# Patient Record
Sex: FEMALE | Race: WHITE | NOT HISPANIC OR LATINO | Employment: OTHER | URBAN - NONMETROPOLITAN AREA
[De-identification: names, ages, dates, MRNs, and addresses within clinical notes are randomized per-mention and may not be internally consistent; named-entity substitution may affect disease eponyms.]

---

## 2017-01-10 ENCOUNTER — HOSPITAL ENCOUNTER (OUTPATIENT)
Dept: GENERAL RADIOLOGY | Facility: HOSPITAL | Age: 77
Discharge: HOME | End: 2017-01-10
Attending: INTERNAL MEDICINE | Admitting: INTERNAL MEDICINE

## 2017-01-10 ENCOUNTER — TRANSCRIBE ORDERS (OUTPATIENT)
Dept: ADMINISTRATIVE | Facility: HOSPITAL | Age: 77
End: 2017-01-10

## 2017-01-10 ENCOUNTER — HOSPITAL ENCOUNTER (OUTPATIENT)
Dept: GENERAL RADIOLOGY | Facility: HOSPITAL | Age: 77
Discharge: HOME | End: 2017-01-10
Attending: INTERNAL MEDICINE

## 2017-01-10 DIAGNOSIS — M51.06 INTERVERTEBRAL LUMBAR DISC DISORDER WITH MYELOPATHY, LUMBAR REGION: ICD-10-CM

## 2017-01-10 DIAGNOSIS — M51.06 INTERVERTEBRAL LUMBAR DISC DISORDER WITH MYELOPATHY, LUMBAR REGION: Primary | ICD-10-CM

## 2017-01-10 PROCEDURE — 72110 X-RAY EXAM L-2 SPINE 4/>VWS: CPT

## 2017-01-10 PROCEDURE — 72070 X-RAY EXAM THORAC SPINE 2VWS: CPT

## 2017-01-10 PROCEDURE — 72072 X-RAY EXAM THORAC SPINE 3VWS: CPT | Performed by: RADIOLOGY

## 2017-01-10 PROCEDURE — 72110 X-RAY EXAM L-2 SPINE 4/>VWS: CPT | Performed by: RADIOLOGY

## 2017-01-20 ENCOUNTER — TRANSCRIBE ORDERS (OUTPATIENT)
Dept: ADMINISTRATIVE | Facility: HOSPITAL | Age: 77
End: 2017-01-20

## 2017-01-20 DIAGNOSIS — M51.04 THORACIC DISC DISEASE WITH MYELOPATHY: Primary | ICD-10-CM

## 2017-01-20 DIAGNOSIS — M51.06 DISC DISEASE WITH MYELOPATHY, LUMBAR: ICD-10-CM

## 2017-01-21 ENCOUNTER — HOSPITAL ENCOUNTER (OUTPATIENT)
Dept: MRI IMAGING | Facility: HOSPITAL | Age: 77
Discharge: HOME | End: 2017-01-21
Attending: INTERNAL MEDICINE | Admitting: INTERNAL MEDICINE

## 2017-01-21 ENCOUNTER — HOSPITAL ENCOUNTER (OUTPATIENT)
Dept: MRI IMAGING | Facility: HOSPITAL | Age: 77
Discharge: HOME | End: 2017-01-21
Attending: INTERNAL MEDICINE

## 2017-01-21 DIAGNOSIS — M51.04 THORACIC DISC DISEASE WITH MYELOPATHY: ICD-10-CM

## 2017-01-21 DIAGNOSIS — M51.06 DISC DISEASE WITH MYELOPATHY, LUMBAR: ICD-10-CM

## 2017-01-21 PROCEDURE — 72148 MRI LUMBAR SPINE W/O DYE: CPT

## 2017-01-21 PROCEDURE — 72148 MRI LUMBAR SPINE W/O DYE: CPT | Performed by: RADIOLOGY

## 2017-01-21 PROCEDURE — 72146 MRI CHEST SPINE W/O DYE: CPT

## 2017-01-21 PROCEDURE — 72146 MRI CHEST SPINE W/O DYE: CPT | Performed by: RADIOLOGY

## 2017-05-06 ENCOUNTER — HOSPITAL ENCOUNTER (EMERGENCY)
Facility: HOSPITAL | Age: 77
Discharge: HOME | End: 2017-05-06
Attending: FAMILY MEDICINE | Admitting: FAMILY MEDICINE

## 2017-05-06 ENCOUNTER — APPOINTMENT (OUTPATIENT)
Dept: CT IMAGING | Facility: HOSPITAL | Age: 77
End: 2017-05-06

## 2017-05-06 ENCOUNTER — APPOINTMENT (OUTPATIENT)
Dept: GENERAL RADIOLOGY | Facility: HOSPITAL | Age: 77
End: 2017-05-06

## 2017-05-06 VITALS
SYSTOLIC BLOOD PRESSURE: 124 MMHG | RESPIRATION RATE: 18 BRPM | OXYGEN SATURATION: 96 % | TEMPERATURE: 97.9 F | HEART RATE: 108 BPM | WEIGHT: 85 LBS | DIASTOLIC BLOOD PRESSURE: 80 MMHG | HEIGHT: 64 IN | BODY MASS INDEX: 14.51 KG/M2

## 2017-05-06 DIAGNOSIS — R31.9 HEMATURIA: ICD-10-CM

## 2017-05-06 DIAGNOSIS — J98.11 ATELECTASIS OF LEFT LUNG: ICD-10-CM

## 2017-05-06 DIAGNOSIS — M25.552 LEFT HIP PAIN: Primary | ICD-10-CM

## 2017-05-06 LAB
BACTERIA UR QL AUTO: ABNORMAL /HPF
BILIRUB UR QL STRIP: NEGATIVE
CLARITY UR: ABNORMAL
COLOR UR: YELLOW
GLUCOSE UR STRIP-MCNC: NEGATIVE MG/DL
HGB UR QL STRIP.AUTO: ABNORMAL
HYALINE CASTS UR QL AUTO: ABNORMAL /LPF
KETONES UR QL STRIP: ABNORMAL
LEUKOCYTE ESTERASE UR QL STRIP.AUTO: NEGATIVE
NITRITE UR QL STRIP: NEGATIVE
PH UR STRIP.AUTO: <=5 [PH] (ref 5–8)
PROT UR QL STRIP: ABNORMAL
RBC # UR: ABNORMAL /HPF
REF LAB TEST METHOD: ABNORMAL
SP GR UR STRIP: 1.02 (ref 1–1.03)
SQUAMOUS #/AREA URNS HPF: ABNORMAL /HPF
UROBILINOGEN UR QL STRIP: ABNORMAL
WBC UR QL AUTO: ABNORMAL /HPF

## 2017-05-06 PROCEDURE — 74176 CT ABD & PELVIS W/O CONTRAST: CPT | Performed by: RADIOLOGY

## 2017-05-06 PROCEDURE — P9612 CATHETERIZE FOR URINE SPEC: HCPCS

## 2017-05-06 PROCEDURE — 99284 EMERGENCY DEPT VISIT MOD MDM: CPT

## 2017-05-06 PROCEDURE — 81001 URINALYSIS AUTO W/SCOPE: CPT | Performed by: NURSE PRACTITIONER

## 2017-05-06 PROCEDURE — 73502 X-RAY EXAM HIP UNI 2-3 VIEWS: CPT

## 2017-05-06 PROCEDURE — 74176 CT ABD & PELVIS W/O CONTRAST: CPT

## 2017-05-06 PROCEDURE — 73502 X-RAY EXAM HIP UNI 2-3 VIEWS: CPT | Performed by: RADIOLOGY

## 2017-05-06 RX ORDER — KETOROLAC TROMETHAMINE 10 MG/1
10 TABLET, FILM COATED ORAL ONCE
Status: COMPLETED | OUTPATIENT
Start: 2017-05-06 | End: 2017-05-06

## 2017-05-06 RX ORDER — HYDROCODONE BITARTRATE AND ACETAMINOPHEN 7.5; 325 MG/1; MG/1
1 TABLET ORAL 2 TIMES DAILY PRN
Status: ON HOLD | COMMUNITY
End: 2017-07-14

## 2017-05-06 RX ORDER — CEPHALEXIN 500 MG/1
500 CAPSULE ORAL 3 TIMES DAILY
Qty: 21 CAPSULE | Refills: 0 | Status: ON HOLD | OUTPATIENT
Start: 2017-05-06 | End: 2017-07-14

## 2017-05-06 RX ORDER — ONDANSETRON 4 MG/1
4 TABLET, ORALLY DISINTEGRATING ORAL ONCE
Status: COMPLETED | OUTPATIENT
Start: 2017-05-06 | End: 2017-05-06

## 2017-05-06 RX ORDER — GABAPENTIN 100 MG/1
100 CAPSULE ORAL EVERY MORNING
COMMUNITY
End: 2017-08-04 | Stop reason: HOSPADM

## 2017-05-06 RX ADMIN — ONDANSETRON 4 MG: 4 TABLET, ORALLY DISINTEGRATING ORAL at 21:47

## 2017-05-06 RX ADMIN — KETOROLAC TROMETHAMINE 10 MG: 10 TABLET, FILM COATED ORAL at 21:47

## 2017-07-14 ENCOUNTER — HOSPITAL ENCOUNTER (INPATIENT)
Facility: HOSPITAL | Age: 77
LOS: 7 days | Discharge: NURSING, REHAB OR LONG-TERM CARE | End: 2017-07-21
Attending: INTERNAL MEDICINE | Admitting: INTERNAL MEDICINE

## 2017-07-14 ENCOUNTER — APPOINTMENT (OUTPATIENT)
Dept: GENERAL RADIOLOGY | Facility: HOSPITAL | Age: 77
End: 2017-07-14

## 2017-07-14 ENCOUNTER — APPOINTMENT (OUTPATIENT)
Dept: GENERAL RADIOLOGY | Facility: HOSPITAL | Age: 77
End: 2017-07-14
Attending: INTERNAL MEDICINE

## 2017-07-14 DIAGNOSIS — E86.0 DEHYDRATION: Primary | ICD-10-CM

## 2017-07-14 LAB
ALBUMIN SERPL-MCNC: 4 G/DL (ref 3.4–4.8)
ALBUMIN/GLOB SERPL: 1 G/DL (ref 1.5–2.5)
ALP SERPL-CCNC: 85 U/L (ref 35–104)
ALT SERPL W P-5'-P-CCNC: 4 U/L (ref 10–36)
ANION GAP SERPL CALCULATED.3IONS-SCNC: 6.3 MMOL/L (ref 3.6–11.2)
AST SERPL-CCNC: 15 U/L (ref 10–30)
BASOPHILS # BLD AUTO: 0.01 10*3/MM3 (ref 0–0.3)
BASOPHILS NFR BLD AUTO: 0.1 % (ref 0–2)
BILIRUB SERPL-MCNC: 0.4 MG/DL (ref 0.2–1.8)
BUN BLD-MCNC: 8 MG/DL (ref 7–21)
BUN/CREAT SERPL: 14.3 (ref 7–25)
CALCIUM SPEC-SCNC: 9.8 MG/DL (ref 7.7–10)
CHLORIDE SERPL-SCNC: 105 MMOL/L (ref 99–112)
CO2 SERPL-SCNC: 32.7 MMOL/L (ref 24.3–31.9)
CREAT BLD-MCNC: 0.56 MG/DL (ref 0.43–1.29)
CRP SERPL-MCNC: 2.34 MG/DL (ref 0–0.99)
DEPRECATED RDW RBC AUTO: 50.5 FL (ref 37–54)
EOSINOPHIL # BLD AUTO: 0.14 10*3/MM3 (ref 0–0.7)
EOSINOPHIL NFR BLD AUTO: 1.5 % (ref 0–7)
ERYTHROCYTE [DISTWIDTH] IN BLOOD BY AUTOMATED COUNT: 14.8 % (ref 11.5–14.5)
GFR SERPL CREATININE-BSD FRML MDRD: 105 ML/MIN/1.73
GLOBULIN UR ELPH-MCNC: 4 GM/DL
GLUCOSE BLD-MCNC: 92 MG/DL (ref 70–110)
HCT VFR BLD AUTO: 36.2 % (ref 37–47)
HGB BLD-MCNC: 10.8 G/DL (ref 12–16)
IMM GRANULOCYTES # BLD: 0.03 10*3/MM3 (ref 0–0.03)
IMM GRANULOCYTES NFR BLD: 0.3 % (ref 0–0.5)
LYMPHOCYTES # BLD AUTO: 0.95 10*3/MM3 (ref 1–3)
LYMPHOCYTES NFR BLD AUTO: 10.2 % (ref 16–46)
MCH RBC QN AUTO: 27.5 PG (ref 27–33)
MCHC RBC AUTO-ENTMCNC: 29.8 G/DL (ref 33–37)
MCV RBC AUTO: 92.1 FL (ref 80–94)
MONOCYTES # BLD AUTO: 0.85 10*3/MM3 (ref 0.1–0.9)
MONOCYTES NFR BLD AUTO: 9.1 % (ref 0–12)
NEUTROPHILS # BLD AUTO: 7.34 10*3/MM3 (ref 1.4–6.5)
NEUTROPHILS NFR BLD AUTO: 78.8 % (ref 40–75)
OSMOLALITY SERPL CALC.SUM OF ELEC: 284.8 MOSM/KG (ref 273–305)
PLATELET # BLD AUTO: 285 10*3/MM3 (ref 130–400)
PMV BLD AUTO: 10.1 FL (ref 6–10)
POTASSIUM BLD-SCNC: 3.1 MMOL/L (ref 3.5–5.3)
PROT SERPL-MCNC: 8 G/DL (ref 6–8)
RBC # BLD AUTO: 3.93 10*6/MM3 (ref 4.2–5.4)
SODIUM BLD-SCNC: 144 MMOL/L (ref 135–153)
WBC NRBC COR # BLD: 9.32 10*3/MM3 (ref 4.5–12.5)

## 2017-07-14 PROCEDURE — 74230 X-RAY XM SWLNG FUNCJ C+: CPT | Performed by: RADIOLOGY

## 2017-07-14 PROCEDURE — 74230 X-RAY XM SWLNG FUNCJ C+: CPT

## 2017-07-14 PROCEDURE — 92610 EVALUATE SWALLOWING FUNCTION: CPT

## 2017-07-14 PROCEDURE — G8996 SWALLOW CURRENT STATUS: HCPCS

## 2017-07-14 PROCEDURE — 93005 ELECTROCARDIOGRAM TRACING: CPT | Performed by: INTERNAL MEDICINE

## 2017-07-14 PROCEDURE — 93010 ELECTROCARDIOGRAM REPORT: CPT | Performed by: INTERNAL MEDICINE

## 2017-07-14 PROCEDURE — 74022 RADEX COMPL AQT ABD SERIES: CPT | Performed by: RADIOLOGY

## 2017-07-14 PROCEDURE — 85025 COMPLETE CBC W/AUTO DIFF WBC: CPT | Performed by: INTERNAL MEDICINE

## 2017-07-14 PROCEDURE — 25010000002 ENOXAPARIN PER 10 MG: Performed by: INTERNAL MEDICINE

## 2017-07-14 PROCEDURE — 74022 RADEX COMPL AQT ABD SERIES: CPT

## 2017-07-14 PROCEDURE — C1751 CATH, INF, PER/CENT/MIDLINE: HCPCS

## 2017-07-14 PROCEDURE — 86140 C-REACTIVE PROTEIN: CPT | Performed by: INTERNAL MEDICINE

## 2017-07-14 PROCEDURE — G8997 SWALLOW GOAL STATUS: HCPCS

## 2017-07-14 PROCEDURE — G8998 SWALLOW D/C STATUS: HCPCS

## 2017-07-14 PROCEDURE — 80053 COMPREHEN METABOLIC PANEL: CPT | Performed by: INTERNAL MEDICINE

## 2017-07-14 RX ORDER — POTASSIUM CHLORIDE 1.5 G/1.77G
40 POWDER, FOR SOLUTION ORAL AS NEEDED
Status: DISCONTINUED | OUTPATIENT
Start: 2017-07-14 | End: 2017-07-21 | Stop reason: HOSPADM

## 2017-07-14 RX ORDER — CYPROHEPTADINE HYDROCHLORIDE 4 MG/1
4 TABLET ORAL 3 TIMES DAILY
Status: DISCONTINUED | OUTPATIENT
Start: 2017-07-14 | End: 2017-07-21 | Stop reason: HOSPADM

## 2017-07-14 RX ORDER — SODIUM CHLORIDE 0.9 % (FLUSH) 0.9 %
10 SYRINGE (ML) INJECTION AS NEEDED
Status: DISCONTINUED | OUTPATIENT
Start: 2017-07-14 | End: 2017-07-21 | Stop reason: HOSPADM

## 2017-07-14 RX ORDER — POTASSIUM CHLORIDE 750 MG/1
40 CAPSULE, EXTENDED RELEASE ORAL AS NEEDED
Status: DISCONTINUED | OUTPATIENT
Start: 2017-07-14 | End: 2017-07-21 | Stop reason: HOSPADM

## 2017-07-14 RX ORDER — MAGNESIUM SULFATE HEPTAHYDRATE 40 MG/ML
4 INJECTION, SOLUTION INTRAVENOUS AS NEEDED
Status: DISCONTINUED | OUTPATIENT
Start: 2017-07-14 | End: 2017-07-21 | Stop reason: HOSPADM

## 2017-07-14 RX ORDER — MAGNESIUM SULFATE HEPTAHYDRATE 40 MG/ML
2 INJECTION, SOLUTION INTRAVENOUS AS NEEDED
Status: DISCONTINUED | OUTPATIENT
Start: 2017-07-14 | End: 2017-07-21 | Stop reason: HOSPADM

## 2017-07-14 RX ORDER — GABAPENTIN 300 MG/1
300 CAPSULE ORAL NIGHTLY
Status: DISCONTINUED | OUTPATIENT
Start: 2017-07-14 | End: 2017-07-21 | Stop reason: HOSPADM

## 2017-07-14 RX ORDER — LIDOCAINE HYDROCHLORIDE 10 MG/ML
20 INJECTION, SOLUTION INFILTRATION; PERINEURAL ONCE
Status: DISCONTINUED | OUTPATIENT
Start: 2017-07-14 | End: 2017-07-21 | Stop reason: HOSPADM

## 2017-07-14 RX ORDER — GABAPENTIN 300 MG/1
300 CAPSULE ORAL NIGHTLY
COMMUNITY
End: 2017-08-04 | Stop reason: HOSPADM

## 2017-07-14 RX ORDER — ACETAMINOPHEN 325 MG/1
1000 TABLET ORAL EVERY 6 HOURS PRN
Status: DISCONTINUED | OUTPATIENT
Start: 2017-07-14 | End: 2017-07-21 | Stop reason: HOSPADM

## 2017-07-14 RX ORDER — ACETAMINOPHEN 500 MG
1000 TABLET ORAL EVERY 6 HOURS PRN
COMMUNITY
End: 2017-10-16 | Stop reason: ALTCHOICE

## 2017-07-14 RX ORDER — GABAPENTIN 100 MG/1
100 CAPSULE ORAL EVERY MORNING
Status: CANCELLED | OUTPATIENT
Start: 2017-07-14

## 2017-07-14 RX ORDER — GABAPENTIN 100 MG/1
100 CAPSULE ORAL EVERY MORNING
Status: DISCONTINUED | OUTPATIENT
Start: 2017-07-14 | End: 2017-07-21 | Stop reason: HOSPADM

## 2017-07-14 RX ORDER — CYPROHEPTADINE HYDROCHLORIDE 4 MG/1
4 TABLET ORAL 3 TIMES DAILY
COMMUNITY
End: 2017-10-16 | Stop reason: ALTCHOICE

## 2017-07-14 RX ORDER — SODIUM CHLORIDE 0.9 % (FLUSH) 0.9 %
10 SYRINGE (ML) INJECTION EVERY 12 HOURS SCHEDULED
Status: DISCONTINUED | OUTPATIENT
Start: 2017-07-14 | End: 2017-07-21 | Stop reason: HOSPADM

## 2017-07-14 RX ORDER — DEXTROSE AND SODIUM CHLORIDE 5; .45 G/100ML; G/100ML
75 INJECTION, SOLUTION INTRAVENOUS CONTINUOUS
Status: DISCONTINUED | OUTPATIENT
Start: 2017-07-14 | End: 2017-07-19

## 2017-07-14 RX ORDER — POTASSIUM CHLORIDE 20 MEQ/1
40 TABLET, EXTENDED RELEASE ORAL EVERY 4 HOURS
Status: COMPLETED | OUTPATIENT
Start: 2017-07-14 | End: 2017-07-15

## 2017-07-14 RX ADMIN — GABAPENTIN 300 MG: 300 CAPSULE ORAL at 20:33

## 2017-07-14 RX ADMIN — POTASSIUM CHLORIDE 40 MEQ: 1500 TABLET, EXTENDED RELEASE ORAL at 23:44

## 2017-07-14 RX ADMIN — DEXTROSE AND SODIUM CHLORIDE 125 ML/HR: 5; 450 INJECTION, SOLUTION INTRAVENOUS at 16:00

## 2017-07-14 RX ADMIN — GABAPENTIN 100 MG: 100 CAPSULE ORAL at 14:17

## 2017-07-14 RX ADMIN — PANTOPRAZOLE SODIUM 8 MG/HR: 40 INJECTION, POWDER, FOR SOLUTION INTRAVENOUS at 20:33

## 2017-07-14 RX ADMIN — ENOXAPARIN SODIUM 40 MG: 40 INJECTION SUBCUTANEOUS at 14:17

## 2017-07-14 RX ADMIN — PANTOPRAZOLE SODIUM 8 MG/HR: 40 INJECTION, POWDER, FOR SOLUTION INTRAVENOUS at 16:00

## 2017-07-14 RX ADMIN — CYPROHEPTADINE HYDROCHLORIDE 4 MG: 4 TABLET ORAL at 20:34

## 2017-07-14 RX ADMIN — POTASSIUM CHLORIDE 40 MEQ: 1500 TABLET, EXTENDED RELEASE ORAL at 20:34

## 2017-07-14 RX ADMIN — Medication 10 ML: at 20:35

## 2017-07-14 NOTE — PROGRESS NOTES
Discharge Planning Assessment  [facility]     Patient Name: Tamie Rodriguez  MRN: [id number]  Today's Date: 7/14/2017    Admit Date: 7/14/2017          Discharge Needs Assessment       07/14/17 1621    Living Environment    Lives With spouse;child(alla), adult;other relative(s) (specify)   Pt lives with spouse, Jerad, daughter and great-grandchild.     Living Environment    Quality Of Family Relationships supportive    Able to Return to Prior Living Arrangements --   Pt states she may need nursing home placement at [facility]. Pt to inform SS if nursing home placement is needed.     Discharge Needs Assessment    Equipment Currently Used at Home hospital bed;wheelchair;rollator   Pt has a hospital bed, wheelchair and rollator from [facility].     Discharge Facility/Level Of Care Needs --   Pt does not utilize home health services. Pt to inform SS if nursing home placement is needed at [facility].             Discharge Plan       07/14/17 1625    Case Management/Social Work Plan    Plan Pt lives with spouse, Jerad, daughter and great-grandchild. Pt states she may need nursing home placement at [facility]. Pt to inform SS if nursing home placement is needed. Pt does not utilize home health services. Pt has a hospital bed, wheelchair and rollator from [facility]. SS to follow and assist as needed with discharge planning.     Patient/Family In Agreement With Plan yes                  Demographic Summary       07/14/17 1621    Referral Information    Referral Source nursing    Reason For Consult --   SS received consult discharge planning. SS spoke to pt.     Primary Care Physician Information    Name Dr. Fulton and pt also see's Dr. Kay       Legal       07/14/17 1621    Legal    Legal Comments Pt does not have a POA or advance directive.      Saira Hou

## 2017-07-14 NOTE — PLAN OF CARE
Problem: Patient Care Overview (Adult)  Goal: Plan of Care Review  Outcome: Ongoing (interventions implemented as appropriate)    07/14/17 1505   Coping/Psychosocial Response Interventions   Plan Of Care Reviewed With patient;family       Goal: Adult Individualization and Mutuality  Outcome: Ongoing (interventions implemented as appropriate)  Goal: Discharge Needs Assessment  Outcome: Ongoing (interventions implemented as appropriate)    Problem: Fluid Volume Deficit (Adult)  Goal: Identify Related Risk Factors and Signs and Symptoms  Outcome: Ongoing (interventions implemented as appropriate)  Goal: Fluid/Electrolyte Balance  Outcome: Ongoing (interventions implemented as appropriate)    07/14/17 1505   Fluid Volume Deficit (Adult)   Fluid/Electrolyte Balance making progress toward outcome       Goal: Comfort/Well Being  Outcome: Ongoing (interventions implemented as appropriate)    07/14/17 1505   Fluid Volume Deficit (Adult)   Comfort/Well Being making progress toward outcome         Problem: Fall Risk (Adult)  Goal: Identify Related Risk Factors and Signs and Symptoms  Outcome: Ongoing (interventions implemented as appropriate)  Goal: Absence of Falls  Outcome: Ongoing (interventions implemented as appropriate)    07/14/17 1505   Fall Risk (Adult)   Absence of Falls making progress toward outcome         Problem: Nutrition, Imbalanced: Inadequate Oral Intake (Adult)  Goal: Identify Related Risk Factors and Signs and Symptoms  Outcome: Ongoing (interventions implemented as appropriate)  Goal: Improved Oral Intake  Outcome: Ongoing (interventions implemented as appropriate)    07/14/17 1505   Nutrition, Imbalanced: Inadequate Oral Intake (Adult)   Improved Oral Intake making progress toward outcome       Goal: Prevent Further Weight Loss  Outcome: Ongoing (interventions implemented as appropriate)    07/14/17 1505   Nutrition, Imbalanced: Inadequate Oral Intake (Adult)   Prevent Further Weight Loss making progress  toward outcome         Problem: Pressure Ulcer Risk (Victor Hugo Scale) (Adult,Obstetrics,Pediatric)  Goal: Identify Related Risk Factors and Signs and Symptoms  Outcome: Ongoing (interventions implemented as appropriate)  Goal: Skin Integrity  Outcome: Ongoing (interventions implemented as appropriate)    07/14/17 1505   Pressure Ulcer Risk (Victor Hugo Scale) (Adult,Obstetrics,Pediatric)   Skin Integrity making progress toward outcome

## 2017-07-14 NOTE — MBS/VFSS/FEES
Acute Care - Speech Language Pathology   Swallow Initial Evaluation  [facility]   MODIFIED BARIUM SWALLOW STUDY     Patient Name: Tamie Rodriguez  : 1940  MRN: [id number]  Today's Date: 2017             Admit Date: 2017    Tamie Rodriguez  presents to the radiology suite this am from 3340/1S to participate in an instrumental MBS to evaluate safety/efficacy of swallowing fnx, determine safest/least restrictive diet. She is admitted w/ dehydration. She reports poor appetite w/ recent weight loss of approximately 20 pounds. She reports odynophagia.      Social History     Social History   • Marital status:      Spouse name: N/A   • Number of children: N/A   • Years of education: N/A     Occupational History   • Not on file.     Social History Main Topics   • Smoking status: Never Smoker   • Smokeless tobacco: Not on file   • Alcohol use No   • Drug use: No   • Sexual activity: Defer     Other Topics Concern   • Not on file     Social History Narrative    Chest xray not available for review.     Diet Orders (active)     None        Observed on ra w/o complications.     Risks and benefits of the procedure are explained w/ verbalizing understanding/agreement to participate. Proceed per protocol.     Ms. Rodriguez is positioned upright and centered in a wheel chair to accept multiple po presentations of solid cracker, puree, honey thick, nectar thick, and thin liquids via spoon, cup and straw, along w/ whole placebo pill in puree. She is able to self feed.     All views are from the lateral plane.     Facial/oral structures are symmetrical upon observation w/o lingual deviation upon protrusion. Oral mucosa are moist, pink and clean. Secretions are clear, thin and well controlled. OROM/EVANS is generally weak w/ slight delayed imitation of oral postures. Gag is not assessed. Volitional cough is adequate in intensity, clear in quality, nonproductive. Vocal quality is adequate in intensity, clear in quality w/  intelligible speech. Pt is a/a and cooperative to particpate.  She is oriented to person, place, and time, follows simple directives, and participates in simple conversational exchanges.     Upon po presentations, adequate bolus anticipation w/ good labial seal for bolus clearance via spoon bowl, cup rim stability and suction via straw.  Bolus formation, manipulation,  and control are generally weak w/fatigue effects w/ solids w/ rotary mastication pattern. A-p transit is timely w/o oral residue. Tongue base retraction and linguavelar seal are adequate w/o premature spillage. No laryngeal penetration or aspiration is evidenced before the swallow.     Pharyngeal swallow is timely w/ adequate hyolaryngeal elevation and epiglottic inversion. Pharyngeal contraction is adequate.Mild diffuese pharyngeal residue w/all consistencies, controlled. No laryngeal penetration or aspiration evidenced during or after the swallow.     Full esophageal sweep reveals slightly delayed esophageal peristalsis w/all consistencies w/ intermittent retrograde flow to mid esophagus. No obvious no mucosal abnormalities. Please see full radiology note for details.              Visit Dx:   No diagnosis found.  Patient Active Problem List   Diagnosis   • Dehydration     Past Medical History:   Diagnosis Date   • Arthritis    • Meniere's disease      Past Surgical History:   Procedure Laterality Date   • APPENDECTOMY     • CHOLECYSTECTOMY     • HYSTERECTOMY     • TOTAL HIP ARTHROPLASTY Left    • TUBAL ABDOMINAL LIGATION     • VARICOSE VEIN SURGERY      times two   EDUCATION  The patient has been educated in the following areas:   Dysphagia (Swallowing Impairment) Modified Diet Instruction.    Impression: Ms. Rodriguez presents w/ a mild oral dysphagia secondary only to generalized weakness, wfl pharyngeal swallow w/o overt penetration or aspiration across this evaluation. She will benefit from modified po diet of mechanical soft, chopped meats, thin  liquids.  Esophageal sweep revealed poor peristalsis w/all consistencies w/ intermittent retrograde flow to mid esophagus. No LPR noted across this evaluation.    SLP Recommendation and Plan   1. Mechanical soft diet, chopped meats, thin liquids  2. Meds whole w/ puree/thins.   3. Upright/centered for all po intake.   4. SAURABH precautions.   5. Per c/o prolonged poor appetite, please consider candidacy for appetite stimulant vs. Nutritional supplements.   No further formal SLP f/u recommended.     D/w pt results and recs w/ verbal understanding.     D/w RNLorna, results and recs w/ verbal understanding.     Thank you-  Najma Bhatti M.S., [facility]/SLP                                                      Time Calculation:         Time Calculation- SLP       07/14/17 1343          Time Calculation- SLP    SLP Start Time 1300  -      SLP Stop Time 1400  -      SLP Time Calculation (min) 60 min  -      SLP Non-Billable Time (min) 15 min  -        User Key  (r) = Recorded By, (t) = Taken By, (c) = Cosigned By    Initials Name Provider Type     Najma Bhatti MS [facility]-SLP Speech Therapist          Therapy Charges for Today     Code Description Service Date Service Provider Modifiers Qty    10387539230 HC ST EVAL ORAL PHARYNG SWALLOW 2 7/14/2017 Najma Bhatti MS CCC-SLP GN 1    11706494367 HC ST SWALLOWING CURRENT STATUS 7/14/2017 Najma Bhatti MS CCC-SLP GN, CI 1    02438025895 HC ST SWALLOWING PROJECTED 7/14/2017 Najma Bhatti MS CCC-SLP GN, CI 1    82733693179 HC ST SWALLOWING DISCHARGE 7/14/2017 Najma Bhatti MS CCC-SLP GN, CI 1          SLP G-Codes  SLP NOMS Used?: Yes  Functional Limitations: Swallowing  Swallow Current Status (): At least 1 percent but less than 20 percent impaired, limited or restricted  Swallow Goal Status (): At least 1 percent but less than 20 percent impaired, limited or restricted  Swallow Discharge Status (): At least 1 percent but less than 20  percent impaired, limited or restricted    Najma Bhatti, MS CCC-SLP  7/14/2017

## 2017-07-14 NOTE — PROGRESS NOTES
Acute Care - Speech Language Pathology   Swallow Initial Evaluation [facility]     Patient Name: Tamie Rodriguez  : 1940  MRN: [id number]  Today's Date: 2017             Admit Date: 2017  Ms. Rodriguez is seen at bedside this pm on 3N to assess swallowing fnx.     She is positioned upright and centered in bed to accept presentations of thin water. She exhibits overt s/s aspiration w/ cough w/ all presentatiosn. She reports odynophagia w/ recent weight loss and poor appetite. Per this evaluation, she is felt to most benefit from further objective evaluation via MBS.     D/w RNLorna, who cleared pt to transfer from floor to radiology suite.     MBS this pm w/ further recs pending.   Visit Dx:   No diagnosis found.  Patient Active Problem List   Diagnosis   • Dehydration     Past Medical History:   Diagnosis Date   • Arthritis    • Meniere's disease      Past Surgical History:   Procedure Laterality Date   • APPENDECTOMY     • CHOLECYSTECTOMY     • HYSTERECTOMY     • TOTAL HIP ARTHROPLASTY Left    • TUBAL ABDOMINAL LIGATION     • VARICOSE VEIN SURGERY      times two     EDUCATION  The patient has been educated in the following areas:   Dysphagia (Swallowing Impairment).    SLP Recommendation and Plan   [facility] [place] w further recommendations pending                                                       Time Calculation:                Najma Bhatti, MS CCC-SLP  2017

## 2017-07-14 NOTE — PLAN OF CARE
Problem: Patient Care Overview (Adult)  Goal: Discharge Needs Assessment  Outcome: Ongoing (interventions implemented as appropriate)  Discharge Planning Assessment   [facility]     Patient Name: Tamie Rodriguez                    MRN: [id number]  Today's Date: 7/14/2017                     Admit Date: 7/14/2017             Discharge Needs Assessment        07/14/17 1621     Living Environment     Lives With spouse;child(alla), adult;other relative(s) (specify)   Pt lives with spouse, Jerad, daughter and great-grandchild.      Living Environment     Quality Of Family Relationships supportive     Able to Return to Prior Living Arrangements --   Pt states she may need nursing home placement at [facility] and [facility]. Pt to inform SS if nursing home placement is needed.      Discharge Needs Assessment     Equipment Currently Used at Home hospital bed;wheelchair;rollator   Pt has a hospital bed, wheelchair and rollator from [facility].      Discharge Facility/Level Of Care Needs --   Pt does not utilize home health services. Pt to inform SS if nursing home placement is needed at [facility] and [facility].        [facility]       Discharge Plan        07/14/17 1625     Case Management/Social Work Plan     Plan Pt lives with spouse, Jerad, daughter and great-grandchild. Pt states she may need nursing home placement at [facility]. Pt to inform SS if nursing home placement is needed. Pt does not utilize home health services. Pt has a hospital bed, wheelchair and rollator from [facility]. SS to follow and assist as needed with discharge planning.      Patient/Family In Agreement With Plan yes       [facility]       Demographic Summary        07/14/17 1621     Referral Information     Referral Source nursing     Reason For Consult --   SS received consult discharge planning. SS spoke to pt.      Primary Care Physician Information     Name Dr. Fulton and pt also see's Dr. Kay        Legal         07/14/17 5581     Legal     Legal Comments Pt does not have a POA or advance directive.        nyla Hou

## 2017-07-14 NOTE — H&P
Chief complaint severe weakness    Subjective     Patient is a 76 y.o. female presented to the office today complaining of severe weakness.  She describes not being able to eat or drink in the last 2-3 days.  Her family notes that she has lost another 10 pounds in the last month.  She has developed severe difficulty swallowing.  She also has difficulty performing any of her activities of daily living.  She recently been seen by the neurologist and diagnosed with severe neuropathy.  She denies any chest pain, palpitations nausea vomiting.  She notes that she's become so weak she is unable to perform any practice of daily living without assistance and her family is required to provide all of her care.  She notes this is certainly new for her    Review of Systems   Constitutional: Positive for activity change, appetite change, fatigue and unexpected weight change.   HENT: Positive for trouble swallowing and voice change. Negative for drooling, hearing loss, mouth sores, sinus pressure and sore throat.    Eyes: Negative for pain, discharge, itching and visual disturbance.   Respiratory: Negative for apnea, cough, chest tightness, shortness of breath, wheezing and stridor.    Cardiovascular: Negative for chest pain, palpitations and leg swelling.   Gastrointestinal: Positive for nausea. Negative for abdominal distention, abdominal pain, diarrhea and vomiting.        Difficulty swallowing and severe weight loss and anorexia.   Endocrine: Negative for cold intolerance, heat intolerance, polydipsia, polyphagia and polyuria.   Genitourinary: Negative for difficulty urinating, dyspareunia, dysuria, flank pain, frequency, hematuria, urgency and vaginal pain.   Musculoskeletal: Positive for arthralgias, back pain and gait problem. Negative for joint swelling, myalgias and neck pain.   Skin: Negative for color change, pallor and rash.   Allergic/Immunologic: Negative for environmental allergies, food allergies and  immunocompromised state.   Neurological: Positive for dizziness and weakness. Negative for tremors, seizures, syncope, facial asymmetry, speech difficulty, numbness and headaches.   Hematological: Negative for adenopathy. Does not bruise/bleed easily.   Psychiatric/Behavioral: Negative for agitation, behavioral problems, confusion, decreased concentration and self-injury. The patient is not nervous/anxious and is not hyperactive.         Past Medical History  Past Medical History:   Diagnosis Date   • Arthritis    • Meniere's disease      Surgical History  Past Surgical History:   Procedure Laterality Date   • APPENDECTOMY     • CHOLECYSTECTOMY     • HYSTERECTOMY     • TOTAL HIP ARTHROPLASTY Left    • TUBAL ABDOMINAL LIGATION     • VARICOSE VEIN SURGERY      times two     Family History  Family History   Problem Relation Age of Onset   • Cancer Mother    • Cancer Father      Social History  Social History   Substance Use Topics   • Smoking status: Never Smoker   • Smokeless tobacco: None   • Alcohol use No     Home Medications  Prescriptions Prior to Admission   Medication Sig Dispense Refill Last Dose   • acetaminophen (TYLENOL) 500 MG tablet Take 1,000 mg by mouth Every 6 (Six) Hours As Needed for Mild Pain (1-3).   7/13/2017 at Unknown time   • cyproheptadine (PERIACTIN) 4 MG tablet Take 4 mg by mouth 3 (Three) Times a Day.   7/14/2017 at 0830   • gabapentin (NEURONTIN) 100 MG capsule Take 100 mg by mouth Every Morning. 100MG IN AM AND 300MG AT BEDTIME   7/13/2017 at 0900   • gabapentin (NEURONTIN) 300 MG capsule Take 300 mg by mouth Every Night.   7/13/2017 at 2200         Allergies:  Darvon [propoxyphene] and Tramadol    Objective     Vital Signs  Temp:  [98.6 °F (37 °C)] 98.6 °F (37 °C)  Heart Rate:  [115] 115  Resp:  [18] 18  BP: (166)/(83) 166/83    Physical Exam:    Physical Exam   Constitutional: She is oriented to person, place, and time.   Thin elderly white female arousable and talkative diffuse  weakness no obvious pain distress   HENT:   Head: Normocephalic and atraumatic.   Mouth/Throat: No oropharyngeal exudate.   Bilateral temporal wasting.  Oral mucosa is dry   Eyes: Conjunctivae and EOM are normal. Pupils are equal, round, and reactive to light. Right eye exhibits no discharge. Left eye exhibits no discharge. No scleral icterus.   Neck: Normal range of motion. Neck supple. JVD present. No tracheal deviation present. No thyromegaly present.   Cardiovascular: Normal rate, regular rhythm, normal heart sounds and intact distal pulses.  Exam reveals no gallop and no friction rub.    No murmur heard.  Pulmonary/Chest: Effort normal and breath sounds normal. No stridor. No respiratory distress. She has no wheezes. She has no rales.   Abdominal: Soft. Bowel sounds are normal. She exhibits no distension and no mass. There is no tenderness.   Genitourinary:   Genitourinary Comments: No Bernard catheter   Musculoskeletal: She exhibits no edema or deformity.   Lymphadenopathy:     She has no cervical adenopathy.   Neurological: She is alert and oriented to person, place, and time. She has normal reflexes. She displays normal reflexes. No cranial nerve deficit. She exhibits normal muscle tone. Coordination normal.   Skin: Skin is warm. No rash noted. No erythema. No pallor.   Psychiatric:   Confused with detailed questions.   Nursing note and vitals reviewed.      Results Review:    I reviewed the patient's clinical results from admission:  Imaging Results (last 72 hours)     Procedure Component Value Units Date/Time    FL Video Swallow [[id number]] Collected:  07/14/17 1346     Updated:  07/14/17 1348    Narrative:       EXAMINATION: FL VIDEO SWALLOW-      CLINICAL INDICATION:     odynophagia     TECHNIQUE: Modified barium swallow was performed utilizing video  fluoroscopy in conjunction with the Speech Pathology team. The patient  ingested multiple barium media including thin barium, nectar, and honey  liquid as  well as barium pudding and a barium pudding coated cracker.   FLUOROSCOPY TIME: 104 s     COMPARISON: NONE      FINDINGS: Mild diffuse pharyngeal residue w/all consistencies,  controlled. No laryngeal penetration or aspiration. Slight delay in  esophageal motility w/ retrograde flow to mid esophagus intermittently,  eventually clearing. No obvious structural abnormalities.        Impression:       Mild diffuse pharyngeal residue w/all consistencies,  controlled. No laryngeal penetration or aspiration. Slight delay in  esophageal motility w/ retrograde flow to mid esophagus intermittently,  eventually clearing. No obvious structural abnormalities.      This report was finalized on 7/14/2017 1:46 PM by Dr. Rancho Carrasco MD.       XR Abdomen 2 View With Chest 1 View [[id number]] Collected:  07/14/17 1401     Updated:  07/14/17 1404    Narrative:       EXAMINATION: XR ABDOMEN 2 VW W CHEST 1 VW-      One frontal view of the chest  and two frontal views of the abdomen.     CLINICAL INDICATION:    76 years Female abdominal pain         COMPARISON: NONE     FINDINGS:  The cardiac silhouette is normal in size and configuration.   The lungs are aerated.   There is no pleural fluid. No free air beneath the diaphragm.  Supine and upright views of the abdomen show nonspecific bowel gas  pattern.  There is no evidence of bowel obstruction.  No intra-abdominal  soft tissue masses are demonstrated.   No pathological calcifications are identified.  Contrast material in the stomach. Abundant stool throughout the colon.    Impression:       Abundant stool throughout the colon.     This report was finalized on 7/14/2017 2:01 PM by Dr. Rancho Carrasco MD.             LABS:    Lab Results   Component Value Date    GLUCOSE 92 07/14/2017    GLUCOSE 90 11/11/2016    GLUCOSE 105 08/16/2016    BUN 8 07/14/2017    CREATININE 0.56 07/14/2017    EGFRIFNONA 105 07/14/2017    BCR 14.3 07/14/2017    CO2 32.7 (H) 07/14/2017    CALCIUM 9.8 07/14/2017     ALBUMIN 4.00 07/14/2017    LABIL2 1.0 (L) 07/14/2017    AST 15 07/14/2017    ALT 4 (L) 07/14/2017    WBC 9.32 07/14/2017    WBC 7.97 08/16/2016    WBC 4.9 05/11/2016    HGB 10.8 (L) 07/14/2017    HCT 36.2 (L) 07/14/2017    MCV 92.1 07/14/2017     07/14/2017     07/14/2017     11/11/2016     08/16/2016    K 3.1 (L) 07/14/2017    K 3.5 11/11/2016    K 3.8 08/16/2016     07/14/2017    ANIONGAP 6.3 07/14/2017       No results found for: INR, PROTIME              Assessment/Plan     1) Dehydration  2) hypokalemia  3) severe dysphagia  4) severe weight loss  5) debility  6) anemia-multifactorial  7) dementia      I discussed the patients findings and my recommendations with patient and nursing staff.     Link Fulton MD  07/14/17  6:01 PM    Time: 32 minutes of time with history of physical coordination of care

## 2017-07-15 LAB
ALBUMIN SERPL-MCNC: 3.3 G/DL (ref 3.4–4.8)
ALBUMIN/GLOB SERPL: 1 G/DL (ref 1.5–2.5)
ALP SERPL-CCNC: 69 U/L (ref 35–104)
ALT SERPL W P-5'-P-CCNC: 5 U/L (ref 10–36)
AMMONIA BLD-SCNC: 52 UMOL/L (ref 11–51)
AMYLASE SERPL-CCNC: 192 U/L (ref 28–100)
ANION GAP SERPL CALCULATED.3IONS-SCNC: 2.5 MMOL/L (ref 3.6–11.2)
AST SERPL-CCNC: 14 U/L (ref 10–30)
BACTERIA UR QL AUTO: ABNORMAL /HPF
BASOPHILS # BLD AUTO: 0.03 10*3/MM3 (ref 0–0.3)
BASOPHILS NFR BLD AUTO: 0.5 % (ref 0–2)
BILIRUB SERPL-MCNC: 0.4 MG/DL (ref 0.2–1.8)
BILIRUB UR QL STRIP: NEGATIVE
BUN BLD-MCNC: 8 MG/DL (ref 7–21)
BUN/CREAT SERPL: 14.5 (ref 7–25)
CALCIUM SPEC-SCNC: 9 MG/DL (ref 7.7–10)
CHLORIDE SERPL-SCNC: 108 MMOL/L (ref 99–112)
CLARITY UR: CLEAR
CO2 SERPL-SCNC: 30.5 MMOL/L (ref 24.3–31.9)
COLOR UR: YELLOW
CREAT BLD-MCNC: 0.55 MG/DL (ref 0.43–1.29)
CRP SERPL-MCNC: 2.84 MG/DL (ref 0–0.99)
DEPRECATED RDW RBC AUTO: 48.6 FL (ref 37–54)
EOSINOPHIL # BLD AUTO: 0.35 10*3/MM3 (ref 0–0.7)
EOSINOPHIL NFR BLD AUTO: 5.6 % (ref 0–7)
ERYTHROCYTE [DISTWIDTH] IN BLOOD BY AUTOMATED COUNT: 14.9 % (ref 11.5–14.5)
GFR SERPL CREATININE-BSD FRML MDRD: 107 ML/MIN/1.73
GLOBULIN UR ELPH-MCNC: 3.3 GM/DL
GLUCOSE BLD-MCNC: 107 MG/DL (ref 70–110)
GLUCOSE UR STRIP-MCNC: NEGATIVE MG/DL
HCT VFR BLD AUTO: 31 % (ref 37–47)
HGB BLD-MCNC: 9.1 G/DL (ref 12–16)
HGB UR QL STRIP.AUTO: ABNORMAL
HYALINE CASTS UR QL AUTO: ABNORMAL /LPF
IMM GRANULOCYTES # BLD: 0.02 10*3/MM3 (ref 0–0.03)
IMM GRANULOCYTES NFR BLD: 0.3 % (ref 0–0.5)
KETONES UR QL STRIP: NEGATIVE
LEUKOCYTE ESTERASE UR QL STRIP.AUTO: ABNORMAL
LIPASE SERPL-CCNC: 27 U/L (ref 13–60)
LYMPHOCYTES # BLD AUTO: 1.61 10*3/MM3 (ref 1–3)
LYMPHOCYTES NFR BLD AUTO: 25.7 % (ref 16–46)
MCH RBC QN AUTO: 27.9 PG (ref 27–33)
MCHC RBC AUTO-ENTMCNC: 29.4 G/DL (ref 33–37)
MCV RBC AUTO: 95.1 FL (ref 80–94)
MONOCYTES # BLD AUTO: 1.04 10*3/MM3 (ref 0.1–0.9)
MONOCYTES NFR BLD AUTO: 16.6 % (ref 0–12)
NEUTROPHILS # BLD AUTO: 3.21 10*3/MM3 (ref 1.4–6.5)
NEUTROPHILS NFR BLD AUTO: 51.3 % (ref 40–75)
NITRITE UR QL STRIP: NEGATIVE
OSMOLALITY SERPL CALC.SUM OF ELEC: 280.1 MOSM/KG (ref 273–305)
PH UR STRIP.AUTO: 7.5 [PH] (ref 5–8)
PLATELET # BLD AUTO: 271 10*3/MM3 (ref 130–400)
PMV BLD AUTO: 9.8 FL (ref 6–10)
POTASSIUM BLD-SCNC: 3.6 MMOL/L (ref 3.5–5.3)
PROT SERPL-MCNC: 6.6 G/DL (ref 6–8)
PROT UR QL STRIP: NEGATIVE
RBC # BLD AUTO: 3.26 10*6/MM3 (ref 4.2–5.4)
RBC # UR: ABNORMAL /HPF
REF LAB TEST METHOD: ABNORMAL
SODIUM BLD-SCNC: 141 MMOL/L (ref 135–153)
SP GR UR STRIP: 1.01 (ref 1–1.03)
SQUAMOUS #/AREA URNS HPF: ABNORMAL /HPF
UROBILINOGEN UR QL STRIP: ABNORMAL
WBC NRBC COR # BLD: 6.26 10*3/MM3 (ref 4.5–12.5)
WBC UR QL AUTO: ABNORMAL /HPF

## 2017-07-15 PROCEDURE — 86140 C-REACTIVE PROTEIN: CPT | Performed by: INTERNAL MEDICINE

## 2017-07-15 PROCEDURE — 80053 COMPREHEN METABOLIC PANEL: CPT | Performed by: INTERNAL MEDICINE

## 2017-07-15 PROCEDURE — 85025 COMPLETE CBC W/AUTO DIFF WBC: CPT | Performed by: INTERNAL MEDICINE

## 2017-07-15 PROCEDURE — 82140 ASSAY OF AMMONIA: CPT | Performed by: INTERNAL MEDICINE

## 2017-07-15 PROCEDURE — 25010000002 ENOXAPARIN PER 10 MG: Performed by: INTERNAL MEDICINE

## 2017-07-15 PROCEDURE — 81001 URINALYSIS AUTO W/SCOPE: CPT | Performed by: INTERNAL MEDICINE

## 2017-07-15 PROCEDURE — 83690 ASSAY OF LIPASE: CPT | Performed by: INTERNAL MEDICINE

## 2017-07-15 PROCEDURE — 82150 ASSAY OF AMYLASE: CPT | Performed by: INTERNAL MEDICINE

## 2017-07-15 RX ORDER — LACTULOSE 10 G/15ML
10 SOLUTION ORAL 3 TIMES DAILY
Status: DISCONTINUED | OUTPATIENT
Start: 2017-07-15 | End: 2017-07-21 | Stop reason: HOSPADM

## 2017-07-15 RX ORDER — PANTOPRAZOLE SODIUM 40 MG/1
40 TABLET, DELAYED RELEASE ORAL
Status: DISCONTINUED | OUTPATIENT
Start: 2017-07-15 | End: 2017-07-21 | Stop reason: HOSPADM

## 2017-07-15 RX ADMIN — DEXTROSE AND SODIUM CHLORIDE 75 ML/HR: 5; 450 INJECTION, SOLUTION INTRAVENOUS at 12:18

## 2017-07-15 RX ADMIN — LACTULOSE 10 G: 20 SOLUTION ORAL at 12:17

## 2017-07-15 RX ADMIN — LACTULOSE 10 G: 20 SOLUTION ORAL at 20:47

## 2017-07-15 RX ADMIN — PANTOPRAZOLE SODIUM 8 MG/HR: 40 INJECTION, POWDER, FOR SOLUTION INTRAVENOUS at 01:45

## 2017-07-15 RX ADMIN — DEXTROSE AND SODIUM CHLORIDE 125 ML/HR: 5; 450 INJECTION, SOLUTION INTRAVENOUS at 00:33

## 2017-07-15 RX ADMIN — CYPROHEPTADINE HYDROCHLORIDE 4 MG: 4 TABLET ORAL at 08:53

## 2017-07-15 RX ADMIN — ENOXAPARIN SODIUM 40 MG: 40 INJECTION SUBCUTANEOUS at 15:13

## 2017-07-15 RX ADMIN — POTASSIUM CHLORIDE 40 MEQ: 1500 TABLET, EXTENDED RELEASE ORAL at 04:44

## 2017-07-15 RX ADMIN — Medication 10 ML: at 20:46

## 2017-07-15 RX ADMIN — CYPROHEPTADINE HYDROCHLORIDE 4 MG: 4 TABLET ORAL at 16:35

## 2017-07-15 RX ADMIN — LACTULOSE 10 G: 20 SOLUTION ORAL at 16:35

## 2017-07-15 RX ADMIN — DEXTROSE AND SODIUM CHLORIDE 75 ML/HR: 5; 450 INJECTION, SOLUTION INTRAVENOUS at 19:13

## 2017-07-15 RX ADMIN — PANTOPRAZOLE SODIUM 40 MG: 40 TABLET, DELAYED RELEASE ORAL at 12:17

## 2017-07-15 RX ADMIN — GABAPENTIN 100 MG: 100 CAPSULE ORAL at 06:57

## 2017-07-15 RX ADMIN — PANTOPRAZOLE SODIUM 8 MG/HR: 40 INJECTION, POWDER, FOR SOLUTION INTRAVENOUS at 07:05

## 2017-07-15 RX ADMIN — GABAPENTIN 300 MG: 300 CAPSULE ORAL at 20:47

## 2017-07-15 NOTE — PLAN OF CARE
Problem: Patient Care Overview (Adult)  Goal: Plan of Care Review  Outcome: Ongoing (interventions implemented as appropriate)    Problem: Fluid Volume Deficit (Adult)  Goal: Identify Related Risk Factors and Signs and Symptoms  Outcome: Ongoing (interventions implemented as appropriate)  Goal: Fluid/Electrolyte Balance  Outcome: Ongoing (interventions implemented as appropriate)  Goal: Comfort/Well Being  Outcome: Ongoing (interventions implemented as appropriate)    Problem: Fall Risk (Adult)  Goal: Identify Related Risk Factors and Signs and Symptoms  Outcome: Ongoing (interventions implemented as appropriate)  Goal: Absence of Falls  Outcome: Ongoing (interventions implemented as appropriate)    Problem: Nutrition, Imbalanced: Inadequate Oral Intake (Adult)  Goal: Identify Related Risk Factors and Signs and Symptoms  Outcome: Ongoing (interventions implemented as appropriate)  Goal: Improved Oral Intake  Outcome: Ongoing (interventions implemented as appropriate)  Goal: Prevent Further Weight Loss  Outcome: Ongoing (interventions implemented as appropriate)

## 2017-07-15 NOTE — PLAN OF CARE
Problem: Patient Care Overview (Adult)  Goal: Plan of Care Review  Outcome: Ongoing (interventions implemented as appropriate)    07/15/17 1130   Coping/Psychosocial Response Interventions   Plan Of Care Reviewed With patient;family   Patient Care Overview   Progress no change         Problem: Fluid Volume Deficit (Adult)  Goal: Identify Related Risk Factors and Signs and Symptoms  Outcome: Ongoing (interventions implemented as appropriate)  Goal: Fluid/Electrolyte Balance  Outcome: Ongoing (interventions implemented as appropriate)  Goal: Comfort/Well Being  Outcome: Ongoing (interventions implemented as appropriate)    Problem: Fall Risk (Adult)  Goal: Identify Related Risk Factors and Signs and Symptoms  Outcome: Ongoing (interventions implemented as appropriate)  Goal: Absence of Falls  Outcome: Ongoing (interventions implemented as appropriate)    Problem: Nutrition, Imbalanced: Inadequate Oral Intake (Adult)  Goal: Identify Related Risk Factors and Signs and Symptoms  Outcome: Ongoing (interventions implemented as appropriate)  Goal: Improved Oral Intake  Outcome: Ongoing (interventions implemented as appropriate)  Goal: Prevent Further Weight Loss  Outcome: Ongoing (interventions implemented as appropriate)    Problem: Pressure Ulcer Risk (Victor Hugo Scale) (Adult,Obstetrics,Pediatric)  Goal: Identify Related Risk Factors and Signs and Symptoms  Outcome: Ongoing (interventions implemented as appropriate)  Goal: Skin Integrity  Outcome: Ongoing (interventions implemented as appropriate)

## 2017-07-15 NOTE — PROGRESS NOTES
LOS: 1 day       Chief Complaint :   Generalized weakness and dehydration  Subjective     Interval History:     Patient Complaints:  Tamie Rodriguez  has been admitted with severe weakness, dehydration, weight loss.  She is feeling somewhat better after IV fluids.  She denies any fevers, chills, nausea or vomiting.  She continues with severe weakness and debility.    Review of Systems-unchanged since admission history and physical  Objective     Vital Signs  Temp:  [97.8 °F (36.6 °C)-98.7 °F (37.1 °C)] 97.8 °F (36.6 °C)  Heart Rate:  [] 78  Resp:  [16-18] 18  BP: (122-166)/(57-87) 150/65    Physical Exam    Constitutional: She is oriented to person, place, and time.   Thin elderly white female arousable and talkative diffuse weakness no obvious pain distress   HENT:   Head: Normocephalic and atraumatic.   Mouth/Throat: No oropharyngeal exudate.   Bilateral temporal wasting. Oral mucosa is dry   Eyes: Conjunctivae and EOM are normal. Pupils are equal, round, and reactive to light. Right eye exhibits no discharge. Left eye exhibits no discharge. No scleral icterus.   Neck: Normal range of motion. Neck supple. JVD present. No tracheal deviation present. No thyromegaly present.   Cardiovascular: Normal rate, regular rhythm, normal heart sounds and intact distal pulses. Exam reveals no gallop and no friction rub.   No murmur heard.  Pulmonary/Chest: Effort normal and breath sounds normal. No stridor. No respiratory distress. She has no wheezes. She has no rales.   Abdominal: Soft. Bowel sounds are normal. She exhibits no distension and no mass. There is no tenderness.   Genitourinary:   Genitourinary Comments: No Bernard catheter   Musculoskeletal: She exhibits no edema or deformity.   Lymphadenopathy:   She has no cervical adenopathy.   Neurological: She is alert and oriented to person, place, and time. She has normal reflexes. She displays normal reflexes. No cranial nerve deficit. She exhibits normal muscle  tone. Coordination normal.   Skin: Skin is warm. No rash noted. No erythema. No pallor.   Psychiatric:   Confused with detailed questions.   Nursing note and vitals reviewed.      Results Review:     I reviewed the patient's new clinical results  : See Below  MEDICATIONS:    cyproheptadine 4 mg Oral TID   enoxaparin 40 mg Subcutaneous Q24H   gabapentin 100 mg Oral QAM   gabapentin 300 mg Oral Nightly   lidocaine 20 mL Subcutaneous Once   sodium chloride 10 mL Intracatheter Q12H       LABS:        Results from last 7 days  Lab Units 07/15/17  0048 07/14/17  1229   CRP mg/dL 2.84* 2.34*   WBC 10*3/mm3 6.26 9.32   HEMOGLOBIN g/dL 9.1* 10.8*   HEMATOCRIT % 31.0* 36.2*   MCV fL 95.1* 92.1   MCHC g/dL 29.4* 29.8*   PLATELETS 10*3/mm3 271 285           Results from last 7 days  Lab Units 07/15/17  0048 07/14/17  1229   SODIUM mmol/L 141 144   POTASSIUM mmol/L 3.6 3.1*   CHLORIDE mmol/L 108 105   CO2 mmol/L 30.5 32.7*   BUN mg/dL 8 8   CREATININE mg/dL 0.55 0.56   EGFR IF NONAFRICN AM mL/min/1.73 107 105   CALCIUM mg/dL 9.0 9.8   GLUCOSE mg/dL 107 92   ALBUMIN g/dL 3.30* 4.00   BILIRUBIN mg/dL 0.4 0.4   ALK PHOS U/L 69 85   AST (SGOT) U/L 14 15   ALT (SGPT) U/L 5* 4*   Estimated Creatinine Clearance: 34.9 mL/min (by C-G formula based on Cr of 0.55).  Ammonia   Date Value Ref Range Status   07/15/2017 52 (H) 11 - 51 umol/L Final                         Assessment/Plan    1) Dehydration  2) hypokalemia  3) severe dysphagia  4) severe weight loss  5) debility  6) anemia-multifactorial  7) dementia       See orders entered.     Link Fulton MD  07/15/17  10:16 AM

## 2017-07-15 NOTE — PROGRESS NOTES
Malnutrition Severity Assessment    Patient Name:  Tamie Rodriguez  YOB: 1940  MRN: [id number]  Admit Date:  7/14/2017    Patient meets criteria for : Severe malnutrition    Comments:  If MD agrees please cosign and document.      Malnutrition Type: Acute Illness/Injury Malnutrition     Malnutrition Type (last 8 hours)      Malnutrition Severity Assessment       07/15/17 1642    Physical Signs of Malnutrition (Chronic)    Muscle Wasting Severe    Fat Loss Severe    Fluid Accumulation None    Secondary Physical Signs None      07/15/17 1642    Malnutrition Severity Assessment    Malnutrition Type Acute Illness/Injury Malnutrition          Physical Signs of Malnutrition         Most Recent Value    Muscle Wasting  Severe    Fat Loss  Severe    Fluid Accumulation  None    Secondary Physical Signs  None      Weight Status         Most Recent Value    %UBW  -- [UBW - 125 lb.  66% UBW]    BMI  Severe (<16) [BMI-14]    %IBW  Severe (<70%) [68%]    %UBW  Severe (<75%) [UBW - 125 lb.  66% UBW]    Weight Loss  Severe (>20% / 1 yr) [43 lbs wt loss x 1 year per pt]      Energy Intake Status         Most Recent Value    Energy Intake  Severe (< or equal to 50% / > or equal to 1 mo)              Electronically signed by:  Thelma Gonzalez RD  07/15/17 4:51 PM

## 2017-07-16 LAB
027 TOXIN: (no result)
ALBUMIN SERPL-MCNC: 3.2 G/DL (ref 3.4–4.8)
ALBUMIN/GLOB SERPL: 1 G/DL (ref 1.5–2.5)
ALP SERPL-CCNC: 64 U/L (ref 35–104)
ALT SERPL W P-5'-P-CCNC: 5 U/L (ref 10–36)
ANION GAP SERPL CALCULATED.3IONS-SCNC: 4.2 MMOL/L (ref 3.6–11.2)
AST SERPL-CCNC: 18 U/L (ref 10–30)
BASOPHILS # BLD AUTO: 0.01 10*3/MM3 (ref 0–0.3)
BASOPHILS NFR BLD AUTO: 0.2 % (ref 0–2)
BILIRUB SERPL-MCNC: 0.3 MG/DL (ref 0.2–1.8)
BUN BLD-MCNC: 5 MG/DL (ref 7–21)
BUN/CREAT SERPL: 7.8 (ref 7–25)
C DIFF TOX GENS STL QL NAA+PROBE: NEGATIVE
CALCIUM SPEC-SCNC: 8.7 MG/DL (ref 7.7–10)
CHLORIDE SERPL-SCNC: 106 MMOL/L (ref 99–112)
CO2 SERPL-SCNC: 28.8 MMOL/L (ref 24.3–31.9)
CREAT BLD-MCNC: 0.64 MG/DL (ref 0.43–1.29)
CRP SERPL-MCNC: 2.85 MG/DL (ref 0–0.99)
DEPRECATED RDW RBC AUTO: 50.8 FL (ref 37–54)
EOSINOPHIL # BLD AUTO: 0.34 10*3/MM3 (ref 0–0.7)
EOSINOPHIL NFR BLD AUTO: 6.2 % (ref 0–7)
ERYTHROCYTE [DISTWIDTH] IN BLOOD BY AUTOMATED COUNT: 14.9 % (ref 11.5–14.5)
GFR SERPL CREATININE-BSD FRML MDRD: 90 ML/MIN/1.73
GLOBULIN UR ELPH-MCNC: 3.2 GM/DL
GLUCOSE BLD-MCNC: 110 MG/DL (ref 70–110)
HCT VFR BLD AUTO: 31.5 % (ref 37–47)
HGB BLD-MCNC: 9.2 G/DL (ref 12–16)
IMM GRANULOCYTES # BLD: 0.01 10*3/MM3 (ref 0–0.03)
IMM GRANULOCYTES NFR BLD: 0.2 % (ref 0–0.5)
LYMPHOCYTES # BLD AUTO: 1.07 10*3/MM3 (ref 1–3)
LYMPHOCYTES NFR BLD AUTO: 19.5 % (ref 16–46)
MAGNESIUM SERPL-MCNC: 1.7 MG/DL (ref 1.7–2.6)
MAGNESIUM SERPL-MCNC: 3 MG/DL (ref 1.7–2.6)
MCH RBC QN AUTO: 27.2 PG (ref 27–33)
MCHC RBC AUTO-ENTMCNC: 29.2 G/DL (ref 33–37)
MCV RBC AUTO: 93.2 FL (ref 80–94)
MONOCYTES # BLD AUTO: 1.05 10*3/MM3 (ref 0.1–0.9)
MONOCYTES NFR BLD AUTO: 19.1 % (ref 0–12)
NEUTROPHILS # BLD AUTO: 3.02 10*3/MM3 (ref 1.4–6.5)
NEUTROPHILS NFR BLD AUTO: 54.8 % (ref 40–75)
OSMOLALITY SERPL CALC.SUM OF ELEC: 275.4 MOSM/KG (ref 273–305)
PHOSPHATE SERPL-MCNC: 3.1 MG/DL (ref 2.7–4.5)
PLATELET # BLD AUTO: 257 10*3/MM3 (ref 130–400)
PMV BLD AUTO: 10.1 FL (ref 6–10)
POTASSIUM BLD-SCNC: 3.8 MMOL/L (ref 3.5–5.3)
PROT SERPL-MCNC: 6.4 G/DL (ref 6–8)
RBC # BLD AUTO: 3.38 10*6/MM3 (ref 4.2–5.4)
SODIUM BLD-SCNC: 139 MMOL/L (ref 135–153)
WBC NRBC COR # BLD: 5.5 10*3/MM3 (ref 4.5–12.5)

## 2017-07-16 PROCEDURE — 80053 COMPREHEN METABOLIC PANEL: CPT | Performed by: INTERNAL MEDICINE

## 2017-07-16 PROCEDURE — 84100 ASSAY OF PHOSPHORUS: CPT | Performed by: INTERNAL MEDICINE

## 2017-07-16 PROCEDURE — 87493 C DIFF AMPLIFIED PROBE: CPT | Performed by: INTERNAL MEDICINE

## 2017-07-16 PROCEDURE — 83735 ASSAY OF MAGNESIUM: CPT | Performed by: INTERNAL MEDICINE

## 2017-07-16 PROCEDURE — 85025 COMPLETE CBC W/AUTO DIFF WBC: CPT | Performed by: INTERNAL MEDICINE

## 2017-07-16 PROCEDURE — 25010000002 ENOXAPARIN PER 10 MG: Performed by: INTERNAL MEDICINE

## 2017-07-16 PROCEDURE — 86140 C-REACTIVE PROTEIN: CPT | Performed by: INTERNAL MEDICINE

## 2017-07-16 RX ORDER — MAGNESIUM SULFATE HEPTAHYDRATE 40 MG/ML
4 INJECTION, SOLUTION INTRAVENOUS ONCE
Status: COMPLETED | OUTPATIENT
Start: 2017-07-16 | End: 2017-07-16

## 2017-07-16 RX ADMIN — LACTULOSE 10 G: 20 SOLUTION ORAL at 21:04

## 2017-07-16 RX ADMIN — LACTULOSE 10 G: 20 SOLUTION ORAL at 16:58

## 2017-07-16 RX ADMIN — GABAPENTIN 300 MG: 300 CAPSULE ORAL at 21:04

## 2017-07-16 RX ADMIN — GABAPENTIN 100 MG: 100 CAPSULE ORAL at 06:12

## 2017-07-16 RX ADMIN — CYPROHEPTADINE HYDROCHLORIDE 4 MG: 4 TABLET ORAL at 08:35

## 2017-07-16 RX ADMIN — CYPROHEPTADINE HYDROCHLORIDE 4 MG: 4 TABLET ORAL at 16:58

## 2017-07-16 RX ADMIN — PANTOPRAZOLE SODIUM 40 MG: 40 TABLET, DELAYED RELEASE ORAL at 05:09

## 2017-07-16 RX ADMIN — DEXTROSE AND SODIUM CHLORIDE 75 ML/HR: 5; 450 INJECTION, SOLUTION INTRAVENOUS at 19:40

## 2017-07-16 RX ADMIN — ENOXAPARIN SODIUM 40 MG: 40 INJECTION SUBCUTANEOUS at 14:19

## 2017-07-16 RX ADMIN — Medication 10 ML: at 21:04

## 2017-07-16 RX ADMIN — MAGNESIUM SULFATE HEPTAHYDRATE 4 G: 40 INJECTION, SOLUTION INTRAVENOUS at 04:29

## 2017-07-16 RX ADMIN — DEXTROSE AND SODIUM CHLORIDE 75 ML/HR: 5; 450 INJECTION, SOLUTION INTRAVENOUS at 02:19

## 2017-07-16 RX ADMIN — Medication 10 ML: at 08:36

## 2017-07-16 RX ADMIN — CYPROHEPTADINE HYDROCHLORIDE 4 MG: 4 TABLET ORAL at 21:04

## 2017-07-16 RX ADMIN — LACTULOSE 10 G: 20 SOLUTION ORAL at 08:35

## 2017-07-16 NOTE — PLAN OF CARE
Problem: Patient Care Overview (Adult)  Goal: Plan of Care Review    07/15/17 2205   Coping/Psychosocial Response Interventions   Plan Of Care Reviewed With patient   Patient Care Overview   Progress no change         Problem: Fluid Volume Deficit (Adult)  Goal: Identify Related Risk Factors and Signs and Symptoms    07/14/17 2235   Fluid Volume Deficit   Fluid Volume Deficit: Related Risk Factors age extremes   Signs and Symptoms (Fluid Volume Deficit) nausea/vomiting, anorexia, diarrhea complaints;weight loss       Goal: Fluid/Electrolyte Balance    07/15/17 2205   Fluid Volume Deficit (Adult)   Fluid/Electrolyte Balance making progress toward outcome       Goal: Comfort/Well Being    07/15/17 2205   Fluid Volume Deficit (Adult)   Comfort/Well Being making progress toward outcome         Problem: Fall Risk (Adult)  Goal: Identify Related Risk Factors and Signs and Symptoms    07/14/17 2235   Fall Risk   Fall Risk: Related Risk Factors age-related changes;gait/mobility problems;sleep pattern alteration;environment unfamiliar   Fall Risk: Signs and Symptoms presence of risk factors       Goal: Absence of Falls    07/15/17 2205   Fall Risk (Adult)   Absence of Falls making progress toward outcome         Problem: Nutrition, Imbalanced: Inadequate Oral Intake (Adult)  Goal: Identify Related Risk Factors and Signs and Symptoms    07/14/17 2235   Nutrition, Imbalanced: Inadequate Oral Intake   Nutrition Imbalanced: Less than Body Requirements: Related Risk Factors appetite decreased;eating difficulty;eating aversion;satiety early   Signs and Symptoms (Nutrition Imbalance, Inadequate Oral Intake: Signs and Symptoms) satiety early;loss of subcutaneous fat;muscle mass/strength decreased;weakness/lethargy       Goal: Improved Oral Intake    07/15/17 2205   Nutrition, Imbalanced: Inadequate Oral Intake (Adult)   Improved Oral Intake making progress toward outcome       Goal: Prevent Further Weight Loss    07/15/17 2205    Nutrition, Imbalanced: Inadequate Oral Intake (Adult)   Prevent Further Weight Loss making progress toward outcome         Problem: Pressure Ulcer Risk (Victor Hugo Scale) (Adult,Obstetrics,Pediatric)  Goal: Identify Related Risk Factors and Signs and Symptoms    07/15/17 1130   Pressure Ulcer Risk (Victor Hugo Scale)   Related Risk Factors (Pressure Ulcer Risk (Victor Hugo Scale)) body weight extremes;fluid intake inadequate;nutritional deficiencies;mobility impaired       Goal: Skin Integrity    07/15/17 2205   Pressure Ulcer Risk (Victor Hugo Scale) (Adult,Obstetrics,Pediatric)   Skin Integrity making progress toward outcome

## 2017-07-16 NOTE — PLAN OF CARE
Problem: Patient Care Overview (Adult)  Goal: Plan of Care Review  Outcome: Ongoing (interventions implemented as appropriate)  Goal: Discharge Needs Assessment  Outcome: Ongoing (interventions implemented as appropriate)    Problem: Fluid Volume Deficit (Adult)  Goal: Identify Related Risk Factors and Signs and Symptoms  Outcome: Ongoing (interventions implemented as appropriate)  Goal: Fluid/Electrolyte Balance  Outcome: Ongoing (interventions implemented as appropriate)  Goal: Comfort/Well Being  Outcome: Ongoing (interventions implemented as appropriate)    Problem: Fall Risk (Adult)  Goal: Identify Related Risk Factors and Signs and Symptoms  Outcome: Ongoing (interventions implemented as appropriate)  Goal: Absence of Falls  Outcome: Ongoing (interventions implemented as appropriate)    Problem: Nutrition, Imbalanced: Inadequate Oral Intake (Adult)  Goal: Identify Related Risk Factors and Signs and Symptoms  Outcome: Ongoing (interventions implemented as appropriate)  Goal: Improved Oral Intake  Outcome: Ongoing (interventions implemented as appropriate)  Goal: Prevent Further Weight Loss  Outcome: Ongoing (interventions implemented as appropriate)    Problem: Pressure Ulcer Risk (Victor Hugo Scale) (Adult,Obstetrics,Pediatric)  Goal: Identify Related Risk Factors and Signs and Symptoms  Outcome: Ongoing (interventions implemented as appropriate)  Goal: Skin Integrity  Outcome: Ongoing (interventions implemented as appropriate)

## 2017-07-16 NOTE — PROGRESS NOTES
LOS: 2 days       Chief Complaint :   Generalized weakness and dehydration  Subjective     Interval History:     Patient Complaints:  Tamie Rodriguez continues with complaints of severe weakness, inability to eat, not drinking well, severe weakness to the point she is unable dyllan Tuesday living.  Her family notes that she is continued to worsen over the last 2-3 weeks.  She denies any chest pain, shortness of breath, nausea or vomiting.  She does complain of difficulty swallowing.  Her family notes that she is also recently had some psychiatric changes in the form of increasing confusion and intermittent agitation.      Review of Systems-unchanged since admission history and physical  Objective     Vital Signs  Temp:  [98.3 °F (36.8 °C)-98.8 °F (37.1 °C)] 98.8 °F (37.1 °C)  Heart Rate:  [] 90  Resp:  [18-19] 18  BP: (115-152)/(61-75) 132/75    Physical Exam    Constitutional: She is oriented to person, place, and time.   Thin elderly white female arousable and talkative diffuse weakness no obvious pain distress   HENT:   Head: Normocephalic and atraumatic.   Mouth/Throat: No oropharyngeal exudate.   Bilateral temporal wasting. Oral mucosa is dry   Eyes: Conjunctivae and EOM are normal. Pupils are equal, round, and reactive to light. Right eye exhibits no discharge. Left eye exhibits no discharge. No scleral icterus.   Neck: Normal range of motion. Neck supple. JVD present. No tracheal deviation present. No thyromegaly present.   Cardiovascular: Normal rate, regular rhythm, normal heart sounds and intact distal pulses. Exam reveals no gallop and no friction rub.   No murmur heard.  Pulmonary/Chest: Effort normal and breath sounds normal. No stridor. No respiratory distress. She has no wheezes. She has no rales.   Abdominal: Soft. Bowel sounds are normal. She exhibits no distension and no mass. There is no tenderness.   Genitourinary:   Genitourinary Comments: No Bernard catheter   Musculoskeletal: She exhibits  no edema or deformity.   Lymphadenopathy:   She has no cervical adenopathy.   Neurological: She is alert and oriented to person, place, and time. She has normal reflexes. She displays normal reflexes. No cranial nerve deficit. She exhibits normal muscle tone. Coordination normal.   Skin: Skin is warm. No rash noted. No erythema. No pallor.   Psychiatric:   Confused with detailed questions.   Nursing note and vitals reviewed.      Results Review:     I reviewed the patient's new clinical results  : See Below  MEDICATIONS:    cyproheptadine 4 mg Oral TID   enoxaparin 40 mg Subcutaneous Q24H   gabapentin 100 mg Oral QAM   gabapentin 300 mg Oral Nightly   lactulose 10 g Oral TID   lidocaine 20 mL Subcutaneous Once   pantoprazole 40 mg Oral Q AM   sodium chloride 10 mL Intracatheter Q12H       LABS:        Results from last 7 days  Lab Units 07/16/17  0112 07/16/17  0111 07/15/17  0048 07/14/17  1229   CRP mg/dL 2.85*  --  2.84* 2.34*   WBC 10*3/mm3  --  5.50 6.26 9.32   HEMOGLOBIN g/dL  --  9.2* 9.1* 10.8*   HEMATOCRIT %  --  31.5* 31.0* 36.2*   MCV fL  --  93.2 95.1* 92.1   MCHC g/dL  --  29.2* 29.4* 29.8*   PLATELETS 10*3/mm3  --  257 271 285           Results from last 7 days  Lab Units 07/16/17  0111 07/15/17  0048 07/14/17  1229   SODIUM mmol/L 139 141 144   POTASSIUM mmol/L 3.8 3.6 3.1*   MAGNESIUM mg/dL 1.7  --   --    CHLORIDE mmol/L 106 108 105   CO2 mmol/L 28.8 30.5 32.7*   BUN mg/dL 5* 8 8   CREATININE mg/dL 0.64 0.55 0.56   EGFR IF NONAFRICN AM mL/min/1.73 90 107 105   CALCIUM mg/dL 8.7 9.0 9.8   GLUCOSE mg/dL 110 107 92   ALBUMIN g/dL 3.20* 3.30* 4.00   BILIRUBIN mg/dL 0.3 0.4 0.4   ALK PHOS U/L 64 69 85   AST (SGOT) U/L 18 14 15   ALT (SGPT) U/L 5* 5* 4*   Estimated Creatinine Clearance: 34.9 mL/min (by C-G formula based on Cr of 0.64).  Ammonia   Date Value Ref Range Status   07/15/2017 52 (H) 11 - 51 umol/L Final                         Assessment/Plan    1) Dehydration  2) hypokalemia  3) severe  dysphagia  4) severe weight loss  5) debility  6) anemia-multifactorial  7) dementia       See orders entered.     Link Fulton MD  07/16/17  10:38 AM

## 2017-07-17 ENCOUNTER — EPISODE CHANGES (OUTPATIENT)
Dept: CASE MANAGEMENT | Facility: OTHER | Age: 77
End: 2017-07-17

## 2017-07-17 PROCEDURE — 25010000002 ENOXAPARIN PER 10 MG: Performed by: INTERNAL MEDICINE

## 2017-07-17 PROCEDURE — 99222 1ST HOSP IP/OBS MODERATE 55: CPT | Performed by: PSYCHIATRY & NEUROLOGY

## 2017-07-17 RX ADMIN — Medication 10 ML: at 09:00

## 2017-07-17 RX ADMIN — CYPROHEPTADINE HYDROCHLORIDE 4 MG: 4 TABLET ORAL at 08:31

## 2017-07-17 RX ADMIN — LACTULOSE 10 G: 20 SOLUTION ORAL at 08:31

## 2017-07-17 RX ADMIN — CYPROHEPTADINE HYDROCHLORIDE 4 MG: 4 TABLET ORAL at 16:45

## 2017-07-17 RX ADMIN — PANTOPRAZOLE SODIUM 40 MG: 40 TABLET, DELAYED RELEASE ORAL at 06:11

## 2017-07-17 RX ADMIN — DEXTROSE AND SODIUM CHLORIDE 75 ML/HR: 5; 450 INJECTION, SOLUTION INTRAVENOUS at 23:28

## 2017-07-17 RX ADMIN — GABAPENTIN 300 MG: 300 CAPSULE ORAL at 20:21

## 2017-07-17 RX ADMIN — CYPROHEPTADINE HYDROCHLORIDE 4 MG: 4 TABLET ORAL at 23:43

## 2017-07-17 RX ADMIN — LACTULOSE 10 G: 20 SOLUTION ORAL at 20:21

## 2017-07-17 RX ADMIN — GABAPENTIN 100 MG: 100 CAPSULE ORAL at 06:11

## 2017-07-17 RX ADMIN — DEXTROSE AND SODIUM CHLORIDE 75 ML/HR: 5; 450 INJECTION, SOLUTION INTRAVENOUS at 08:30

## 2017-07-17 RX ADMIN — Medication 10 ML: at 20:21

## 2017-07-17 RX ADMIN — ENOXAPARIN SODIUM 40 MG: 40 INJECTION SUBCUTANEOUS at 13:35

## 2017-07-17 RX ADMIN — LACTULOSE 10 G: 20 SOLUTION ORAL at 16:46

## 2017-07-17 NOTE — PROGRESS NOTES
Discharge Planning Assessment   [place]     Patient Name: Tamie Rodriguez  MRN: [id number]  Today's Date: 7/17/2017    Admit Date: 7/14/2017       Discharge Plan       07/17/17 1354    Case Management/Social Work Plan    Plan SS spoke to pt who states plans to return home at discharge with family. SS contacted spouse, Jerad who request acute rehab. SS attempted contact with [facility] acute rehab and left message. SS discussed nursing home placement for short-term rehab with spouse, however he declines this. SS to follow.     15:10 SS received message from [facility] acute rehab per Dayla and pt's will be reviewed. SS to follow.               Saira Hou

## 2017-07-17 NOTE — PLAN OF CARE
Problem: Patient Care Overview (Adult)  Goal: Plan of Care Review    07/16/17 2234   Coping/Psychosocial Response Interventions   Plan Of Care Reviewed With patient   Patient Care Overview   Progress no change         Problem: Fluid Volume Deficit (Adult)  Goal: Identify Related Risk Factors and Signs and Symptoms    07/14/17 2235   Fluid Volume Deficit   Fluid Volume Deficit: Related Risk Factors age extremes   Signs and Symptoms (Fluid Volume Deficit) nausea/vomiting, anorexia, diarrhea complaints;weight loss       Goal: Fluid/Electrolyte Balance    07/16/17 2234   Fluid Volume Deficit (Adult)   Fluid/Electrolyte Balance making progress toward outcome       Goal: Comfort/Well Being    07/16/17 2234   Fluid Volume Deficit (Adult)   Comfort/Well Being making progress toward outcome         Problem: Fall Risk (Adult)  Goal: Identify Related Risk Factors and Signs and Symptoms    07/14/17 2235   Fall Risk   Fall Risk: Related Risk Factors age-related changes;gait/mobility problems;sleep pattern alteration;environment unfamiliar   Fall Risk: Signs and Symptoms presence of risk factors       Goal: Absence of Falls    07/16/17 2234   Fall Risk (Adult)   Absence of Falls making progress toward outcome         Problem: Nutrition, Imbalanced: Inadequate Oral Intake (Adult)  Goal: Identify Related Risk Factors and Signs and Symptoms    07/14/17 2235   Nutrition, Imbalanced: Inadequate Oral Intake   Nutrition Imbalanced: Less than Body Requirements: Related Risk Factors appetite decreased;eating difficulty;eating aversion;satiety early   Signs and Symptoms (Nutrition Imbalance, Inadequate Oral Intake: Signs and Symptoms) satiety early;loss of subcutaneous fat;muscle mass/strength decreased;weakness/lethargy       Goal: Improved Oral Intake    07/16/17 2234   Nutrition, Imbalanced: Inadequate Oral Intake (Adult)   Improved Oral Intake making progress toward outcome       Goal: Prevent Further Weight Loss    07/16/17 2234    Nutrition, Imbalanced: Inadequate Oral Intake (Adult)   Prevent Further Weight Loss making progress toward outcome         Problem: Pressure Ulcer Risk (Victor Hugo Scale) (Adult,Obstetrics,Pediatric)  Goal: Identify Related Risk Factors and Signs and Symptoms    07/15/17 1130   Pressure Ulcer Risk (Victor Hugo Scale)   Related Risk Factors (Pressure Ulcer Risk (Victor Hugo Scale)) body weight extremes;fluid intake inadequate;nutritional deficiencies;mobility impaired       Goal: Skin Integrity    07/16/17 2234   Pressure Ulcer Risk (Victor Hugo Scale) (Adult,Obstetrics,Pediatric)   Skin Integrity making progress toward outcome

## 2017-07-17 NOTE — PLAN OF CARE
Problem: Pressure Ulcer Risk (Victor Hugo Scale) (Adult,Obstetrics,Pediatric)  Goal: Identify Related Risk Factors and Signs and Symptoms  Outcome: Ongoing (interventions implemented as appropriate)    07/15/17 1130   Pressure Ulcer Risk (Victor Hugo Scale)   Related Risk Factors (Pressure Ulcer Risk (Victor Hugo Scale)) body weight extremes;fluid intake inadequate;nutritional deficiencies;mobility impaired

## 2017-07-17 NOTE — CONSULTS
Referring Provider: Dr. Rakesh Fulton  Reason for Consultation: G-tube placement      Chief complaint poor appetite and coughing when eating    Subjective .     History of present illness:  The patient is 77-year-old lady who has had difficulty swallowing with frequent episodes of coughing and choking.  She reports a 45 pound weight loss and is now quite thin and weak.  GI consultation was requested for gastrostomy tube placement.    Review of Systems    Review of Systems - Negative except joint pain and stiffness, weakness, fatigue  History  Past Medical History:   Diagnosis Date   • Arthritis    • Meniere's disease    , Past Surgical History:   Procedure Laterality Date   • APPENDECTOMY     • CHOLECYSTECTOMY     • HYSTERECTOMY     • TOTAL HIP ARTHROPLASTY Left    • TUBAL ABDOMINAL LIGATION     • VARICOSE VEIN SURGERY      times two   , Family History   Problem Relation Age of Onset   • Cancer Mother    • Cancer Father    , Social History   Substance Use Topics   • Smoking status: Never Smoker   • Smokeless tobacco: None   • Alcohol use No   , Prescriptions Prior to Admission   Medication Sig Dispense Refill Last Dose   • acetaminophen (TYLENOL) 500 MG tablet Take 1,000 mg by mouth Every 6 (Six) Hours As Needed for Mild Pain (1-3).   7/13/2017 at Unknown time   • cyproheptadine (PERIACTIN) 4 MG tablet Take 4 mg by mouth 3 (Three) Times a Day.   7/14/2017 at 0830   • gabapentin (NEURONTIN) 100 MG capsule Take 100 mg by mouth Every Morning. 100MG IN AM AND 300MG AT BEDTIME   7/13/2017 at 0900   • gabapentin (NEURONTIN) 300 MG capsule Take 300 mg by mouth Every Night.   7/13/2017 at 2200   , Scheduled Meds:    cyproheptadine 4 mg Oral TID   enoxaparin 40 mg Subcutaneous Q24H   gabapentin 100 mg Oral QAM   gabapentin 300 mg Oral Nightly   lactulose 10 g Oral TID   lidocaine 20 mL Subcutaneous Once   pantoprazole 40 mg Oral Q AM   sodium chloride 10 mL Intracatheter Q12H   , Continuous Infusions:    dextrose 5 % and  sodium chloride 0.45 % 75 mL/hr Last Rate: 75 mL/hr (07/17/17 0830)   , PRN Meds:  •  acetaminophen  •  magnesium sulfate **OR** magnesium sulfate **OR** magnesium sulfate  •  potassium & sodium phosphates **OR** potassium & sodium phosphates  •  potassium chloride  •  potassium chloride  •  sodium chloride and Allergies:  Darvon [propoxyphene] and Tramadol    Objective     Vital Signs   Temp:  [97.5 °F (36.4 °C)-98.8 °F (37.1 °C)] 98.3 °F (36.8 °C)  Heart Rate:  [] 103  Resp:  [18-19] 18  BP: (112-152)/(57-81) 127/57    Physical Exam:     General Appearance:    Alert, cooperative, in no acute distress, Cachectic    Head:    Normocephalic, without obvious abnormality, atraumatic   Eyes:            Lids and lashes normal, conjunctivae and sclerae normal, no   icterus, no pallor, corneas clear, PERRLA       Throat:   No oral lesions, no thrush, oral mucosa moist   Neck:   No adenopathy, supple, trachea midline, no thyromegaly, no   carotid bruit, no JVD   Back:     No kyphosis present, no scoliosis present, no skin lesions,      erythema or scars, no tenderness to percussion or                   palpation,   range of motion normal   Lungs:     Clear to auscultation,respirations regular, even and                  unlabored Coarse breath sounds with forced expiration     Heart:    Regular rhythm and normal rate, normal S1 and S2, no            murmur, no gallop, no rub, no click   Chest Wall:    No abnormalities observed.  Very elongated chest.     Abdomen:     Normal bowel sounds,Scaphoid abdomen no masses, no organomegaly, soft        non-tender, non-distended, no guarding, no rebound                tenderness   Rectal:     Deferred   Extremities:   Moves all extremities well, no edema, no cyanosis, no             redness       Skin:   No bleeding, bruising or rash   Lymph nodes:   No palpable adenopathy   Results Review:   I reviewed the patient's new clinical results.      Assessment/Plan    Dysphagia with  anorexia, significant weight loss and weakness.  I have discussed the risk, benefits, and alternatives to G-tube placement.  The patient at this point she is uncertain and wishes to discuss it further with her family.  If she consents it is not certain that a G-tube can be placed.  Her elongated chest may preclude access to the stomach percutaneously.  However, whether or not it can be placed with adequate assurance of safety can only be determined at the time of endoscopy.    Active Problems:    Dehydration            Venu Ventura MD  07/17/17  4:57 PM

## 2017-07-17 NOTE — PLAN OF CARE
Problem: Nutrition, Imbalanced: Inadequate Oral Intake (Adult)  Goal: Identify Related Risk Factors and Signs and Symptoms  Outcome: Ongoing (interventions implemented as appropriate)    07/14/17 6088   Nutrition, Imbalanced: Inadequate Oral Intake   Nutrition Imbalanced: Less than Body Requirements: Related Risk Factors appetite decreased;eating difficulty;eating aversion;satiety early   Signs and Symptoms (Nutrition Imbalance, Inadequate Oral Intake: Signs and Symptoms) satiety early;loss of subcutaneous fat;muscle mass/strength decreased;weakness/lethargy

## 2017-07-17 NOTE — PROGRESS NOTES
LOS: 3 days       Chief Complaint :   Generalized weakness and dehydration  Subjective     Interval History:     Patient Complaints:  Tamie Rodriguez continues with healthy swallowing and diffuse weakness.  She has no other complaints this morning.  She denies any fevers or chills.  She continues with diffuse weakness.  She is agreeable to GI evaluation.      Review of Systems-unchanged since admission history and physical  Objective     Vital Signs  Temp:  [97.5 °F (36.4 °C)-98.8 °F (37.1 °C)] 97.5 °F (36.4 °C)  Heart Rate:  [84-94] 94  Resp:  [18-19] 19  BP: (112-146)/(61-81) 133/75    Physical Exam    Constitutional: She is oriented to person, place, and time.   Thin elderly white female arousable and talkative diffuse weakness no obvious pain distress   HENT:   Head: Normocephalic and atraumatic.   Mouth/Throat: No oropharyngeal exudate.   Bilateral temporal wasting. Oral mucosa is dry   Eyes: Conjunctivae and EOM are normal. Pupils are equal, round, and reactive to light. Right eye exhibits no discharge. Left eye exhibits no discharge. No scleral icterus.   Neck: Normal range of motion. Neck supple. JVD present. No tracheal deviation present. No thyromegaly present.   Cardiovascular: Normal rate, regular rhythm, normal heart sounds and intact distal pulses. Exam reveals no gallop and no friction rub.   No murmur heard.  Pulmonary/Chest: Effort normal and breath sounds normal. No stridor. No respiratory distress. She has no wheezes. She has no rales.   Abdominal: Soft. Bowel sounds are normal. She exhibits no distension and no mass. There is no tenderness.   Genitourinary:   Genitourinary Comments: No Bernard catheter   Musculoskeletal: She exhibits no edema or deformity.   Lymphadenopathy:   She has no cervical adenopathy.   Neurological: She is alert and oriented to person, place, and time. She has normal reflexes. She displays normal reflexes. No cranial nerve deficit. She exhibits normal muscle tone.  Coordination normal.   Skin: Skin is warm. No rash noted. No erythema. No pallor.   Psychiatric:   Confused with detailed questions.   Nursing note and vitals reviewed.      Results Review:     I reviewed the patient's new clinical results  : See Below  MEDICATIONS:    cyproheptadine 4 mg Oral TID   enoxaparin 40 mg Subcutaneous Q24H   gabapentin 100 mg Oral QAM   gabapentin 300 mg Oral Nightly   lactulose 10 g Oral TID   lidocaine 20 mL Subcutaneous Once   pantoprazole 40 mg Oral Q AM   sodium chloride 10 mL Intracatheter Q12H       LABS:        Results from last 7 days  Lab Units 07/16/17  0112 07/16/17  0111 07/15/17  0048 07/14/17  1229   CRP mg/dL 2.85*  --  2.84* 2.34*   WBC 10*3/mm3  --  5.50 6.26 9.32   HEMOGLOBIN g/dL  --  9.2* 9.1* 10.8*   HEMATOCRIT %  --  31.5* 31.0* 36.2*   MCV fL  --  93.2 95.1* 92.1   MCHC g/dL  --  29.2* 29.4* 29.8*   PLATELETS 10*3/mm3  --  257 271 285           Results from last 7 days  Lab Units 07/16/17  1718 07/16/17  0111 07/15/17  0048 07/14/17  1229   SODIUM mmol/L  --  139 141 144   POTASSIUM mmol/L  --  3.8 3.6 3.1*   MAGNESIUM mg/dL 3.0* 1.7  --   --    CHLORIDE mmol/L  --  106 108 105   CO2 mmol/L  --  28.8 30.5 32.7*   BUN mg/dL  --  5* 8 8   CREATININE mg/dL  --  0.64 0.55 0.56   EGFR IF NONAFRICN AM mL/min/1.73  --  90 107 105   CALCIUM mg/dL  --  8.7 9.0 9.8   GLUCOSE mg/dL  --  110 107 92   ALBUMIN g/dL  --  3.20* 3.30* 4.00   BILIRUBIN mg/dL  --  0.3 0.4 0.4   ALK PHOS U/L  --  64 69 85   AST (SGOT) U/L  --  18 14 15   ALT (SGPT) U/L  --  5* 5* 4*   Estimated Creatinine Clearance: 34.9 mL/min (by C-G formula based on Cr of 0.64).  Ammonia   Date Value Ref Range Status   07/15/2017 52 (H) 11 - 51 umol/L Final                         Assessment/Plan    1) Dehydration  2) hypokalemia  3) severe dysphagia  4) severe weight loss  5) debility  6) anemia-multifactorial  7) dementia       See orders entered.     Link Fulton MD  07/17/17  6:52 AM

## 2017-07-17 NOTE — PLAN OF CARE
Problem: Fluid Volume Deficit (Adult)  Goal: Fluid/Electrolyte Balance  Outcome: Ongoing (interventions implemented as appropriate)    07/16/17 9658   Fluid Volume Deficit (Adult)   Fluid/Electrolyte Balance making progress toward outcome

## 2017-07-17 NOTE — CONSULTS
"    Referring Provider: Dr. Fulton  Reason for Consultation: Depression      Chief complaint \"Not able to eat\"    Subjective .     History of present illness:  The patient is a 75 y/o CF, who has been admitted to the medical service because of weakness and poor po intake. The patient states that she has no appetite for the last four months, and when she tries to eat, she chokes on her food. She states that she has been feeling sick for the last four months. States that she had surgery for dizziness and she reports that at first it helped, but she continues to feel dizzy and has been bound to her bed and is dependent on others for her to get up and eat and go to the bathroom. She states that she feels depressed, hopeless and has had suicidal thoughts. She states that she has felt suicidal and once asked her  to bring her a bottle of pills so that she would take an overdose. She denies any active suicidal thoughts at this time and states that doesn't want to kill herself.   There is no report of tahira, psychosis. She has had some cognitive decline but doesn't appear to be confused, or delusional.    Review of Systems  Gen: Weakness, weight loss, dizziness  Resp: No soa  CVS: No CP  GI: Loss of appetite  Musculoskeletal: Back pain      History  Past Medical History:   Diagnosis Date   • Arthritis    • Meniere's disease    ,   Past Surgical History:   Procedure Laterality Date   • APPENDECTOMY     • CHOLECYSTECTOMY     • HYSTERECTOMY     • TOTAL HIP ARTHROPLASTY Left    • TUBAL ABDOMINAL LIGATION     • VARICOSE VEIN SURGERY      times two   ,   Family History   Problem Relation Age of Onset   • Cancer Mother    • Cancer Father    ,   Social History   Substance Use Topics   • Smoking status: Never Smoker   • Smokeless tobacco: None   • Alcohol use No   ,   Prescriptions Prior to Admission   Medication Sig Dispense Refill Last Dose   • acetaminophen (TYLENOL) 500 MG tablet Take 1,000 mg by mouth Every 6 (Six) Hours " As Needed for Mild Pain (1-3).   7/13/2017 at Unknown time   • cyproheptadine (PERIACTIN) 4 MG tablet Take 4 mg by mouth 3 (Three) Times a Day.   7/14/2017 at 0830   • gabapentin (NEURONTIN) 100 MG capsule Take 100 mg by mouth Every Morning. 100MG IN AM AND 300MG AT BEDTIME   7/13/2017 at 0900   • gabapentin (NEURONTIN) 300 MG capsule Take 300 mg by mouth Every Night.   7/13/2017 at 2200   , Scheduled Meds:    cyproheptadine 4 mg Oral TID   enoxaparin 40 mg Subcutaneous Q24H   gabapentin 100 mg Oral QAM   gabapentin 300 mg Oral Nightly   lactulose 10 g Oral TID   lidocaine 20 mL Subcutaneous Once   pantoprazole 40 mg Oral Q AM   sodium chloride 10 mL Intracatheter Q12H   , Continuous Infusions:    dextrose 5 % and sodium chloride 0.45 % 75 mL/hr Last Rate: 75 mL/hr (07/17/17 0830)   , PRN Meds:  •  acetaminophen  •  magnesium sulfate **OR** magnesium sulfate **OR** magnesium sulfate  •  potassium & sodium phosphates **OR** potassium & sodium phosphates  •  potassium chloride  •  potassium chloride  •  sodium chloride and Allergies:  Darvon [propoxyphene] and Tramadol    Objective     Vital Signs   Temp:  [97.5 °F (36.4 °C)-98.8 °F (37.1 °C)] 98.8 °F (37.1 °C)  Heart Rate:  [80-94] 84  Resp:  [18-19] 18  BP: (112-152)/(61-81) 152/72    Mental Status Exam:   Mental Status Exam:    Hygiene:   fair  Cooperation:  Cooperative  Eye Contact:  Good  Psychomotor Behavior:  Appropriate  Affect:  Appropriate  Hopelessness: 6  Speech:  Normal  Thought Progress:  Goal directed  Thought Content:  Normal  Suicidal:  None  Homicidal:  None  Hallucinations:  None  Delusion:  None  Memory:  Intact  Orientation:  Person, Place, Time and Situation  Reliability:  fair  Insight:  Fair  Judgement:  Fair  Impulse Control:  Fair    Results Review:   I reviewed the patient's new clinical results.  Lab Results (last 24 hours)     Procedure Component Value Units Date/Time    Magnesium [[id number]]  (Abnormal) Collected:  07/16/17 0340     Specimen:  Blood Updated:  07/16/17 1822     Magnesium 3.0 (H) mg/dL         Imaging Results (last 24 hours)     ** No results found for the last 24 hours. **            Assessment/Plan     Active Problems:    Dehydration     MDD, single episode, moderate    Rec: May start patient on Remeron sol tab 15 mg. It dissolves in the mouth and will be a better option for the patient with her swallowing problems. Also will help her rest better and improve her appetite, along with treating her depressive symptoms.  Thank you for allowing me to participate in the care of the patient.       I discussed the patients findings and my recommendations with patient and nursing staff    Jose Sharif MD  07/17/17  1:01 PM

## 2017-07-18 ENCOUNTER — ANESTHESIA (OUTPATIENT)
Dept: PERIOP | Facility: HOSPITAL | Age: 77
End: 2017-07-18

## 2017-07-18 ENCOUNTER — ANESTHESIA EVENT (OUTPATIENT)
Dept: PERIOP | Facility: HOSPITAL | Age: 77
End: 2017-07-18

## 2017-07-18 PROCEDURE — 25010000002 PROPOFOL 1000 MG/ML EMULSION: Performed by: NURSE ANESTHETIST, CERTIFIED REGISTERED

## 2017-07-18 PROCEDURE — 25010000002 HYDROMORPHONE PER 4 MG: Performed by: INTERNAL MEDICINE

## 2017-07-18 PROCEDURE — 94640 AIRWAY INHALATION TREATMENT: CPT

## 2017-07-18 PROCEDURE — 25010000002 PROPOFOL 10 MG/ML EMULSION: Performed by: NURSE ANESTHETIST, CERTIFIED REGISTERED

## 2017-07-18 PROCEDURE — 0DH63UZ INSERTION OF FEEDING DEVICE INTO STOMACH, PERCUTANEOUS APPROACH: ICD-10-PCS | Performed by: INTERNAL MEDICINE

## 2017-07-18 PROCEDURE — 94799 UNLISTED PULMONARY SVC/PX: CPT

## 2017-07-18 RX ORDER — MEPERIDINE HYDROCHLORIDE 25 MG/ML
12.5 INJECTION INTRAMUSCULAR; INTRAVENOUS; SUBCUTANEOUS
Status: DISCONTINUED | OUTPATIENT
Start: 2017-07-18 | End: 2017-07-18 | Stop reason: HOSPADM

## 2017-07-18 RX ORDER — SODIUM CHLORIDE, SODIUM LACTATE, POTASSIUM CHLORIDE, CALCIUM CHLORIDE 600; 310; 30; 20 MG/100ML; MG/100ML; MG/100ML; MG/100ML
125 INJECTION, SOLUTION INTRAVENOUS CONTINUOUS
Status: DISCONTINUED | OUTPATIENT
Start: 2017-07-18 | End: 2017-07-19

## 2017-07-18 RX ORDER — ONDANSETRON 2 MG/ML
4 INJECTION INTRAMUSCULAR; INTRAVENOUS ONCE AS NEEDED
Status: DISCONTINUED | OUTPATIENT
Start: 2017-07-18 | End: 2017-07-18 | Stop reason: HOSPADM

## 2017-07-18 RX ORDER — HYDROMORPHONE HYDROCHLORIDE 1 MG/ML
0.5 INJECTION, SOLUTION INTRAMUSCULAR; INTRAVENOUS; SUBCUTANEOUS ONCE
Status: COMPLETED | OUTPATIENT
Start: 2017-07-18 | End: 2017-07-18

## 2017-07-18 RX ORDER — IPRATROPIUM BROMIDE AND ALBUTEROL SULFATE 2.5; .5 MG/3ML; MG/3ML
3 SOLUTION RESPIRATORY (INHALATION) ONCE AS NEEDED
Status: DISCONTINUED | OUTPATIENT
Start: 2017-07-18 | End: 2017-07-18 | Stop reason: HOSPADM

## 2017-07-18 RX ORDER — SODIUM CHLORIDE 0.9 % (FLUSH) 0.9 %
1-10 SYRINGE (ML) INJECTION AS NEEDED
Status: DISCONTINUED | OUTPATIENT
Start: 2017-07-18 | End: 2017-07-18 | Stop reason: HOSPADM

## 2017-07-18 RX ORDER — IPRATROPIUM BROMIDE AND ALBUTEROL SULFATE 2.5; .5 MG/3ML; MG/3ML
3 SOLUTION RESPIRATORY (INHALATION) ONCE
Status: COMPLETED | OUTPATIENT
Start: 2017-07-18 | End: 2017-07-18

## 2017-07-18 RX ORDER — METOPROLOL TARTRATE 5 MG/5ML
INJECTION INTRAVENOUS AS NEEDED
Status: DISCONTINUED | OUTPATIENT
Start: 2017-07-18 | End: 2017-07-18 | Stop reason: SURG

## 2017-07-18 RX ORDER — ACETAMINOPHEN 10 MG/ML
INJECTION, SOLUTION INTRAVENOUS AS NEEDED
Status: DISCONTINUED | OUTPATIENT
Start: 2017-07-18 | End: 2017-07-18 | Stop reason: SURG

## 2017-07-18 RX ORDER — PROPOFOL 10 MG/ML
VIAL (ML) INTRAVENOUS AS NEEDED
Status: DISCONTINUED | OUTPATIENT
Start: 2017-07-18 | End: 2017-07-18 | Stop reason: SURG

## 2017-07-18 RX ORDER — FENTANYL CITRATE 50 UG/ML
50 INJECTION, SOLUTION INTRAMUSCULAR; INTRAVENOUS
Status: DISCONTINUED | OUTPATIENT
Start: 2017-07-18 | End: 2017-07-18 | Stop reason: HOSPADM

## 2017-07-18 RX ORDER — OXYCODONE HYDROCHLORIDE AND ACETAMINOPHEN 5; 325 MG/1; MG/1
1 TABLET ORAL ONCE AS NEEDED
Status: DISCONTINUED | OUTPATIENT
Start: 2017-07-18 | End: 2017-07-18 | Stop reason: HOSPADM

## 2017-07-18 RX ORDER — IPRATROPIUM BROMIDE AND ALBUTEROL SULFATE 2.5; .5 MG/3ML; MG/3ML
3 SOLUTION RESPIRATORY (INHALATION)
Status: DISCONTINUED | OUTPATIENT
Start: 2017-07-18 | End: 2017-07-20 | Stop reason: SDUPTHER

## 2017-07-18 RX ADMIN — PROPOFOL 20 MG: 10 INJECTION, EMULSION INTRAVENOUS at 14:59

## 2017-07-18 RX ADMIN — DEXTROSE AND SODIUM CHLORIDE 75 ML/HR: 5; 450 INJECTION, SOLUTION INTRAVENOUS at 12:57

## 2017-07-18 RX ADMIN — HYDROMORPHONE HYDROCHLORIDE 0.5 MG: 1 INJECTION, SOLUTION INTRAMUSCULAR; INTRAVENOUS; SUBCUTANEOUS at 23:06

## 2017-07-18 RX ADMIN — PROPOFOL 80 MCG/KG/MIN: 10 INJECTION, EMULSION INTRAVENOUS at 14:51

## 2017-07-18 RX ADMIN — ACETAMINOPHEN 1000 MG: 10 INJECTION, SOLUTION INTRAVENOUS at 15:39

## 2017-07-18 RX ADMIN — IPRATROPIUM BROMIDE AND ALBUTEROL SULFATE 3 ML: .5; 3 SOLUTION RESPIRATORY (INHALATION) at 13:43

## 2017-07-18 RX ADMIN — GABAPENTIN 300 MG: 300 CAPSULE ORAL at 22:00

## 2017-07-18 RX ADMIN — METOPROLOL TARTRATE 2 MG: 1 INJECTION, SOLUTION INTRAVENOUS at 14:56

## 2017-07-18 RX ADMIN — LACTULOSE 10 G: 20 SOLUTION ORAL at 22:00

## 2017-07-18 RX ADMIN — GABAPENTIN 300 MG: 300 CAPSULE ORAL at 05:24

## 2017-07-18 RX ADMIN — SODIUM CHLORIDE, POTASSIUM CHLORIDE, SODIUM LACTATE AND CALCIUM CHLORIDE: 600; 310; 30; 20 INJECTION, SOLUTION INTRAVENOUS at 14:44

## 2017-07-18 RX ADMIN — IPRATROPIUM BROMIDE AND ALBUTEROL SULFATE 3 ML: .5; 3 SOLUTION RESPIRATORY (INHALATION) at 18:49

## 2017-07-18 RX ADMIN — Medication 10 ML: at 08:27

## 2017-07-18 RX ADMIN — CEFAZOLIN 1 G: 1 INJECTION, POWDER, FOR SOLUTION INTRAMUSCULAR; INTRAVENOUS; PARENTERAL at 14:56

## 2017-07-18 RX ADMIN — Medication 10 ML: at 21:00

## 2017-07-18 RX ADMIN — CYPROHEPTADINE HYDROCHLORIDE 4 MG: 4 TABLET ORAL at 22:00

## 2017-07-18 RX ADMIN — PROPOFOL 20 MG: 10 INJECTION, EMULSION INTRAVENOUS at 14:51

## 2017-07-18 RX ADMIN — PROPOFOL 20 MG: 10 INJECTION, EMULSION INTRAVENOUS at 14:54

## 2017-07-18 RX ADMIN — PANTOPRAZOLE SODIUM 40 MG: 40 TABLET, DELAYED RELEASE ORAL at 05:00

## 2017-07-18 NOTE — PROGRESS NOTES
LOS: 4 days       Chief Complaint :   Generalized weakness and dehydration  Subjective     Interval History:     Patient Complaints:  Tamie Rodriguez continues with difficulty swallowing and diffuse weakness.  She has discussed feeding tube pacemaker with gastroenterologist.  She continues with general weakness, depression, difficulty performing activities of daily living.    She has no other complaints this morning.  She denies any fevers or chills.      Review of Systems-unchanged since admission history and physical  Objective     Vital Signs  Temp:  [98 °F (36.7 °C)-98.8 °F (37.1 °C)] 98.3 °F (36.8 °C)  Heart Rate:  [] 98  Resp:  [18-20] 18  BP: (121-152)/(57-76) 144/76    Physical Exam    Constitutional: She is oriented to person, place, and time.   Thin elderly white female arousable and talkative diffuse weakness no obvious pain distress   HENT:   Head: Normocephalic and atraumatic.   Mouth/Throat: No oropharyngeal exudate.   Bilateral temporal wasting. Oral mucosa is dry   Eyes: Conjunctivae and EOM are normal. Pupils are equal, round, and reactive to light. Right eye exhibits no discharge. Left eye exhibits no discharge. No scleral icterus.   Neck: Normal range of motion. Neck supple. No tracheal deviation present. No thyromegaly present.   Cardiovascular: Normal rate, regular rhythm, normal heart sounds and intact distal pulses. Exam reveals no gallop and no friction rub.   No murmur heard.  Pulmonary/Chest: Effort normal and breath sounds normal. No stridor. No respiratory distress. She has no wheezes. She has no rales.   Abdominal: Soft. Bowel sounds are normal. She exhibits no distension and no mass. There is no tenderness.   Genitourinary:   Genitourinary Comments: No Bernard catheter   Musculoskeletal: She exhibits no edema or deformity.   Lymphadenopathy:   She has no cervical adenopathy.   Neurological: She is alert and oriented to person, place, and time. She has normal reflexes. She displays  normal reflexes. No cranial nerve deficit. She exhibits normal muscle tone. Coordination normal.   Skin: Skin is warm. No rash noted. No erythema. No pallor.   Psychiatric: Alert and arousable and talking  Nursing note and vitals reviewed.      Results Review:     I reviewed the patient's new clinical results  : See Below  MEDICATIONS:    cyproheptadine 4 mg Oral TID   enoxaparin 40 mg Subcutaneous Q24H   gabapentin 100 mg Oral QAM   gabapentin 300 mg Oral Nightly   lactulose 10 g Oral TID   lidocaine 20 mL Subcutaneous Once   pantoprazole 40 mg Oral Q AM   sodium chloride 10 mL Intracatheter Q12H       LABS:        Results from last 7 days  Lab Units 07/16/17  0112 07/16/17  0111 07/15/17  0048 07/14/17  1229   CRP mg/dL 2.85*  --  2.84* 2.34*   WBC 10*3/mm3  --  5.50 6.26 9.32   HEMOGLOBIN g/dL  --  9.2* 9.1* 10.8*   HEMATOCRIT %  --  31.5* 31.0* 36.2*   MCV fL  --  93.2 95.1* 92.1   MCHC g/dL  --  29.2* 29.4* 29.8*   PLATELETS 10*3/mm3  --  257 271 285           Results from last 7 days  Lab Units 07/16/17  1718 07/16/17  0111 07/15/17  0048 07/14/17  1229   SODIUM mmol/L  --  139 141 144   POTASSIUM mmol/L  --  3.8 3.6 3.1*   MAGNESIUM mg/dL 3.0* 1.7  --   --    CHLORIDE mmol/L  --  106 108 105   CO2 mmol/L  --  28.8 30.5 32.7*   BUN mg/dL  --  5* 8 8   CREATININE mg/dL  --  0.64 0.55 0.56   EGFR IF NONAFRICN AM mL/min/1.73  --  90 107 105   CALCIUM mg/dL  --  8.7 9.0 9.8   GLUCOSE mg/dL  --  110 107 92   ALBUMIN g/dL  --  3.20* 3.30* 4.00   BILIRUBIN mg/dL  --  0.3 0.4 0.4   ALK PHOS U/L  --  64 69 85   AST (SGOT) U/L  --  18 14 15   ALT (SGPT) U/L  --  5* 5* 4*   Estimated Creatinine Clearance: 37.2 mL/min (by C-G formula based on Cr of 0.64).  No results found for: AMMONIA                      Assessment/Plan    1) Dehydration-Resolved  2) hypokalemia  3) severe dysphagia-GI evaluation ongoing.  4) severe weight loss  5) debility  6) anemia-multifactorial  7) dementia  8) depression-psychiatrist  recommendations appreciated       See orders entered.     Link Fulton MD  07/18/17  7:04 AM

## 2017-07-18 NOTE — PROGRESS NOTES
Discharge Planning Assessment  [facility]     Patient Name: Tamie Rodriguez  MRN: [id number]  Today's Date: 7/18/2017    Admit Date: 7/14/2017       Discharge Plan       07/18/17 1605    Case Management/Social Work Plan    Plan SS spoke to [facility] [place] acute rehab per Gris who is agreeable to review information. Pt is scheduled for G-Tube placement today. SS to follow.           Expected Discharge Date and Time     Expected Discharge Date Expected Discharge Time    Jul 20, 2017           Saira Hou

## 2017-07-18 NOTE — PLAN OF CARE
Problem: Patient Care Overview (Adult)  Goal: Plan of Care Review  Outcome: Ongoing (interventions implemented as appropriate)  Goal: Adult Individualization and Mutuality  Outcome: Ongoing (interventions implemented as appropriate)  Goal: Discharge Needs Assessment  Outcome: Ongoing (interventions implemented as appropriate)    Problem: Fluid Volume Deficit (Adult)  Goal: Identify Related Risk Factors and Signs and Symptoms  Outcome: Ongoing (interventions implemented as appropriate)  Goal: Fluid/Electrolyte Balance  Outcome: Ongoing (interventions implemented as appropriate)  Goal: Comfort/Well Being  Outcome: Ongoing (interventions implemented as appropriate)    Problem: Fall Risk (Adult)  Goal: Identify Related Risk Factors and Signs and Symptoms  Outcome: Ongoing (interventions implemented as appropriate)  Goal: Absence of Falls  Outcome: Ongoing (interventions implemented as appropriate)    Problem: Nutrition, Imbalanced: Inadequate Oral Intake (Adult)  Goal: Identify Related Risk Factors and Signs and Symptoms  Outcome: Ongoing (interventions implemented as appropriate)  Goal: Improved Oral Intake  Outcome: Ongoing (interventions implemented as appropriate)  Goal: Prevent Further Weight Loss  Outcome: Ongoing (interventions implemented as appropriate)    Problem: Pressure Ulcer Risk (Victor Hugo Scale) (Adult,Obstetrics,Pediatric)  Goal: Identify Related Risk Factors and Signs and Symptoms  Outcome: Ongoing (interventions implemented as appropriate)  Goal: Skin Integrity  Outcome: Ongoing (interventions implemented as appropriate)

## 2017-07-18 NOTE — OP NOTE
"Endoscopic Gastroduodenoscopy with PEG Procedure Note    Procedure: Endoscopic Gastroduodenoscopy with placement of a percutaneous endoscopic gastrostomy tube    Pre-operative Diagnosis: Dysphagia and weight loss    Post-operative Diagnosis: Same    Indications: Dysphagia and weight loss with malnutrition    Sedation: As per anesthesia    Pre-Procedure Physical:  The following portions of the patient's history were reviewed.    /79 (BP Location: Left arm, Patient Position: Lying)  Pulse 120  Temp 97.4 °F (36.3 °C) (Tympanic)   Resp 20  Ht 64\" (162.6 cm)  Wt 88 lb 4 oz (40 kg)  SpO2 100%  BMI 15.15 kg/m2    Heart:  normal S1 and S2  Lungs:  clear  Abdomen:  soft, nontender, normal bowel sounds  Mental Status:  Awake and alert.  She did respond to simple questions coherently.        Procedure Details     Informed consent was obtained for the procedure, including conscious sedation. Risks of pancreatitis, infection, perforation, hemorrhage, adverse drug reaction and aspiration were discussed. The patient was placed in the supine position.  Based on the pre-procedure assessment, including review of the patient's medical history, medications, allergies, and review of systems, she had been deemed to be an appropriate candidate for conscious sedation; she was therefore sedated with the medications listed below.  She was monitored continuously with ECG tracing, pulse oximetry, blood pressure monitoring, and direct observation.      The gastroscope was inserted into the mouth and advanced under direct vision to third portion of the duodenum.  A careful inspection was made as the gastroscope was withdrawn, including a retroflexed view of the proximal stomach.     An appropriate location for PEG placement was found by transillumination and palpation of the anterior abdominal wall.  The skin was prepped and draped in a sterile fashion, then the skin and underlying soft tissues were anesthetized with lidocaine.  A " needle was passed through the abdominal wall and grasped using a snare placed through the endoscope.  A wire was then passed through the needle, grasped with the snare, and brought out through the patient's mouth along with the endoscope.  Following this, a PEG tube was placed by the pull technique.  Further findings and interventions are described below.  The patient's anatomy was very unusual with an elongated stomach extending nearly to the pelvis.  A gastrostomy was placed in the left mid abdomen only 2 cm from the skin to the gastric mucosa.  The esophageal, gastric, and duodenal mucosa was normal throughout.  The patient tolerated the procedure well with no sign of any immediate complication.      Findings:  -Very large and elongated stomach extending nearly to the pelvis but otherwise normal esophagus, stomach, and duodenum    Specimens: None           Complications:  None; patient tolerated the procedure well.           Impression:    -Very large and elongated stomach extending nearly to the pelvis but otherwise normal esophagus, stomach, and duodenum  -Status post PEG placement in the anterior wall of the mid gastric body    Recommendations:  -Monitor overnight  -Began enteral nutrition in a.m. if no complications arise

## 2017-07-18 NOTE — ANESTHESIA PREPROCEDURE EVALUATION
Anesthesia Evaluation     Patient summary reviewed and Nursing notes reviewed   no history of anesthetic complications:  NPO Solid Status: > 8 hours  NPO Liquid Status: > 8 hours     Airway   Mallampati: II  TM distance: >3 FB  Neck ROM: full  possible difficult intubation and small opening  Dental - normal exam     Pulmonary    (+) pneumonia worsening , rhonchi, decreased breath sounds,   (-) asthma, not a smoker  Cardiovascular - negative cardio ROS and normal exam  Exercise tolerance: good (4-7 METS)    NYHA Classification: II    (-) hypertension, past MI, dysrhythmias, angina      Neuro/Psych  (+) CVA (5 yrs ago),    (-) seizures  GI/Hepatic/Renal/Endo - negative ROS   (-) GERD, diabetes, hypothyroidism    Musculoskeletal     Abdominal  - normal exam    Bowel sounds: normal.   Substance History - negative use     OB/GYN negative ob/gyn ROS         Other   (+) arthritis     ROS/Med Hx Other: Hard of hearing  Chronic bronchitis pt states coughing for 4-months      Phys Exam Other: Poor inspiratory effort                              Anesthesia Plan    ASA 3     general     intravenous induction   Anesthetic plan and risks discussed with patient.  Use of blood products discussed with patient  Consented to blood products.

## 2017-07-18 NOTE — PROGRESS NOTES
Nutrition Services    Patient Name:  Tamie Rodriguez  YOB: 1940  MRN: [id number]  Admit Date:  7/14/2017        When MD desires to use g-tube for feeding:  Recommend: Osmolite 1 wei at goal rate of 45 ml/hr to provide: 1145 kcal/d (100% kcal needs, 48 g protein/d (100% pro needs), and 909 cc/d fluid.  RD will follow.  Thank you      Electronically signed by:  Helen Waite RD  07/18/17 2:42 PM

## 2017-07-18 NOTE — PLAN OF CARE
Problem: Patient Care Overview (Adult)  Goal: Plan of Care Review  Outcome: Ongoing (interventions implemented as appropriate)    07/16/17 2234 07/17/17 0842   Coping/Psychosocial Response Interventions   Plan Of Care Reviewed With --  patient   Patient Care Overview   Progress no change --        Goal: Adult Individualization and Mutuality  Outcome: Ongoing (interventions implemented as appropriate)    Problem: Fluid Volume Deficit (Adult)  Goal: Identify Related Risk Factors and Signs and Symptoms  Outcome: Ongoing (interventions implemented as appropriate)    07/14/17 2235   Fluid Volume Deficit   Fluid Volume Deficit: Related Risk Factors age extremes   Signs and Symptoms (Fluid Volume Deficit) nausea/vomiting, anorexia, diarrhea complaints;weight loss       Goal: Fluid/Electrolyte Balance  Outcome: Ongoing (interventions implemented as appropriate)    07/16/17 2234   Fluid Volume Deficit (Adult)   Fluid/Electrolyte Balance making progress toward outcome       Goal: Comfort/Well Being  Outcome: Ongoing (interventions implemented as appropriate)    07/16/17 2234   Fluid Volume Deficit (Adult)   Comfort/Well Being making progress toward outcome         Problem: Fall Risk (Adult)  Goal: Identify Related Risk Factors and Signs and Symptoms  Outcome: Ongoing (interventions implemented as appropriate)    07/14/17 2235   Fall Risk   Fall Risk: Related Risk Factors age-related changes;gait/mobility problems;sleep pattern alteration;environment unfamiliar   Fall Risk: Signs and Symptoms presence of risk factors       Goal: Absence of Falls  Outcome: Ongoing (interventions implemented as appropriate)    07/17/17 2153   Fall Risk (Adult)   Absence of Falls making progress toward outcome         Problem: Nutrition, Imbalanced: Inadequate Oral Intake (Adult)  Goal: Identify Related Risk Factors and Signs and Symptoms  Outcome: Ongoing (interventions implemented as appropriate)    07/14/17 2235   Nutrition, Imbalanced:  Inadequate Oral Intake   Nutrition Imbalanced: Less than Body Requirements: Related Risk Factors appetite decreased;eating difficulty;eating aversion;satiety early   Signs and Symptoms (Nutrition Imbalance, Inadequate Oral Intake: Signs and Symptoms) satiety early;loss of subcutaneous fat;muscle mass/strength decreased;weakness/lethargy       Goal: Improved Oral Intake  Outcome: Ongoing (interventions implemented as appropriate)    07/16/17 2234   Nutrition, Imbalanced: Inadequate Oral Intake (Adult)   Improved Oral Intake making progress toward outcome       Goal: Prevent Further Weight Loss  Outcome: Ongoing (interventions implemented as appropriate)    07/17/17 2153   Nutrition, Imbalanced: Inadequate Oral Intake (Adult)   Prevent Further Weight Loss making progress toward outcome         Problem: Pressure Ulcer Risk (Victor Hugo Scale) (Adult,Obstetrics,Pediatric)  Goal: Identify Related Risk Factors and Signs and Symptoms  Outcome: Ongoing (interventions implemented as appropriate)    07/15/17 1130   Pressure Ulcer Risk (Victor Hugo Scale)   Related Risk Factors (Pressure Ulcer Risk (Victor Hugo Scale)) body weight extremes;fluid intake inadequate;nutritional deficiencies;mobility impaired       Goal: Skin Integrity  Outcome: Ongoing (interventions implemented as appropriate)    07/17/17 2153   Pressure Ulcer Risk (Victor Hugo Scale) (Adult,Obstetrics,Pediatric)   Skin Integrity making progress toward outcome

## 2017-07-18 NOTE — NURSING NOTE
Called and left voicemail for call back and also paged Dr. Hayes on call MD regarding pt have ing rhonchi in left lower lobe with c/o chest conjestion.

## 2017-07-18 NOTE — ANESTHESIA POSTPROCEDURE EVALUATION
Patient: Tamie Rodriguez    Procedure Summary     Date Anesthesia Start Anesthesia Stop Room / Location    07/18/17 1449 1516  [facility] 07 / [facility]       Procedure Diagnosis Surgeon Provider    ESOPHAGOGASTRODUODENOSCOPY WITH PERCUTANEOUS ENDOSCOPIC GASTROSTOMY TUBE INSERTION (N/A Esophagus) Dehydration  (Dehydration [E86.0]) MD Stanley Munoz MD          Anesthesia Type: general  Last vitals  BP   (!) 151/104 (07/18/17 1528)    Temp   97.4 °F (36.3 °C) (07/18/17 1518)    Pulse   99 (07/18/17 1528)   Resp   20 (07/18/17 1528)    SpO2   99 % (07/18/17 1528)      Post Anesthesia Care and Evaluation    Patient location during evaluation: PACU  Patient participation: complete - patient participated  Level of consciousness: awake and alert  Pain score: 1  Pain management: adequate  Airway patency: patent  Anesthetic complications: No anesthetic complications  PONV Status: controlled  Cardiovascular status: acceptable  Respiratory status: acceptable  Hydration status: acceptable

## 2017-07-19 ENCOUNTER — APPOINTMENT (OUTPATIENT)
Dept: GENERAL RADIOLOGY | Facility: HOSPITAL | Age: 77
End: 2017-07-19

## 2017-07-19 ENCOUNTER — APPOINTMENT (OUTPATIENT)
Dept: CT IMAGING | Facility: HOSPITAL | Age: 77
End: 2017-07-19

## 2017-07-19 LAB
ALBUMIN SERPL-MCNC: 3.4 G/DL (ref 3.4–4.8)
ALBUMIN/GLOB SERPL: 0.9 G/DL (ref 1.5–2.5)
ALP SERPL-CCNC: 76 U/L (ref 35–104)
ALT SERPL W P-5'-P-CCNC: 8 U/L (ref 10–36)
ANION GAP SERPL CALCULATED.3IONS-SCNC: 4.1 MMOL/L (ref 3.6–11.2)
AST SERPL-CCNC: 16 U/L (ref 10–30)
BASOPHILS # BLD AUTO: 0.01 10*3/MM3 (ref 0–0.3)
BASOPHILS NFR BLD AUTO: 0.1 % (ref 0–2)
BILIRUB SERPL-MCNC: 0.4 MG/DL (ref 0.2–1.8)
BUN BLD-MCNC: 8 MG/DL (ref 7–21)
BUN/CREAT SERPL: 13.6 (ref 7–25)
CALCIUM SPEC-SCNC: 8.9 MG/DL (ref 7.7–10)
CHLORIDE SERPL-SCNC: 104 MMOL/L (ref 99–112)
CO2 SERPL-SCNC: 30.9 MMOL/L (ref 24.3–31.9)
CREAT BLD-MCNC: 0.59 MG/DL (ref 0.43–1.29)
DEPRECATED RDW RBC AUTO: 50.5 FL (ref 37–54)
EOSINOPHIL # BLD AUTO: 0.24 10*3/MM3 (ref 0–0.7)
EOSINOPHIL NFR BLD AUTO: 2.8 % (ref 0–7)
ERYTHROCYTE [DISTWIDTH] IN BLOOD BY AUTOMATED COUNT: 14.7 % (ref 11.5–14.5)
GFR SERPL CREATININE-BSD FRML MDRD: 99 ML/MIN/1.73
GLOBULIN UR ELPH-MCNC: 3.6 GM/DL
GLUCOSE BLD-MCNC: 115 MG/DL (ref 70–110)
HCT VFR BLD AUTO: 32.2 % (ref 37–47)
HGB BLD-MCNC: 9.4 G/DL (ref 12–16)
IMM GRANULOCYTES # BLD: 0.01 10*3/MM3 (ref 0–0.03)
IMM GRANULOCYTES NFR BLD: 0.1 % (ref 0–0.5)
LYMPHOCYTES # BLD AUTO: 1.21 10*3/MM3 (ref 1–3)
LYMPHOCYTES NFR BLD AUTO: 14.1 % (ref 16–46)
MCH RBC QN AUTO: 27.1 PG (ref 27–33)
MCHC RBC AUTO-ENTMCNC: 29.2 G/DL (ref 33–37)
MCV RBC AUTO: 92.8 FL (ref 80–94)
MONOCYTES # BLD AUTO: 0.82 10*3/MM3 (ref 0.1–0.9)
MONOCYTES NFR BLD AUTO: 9.5 % (ref 0–12)
NEUTROPHILS # BLD AUTO: 6.32 10*3/MM3 (ref 1.4–6.5)
NEUTROPHILS NFR BLD AUTO: 73.4 % (ref 40–75)
NRBC BLD MANUAL-RTO: 0 /100 WBC (ref 0–0)
OSMOLALITY SERPL CALC.SUM OF ELEC: 276.8 MOSM/KG (ref 273–305)
PLATELET # BLD AUTO: 272 10*3/MM3 (ref 130–400)
PMV BLD AUTO: 10 FL (ref 6–10)
POTASSIUM BLD-SCNC: 3.8 MMOL/L (ref 3.5–5.3)
PROT SERPL-MCNC: 7 G/DL (ref 6–8)
RBC # BLD AUTO: 3.47 10*6/MM3 (ref 4.2–5.4)
SODIUM BLD-SCNC: 139 MMOL/L (ref 135–153)
WBC NRBC COR # BLD: 8.61 10*3/MM3 (ref 4.5–12.5)

## 2017-07-19 PROCEDURE — 0 IOPAMIDOL 61 % SOLUTION: Performed by: INTERNAL MEDICINE

## 2017-07-19 PROCEDURE — 85025 COMPLETE CBC W/AUTO DIFF WBC: CPT | Performed by: INTERNAL MEDICINE

## 2017-07-19 PROCEDURE — 71020 HC CHEST PA AND LATERAL: CPT

## 2017-07-19 PROCEDURE — 71020 XR CHEST PA AND LATERAL: CPT | Performed by: RADIOLOGY

## 2017-07-19 PROCEDURE — 94799 UNLISTED PULMONARY SVC/PX: CPT

## 2017-07-19 PROCEDURE — 71260 CT THORAX DX C+: CPT | Performed by: RADIOLOGY

## 2017-07-19 PROCEDURE — 25010000002 ENOXAPARIN PER 10 MG: Performed by: INTERNAL MEDICINE

## 2017-07-19 PROCEDURE — 71260 CT THORAX DX C+: CPT

## 2017-07-19 PROCEDURE — 80053 COMPREHEN METABOLIC PANEL: CPT | Performed by: INTERNAL MEDICINE

## 2017-07-19 RX ORDER — POVIDONE-IODINE 10 MG/G
OINTMENT TOPICAL DAILY
Status: DISCONTINUED | OUTPATIENT
Start: 2017-07-19 | End: 2017-07-21 | Stop reason: HOSPADM

## 2017-07-19 RX ADMIN — GABAPENTIN 100 MG: 100 CAPSULE ORAL at 06:23

## 2017-07-19 RX ADMIN — CYPROHEPTADINE HYDROCHLORIDE 4 MG: 4 TABLET ORAL at 20:55

## 2017-07-19 RX ADMIN — CYPROHEPTADINE HYDROCHLORIDE 4 MG: 4 TABLET ORAL at 10:37

## 2017-07-19 RX ADMIN — ENOXAPARIN SODIUM 40 MG: 40 INJECTION SUBCUTANEOUS at 15:11

## 2017-07-19 RX ADMIN — LACTULOSE 10 G: 20 SOLUTION ORAL at 09:59

## 2017-07-19 RX ADMIN — PANTOPRAZOLE SODIUM 40 MG: 40 TABLET, DELAYED RELEASE ORAL at 06:24

## 2017-07-19 RX ADMIN — Medication 10 ML: at 09:34

## 2017-07-19 RX ADMIN — IPRATROPIUM BROMIDE AND ALBUTEROL SULFATE 3 ML: .5; 3 SOLUTION RESPIRATORY (INHALATION) at 00:42

## 2017-07-19 RX ADMIN — Medication 10 ML: at 21:00

## 2017-07-19 RX ADMIN — ACETAMINOPHEN 975 MG: 325 TABLET ORAL at 10:00

## 2017-07-19 RX ADMIN — POVIDONE-IODINE: 100 OINTMENT TOPICAL at 02:00

## 2017-07-19 RX ADMIN — IPRATROPIUM BROMIDE AND ALBUTEROL SULFATE 3 ML: .5; 3 SOLUTION RESPIRATORY (INHALATION) at 07:09

## 2017-07-19 RX ADMIN — DEXTROSE AND SODIUM CHLORIDE 75 ML/HR: 5; 450 INJECTION, SOLUTION INTRAVENOUS at 04:40

## 2017-07-19 RX ADMIN — IOPAMIDOL 100 ML: 612 INJECTION, SOLUTION INTRAVENOUS at 20:30

## 2017-07-19 RX ADMIN — GABAPENTIN 300 MG: 300 CAPSULE ORAL at 20:55

## 2017-07-19 RX ADMIN — IPRATROPIUM BROMIDE AND ALBUTEROL SULFATE 3 ML: .5; 3 SOLUTION RESPIRATORY (INHALATION) at 12:56

## 2017-07-19 RX ADMIN — LACTULOSE 10 G: 20 SOLUTION ORAL at 20:55

## 2017-07-19 RX ADMIN — IPRATROPIUM BROMIDE AND ALBUTEROL SULFATE 3 ML: .5; 3 SOLUTION RESPIRATORY (INHALATION) at 19:03

## 2017-07-19 NOTE — PROGRESS NOTES
LOS: 5 days       Chief Complaint :   Generalized weakness and dehydration  Subjective     Interval History:     Patient Complaints:  Tamie Rodriguez is having some postoperative abdominal pain.  She has no complaints of fevers or chills.  She does continue with some poor energy, lethargy and difficulty performing activities of daily living.  She has no associated hematemesis, rectal bleeding, diarrhea.       Review of Systems-unchanged since admission history and physical  Objective     Vital Signs  Temp:  [97.4 °F (36.3 °C)-99 °F (37.2 °C)] 99 °F (37.2 °C)  Heart Rate:  [] 90  Resp:  [14-20] 18  BP: (112-157)/() 137/68    Physical Exam    Constitutional: She is oriented to person, place, and time.   Thin elderly white female arousable and talkative diffuse weakness no obvious pain distress   HENT:   Head: Normocephalic and atraumatic.   Mouth/Throat: No oropharyngeal exudate.   Bilateral temporal wasting. Oral mucosa is dry   Eyes: Conjunctivae and EOM are normal. Pupils are equal, round, and reactive to light. Right eye exhibits no discharge. Left eye exhibits no discharge. No scleral icterus.   Neck: Normal range of motion. Neck supple. No tracheal deviation present. No thyromegaly present.   Cardiovascular: Normal rate, regular rhythm, normal heart sounds and intact distal pulses. Exam reveals no gallop and no friction rub.   No murmur heard.  Pulmonary/Chest: Effort normal and breath sounds normal. No stridor. No respiratory distress. She has no wheezes. She has no rales.   Abdominal: Soft. Bowel sounds are normal. -Postoperative tenderness.  Abdominal binders in place.    Genitourinary:   Genitourinary Comments: No Bernard catheter   Musculoskeletal: She exhibits no edema or deformity.   Lymphadenopathy:   She has no cervical adenopathy.   Neurological: She is alert and oriented to person, place, and time. She has normal reflexes. She displays normal reflexes. No cranial nerve deficit. She  exhibits normal muscle tone. Coordination normal.   Skin: Skin is warm. No rash noted. No erythema. No pallor.   Psychiatric: Alert and arousable and talking  Nursing note and vitals reviewed.      Results Review:     I reviewed the patient's new clinical results  : See Below  MEDICATIONS:    cyproheptadine 4 mg Oral TID   enoxaparin 40 mg Subcutaneous Q24H   gabapentin 100 mg Oral QAM   gabapentin 300 mg Oral Nightly   ipratropium-albuterol 3 mL Nebulization 4x Daily - RT   lactulose 10 g Oral TID   lidocaine 20 mL Subcutaneous Once   pantoprazole 40 mg Oral Q AM   povidone-iodine  Topical Daily   sodium chloride 10 mL Intracatheter Q12H       LABS:        Results from last 7 days  Lab Units 07/19/17  0051 07/16/17  0112 07/16/17  0111 07/15/17  0048 07/14/17  1229   CRP mg/dL  --  2.85*  --  2.84* 2.34*   WBC 10*3/mm3 8.61  --  5.50 6.26 9.32   HEMOGLOBIN g/dL 9.4*  --  9.2* 9.1* 10.8*   HEMATOCRIT % 32.2*  --  31.5* 31.0* 36.2*   MCV fL 92.8  --  93.2 95.1* 92.1   MCHC g/dL 29.2*  --  29.2* 29.4* 29.8*   PLATELETS 10*3/mm3 272  --  257 271 285           Results from last 7 days  Lab Units 07/19/17  0051 07/16/17  1718 07/16/17  0111 07/15/17  0048   SODIUM mmol/L 139  --  139 141   POTASSIUM mmol/L 3.8  --  3.8 3.6   MAGNESIUM mg/dL  --  3.0* 1.7  --    CHLORIDE mmol/L 104  --  106 108   CO2 mmol/L 30.9  --  28.8 30.5   BUN mg/dL 8  --  5* 8   CREATININE mg/dL 0.59  --  0.64 0.55   EGFR IF NONAFRICN AM mL/min/1.73 99  --  90 107   CALCIUM mg/dL 8.9  --  8.7 9.0   GLUCOSE mg/dL 115*  --  110 107   ALBUMIN g/dL 3.40  --  3.20* 3.30*   BILIRUBIN mg/dL 0.4  --  0.3 0.4   ALK PHOS U/L 76  --  64 69   AST (SGOT) U/L 16  --  18 14   ALT (SGPT) U/L 8*  --  5* 5*   Estimated Creatinine Clearance: 37.2 mL/min (by C-G formula based on Cr of 0.59).  No results found for: AMMONIA                      Assessment/Plan    1) Dehydration-Resolved  2) hypokalemia  3) severe dysphagia-GI evaluation ongoing.  4) severe weight  loss  5) debility  6) anemia-multifactorial  7) dementia  8) depression-psychiatrist recommendations appreciated       See orders entered.     Link Fulton MD  07/19/17  9:28 AM

## 2017-07-19 NOTE — PLAN OF CARE
Problem: Patient Care Overview (Adult)  Goal: Plan of Care Review  Outcome: Ongoing (interventions implemented as appropriate)    07/19/17 0033   Coping/Psychosocial Response Interventions   Plan Of Care Reviewed With patient   Patient Care Overview   Progress progress toward functional goals as expected         Problem: Fluid Volume Deficit (Adult)  Goal: Identify Related Risk Factors and Signs and Symptoms  Outcome: Ongoing (interventions implemented as appropriate)    07/18/17 0905   Fluid Volume Deficit   Fluid Volume Deficit: Related Risk Factors age extremes   Signs and Symptoms (Fluid Volume Deficit) weight loss;nausea/vomiting, anorexia, diarrhea complaints       Goal: Fluid/Electrolyte Balance  Outcome: Ongoing (interventions implemented as appropriate)    07/19/17 0033   Fluid Volume Deficit (Adult)   Fluid/Electrolyte Balance making progress toward outcome       Goal: Comfort/Well Being  Outcome: Ongoing (interventions implemented as appropriate)    07/19/17 0033   Fluid Volume Deficit (Adult)   Comfort/Well Being making progress toward outcome         Problem: Fall Risk (Adult)  Goal: Identify Related Risk Factors and Signs and Symptoms  Outcome: Ongoing (interventions implemented as appropriate)    07/18/17 0905   Fall Risk   Fall Risk: Related Risk Factors age-related changes;gait/mobility problems;sleep pattern alteration   Fall Risk: Signs and Symptoms presence of risk factors       Goal: Absence of Falls  Outcome: Ongoing (interventions implemented as appropriate)    07/19/17 0033   Fall Risk (Adult)   Absence of Falls making progress toward outcome         Problem: Nutrition, Imbalanced: Inadequate Oral Intake (Adult)  Goal: Identify Related Risk Factors and Signs and Symptoms  Outcome: Ongoing (interventions implemented as appropriate)    07/18/17 0905   Nutrition, Imbalanced: Inadequate Oral Intake   Nutrition Imbalanced: Less than Body Requirements: Related Risk Factors appetite decreased;eating  aversion;knowledge deficit   Signs and Symptoms (Nutrition Imbalance, Inadequate Oral Intake: Signs and Symptoms) satiety early;weight decreased (percent weight loss, percent usual body weight, body mass index less than 18.5) (Adults);muscle mass/strength decreased;loss of subcutaneous fat       Goal: Improved Oral Intake  Outcome: Ongoing (interventions implemented as appropriate)    07/19/17 0033   Nutrition, Imbalanced: Inadequate Oral Intake (Adult)   Improved Oral Intake making progress toward outcome       Goal: Prevent Further Weight Loss  Outcome: Ongoing (interventions implemented as appropriate)    07/19/17 0033   Nutrition, Imbalanced: Inadequate Oral Intake (Adult)   Prevent Further Weight Loss making progress toward outcome         Problem: Pressure Ulcer Risk (Victor Hugo Scale) (Adult,Obstetrics,Pediatric)  Goal: Identify Related Risk Factors and Signs and Symptoms  Outcome: Ongoing (interventions implemented as appropriate)    07/18/17 0905   Pressure Ulcer Risk (Victor Hugo Scale)   Related Risk Factors (Pressure Ulcer Risk (Victor Hugo Scale)) age extremes;fluid intake inadequate;mobility impaired       Goal: Skin Integrity  Outcome: Ongoing (interventions implemented as appropriate)    07/19/17 0033   Pressure Ulcer Risk (Victor Hugo Scale) (Adult,Obstetrics,Pediatric)   Skin Integrity making progress toward outcome

## 2017-07-19 NOTE — PLAN OF CARE
Problem: Patient Care Overview (Adult)  Goal: Plan of Care Review  Outcome: Ongoing (interventions implemented as appropriate)  Goal: Adult Individualization and Mutuality  Outcome: Ongoing (interventions implemented as appropriate)  Goal: Discharge Needs Assessment  Outcome: Ongoing (interventions implemented as appropriate)    Problem: Fluid Volume Deficit (Adult)  Goal: Identify Related Risk Factors and Signs and Symptoms  Outcome: Ongoing (interventions implemented as appropriate)  Goal: Fluid/Electrolyte Balance  Outcome: Ongoing (interventions implemented as appropriate)  Goal: Comfort/Well Being  Outcome: Ongoing (interventions implemented as appropriate)    Problem: Fall Risk (Adult)  Goal: Identify Related Risk Factors and Signs and Symptoms  Outcome: Ongoing (interventions implemented as appropriate)  Goal: Absence of Falls  Outcome: Ongoing (interventions implemented as appropriate)    Problem: Nutrition, Imbalanced: Inadequate Oral Intake (Adult)  Goal: Identify Related Risk Factors and Signs and Symptoms  Outcome: Ongoing (interventions implemented as appropriate)  Goal: Improved Oral Intake  Outcome: Ongoing (interventions implemented as appropriate)  Goal: Prevent Further Weight Loss  Outcome: Ongoing (interventions implemented as appropriate)

## 2017-07-19 NOTE — PROGRESS NOTES
Discharge Planning Assessment  [facility]     Patient Name: Tamie Rodriguez  MRN: [id number]  Today's Date: 7/19/2017    Admit Date: 7/14/2017    ischarge Plan       07/19/17 1204    Case Management/Social Work Plan    Plan [facility] acute rehab is reviewing information. SS to follow.           Expected Discharge Date and Time     Expected Discharge Date Expected Discharge Time    Jul 20, 2017           Saira Hou

## 2017-07-19 NOTE — PROGRESS NOTES
Subjective     Interval History:     Patient Complaints: Sleepy.  She denies shortness of breath and denies abdominal pain.    History taken from: patient    Review of Systems:    The following systems were reviewed and negative;  respiratory, cardiovascular and genitourinary    Objective     Vital Signs  Temp:  [97.8 °F (36.6 °C)-99 °F (37.2 °C)] 99 °F (37.2 °C)  Heart Rate:  [] 89  Resp:  [15-18] 15  BP: (112-139)/(50-75) 137/68    Physical Exam:   Lungs respirations regular, respirations even and respirations unlabored  Heart normal S1, S2, no murmur, no jaswinder, no rub and no click  Abdomen no masses, no hepatomegaly and no splenomegaly mild tenderness on palpation round G-tube  Extremities moves extremities well and no redness     Results Review:     I reviewed the patient's new clinical results.      Assessment/Plan   Status post G-tube placement yesterday.  The G-tube looks good and the patient has no complaints today.  She did have an unusual amount of abdominal discomfort yesterday immediately following the placement and she also has a very unusual intra-abdominal anatomy.  For that reason I have requested that she have her abdomen scanned through the G-tube when she has her chest CT done later today.  Assuming there are no unexpected findings on CT I will see the patient on an as needed basis.    Active Problems:    Dehydration        Venu Ventura MD  07/19/17  4:56 PM

## 2017-07-19 NOTE — NURSING NOTE
Skin assessment performed at this time, slight blanchable redness noted to coccyx, heels, and buttocks. No new areas for concern at this time, heels elevated off of bed at this time with pillow. Pt has #20 in lower Rt arm, flushes well at this time.

## 2017-07-19 NOTE — PROGRESS NOTES
Nutrition Services    Patient Name:  Tamie Rodriguez  YOB: 1940  MRN: [id number]  Admit Date:  7/14/2017        Rec TF of Osmolite 1 wei at goal rate of 45ml/hr to meet needs - no additional fluids until IVF d/c Tf will provide 907 ml water at goal rate -  10 ml/hr or additional 240ml water recommended when IVF d/c      Electronically signed by:  Simran Benavides RD  07/19/17 9:23 AM

## 2017-07-20 PROCEDURE — G8979 MOBILITY GOAL STATUS: HCPCS

## 2017-07-20 PROCEDURE — 97167 OT EVAL HIGH COMPLEX 60 MIN: CPT

## 2017-07-20 PROCEDURE — 25010000002 CEFTRIAXONE: Performed by: NURSE PRACTITIONER

## 2017-07-20 PROCEDURE — 94799 UNLISTED PULMONARY SVC/PX: CPT

## 2017-07-20 PROCEDURE — 87040 BLOOD CULTURE FOR BACTERIA: CPT | Performed by: INTERNAL MEDICINE

## 2017-07-20 PROCEDURE — G8988 SELF CARE GOAL STATUS: HCPCS

## 2017-07-20 PROCEDURE — 97530 THERAPEUTIC ACTIVITIES: CPT

## 2017-07-20 PROCEDURE — G8987 SELF CARE CURRENT STATUS: HCPCS

## 2017-07-20 PROCEDURE — 97162 PT EVAL MOD COMPLEX 30 MIN: CPT

## 2017-07-20 PROCEDURE — 99222 1ST HOSP IP/OBS MODERATE 55: CPT | Performed by: INTERNAL MEDICINE

## 2017-07-20 PROCEDURE — 25010000002 ENOXAPARIN PER 10 MG: Performed by: INTERNAL MEDICINE

## 2017-07-20 PROCEDURE — G8978 MOBILITY CURRENT STATUS: HCPCS

## 2017-07-20 PROCEDURE — 97110 THERAPEUTIC EXERCISES: CPT

## 2017-07-20 RX ORDER — IPRATROPIUM BROMIDE AND ALBUTEROL SULFATE 2.5; .5 MG/3ML; MG/3ML
3 SOLUTION RESPIRATORY (INHALATION)
Status: DISCONTINUED | OUTPATIENT
Start: 2017-07-20 | End: 2017-07-21 | Stop reason: HOSPADM

## 2017-07-20 RX ORDER — PEDIATRIC MULTIPLE VITAMIN LIQ
5 LIQUID ORAL DAILY
Status: DISCONTINUED | OUTPATIENT
Start: 2017-07-20 | End: 2017-07-21 | Stop reason: HOSPADM

## 2017-07-20 RX ADMIN — IPRATROPIUM BROMIDE AND ALBUTEROL SULFATE 3 ML: .5; 3 SOLUTION RESPIRATORY (INHALATION) at 19:25

## 2017-07-20 RX ADMIN — CYPROHEPTADINE HYDROCHLORIDE 4 MG: 4 TABLET ORAL at 08:12

## 2017-07-20 RX ADMIN — CEFTRIAXONE 1 G: 1 INJECTION, POWDER, FOR SOLUTION INTRAMUSCULAR; INTRAVENOUS at 13:01

## 2017-07-20 RX ADMIN — PEDIATRIC MULTIPLE VITAMIN LIQ 5 ML: LIQUID at 16:32

## 2017-07-20 RX ADMIN — IPRATROPIUM BROMIDE AND ALBUTEROL SULFATE 3 ML: .5; 3 SOLUTION RESPIRATORY (INHALATION) at 13:08

## 2017-07-20 RX ADMIN — Medication 10 ML: at 08:12

## 2017-07-20 RX ADMIN — LACTULOSE 10 G: 20 SOLUTION ORAL at 16:32

## 2017-07-20 RX ADMIN — POVIDONE-IODINE: 100 OINTMENT TOPICAL at 08:12

## 2017-07-20 RX ADMIN — PANTOPRAZOLE SODIUM 40 MG: 40 TABLET, DELAYED RELEASE ORAL at 06:02

## 2017-07-20 RX ADMIN — LACTULOSE 10 G: 20 SOLUTION ORAL at 08:12

## 2017-07-20 RX ADMIN — GABAPENTIN 300 MG: 300 CAPSULE ORAL at 21:06

## 2017-07-20 RX ADMIN — GABAPENTIN 100 MG: 100 CAPSULE ORAL at 06:02

## 2017-07-20 RX ADMIN — IPRATROPIUM BROMIDE AND ALBUTEROL SULFATE 3 ML: .5; 3 SOLUTION RESPIRATORY (INHALATION) at 00:38

## 2017-07-20 RX ADMIN — LACTULOSE 10 G: 20 SOLUTION ORAL at 21:06

## 2017-07-20 RX ADMIN — ENOXAPARIN SODIUM 40 MG: 40 INJECTION SUBCUTANEOUS at 14:08

## 2017-07-20 RX ADMIN — DOXYCYCLINE 100 MG: 100 INJECTION, POWDER, LYOPHILIZED, FOR SOLUTION INTRAVENOUS at 13:59

## 2017-07-20 RX ADMIN — CYPROHEPTADINE HYDROCHLORIDE 4 MG: 4 TABLET ORAL at 16:32

## 2017-07-20 RX ADMIN — Medication 10 ML: at 21:06

## 2017-07-20 NOTE — PLAN OF CARE
Problem: Patient Care Overview (Adult)  Goal: Plan of Care Review  Outcome: Ongoing (interventions implemented as appropriate)    07/19/17 2313   Coping/Psychosocial Response Interventions   Plan Of Care Reviewed With patient   Patient Care Overview   Progress progress toward functional goals as expected         Problem: Fluid Volume Deficit (Adult)  Goal: Fluid/Electrolyte Balance  Outcome: Ongoing (interventions implemented as appropriate)    07/19/17 2313   Fluid Volume Deficit (Adult)   Fluid/Electrolyte Balance making progress toward outcome       Goal: Comfort/Well Being  Outcome: Ongoing (interventions implemented as appropriate)    07/19/17 2313   Fluid Volume Deficit (Adult)   Comfort/Well Being making progress toward outcome         Problem: Fall Risk (Adult)  Goal: Absence of Falls  Outcome: Ongoing (interventions implemented as appropriate)    07/19/17 2313   Fall Risk (Adult)   Absence of Falls making progress toward outcome         Problem: Nutrition, Imbalanced: Inadequate Oral Intake (Adult)  Goal: Improved Oral Intake  Outcome: Ongoing (interventions implemented as appropriate)    07/19/17 2313   Nutrition, Imbalanced: Inadequate Oral Intake (Adult)   Improved Oral Intake making progress toward outcome       Goal: Prevent Further Weight Loss  Outcome: Ongoing (interventions implemented as appropriate)    07/19/17 2313   Nutrition, Imbalanced: Inadequate Oral Intake (Adult)   Prevent Further Weight Loss making progress toward outcome         Problem: Pressure Ulcer Risk (Victor Hugo Scale) (Adult,Obstetrics,Pediatric)  Goal: Skin Integrity  Outcome: Ongoing (interventions implemented as appropriate)    07/19/17 2313   Pressure Ulcer Risk (Victor Hugo Scale) (Adult,Obstetrics,Pediatric)   Skin Integrity making progress toward outcome

## 2017-07-20 NOTE — PROGRESS NOTES
Discharge Planning Assessment   [place]     Patient Name: Tamie Rodriguez  MRN: [id number]  Today's Date: 7/20/2017    Admit Date: 7/14/2017       Discharge Plan       07/20/17 1301    Case Management/Social Work Plan    Plan Swing bed consult ordered today. SS contacted [facility] acute rehab per Gris. [facility] acute rehab to review pt further after PT/OT notes documented. SS made swing bed nurse aware that acute rehab is reviewing pt. SS to follow.     15:16 SS received call from [facility] acute rehab per Gris who states plans to review PT notes when available. SS to follow.           Expected Discharge Date and Time     Expected Discharge Date Expected Discharge Time    Jul 20, 2017                Saira Hou

## 2017-07-20 NOTE — PLAN OF CARE
Problem: Inpatient Occupational Therapy  Goal: Strength Goal LTG- OT    07/20/17 1435   Strength OT LTG   Strength Goal OT LTG, Date Established 07/20/17   Strength Goal OT LTG, Time to Achieve 5 - 7 days   Strength Goal OT LTG, Measure to Achieve BUE increase x 1 to enhance self care/mobility skills       Goal: ADL Goal LTG- OT    07/20/17 1435   ADL OT LTG   ADL OT LTG, Date Established 07/20/17   ADL OT LTG, Time to Achieve 5 - 7 days   ADL OT LTG, Activity Type ADL skills   ADL OT LTG, [place] Level mod assist

## 2017-07-20 NOTE — THERAPY EVALUATION
Acute Care - Physical Therapy Initial Evaluation   [place]     Patient Name: Tamie Rodriguez  : 1940  MRN: [id number]  Today's Date: 2017   Onset of Illness/Injury or Date of Surgery Date: 17  Date of Referral to PT: 17  Referring Physician: Dr. Fulton      Admit Date: 2017     Visit Dx:    ICD-10-CM ICD-9-CM   1. Dehydration E86.0 276.51     Patient Active Problem List   Diagnosis   • Dehydration     Past Medical History:   Diagnosis Date   • Arthritis    • Meniere's disease      Past Surgical History:   Procedure Laterality Date   • APPENDECTOMY     • CHOLECYSTECTOMY     • ENDOSCOPY W/ PEG TUBE PLACEMENT N/A 2017    Procedure: ESOPHAGOGASTRODUODENOSCOPY WITH PERCUTANEOUS ENDOSCOPIC GASTROSTOMY TUBE INSERTION;  Surgeon: Venu Ventura MD;  Location: [facility];  Service:    • HYSTERECTOMY     • TOTAL HIP ARTHROPLASTY Left    • TUBAL ABDOMINAL LIGATION     • VARICOSE VEIN SURGERY      times two          PT ASSESSMENT (last 72 hours)      PT Evaluation       17 1700 17 1431    Rehab Evaluation    Document Type evaluation  -BC evaluation  -KR    Subjective Information agree to therapy  -BC agree to therapy  -KR    Patient Effort, Rehab Treatment adequate  -BC adequate  -KR    General Information    Patient Profile Review yes  -BC     Onset of Illness/Injury or Date of Surgery Date 17  -BC     Referring Physician Dr. Fulton  -BC     General Observations Pt. supine in bed, awake and alert, daughter in room.  -BC     Equipment Currently Used at Home walker, rolling  -BC     Plans/Goals Discussed With patient and family;agreed upon  -BC     Risks Reviewed patient and family:;LOB;nausea/vomiting;dizziness;increased discomfort;change in vital signs  -BC     Benefits Reviewed patient and family:;improve function;increase independence;increase strength;increase balance;decrease pain;decrease risk of DVT  -BC     Living Environment    Lives With child(alla), adult   per  pt report  -BC child(alla), adult   per pt report  -KR    Living Arrangements house  -BC     Clinical Impression    Date of Referral to PT 07/20/17  -BC     Functional Level At Time Of Evaluation poor  -BC     Criteria for Skilled Therapeutic Interventions Met yes;treatment indicated  -BC     Rehab Potential fair, will monitor progress closely  -BC     Predicted Duration of Therapy Intervention (days/wks) 3-4 days  -BC     Pain Assessment    Pain Assessment No/denies pain  -BC     Cognitive Assessment/Intervention    Current Cognitive/Communication Assessment functional  -BC functional  -KR    Orientation Status oriented to;person;situation;required verbal cueing (specifiy in comments)  -BC oriented to;person;situation  -KR    Follows Commands/Answers Questions able to follow single-step instructions  -BC able to follow single-step instructions  -KR    Personal Safety moderate impairment;decreased awareness, need for assist;decreased awareness, need for safety;decreased insight to deficits  -BC     Personal Safety Interventions fall prevention program maintained;gait belt;nonskid shoes/slippers when out of bed;muscle strengthening facilitated  -BC     ROM (Range of Motion)    General ROM --   BLE ROM decreased 30%  -BC --   BUE AROM 4/5  -KR    MMT (Manual Muscle Testing)    General MMT Assessment lower extremity strength deficits identified   BLE MMT grossly 3+/5  -BC --   BUE 3-/5  -KR    Bed Mobility, Assessment/Treatment    Bed Mobility, Assistive Device bed rails  -BC     Bed Mob, Supine to Sit, [place] verbal cues required;nonverbal cues required (demo/gesture);2 person assist required;moderate assist (50% patient effort)  -BC     Bed Mob, Sit to Supine, [place] verbal cues required;nonverbal cues required (demo/gesture);moderate assist (50% patient effort);2 person assist required  -BC     Transfer Assessment/Treatment    Transfers, Sit-Stand [place] moderate assist (50% patient effort);2 person  assist required  -BC     Transfers, Stand-Sit [place] moderate assist (50% patient effort);2 person assist required  -BC     Transfers, Sit-Stand-Sit, Assist Device rolling walker  -BC     Gait Assessment/Treatment    Gait, [place] Level maximum assist (25% patient effort);2 person assist required  -BC     Gait, Assistive Device rolling walker  -BC     Gait, Distance (Feet) --   1 step  -BC     Positioning and Restraints    Pre-Treatment Position in bed  -BC     Post Treatment Position bed   bed in chair position. Notified nurse, sit up <30 min  -BC       07/20/17 0810 07/19/17 2000    Muscle Tone Assessment    Muscle Tone Assessment Bilateral Upper Extremities;Bilateral Lower Extremities  -SJ Bilateral Upper Extremities;Bilateral Lower Extremities  -RH    Bilateral Upper Extremities Muscle Tone Assessment moderately decreased tone  -SJ moderately decreased tone  -RH    Bilateral Lower Extremities Muscle Tone Assessment moderately decreased tone  -SJ moderately decreased tone  -RH      07/19/17 0812 07/18/17 2306    Muscle Tone Assessment    Muscle Tone Assessment Bilateral Upper Extremities;Bilateral Lower Extremities  -HB Bilateral Upper Extremities;Bilateral Lower Extremities  -RH    Bilateral Upper Extremities Muscle Tone Assessment moderately decreased tone  -HB moderately decreased tone  -RH    Bilateral Lower Extremities Muscle Tone Assessment moderately decreased tone  -HB moderately decreased tone  -RH      07/18/17 0823       Muscle Tone Assessment    Muscle Tone Assessment Bilateral Upper Extremities;Bilateral Lower Extremities  -HB     Bilateral Upper Extremities Muscle Tone Assessment moderately decreased tone  -HB     Bilateral Lower Extremities Muscle Tone Assessment moderately decreased tone  -HB       User Key  (r) = Recorded By, (t) = Taken By, (c) = Cosigned By    Initials Name Provider Type    KEYLA Daugherty, PT Physical Therapist    KR Aquiles Erickson OT Occupational Therapist    SJ  Lorna Blood, RN Registered Nurse    DILIP Lopes, RN Registered Nurse    JANESSA Berger, RN Registered Nurse          Physical Therapy Education     Title: PT OT SLP Therapies (Active)     Topic: Physical Therapy (Active)     Point: Mobility training (Active)    Learning Progress Summary    Learner Readiness Method Response Comment Documented by Status   Patient Acceptance E NR  BC 07/20/17 1729 Active               Point: Home exercise program (Active)    Learning Progress Summary    Learner Readiness Method Response Comment Documented by Status   Patient Acceptance E NR  BC 07/20/17 1729 Active               Point: Body mechanics (Active)    Learning Progress Summary    Learner Readiness Method Response Comment Documented by Status   Patient Acceptance E NR  BC 07/20/17 1729 Active               Point: Precautions (Active)    Learning Progress Summary    Learner Readiness Method Response Comment Documented by Status   Patient Acceptance E NR  BC 07/20/17 1729 Active                      User Key     Initials Effective Dates Name Provider Type Discipline    BC 03/14/16 -  Angie Daugherty, PT Physical Therapist PT                PT Recommendation and Plan  Anticipated Equipment Needs At Discharge: front wheeled walker  Planned Therapy Interventions: balance training, bed mobility training, gait training, home exercise program, patient/family education, strengthening, transfer training  PT Frequency: 3-5 times/wk, per priority policy             IP PT Goals       07/20/17 1730          Bed Mobility PT LTG    Bed Mobility PT LTG, Date Established 07/20/17  -BC      Bed Mobility PT LTG, Time to Achieve 3 days  -BC      Bed Mobility PT LTG, Activity Type all bed mobility  -BC      Bed Mobility PT LTG, [place] Level minimum assist (75% patient effort)  -BC      Bed Mobility PT Goal  LTG, Assist Device bed rails  -BC      Transfer Training PT LTG    Transfer Training PT LTG, Date Established 07/20/17   -BC      Transfer Training PT LTG, Time to Achieve 3 days  -BC      Transfer Training PT LTG, Activity Type all transfers  -BC      Transfer Training PT LTG, [place] Level minimum assist (75% patient effort)  -BC      Transfer Training PT LTG, Assist Device walker, rolling  -BC      Gait Training PT LTG    Gait Training Goal PT LTG, Date Established 07/20/17  -BC      Gait Training Goal PT LTG, Time to Achieve 3 days  -BC      Gait Training Goal PT LTG, [place] Level minimum assist (75% patient effort)  -BC      Gait Training Goal PT LTG, Assist Device walker, rolling  -BC      Gait Training Goal PT LTG, Distance to Achieve 20  -BC        User Key  (r) = Recorded By, (t) = Taken By, (c) = Cosigned By    Initials Name Provider Type    KEYLA Daugherty, PT Physical Therapist                Outcome Measures       07/20/17 1700 07/20/17 1436 07/20/17 1400    How much help from another person do you currently need...    Turning from your back to your side while in flat bed without using bedrails? 2  -BC      Moving from lying on back to sitting on the side of a flat bed without bedrails? 2  -BC      Moving to and from a bed to a chair (including a wheelchair)? 2  -BC      Standing up from a chair using your arms (e.g., wheelchair, bedside chair)? 2  -BC      Climbing 3-5 steps with a railing? 1  -BC      To walk in hospital room? 2  -BC      AM-PAC 6 Clicks Score 11  -BC      How much help from another is currently needed...    Putting on and taking off regular lower body clothing?  2  -KR     Bathing (including washing, rinsing, and drying)  2  -KR     Toileting (which includes using toilet bed pan or urinal)  2  -KR     Putting on and taking off regular upper body clothing  3  -KR     Taking care of personal grooming (such as brushing teeth)  3  -KR     Eating meals  3  -KR     Score  15  -KR     Functional Assessment    Outcome Measure Options AM-PAC 6 Clicks Basic Mobility (PT)  -BC  AM-PAC 6 Clicks  Daily Activity (OT)  -KR      User Key  (r) = Recorded By, (t) = Taken By, (c) = Cosigned By    Initials Name Provider Type    BC Angie Daugherty, PT Physical Therapist    KR Aquiles Erickson, OT Occupational Therapist           Time Calculation:         PT Charges       07/20/17 2012          Time Calculation    Start Time --   60  -BC      PT Received On 07/20/17  -BC      PT Goal Re-Cert Due Date 08/03/17  -BC        User Key  (r) = Recorded By, (t) = Taken By, (c) = Cosigned By    Initials Name Provider Type    BC Angie Daugherty, PT Physical Therapist          Therapy Charges for Today     Code Description Service Date Service Provider Modifiers Qty    34357199220 HC PT MOBILITY CURRENT 7/20/2017 Angie Daugherty, PT GP, CL 1    43846569352 HC PT MOBILITY PROJECTED 7/20/2017 Angie Daugherty, PT GP, CK 1    39258377602 HC PT THERAPEUTIC ACT EA 15 MIN 7/20/2017 Angie Daugherty, PT GP 1    27679479636 HC PT THER SUPP EA 15 MIN 7/20/2017 Angie Daugherty, PT GP 2    12876803482 HC PT THER PROC EA 15 MIN 7/20/2017 Angie Daugherty, PT GP 1    30355445233 HC PT EVAL MOD COMPLEXITY 2 7/20/2017 Angie Daugherty, PT GP 1          PT G-Codes  Outcome Measure Options: AM-PAC 6 Clicks Basic Mobility (PT)  Score: 11  Functional Limitation: Mobility: Walking and moving around  Mobility: Walking and Moving Around Current Status (): At least 60 percent but less than 80 percent impaired, limited or restricted  Mobility: Walking and Moving Around Goal Status (): At least 40 percent but less than 60 percent impaired, limited or restricted      Angie Daugherty PT  7/20/2017

## 2017-07-20 NOTE — PROGRESS NOTES
LOS: 6 days       Chief Complaint :   Generalized weakness and dehydration  Subjective     Interval History:     Patient Complaints:  Tamie Rodriguez remains alert and talkative.  She describes having improved energy, strength and capabilities with tube feeding.  She is more energetic.  She denies any productive cough, shortness breath, nausea or vomiting.  She does have some postoperative abdominal pain from her G-tube placement.  She did have a CT scan of her chest which was consistent with right middle lobe and left upper lobe consolidations.  Clinically her white blood cell count is normal she's had no fever.    She also has no productive cough.     Review of Systems-unchanged since admission history and physical  Objective     Vital Signs  Temp:  [97.4 °F (36.3 °C)-98.8 °F (37.1 °C)] 97.4 °F (36.3 °C)  Heart Rate:  [] 120  Resp:  [15-16] 16  BP: (109-137)/(63-78) 109/63    Physical Exam    Constitutional: She is oriented to person, place, and time.   Thin elderly white female arousable and talkative diffuse weakness no obvious pain distress   HENT:   Head: Normocephalic and atraumatic.   Mouth/Throat: No oropharyngeal exudate.   Bilateral temporal wasting. Oral mucosa is dry   Eyes: Conjunctivae and EOM are normal. Pupils are equal, round, and reactive to light. Right eye exhibits no discharge. Left eye exhibits no discharge. No scleral icterus.   Neck: Normal range of motion. Neck supple. No tracheal deviation present. No thyromegaly present.   Cardiovascular: Normal rate, regular rhythm, normal heart sounds and intact distal pulses. Exam reveals no gallop and no friction rub.   No murmur heard.  Pulmonary/Chest: Effort normal and breath sounds normal. No stridor. No respiratory distress. She has no wheezes. She has no rales.   Abdominal: Soft. Bowel sounds are normal. -Postoperative tenderness.  Abdominal binders in place.    Genitourinary:   Genitourinary Comments: No Bernard catheter    Musculoskeletal: She exhibits no edema or deformity.   Lymphadenopathy:   She has no cervical adenopathy.   Neurological: She is alert and oriented to person, place, and time. She has normal reflexes. She displays normal reflexes. No cranial nerve deficit. She exhibits normal muscle tone. Coordination normal.   Skin: Skin is warm. No rash noted. No erythema. No pallor.   Psychiatric: Alert and arousable and talking  Nursing note and vitals reviewed.      Results Review:     I reviewed the patient's new clinical results  : See Below  MEDICATIONS:    cyproheptadine 4 mg Oral TID   enoxaparin 40 mg Subcutaneous Q24H   gabapentin 100 mg Oral QAM   gabapentin 300 mg Oral Nightly   ipratropium-albuterol 3 mL Nebulization 4x Daily - RT   lactulose 10 g Oral TID   lidocaine 20 mL Subcutaneous Once   pantoprazole 40 mg Oral Q AM   povidone-iodine  Topical Daily   sodium chloride 10 mL Intracatheter Q12H       LABS:        Results from last 7 days  Lab Units 07/19/17  0051 07/16/17  0112 07/16/17  0111 07/15/17  0048 07/14/17  1229   CRP mg/dL  --  2.85*  --  2.84* 2.34*   WBC 10*3/mm3 8.61  --  5.50 6.26 9.32   HEMOGLOBIN g/dL 9.4*  --  9.2* 9.1* 10.8*   HEMATOCRIT % 32.2*  --  31.5* 31.0* 36.2*   MCV fL 92.8  --  93.2 95.1* 92.1   MCHC g/dL 29.2*  --  29.2* 29.4* 29.8*   PLATELETS 10*3/mm3 272  --  257 271 285           Results from last 7 days  Lab Units 07/19/17  0051 07/16/17  1718 07/16/17  0111 07/15/17  0048   SODIUM mmol/L 139  --  139 141   POTASSIUM mmol/L 3.8  --  3.8 3.6   MAGNESIUM mg/dL  --  3.0* 1.7  --    CHLORIDE mmol/L 104  --  106 108   CO2 mmol/L 30.9  --  28.8 30.5   BUN mg/dL 8  --  5* 8   CREATININE mg/dL 0.59  --  0.64 0.55   EGFR IF NONAFRICN AM mL/min/1.73 99  --  90 107   CALCIUM mg/dL 8.9  --  8.7 9.0   GLUCOSE mg/dL 115*  --  110 107   ALBUMIN g/dL 3.40  --  3.20* 3.30*   BILIRUBIN mg/dL 0.4  --  0.3 0.4   ALK PHOS U/L 76  --  64 69   AST (SGOT) U/L 16  --  18 14   ALT (SGPT) U/L 8*  --  5* 5*    Estimated Creatinine Clearance: 37.2 mL/min (by C-G formula based on Cr of 0.59).  No results found for: AMMONIA                      Assessment/Plan    1) Dehydration-Resolved  2) hypokalemia  3) severe dysphagia-GI evaluation ongoing.  4) severe weight loss  5) debility  6) anemia-multifactorial  7) dementia  8) depression-psychiatrist recommendations appreciated  9) consolidated right middle lobe and left upper lobe-clinically not consistent with pneumonia with normal white count and no fever.-Pulmonology consult       See orders entered.     Link Fulton MD  07/20/17  7:22 AM

## 2017-07-20 NOTE — DISCHARGE PLACEMENT REQUEST
Case Management/Social Work    Patient Name:  Tamie Rodriguez  YOB: 1940  MRN: [id number]  Admit Date:  7/14/2017    Consult received for Swing Bed services.  Patient meets criteria with Tube feeding, PT and RT however, acute Rehab is looking to accept patient.  I will continue to follow.     Electronically signed by:  Shari Schaffer RN  07/20/17 8:29 AM

## 2017-07-20 NOTE — THERAPY EVALUATION
Acute Care - Occupational Therapy Initial Evaluation   [place]     Patient Name: Tamie Rodriguez  : 1940  MRN: [id number]  Today's Date: 2017             Admit Date: 2017       ICD-10-CM ICD-9-CM   1. Dehydration E86.0 276.51     Patient Active Problem List   Diagnosis   • Dehydration     Past Medical History:   Diagnosis Date   • Arthritis    • Meniere's disease      Past Surgical History:   Procedure Laterality Date   • APPENDECTOMY     • CHOLECYSTECTOMY     • ENDOSCOPY W/ PEG TUBE PLACEMENT N/A 2017    Procedure: ESOPHAGOGASTRODUODENOSCOPY WITH PERCUTANEOUS ENDOSCOPIC GASTROSTOMY TUBE INSERTION;  Surgeon: Venu Ventura MD;  Location: [facility];  Service:    • HYSTERECTOMY     • TOTAL HIP ARTHROPLASTY Left    • TUBAL ABDOMINAL LIGATION     • VARICOSE VEIN SURGERY      times two          OT ASSESSMENT FLOWSHEET (last 72 hours)      OT Evaluation       17 1431 17 0810 17 2000 17 0812 17 2306    Rehab Evaluation    Document Type evaluation  -KR        Subjective Information agree to therapy  -KR        Patient Effort, Rehab Treatment adequate  -KR        Living Environment    Lives With child(alla), adult   per pt report  -KR        Cognitive Assessment/Intervention    Current Cognitive/Communication Assessment functional  -KR        Orientation Status oriented to;person;situation  -KR        Follows Commands/Answers Questions able to follow single-step instructions  -KR        ROM (Range of Motion)    General ROM --   BUE AROM 4/5  -KR        MMT (Manual Muscle Testing)    General MMT Assessment --   BUE 3-/5  -KR        Muscle Tone Assessment    Muscle Tone Assessment  Bilateral Upper Extremities;Bilateral Lower Extremities  -SJ Bilateral Upper Extremities;Bilateral Lower Extremities  -RH Bilateral Upper Extremities;Bilateral Lower Extremities  -HB Bilateral Upper Extremities;Bilateral Lower Extremities  -RH    Bilateral Upper Extremities Muscle Tone  Assessment  moderately decreased tone  -SJ moderately decreased tone  -RH moderately decreased tone  -HB moderately decreased tone  -RH    Bilateral Lower Extremities Muscle Tone Assessment  moderately decreased tone  -SJ moderately decreased tone  -RH moderately decreased tone  -HB moderately decreased tone  -RH    Upper Body Bathing Assessment/Training    UB Bathing Assess/Train, [place] Level maximum assist (25% patient effort)  -KR        Lower Body Bathing Assessment/Training    LB Bathing Assess/Train, [place] Level maximum assist (25% patient effort)  -KR        Upper Body Dressing Assessment/Training    UB Dressing Assess/Train, [place] maximum assist (25% patient effort)  -KR        Lower Body Dressing Assessment/Training    LB Dressing Assess/Train, [place] maximum assist (25% patient effort)  -KR        Toileting Assessment/Training    Toileting Assess/Train, [place] Level dependent (less than 25% patient effort)  -KR        Grooming Assessment/Training    Grooming Assess/Train, [place] Level moderate assist (50% patient effort)  -KR        General Therapy Interventions    Planned Therapy Interventions ROM (Range of Motion);strengthening;ADL retraining  -KR          07/18/17 0823                Muscle Tone Assessment    Muscle Tone Assessment Bilateral Upper Extremities;Bilateral Lower Extremities  -HB        Bilateral Upper Extremities Muscle Tone Assessment moderately decreased tone  -HB        Bilateral Lower Extremities Muscle Tone Assessment moderately decreased tone  -HB          User Key  (r) = Recorded By, (t) = Taken By, (c) = Cosigned By    Initials Name Effective Dates    KR Aquiles Erickson, ELIDIA 03/14/16 -     SJ Lorna Blood RN 06/16/16 -     RH Kortney Lopes RN 12/27/16 -     HB Rahel Berger RN 03/17/17 -                  OT Recommendation and Plan  Planned Therapy Interventions: ROM (Range of Motion), strengthening, ADL retraining             OT Goals        07/20/17 1435          Strength OT LTG    Strength Goal OT LTG, Date Established 07/20/17  -KR      Strength Goal OT LTG, Time to Achieve 5 - 7 days  -KR      Strength Goal OT LTG, Measure to Achieve BUE increase x 1 to enhance self care/mobility skills  -KR      ADL OT LTG    ADL OT LTG, Date Established 07/20/17  -KR      ADL OT LTG, Time to Achieve 5 - 7 days  -KR      ADL OT LTG, Activity Type ADL skills  -KR      ADL OT LTG, [place] Level mod assist  -KR        User Key  (r) = Recorded By, (t) = Taken By, (c) = Cosigned By    Initials Name Provider Type    TYSON Erickson OT Occupational Therapist                Outcome Measures       07/20/17 1436 07/20/17 1400       How much help from another is currently needed...    Putting on and taking off regular lower body clothing? 2  -KR      Bathing (including washing, rinsing, and drying) 2  -KR      Toileting (which includes using toilet bed pan or urinal) 2  -KR      Putting on and taking off regular upper body clothing 3  -KR      Taking care of personal grooming (such as brushing teeth) 3  -KR      Eating meals 3  -KR      Score 15  -KR      Functional Assessment    Outcome Measure Options  AM-PAC 6 Clicks Daily Activity (OT)  -KR       User Key  (r) = Recorded By, (t) = Taken By, (c) = Cosigned By    Initials Name Provider Type    TYSON Erickson OT Occupational Therapist          Time Calculation:        Therapy Charges for Today     Code Description Service Date Service Provider Modifiers Qty    57806917008  OT SELFCARE CURRENT 7/20/2017 ELIDIA Peters, CL 1    10551031079 HC OT SELFCARE PROJECTED 7/20/2017 ELIDIA Peters, CJ 1    [id number]  OT EVAL HIGH COMPLEXITY 2 7/20/2017 Aquiles Erickson OT GO 1          OT G-codes  Functional Limitation: Self care  Self Care Current Status (): At least 60 percent but less than 80 percent impaired, limited or restricted  Self Care Goal Status (): At least 20 percent but less than 40 percent  impaired, limited or restricted    Aquiles Erickson, OT  7/20/2017

## 2017-07-20 NOTE — PLAN OF CARE
Problem: Patient Care Overview (Adult)  Goal: Plan of Care Review  Outcome: Ongoing (interventions implemented as appropriate)  Goal: Adult Individualization and Mutuality  Outcome: Ongoing (interventions implemented as appropriate)  Goal: Discharge Needs Assessment  Outcome: Ongoing (interventions implemented as appropriate)    Problem: Fluid Volume Deficit (Adult)  Goal: Identify Related Risk Factors and Signs and Symptoms  Outcome: Ongoing (interventions implemented as appropriate)  Goal: Fluid/Electrolyte Balance  Outcome: Ongoing (interventions implemented as appropriate)    07/20/17 1437   Fluid Volume Deficit (Adult)   Fluid/Electrolyte Balance making progress toward outcome       Goal: Comfort/Well Being  Outcome: Ongoing (interventions implemented as appropriate)    07/20/17 1437   Fluid Volume Deficit (Adult)   Comfort/Well Being making progress toward outcome         Problem: Fall Risk (Adult)  Goal: Identify Related Risk Factors and Signs and Symptoms  Outcome: Ongoing (interventions implemented as appropriate)  Goal: Absence of Falls  Outcome: Ongoing (interventions implemented as appropriate)    07/20/17 1437   Fall Risk (Adult)   Absence of Falls making progress toward outcome         Problem: Nutrition, Imbalanced: Inadequate Oral Intake (Adult)  Goal: Identify Related Risk Factors and Signs and Symptoms  Outcome: Ongoing (interventions implemented as appropriate)  Goal: Improved Oral Intake  Outcome: Ongoing (interventions implemented as appropriate)    07/20/17 1437   Nutrition, Imbalanced: Inadequate Oral Intake (Adult)   Improved Oral Intake making progress toward outcome       Goal: Prevent Further Weight Loss  Outcome: Ongoing (interventions implemented as appropriate)    07/20/17 1437   Nutrition, Imbalanced: Inadequate Oral Intake (Adult)   Prevent Further Weight Loss making progress toward outcome         Problem: Pressure Ulcer Risk (Victor Hugo Scale) (Adult,Obstetrics,Pediatric)  Goal:  Identify Related Risk Factors and Signs and Symptoms  Outcome: Ongoing (interventions implemented as appropriate)    07/18/17 0905   Pressure Ulcer Risk (Victor Hugo Scale)   Related Risk Factors (Pressure Ulcer Risk (Victor Hugo Scale)) age extremes;fluid intake inadequate;mobility impaired       Goal: Skin Integrity  Outcome: Ongoing (interventions implemented as appropriate)    07/20/17 1437   Pressure Ulcer Risk (Victor Hugo Scale) (Adult,Obstetrics,Pediatric)   Skin Integrity making progress toward outcome

## 2017-07-20 NOTE — CONSULTS
Referring Provider: Dr. Fulton   Reason for Consultation: Pneumonia       Chief complaint: Shortness of breath       History of present illness:     Mrs. Rodriguez is a 76 year old patient who was admitted on 7/14/17 by Dr. Fulton for severe weakness. She reports she had not been able to eat or drink over the last 2-3 days prior to seeing him and that she has also lost 10 pounds over the last month. Her weakness has become so severe she has been unable to perform her normal ADLs. Upon admission she was found to be dehydrated, her  reports that she coughs when she eats, which had been going on quite some time. She has been evaluated by speech therapy during her stay, which has been reviewed. She has had a PEG tube placed on 7/17/17 to maintain nutrition due to her dysphagia. Her chest xray completed on 7/19/17 she was found to have a left pleural effusion and right and left basilar consolidation. She has not had any recent hospitalizations and has not been exposed to any illnesses to her knowledge.  Pulmonary/Critical care has been consulted to help with managing her pneumonia.      Her  is at bedside during rounds and is a good historian. Her medical history consist of neuropathy, meniere disease and arthritis. She denies any history of tobacco use. She is a full code.     Review Of Systems:  Review of Systems   Constitutional: Positive for appetite change and unexpected weight change (10 pounds in one month ). Negative for chills, diaphoresis and fever.   HENT: Positive for trouble swallowing. Negative for congestion, rhinorrhea and sore throat.    Respiratory: Positive for cough (nonproductive ). Negative for wheezing.    Cardiovascular: Negative for chest pain and leg swelling.   Gastrointestinal: Positive for abdominal pain (post-op g-tube placement 7/17/17). Negative for abdominal distention.   Genitourinary: Negative for difficulty urinating.   Musculoskeletal: Negative for arthralgias and  myalgias.   Skin: Positive for pallor. Negative for color change.   Neurological: Positive for weakness. Negative for dizziness and tremors.   Psychiatric/Behavioral: Negative for agitation, behavioral problems and confusion.          History  Past Medical History:   Diagnosis Date   • Arthritis    • Meniere's disease    , Past Surgical History:   Procedure Laterality Date   • APPENDECTOMY     • CHOLECYSTECTOMY     • ENDOSCOPY W/ PEG TUBE PLACEMENT N/A 7/18/2017    Procedure: ESOPHAGOGASTRODUODENOSCOPY WITH PERCUTANEOUS ENDOSCOPIC GASTROSTOMY TUBE INSERTION;  Surgeon: Venu Ventura MD;  Location: [facility];  Service:    • HYSTERECTOMY     • TOTAL HIP ARTHROPLASTY Left    • TUBAL ABDOMINAL LIGATION     • VARICOSE VEIN SURGERY      times two   , Family History   Problem Relation Age of Onset   • Cancer Mother    • Cancer Father    , Social History   Substance Use Topics   • Smoking status: Never Smoker   • Smokeless tobacco: None   • Alcohol use No   , Prescriptions Prior to Admission   Medication Sig Dispense Refill Last Dose   • acetaminophen (TYLENOL) 500 MG tablet Take 1,000 mg by mouth Every 6 (Six) Hours As Needed for Mild Pain (1-3).   7/13/2017 at Unknown time   • cyproheptadine (PERIACTIN) 4 MG tablet Take 4 mg by mouth 3 (Three) Times a Day.   7/14/2017 at 0830   • gabapentin (NEURONTIN) 100 MG capsule Take 100 mg by mouth Every Morning. 100MG IN AM AND 300MG AT BEDTIME   7/13/2017 at 0900   • gabapentin (NEURONTIN) 300 MG capsule Take 300 mg by mouth Every Night.   7/13/2017 at 2200   , Scheduled Meds:    ceftriaxone 1 g Intravenous Q24H   cyproheptadine 4 mg Oral TID   doxycycline 100 mg Intravenous Q12H   enoxaparin 40 mg Subcutaneous Q24H   gabapentin 100 mg Oral QAM   gabapentin 300 mg Oral Nightly   ipratropium-albuterol 3 mL Nebulization 4x Daily - RT   lactulose 10 g Oral TID   lidocaine 20 mL Subcutaneous Once   pantoprazole 40 mg Oral Q AM   povidone-iodine  Topical Daily   sodium chloride  10 mL Intracatheter Q12H   , Continuous Infusions:    and Allergies:  Darvon [propoxyphene] and Tramadol    Objective     Vital Signs   Vitals:    07/20/17 0943   BP:    Pulse: 119   Resp:    Temp: 98.6 °F (37 °C)   SpO2: 93%       Physical Exam:               GENERAL APPEARANCE:frail, thin, female, in no distress.     HEAD: normocephalic.    EYES: PERRL, EOMI. Fundi normal, vision is grossly intact.    THROAT: Oral cavity and pharynx normal. No inflammation, swelling, exudate, or lesions.     NECK: Neck supple.     CARDIAC: Normal S1 and S2. No S3, S4 or murmurs. Rhythm is regular. There is no peripheral edema, cyanosis or pallor. Extremities are warm and well perfused. Capillary refill is less than 2 seconds. No carotid bruits.    LUNGS: Clear to auscultation without rales, rhonchi, wheezing or diminished breath sounds.    ABDOMEN: Positive bowel sounds. Soft, nondistended, tender, g-tube     EXTREMITIES: No significant deformity or joint abnormality. No edema. Peripheral pulses intact. No varicosities.    NEUROLOGICAL: Strength and sensation symmetric and intact throughout, weak      PSYCHIATRIC: The mental examination revealed the patient was oriented to person, place, and time.                Results Review:    LABS:    Lab Results   Component Value Date    GLUCOSE 115 (H) 07/19/2017    BUN 8 07/19/2017    CREATININE 0.59 07/19/2017    EGFRIFNONA 99 07/19/2017    BCR 13.6 07/19/2017    CO2 30.9 07/19/2017    CALCIUM 8.9 07/19/2017    ALBUMIN 3.40 07/19/2017    LABIL2 0.9 (L) 07/19/2017    AST 16 07/19/2017    ALT 8 (L) 07/19/2017    WBC 8.61 07/19/2017    HGB 9.4 (L) 07/19/2017    HCT 32.2 (L) 07/19/2017    MCV 92.8 07/19/2017     07/19/2017     07/19/2017    K 3.8 07/19/2017     07/19/2017    ANIONGAP 4.1 07/19/2017       No results found for: INR, PROTIME         X-RAY CHEST PA AND LATERAL-       HISTORY: Possible pneumonia. Please send down for a standing study with  assistance.;  E86.0-Dehydration      COMPARISON: 07/14/2017.       TECHNIQUE: XR CHEST PA AND LATERAL-       FINDINGS:   Left effusion and right basilar consolidation.   Small nodular density in the right infrahilar region.   No pneumothorax.   Bony and soft tissue structures are unremarkable.          IMPRESSION:  Left effusion and right basilar consolidation, also left basilar  consolidation.      This report was finalized on 7/19/2017 1:03 PM by Dr. Norberto Sorensen MD.      CT CHEST WITH CONTRAST-       CLINICAL INDICATION: X-ray shows nodular density; E86.0-Dehydration.          DOSE:      COMPARISON: 02/14/2011      Radiation dose reduction techniques were utilized per ALARA protocol.  Automated exposure control was initiated through either or CareDoiGlue or  DoseRight software packages by  protocol.             PROCEDURE: Axial images were acquired from the thoracic inlet through  the upper abdomen during IV contrast. Reformatted images were created.      FINDINGS: There is dense consolidation in the left lung base and a small  left pleural effusion. This has the appearance of inflammatory change.      Lesser degree of inflammatory change in the right lung base and also  consolidation in the right middle lobe.      Small lymph node in the right hilum.      IMPRESSION:  1. Dense left basilar consolidation and small left effusion.  2. Consolidation in the right middle lobe and to a lesser degree in the  right lower lobe.       This report was finalized on 7/20/2017 7:21 AM by Dr. Norberto Sorensen MD.                I reviewed the patient's new clinical results.  I reviewed the patient's new imaging results and agree with the interpretation.      Assessment/Plan      LLL and RLL pneumonia: noted on chest x-ray and CT, likely related to aspiration. Discussed with patient possibility of aspirating even with g-tube in place. We will continue duo nebs. We will start rocephin and doxycyline today. She has been afebrile, WBC is  8.6, CRP ordered for the AM. 02 @ 2LNC in place, maintain Sp02 >94%.     Unintentional weight loss: likely related to her poor nutritional status and pneumonia. Would recommend making sure patient is up to date on age appropriate cancer screenings if she is agreeable.     Renal: BUN/Creatinine WNL, mag:3.0 phosph: 3.1, continue to monitor renal function and electrolytes closely.     Wash the patient for refeeding syndrome.  We will order magnesium and phosphorous level in the morning.  We will start on multivitamins.    The patient's overall prognosis with failure to thrive and likely aspiration is poor.    Continue DVT prophylaxis.     Active Problems:    Dehydration         I discussed the patients findings and my recommendations with patient, family, nursing staff and primary care team    JW Stevens  07/20/17  10:48 AM     Scribed for Dr. Linda by Cassandra Bob, JW.   I, Bennie Linda M.D. attest that the above note accurately reflects the work and decisions made  by me.  Patient was seen and evaluated by Dr. Linda, including history of present illness, physical exam, assessment, and treatment plan.  The above note was reviewed and edited by Dr. Linda.

## 2017-07-21 ENCOUNTER — HOSPITAL ENCOUNTER (INPATIENT)
Facility: HOSPITAL | Age: 77
LOS: 14 days | Discharge: HOME HEALTH | End: 2017-08-04
Attending: FAMILY MEDICINE | Admitting: FAMILY MEDICINE

## 2017-07-21 VITALS
TEMPERATURE: 98.8 F | RESPIRATION RATE: 18 BRPM | HEART RATE: 106 BPM | OXYGEN SATURATION: 98 % | SYSTOLIC BLOOD PRESSURE: 113 MMHG | WEIGHT: 88.25 LBS | BODY MASS INDEX: 15.07 KG/M2 | HEIGHT: 64 IN | DIASTOLIC BLOOD PRESSURE: 63 MMHG

## 2017-07-21 PROBLEM — R53.81 DEBILITY: Status: ACTIVE | Noted: 2017-07-21

## 2017-07-21 LAB
ALBUMIN SERPL-MCNC: 3.3 G/DL (ref 3.4–4.8)
ALBUMIN/GLOB SERPL: 1 G/DL (ref 1.5–2.5)
ALP SERPL-CCNC: 82 U/L (ref 35–104)
ALT SERPL W P-5'-P-CCNC: 11 U/L (ref 10–36)
ANION GAP SERPL CALCULATED.3IONS-SCNC: 1 MMOL/L (ref 3.6–11.2)
AST SERPL-CCNC: 26 U/L (ref 10–30)
BASOPHILS # BLD AUTO: 0.02 10*3/MM3 (ref 0–0.3)
BASOPHILS NFR BLD AUTO: 0.2 % (ref 0–2)
BILIRUB SERPL-MCNC: 0.3 MG/DL (ref 0.2–1.8)
BILIRUB UR QL STRIP: NEGATIVE
BUN BLD-MCNC: 12 MG/DL (ref 7–21)
BUN/CREAT SERPL: 22.2 (ref 7–25)
CALCIUM SPEC-SCNC: 8.8 MG/DL (ref 7.7–10)
CHLORIDE SERPL-SCNC: 103 MMOL/L (ref 99–112)
CLARITY UR: CLEAR
CO2 SERPL-SCNC: 31 MMOL/L (ref 24.3–31.9)
COLOR UR: YELLOW
CREAT BLD-MCNC: 0.54 MG/DL (ref 0.43–1.29)
CRP SERPL-MCNC: 9.32 MG/DL (ref 0–0.99)
DEPRECATED RDW RBC AUTO: 51.3 FL (ref 37–54)
EOSINOPHIL # BLD AUTO: 0.11 10*3/MM3 (ref 0–0.7)
EOSINOPHIL NFR BLD AUTO: 1 % (ref 0–7)
ERYTHROCYTE [DISTWIDTH] IN BLOOD BY AUTOMATED COUNT: 15.1 % (ref 11.5–14.5)
GFR SERPL CREATININE-BSD FRML MDRD: 109 ML/MIN/1.73
GLOBULIN UR ELPH-MCNC: 3.4 GM/DL
GLUCOSE BLD-MCNC: 146 MG/DL (ref 70–110)
GLUCOSE UR STRIP-MCNC: NEGATIVE MG/DL
HCT VFR BLD AUTO: 28.1 % (ref 37–47)
HGB BLD-MCNC: 8.3 G/DL (ref 12–16)
HGB UR QL STRIP.AUTO: NEGATIVE
IMM GRANULOCYTES # BLD: 0.01 10*3/MM3 (ref 0–0.03)
IMM GRANULOCYTES NFR BLD: 0.1 % (ref 0–0.5)
KETONES UR QL STRIP: NEGATIVE
LEUKOCYTE ESTERASE UR QL STRIP.AUTO: NEGATIVE
LYMPHOCYTES # BLD AUTO: 1.42 10*3/MM3 (ref 1–3)
LYMPHOCYTES NFR BLD AUTO: 12.6 % (ref 16–46)
MAGNESIUM SERPL-MCNC: 2 MG/DL (ref 1.7–2.6)
MCH RBC QN AUTO: 27.7 PG (ref 27–33)
MCHC RBC AUTO-ENTMCNC: 29.5 G/DL (ref 33–37)
MCV RBC AUTO: 93.7 FL (ref 80–94)
MONOCYTES # BLD AUTO: 1.54 10*3/MM3 (ref 0.1–0.9)
MONOCYTES NFR BLD AUTO: 13.7 % (ref 0–12)
NEUTROPHILS # BLD AUTO: 8.13 10*3/MM3 (ref 1.4–6.5)
NEUTROPHILS NFR BLD AUTO: 72.4 % (ref 40–75)
NITRITE UR QL STRIP: NEGATIVE
OSMOLALITY SERPL CALC.SUM OF ELEC: 272.5 MOSM/KG (ref 273–305)
PH UR STRIP.AUTO: 8 [PH] (ref 5–8)
PHOSPHATE SERPL-MCNC: 2.8 MG/DL (ref 2.7–4.5)
PLATELET # BLD AUTO: 254 10*3/MM3 (ref 130–400)
PMV BLD AUTO: 10.1 FL (ref 6–10)
POTASSIUM BLD-SCNC: 4.2 MMOL/L (ref 3.5–5.3)
PROT SERPL-MCNC: 6.7 G/DL (ref 6–8)
PROT UR QL STRIP: NEGATIVE
RBC # BLD AUTO: 3 10*6/MM3 (ref 4.2–5.4)
SODIUM BLD-SCNC: 135 MMOL/L (ref 135–153)
SP GR UR STRIP: 1.01 (ref 1–1.03)
UROBILINOGEN UR QL STRIP: NORMAL
WBC NRBC COR # BLD: 11.23 10*3/MM3 (ref 4.5–12.5)

## 2017-07-21 PROCEDURE — 25010000002 CEFTRIAXONE: Performed by: NURSE PRACTITIONER

## 2017-07-21 PROCEDURE — 94799 UNLISTED PULMONARY SVC/PX: CPT

## 2017-07-21 PROCEDURE — 83735 ASSAY OF MAGNESIUM: CPT | Performed by: NURSE PRACTITIONER

## 2017-07-21 PROCEDURE — 80053 COMPREHEN METABOLIC PANEL: CPT | Performed by: INTERNAL MEDICINE

## 2017-07-21 PROCEDURE — 86140 C-REACTIVE PROTEIN: CPT | Performed by: INTERNAL MEDICINE

## 2017-07-21 PROCEDURE — 99232 SBSQ HOSP IP/OBS MODERATE 35: CPT | Performed by: INTERNAL MEDICINE

## 2017-07-21 PROCEDURE — 84100 ASSAY OF PHOSPHORUS: CPT | Performed by: NURSE PRACTITIONER

## 2017-07-21 PROCEDURE — 97530 THERAPEUTIC ACTIVITIES: CPT

## 2017-07-21 PROCEDURE — 81003 URINALYSIS AUTO W/O SCOPE: CPT | Performed by: FAMILY MEDICINE

## 2017-07-21 PROCEDURE — 85025 COMPLETE CBC W/AUTO DIFF WBC: CPT | Performed by: INTERNAL MEDICINE

## 2017-07-21 RX ORDER — POTASSIUM CHLORIDE 750 MG/1
40 CAPSULE, EXTENDED RELEASE ORAL AS NEEDED
Status: DISCONTINUED | OUTPATIENT
Start: 2017-07-21 | End: 2017-08-04 | Stop reason: HOSPADM

## 2017-07-21 RX ORDER — SODIUM CHLORIDE 0.9 % (FLUSH) 0.9 %
10 SYRINGE (ML) INJECTION AS NEEDED
Status: DISCONTINUED | OUTPATIENT
Start: 2017-07-21 | End: 2017-08-04 | Stop reason: HOSPADM

## 2017-07-21 RX ORDER — ACETAMINOPHEN 325 MG/1
1000 TABLET ORAL EVERY 6 HOURS PRN
Status: DISCONTINUED | OUTPATIENT
Start: 2017-07-21 | End: 2017-08-04 | Stop reason: HOSPADM

## 2017-07-21 RX ORDER — POVIDONE-IODINE 10 MG/G
OINTMENT TOPICAL DAILY
Status: CANCELLED | OUTPATIENT
Start: 2017-07-22

## 2017-07-21 RX ORDER — SODIUM CHLORIDE 0.9 % (FLUSH) 0.9 %
10 SYRINGE (ML) INJECTION EVERY 12 HOURS SCHEDULED
Status: DISCONTINUED | OUTPATIENT
Start: 2017-07-21 | End: 2017-07-24

## 2017-07-21 RX ORDER — GABAPENTIN 300 MG/1
300 CAPSULE ORAL NIGHTLY
Status: CANCELLED | OUTPATIENT
Start: 2017-07-21

## 2017-07-21 RX ORDER — MAGNESIUM SULFATE HEPTAHYDRATE 40 MG/ML
4 INJECTION, SOLUTION INTRAVENOUS AS NEEDED
Status: CANCELLED | OUTPATIENT
Start: 2017-07-21

## 2017-07-21 RX ORDER — GABAPENTIN 100 MG/1
100 CAPSULE ORAL EVERY MORNING
Status: DISCONTINUED | OUTPATIENT
Start: 2017-07-22 | End: 2017-07-21

## 2017-07-21 RX ORDER — MAGNESIUM SULFATE HEPTAHYDRATE 40 MG/ML
4 INJECTION, SOLUTION INTRAVENOUS AS NEEDED
Status: DISCONTINUED | OUTPATIENT
Start: 2017-07-21 | End: 2017-08-04 | Stop reason: HOSPADM

## 2017-07-21 RX ORDER — CYPROHEPTADINE HYDROCHLORIDE 4 MG/1
4 TABLET ORAL 3 TIMES DAILY
Status: DISCONTINUED | OUTPATIENT
Start: 2017-07-21 | End: 2017-08-04 | Stop reason: HOSPADM

## 2017-07-21 RX ORDER — IPRATROPIUM BROMIDE AND ALBUTEROL SULFATE 2.5; .5 MG/3ML; MG/3ML
3 SOLUTION RESPIRATORY (INHALATION)
Status: DISCONTINUED | OUTPATIENT
Start: 2017-07-21 | End: 2017-08-04 | Stop reason: HOSPADM

## 2017-07-21 RX ORDER — CYPROHEPTADINE HYDROCHLORIDE 4 MG/1
4 TABLET ORAL 3 TIMES DAILY
Status: CANCELLED | OUTPATIENT
Start: 2017-07-21

## 2017-07-21 RX ORDER — LACTULOSE 10 G/15ML
10 SOLUTION ORAL 3 TIMES DAILY
Status: DISCONTINUED | OUTPATIENT
Start: 2017-07-21 | End: 2017-08-04 | Stop reason: HOSPADM

## 2017-07-21 RX ORDER — MAGNESIUM SULFATE HEPTAHYDRATE 40 MG/ML
2 INJECTION, SOLUTION INTRAVENOUS AS NEEDED
Status: DISCONTINUED | OUTPATIENT
Start: 2017-07-21 | End: 2017-08-04 | Stop reason: HOSPADM

## 2017-07-21 RX ORDER — POVIDONE-IODINE 10 MG/G
OINTMENT TOPICAL DAILY
Status: DISCONTINUED | OUTPATIENT
Start: 2017-07-22 | End: 2017-08-04 | Stop reason: HOSPADM

## 2017-07-21 RX ORDER — PEDIATRIC MULTIPLE VITAMIN LIQ
5 LIQUID ORAL DAILY
Status: CANCELLED | OUTPATIENT
Start: 2017-07-22

## 2017-07-21 RX ORDER — POTASSIUM CHLORIDE 750 MG/1
40 CAPSULE, EXTENDED RELEASE ORAL AS NEEDED
Status: CANCELLED | OUTPATIENT
Start: 2017-07-21

## 2017-07-21 RX ORDER — PANTOPRAZOLE SODIUM 40 MG/1
40 TABLET, DELAYED RELEASE ORAL
Status: DISCONTINUED | OUTPATIENT
Start: 2017-07-22 | End: 2017-08-04 | Stop reason: HOSPADM

## 2017-07-21 RX ORDER — ONDANSETRON 2 MG/ML
4 INJECTION INTRAMUSCULAR; INTRAVENOUS EVERY 6 HOURS PRN
Status: DISCONTINUED | OUTPATIENT
Start: 2017-07-21 | End: 2017-08-04 | Stop reason: HOSPADM

## 2017-07-21 RX ORDER — GABAPENTIN 300 MG/1
300 CAPSULE ORAL NIGHTLY
Status: DISCONTINUED | OUTPATIENT
Start: 2017-07-21 | End: 2017-07-21

## 2017-07-21 RX ORDER — MAGNESIUM SULFATE HEPTAHYDRATE 40 MG/ML
2 INJECTION, SOLUTION INTRAVENOUS AS NEEDED
Status: CANCELLED | OUTPATIENT
Start: 2017-07-21

## 2017-07-21 RX ORDER — GABAPENTIN 100 MG/1
100 CAPSULE ORAL EVERY MORNING
Status: CANCELLED | OUTPATIENT
Start: 2017-07-22

## 2017-07-21 RX ORDER — GABAPENTIN 100 MG/1
100 CAPSULE ORAL EVERY 12 HOURS SCHEDULED
Status: DISCONTINUED | OUTPATIENT
Start: 2017-07-21 | End: 2017-08-04 | Stop reason: HOSPADM

## 2017-07-21 RX ORDER — ONDANSETRON 4 MG/1
4 TABLET, FILM COATED ORAL EVERY 6 HOURS PRN
Status: DISCONTINUED | OUTPATIENT
Start: 2017-07-21 | End: 2017-08-04 | Stop reason: HOSPADM

## 2017-07-21 RX ORDER — SODIUM CHLORIDE 0.9 % (FLUSH) 0.9 %
10 SYRINGE (ML) INJECTION AS NEEDED
Status: CANCELLED | OUTPATIENT
Start: 2017-07-21

## 2017-07-21 RX ORDER — ACETAMINOPHEN 325 MG/1
650 TABLET ORAL EVERY 4 HOURS PRN
Status: DISCONTINUED | OUTPATIENT
Start: 2017-07-21 | End: 2017-07-21 | Stop reason: SDUPTHER

## 2017-07-21 RX ORDER — POTASSIUM CHLORIDE 1.5 G/1.77G
40 POWDER, FOR SOLUTION ORAL AS NEEDED
Status: DISCONTINUED | OUTPATIENT
Start: 2017-07-21 | End: 2017-08-04 | Stop reason: HOSPADM

## 2017-07-21 RX ORDER — BISACODYL 10 MG
10 SUPPOSITORY, RECTAL RECTAL DAILY PRN
Status: DISCONTINUED | OUTPATIENT
Start: 2017-07-21 | End: 2017-08-04 | Stop reason: HOSPADM

## 2017-07-21 RX ORDER — PEDIATRIC MULTIPLE VITAMIN LIQ
5 LIQUID ORAL DAILY
Status: DISCONTINUED | OUTPATIENT
Start: 2017-07-22 | End: 2017-07-30 | Stop reason: SDUPTHER

## 2017-07-21 RX ORDER — PANTOPRAZOLE SODIUM 40 MG/1
40 TABLET, DELAYED RELEASE ORAL
Status: CANCELLED | OUTPATIENT
Start: 2017-07-22

## 2017-07-21 RX ORDER — ONDANSETRON 4 MG/1
4 TABLET, ORALLY DISINTEGRATING ORAL EVERY 6 HOURS PRN
Status: DISCONTINUED | OUTPATIENT
Start: 2017-07-21 | End: 2017-08-04 | Stop reason: HOSPADM

## 2017-07-21 RX ORDER — SODIUM CHLORIDE 0.9 % (FLUSH) 0.9 %
10 SYRINGE (ML) INJECTION EVERY 12 HOURS SCHEDULED
Status: CANCELLED | OUTPATIENT
Start: 2017-07-21

## 2017-07-21 RX ORDER — ACETAMINOPHEN 325 MG/1
1000 TABLET ORAL EVERY 6 HOURS PRN
Status: CANCELLED | OUTPATIENT
Start: 2017-07-21

## 2017-07-21 RX ORDER — POTASSIUM CHLORIDE 1.5 G/1.77G
40 POWDER, FOR SOLUTION ORAL AS NEEDED
Status: CANCELLED | OUTPATIENT
Start: 2017-07-21

## 2017-07-21 RX ORDER — IPRATROPIUM BROMIDE AND ALBUTEROL SULFATE 2.5; .5 MG/3ML; MG/3ML
3 SOLUTION RESPIRATORY (INHALATION)
Status: CANCELLED | OUTPATIENT
Start: 2017-07-21

## 2017-07-21 RX ORDER — LACTULOSE 10 G/15ML
10 SOLUTION ORAL 3 TIMES DAILY
Status: CANCELLED | OUTPATIENT
Start: 2017-07-21

## 2017-07-21 RX ADMIN — Medication 10 ML: at 21:44

## 2017-07-21 RX ADMIN — CYPROHEPTADINE HYDROCHLORIDE 4 MG: 4 TABLET ORAL at 02:00

## 2017-07-21 RX ADMIN — IPRATROPIUM BROMIDE AND ALBUTEROL SULFATE 3 ML: .5; 3 SOLUTION RESPIRATORY (INHALATION) at 19:19

## 2017-07-21 RX ADMIN — CYPROHEPTADINE HYDROCHLORIDE 4 MG: 4 TABLET ORAL at 23:09

## 2017-07-21 RX ADMIN — CYPROHEPTADINE HYDROCHLORIDE 4 MG: 4 TABLET ORAL at 09:27

## 2017-07-21 RX ADMIN — LACTULOSE 10 G: 20 SOLUTION ORAL at 09:27

## 2017-07-21 RX ADMIN — DOXYCYCLINE 100 MG: 100 INJECTION, POWDER, LYOPHILIZED, FOR SOLUTION INTRAVENOUS at 01:35

## 2017-07-21 RX ADMIN — CEFTRIAXONE 1 G: 1 INJECTION, POWDER, FOR SOLUTION INTRAMUSCULAR; INTRAVENOUS at 12:36

## 2017-07-21 RX ADMIN — CYPROHEPTADINE HYDROCHLORIDE 4 MG: 4 TABLET ORAL at 16:25

## 2017-07-21 RX ADMIN — PEDIATRIC MULTIPLE VITAMIN LIQ 5 ML: LIQUID at 09:27

## 2017-07-21 RX ADMIN — GABAPENTIN 100 MG: 100 CAPSULE ORAL at 06:26

## 2017-07-21 RX ADMIN — POVIDONE-IODINE: 100 OINTMENT TOPICAL at 09:27

## 2017-07-21 RX ADMIN — LACTULOSE 10 G: 20 SOLUTION ORAL at 16:26

## 2017-07-21 RX ADMIN — IPRATROPIUM BROMIDE AND ALBUTEROL SULFATE 3 ML: .5; 3 SOLUTION RESPIRATORY (INHALATION) at 00:59

## 2017-07-21 RX ADMIN — IPRATROPIUM BROMIDE AND ALBUTEROL SULFATE 3 ML: .5; 3 SOLUTION RESPIRATORY (INHALATION) at 12:22

## 2017-07-21 RX ADMIN — GABAPENTIN 100 MG: 100 CAPSULE ORAL at 21:48

## 2017-07-21 RX ADMIN — DOXYCYCLINE 100 MG: 100 INJECTION, POWDER, LYOPHILIZED, FOR SOLUTION INTRAVENOUS at 12:36

## 2017-07-21 RX ADMIN — PANTOPRAZOLE SODIUM 40 MG: 40 TABLET, DELAYED RELEASE ORAL at 06:26

## 2017-07-21 RX ADMIN — LACTULOSE 10 G: 20 SOLUTION ORAL at 21:49

## 2017-07-21 RX ADMIN — Medication 10 ML: at 09:29

## 2017-07-21 NOTE — PLAN OF CARE
Problem: Patient Care Overview (Adult)  Goal: Plan of Care Review  Outcome: Ongoing (interventions implemented as appropriate)    Problem: Fluid Volume Deficit (Adult)  Goal: Identify Related Risk Factors and Signs and Symptoms  Outcome: Ongoing (interventions implemented as appropriate)  Goal: Fluid/Electrolyte Balance  Outcome: Ongoing (interventions implemented as appropriate)  Goal: Comfort/Well Being  Outcome: Ongoing (interventions implemented as appropriate)    Problem: Fall Risk (Adult)  Goal: Identify Related Risk Factors and Signs and Symptoms  Outcome: Ongoing (interventions implemented as appropriate)  Goal: Absence of Falls  Outcome: Ongoing (interventions implemented as appropriate)    Problem: Nutrition, Imbalanced: Inadequate Oral Intake (Adult)  Goal: Identify Related Risk Factors and Signs and Symptoms  Outcome: Ongoing (interventions implemented as appropriate)  Goal: Improved Oral Intake  Outcome: Ongoing (interventions implemented as appropriate)  Goal: Prevent Further Weight Loss  Outcome: Ongoing (interventions implemented as appropriate)    Problem: Pressure Ulcer Risk (Victor Hugo Scale) (Adult,Obstetrics,Pediatric)  Goal: Identify Related Risk Factors and Signs and Symptoms  Outcome: Ongoing (interventions implemented as appropriate)  Goal: Skin Integrity  Outcome: Ongoing (interventions implemented as appropriate)

## 2017-07-21 NOTE — PROGRESS NOTES
Discharge Planning Assessment   [place]     Patient Name: Tamie Rodriguez  MRN: [id number]  Today's Date: 7/21/2017    Admit Date: 7/14/2017       Discharge Plan       07/21/17 1449    Final Note    Final Note SS spoke to [facility] acute rehab per Gris this am who planned to discuss pt with Dr. Segovia. SS received call back from rehab per Gris to inform that pt has been accepted and needs to be discharged before 14:00. SS contacted Dr. Fulton's office at 11:45 and spoke to Toña to make aware. SS made nurse, Enid and lead nurse, Alberta aware. SS attempted contact with Toña at Dr. Fulton's office at 12:45 without success. SS contacted Dr. Fulton at 12:55 who was agreeable discharge pt to acute rehab today. SS contacted rehab per Christine to make aware. Nurse provided report to rehab nurse. Pt was discharged to rehab today. SS attempted contact with spouse, Jerad and left a message. No other needs identified.         Discharge Placement     Facility/Agency Request Status Selected? Address Phone Number Fax Number     [facility] Accept[facility]     [address] [id number] [phone]         Expected Discharge Date and Time     Expected Discharge Date Expected Discharge Time    Jul 21, 2017                Saira Hou

## 2017-07-21 NOTE — PROGRESS NOTES
LOS: 7 days       Chief Complaint :   Generalized weakness and dehydration  Subjective     Interval History:     Patient Complaints:  Tamie Rodriguez remains alert and talkative.  She has had an uneventful night.  Her cough is minimal.  She denies any fever.  She also denies any nausea vomiting.  Postoperative abdominal pain from her G-tube is improved.  She is no other complaints this morning except generalized weakness.  She continues to have difficulty performing any practice of daily living.        Review of Systems-unchanged since admission history and physical  Objective     Vital Signs  Temp:  [97.4 °F (36.3 °C)-99.8 °F (37.7 °C)] 99 °F (37.2 °C)  Heart Rate:  [] 115  Resp:  [16-18] 18  BP: ()/(42-69) 118/54    Physical Exam    Constitutional: She is oriented to person, place, and time.   Thin elderly white female arousable and talkative diffuse weakness no obvious pain distress   HENT:   Head: Normocephalic and atraumatic.   Mouth/Throat: No oropharyngeal exudate.   Bilateral temporal wasting. Oral mucosa is dry   Eyes: Conjunctivae and EOM are normal. Pupils are equal, round, and reactive to light. Right eye exhibits no discharge. Left eye exhibits no discharge. No scleral icterus.   Neck: Normal range of motion. Neck supple. No tracheal deviation present. No thyromegaly present.   Cardiovascular: Normal rate, regular rhythm, normal heart sounds and intact distal pulses. Exam reveals no gallop and no friction rub.   No murmur heard.  Pulmonary/Chest: Effort normal and breath sounds normal. No stridor. No respiratory distress. She has no wheezes. She has no rales.   Abdominal: Soft. Bowel sounds are normal. -Postoperative tenderness.  Abdominal binders in place.    Genitourinary:   Genitourinary Comments: No Bernard catheter   Musculoskeletal: She exhibits no edema or deformity.   Lymphadenopathy:   She has no cervical adenopathy.   Neurological: She is alert and oriented to person, place, and  time. She has normal reflexes. She displays normal reflexes. No cranial nerve deficit. She exhibits normal muscle tone. Coordination normal.   Skin: Skin is warm. No rash noted. No erythema. No pallor.   Psychiatric: Alert and arousable and talking  Nursing note and vitals reviewed.      Results Review:     I reviewed the patient's new clinical results  : See Below  MEDICATIONS:    ceftriaxone 1 g Intravenous Q24H   cyproheptadine 4 mg Oral TID   doxycycline 100 mg Intravenous Q12H   enoxaparin 40 mg Subcutaneous Q24H   gabapentin 100 mg Oral QAM   gabapentin 300 mg Oral Nightly   ipratropium-albuterol 3 mL Nebulization 4x Daily - RT   lactulose 10 g Oral TID   lidocaine 20 mL Subcutaneous Once   multivitamin 5 mL Oral Daily   pantoprazole 40 mg Oral Q AM   povidone-iodine  Topical Daily   sodium chloride 10 mL Intracatheter Q12H       LABS:        Results from last 7 days  Lab Units 07/21/17  0117 07/19/17  0051 07/16/17  0112 07/16/17  0111 07/15/17  0048   CRP mg/dL 9.32*  --  2.85*  --  2.84*   WBC 10*3/mm3 11.23 8.61  --  5.50 6.26   HEMOGLOBIN g/dL 8.3* 9.4*  --  9.2* 9.1*   HEMATOCRIT % 28.1* 32.2*  --  31.5* 31.0*   MCV fL 93.7 92.8  --  93.2 95.1*   MCHC g/dL 29.5* 29.2*  --  29.2* 29.4*   PLATELETS 10*3/mm3 254 272  --  257 271           Results from last 7 days  Lab Units 07/21/17  0117 07/19/17  0051 07/16/17  1718 07/16/17  0111   SODIUM mmol/L 135 139  --  139   POTASSIUM mmol/L 4.2 3.8  --  3.8   MAGNESIUM mg/dL 2.0  --  3.0* 1.7   CHLORIDE mmol/L 103 104  --  106   CO2 mmol/L 31.0 30.9  --  28.8   BUN mg/dL 12 8  --  5*   CREATININE mg/dL 0.54 0.59  --  0.64   EGFR IF NONAFRICN AM mL/min/1.73 109 99  --  90   CALCIUM mg/dL 8.8 8.9  --  8.7   GLUCOSE mg/dL 146* 115*  --  110   ALBUMIN g/dL 3.30* 3.40  --  3.20*   BILIRUBIN mg/dL 0.3 0.4  --  0.3   ALK PHOS U/L 82 76  --  64   AST (SGOT) U/L 26 16  --  18   ALT (SGPT) U/L 11 8*  --  5*   Estimated Creatinine Clearance: 37.2 mL/min (by C-G formula based  on Cr of 0.54).  No results found for: AMMONIA                      Assessment/Plan    1) Dehydration-Resolved  2) hypokalemia  3) severe dysphagia-status post G-tube.  4) severe weight loss  5) debility-physical rehabilitation consulted.  Transfer if accepted.  6) anemia-multifactorial  7) dementia  8) depression-psychiatrist recommendations appreciated  9) consolidated right middle lobe and left upper lobe-aspiration pneumonia-clinically  normal white count and no fever.-Pulmonology recommendations appreciated.       See orders entered.     Link Fulotn MD  07/21/17  7:13 AM

## 2017-07-21 NOTE — PLAN OF CARE
Problem: Patient Care Overview (Adult)  Goal: Plan of Care Review  Outcome: Ongoing (interventions implemented as appropriate)    07/20/17 1437 07/21/17 0900   Coping/Psychosocial Response Interventions   Plan Of Care Reviewed With --  patient;family   Patient Care Overview   Progress progress toward functional goals as expected --        Goal: Adult Individualization and Mutuality  Outcome: Ongoing (interventions implemented as appropriate)  Goal: Discharge Needs Assessment  Outcome: Ongoing (interventions implemented as appropriate)    Problem: Fluid Volume Deficit (Adult)  Goal: Identify Related Risk Factors and Signs and Symptoms  Outcome: Ongoing (interventions implemented as appropriate)  Goal: Fluid/Electrolyte Balance  Outcome: Ongoing (interventions implemented as appropriate)  Goal: Comfort/Well Being  Outcome: Ongoing (interventions implemented as appropriate)    Problem: Fall Risk (Adult)  Goal: Identify Related Risk Factors and Signs and Symptoms  Outcome: Ongoing (interventions implemented as appropriate)  Goal: Absence of Falls  Outcome: Ongoing (interventions implemented as appropriate)    Problem: Nutrition, Imbalanced: Inadequate Oral Intake (Adult)  Goal: Identify Related Risk Factors and Signs and Symptoms  Outcome: Ongoing (interventions implemented as appropriate)  Goal: Improved Oral Intake  Outcome: Ongoing (interventions implemented as appropriate)  Goal: Prevent Further Weight Loss  Outcome: Ongoing (interventions implemented as appropriate)

## 2017-07-21 NOTE — THERAPY DISCHARGE NOTE
Acute Care - Physical Therapy Treatment Note/Discharge   [place]     Patient Name: Tamie Rodriguez  : 1940  MRN: [id number]  Today's Date: 2017  Onset of Illness/Injury or Date of Surgery Date: 17  Date of Referral to PT: 17  Referring Physician: Dr. Fulton    Admit Date: 2017    Visit Dx:    ICD-10-CM ICD-9-CM   1. Dehydration E86.0 276.51     Patient Active Problem List   Diagnosis   • Dehydration   • Debility       Physical Therapy Education     Title: PT OT SLP Therapies (Resolved)     Topic: Physical Therapy (Resolved)     Point: Mobility training (Resolved)    Learning Progress Summary    Learner Readiness Method Response Comment Documented by Status   Patient Acceptance E [facility] 17 173 Done    Acceptance E NR  BC 17 Active               Point: Home exercise program (Resolved)    Learning Progress Summary    Learner Readiness Method Response Comment Documented by Status   Patient Acceptance E [facility] 17 1739 Done    Acceptance E NR  BC 17 172 Active               Point: Body mechanics (Resolved)    Learning Progress Summary    Learner Readiness Method Response Comment Documented by Status   Patient Acceptance E [facility] 17 1739 Done    Acceptance E NR  BC 17 172 Active               Point: Precautions (Resolved)    Learning Progress Summary    Learner Readiness Method Response Comment Documented by Status   Patient Acceptance E [facility] 17 1739 Done    Acceptance E NR  BC 17 172 Active                      User Key     Initials Effective Dates Name Provider Type Discipline    BC 16 -  Angie Daugherty, PT Physical Therapist PT                    IP PT Goals       17 1730          Bed Mobility PT LTG    Bed Mobility PT LTG, Date Established 17  -BC      Bed Mobility PT LTG, Time to Achieve 3 days  -BC      Bed Mobility PT LTG, Activity Type all bed mobility  -BC      Bed Mobility PT LTG, [place] Level minimum  assist (75% patient effort)  -BC      Bed Mobility PT Goal  LTG, Assist Device bed rails  -BC      Transfer Training PT LTG    Transfer Training PT LTG, Date Established 07/20/17  -BC      Transfer Training PT LTG, Time to Achieve 3 days  -BC      Transfer Training PT LTG, Activity Type all transfers  -BC      Transfer Training PT LTG, [place] Level minimum assist (75% patient effort)  -BC      Transfer Training PT LTG, Assist Device walker, rolling  -BC      Gait Training PT LTG    Gait Training Goal PT LTG, Date Established 07/20/17  -BC      Gait Training Goal PT LTG, Time to Achieve 3 days  -BC      Gait Training Goal PT LTG, [place] Level minimum assist (75% patient effort)  -BC      Gait Training Goal PT LTG, Assist Device walker, rolling  -BC      Gait Training Goal PT LTG, Distance to Achieve 20  -BC        User Key  (r) = Recorded By, (t) = Taken By, (c) = Cosigned By    Initials Name Provider Type    BC Angie Daugherty, PT Physical Therapist              Adult Rehabilitation Note       07/21/17 5574          Rehab Assessment/Intervention    Discipline physical therapist  -BC      Document Type therapy note (daily note)  -BC      Subjective Information agree to therapy  -BC      Patient Effort, Rehab Treatment adequate  -BC      Recorded by [BC] Angie Daugherty, PT      Pain Assessment    Pain Assessment No/denies pain  -BC      Recorded by [BC] Angie Daugherty, PT      Cognitive Assessment/Intervention    Current Cognitive/Communication Assessment functional  -BC      Orientation Status oriented to;person;situation;required verbal cueing (specifiy in comments)  -BC      Follows Commands/Answers Questions able to follow single-step instructions  -BC      Personal Safety moderate impairment;decreased awareness, need for assist;decreased awareness, need for safety;decreased insight to deficits  -BC      Personal Safety Interventions fall prevention program maintained;gait belt;muscle  strengthening facilitated;nonskid shoes/slippers when out of bed  -BC      Recorded by [BC] Angie Daugherty, PT      Bed Mobility, Assessment/Treatment    Bed Mobility, Assistive Device bed rails  -BC      Bed Mob, Supine to Sit, [place] verbal cues required;nonverbal cues required (demo/gesture);2 person assist required;moderate assist (50% patient effort)  -BC      Bed Mob, Sit to Supine, [place] verbal cues required;nonverbal cues required (demo/gesture);moderate assist (50% patient effort);2 person assist required  -BC      Recorded by [BC] Angie Daugherty PT      Transfer Assessment/Treatment    Transfers, Sit-Stand [place] moderate assist (50% patient effort);2 person assist required  -BC      Transfers, Stand-Sit [place] moderate assist (50% patient effort);2 person assist required  -BC      Transfers, Sit-Stand-Sit, Assist Device rolling walker  -BC      Recorded by [BC] Angie Daugherty PT      Gait Assessment/Treatment    Gait, [place] Level maximum assist (25% patient effort);2 person assist required  -BC      Gait, Assistive Device rolling walker  -BC      Recorded by [BC] Angie Daugherty PT      Positioning and Restraints    Pre-Treatment Position in bed  -BC      Post Treatment Position bed  -BC      In Bed notified nsg;supine;call light within reach;encouraged to call for assist;side rails up x3  -BC      Recorded by [BC] Angie Daugherty PT        User Key  (r) = Recorded By, (t) = Taken By, (c) = Cosigned By    Initials Name Effective Dates    BC Angie Daugherty PT 03/14/16 -           PT Recommendation and Plan  Anticipated Equipment Needs At Discharge: front wheeled walker  Planned Therapy Interventions: balance training, bed mobility training, gait training, home exercise program, patient/family education, strengthening, transfer training  PT Frequency: 3-5 times/wk, per priority policy             Outcome Measures       07/20/17 1700 07/20/17 1436 07/20/17 1400     How much help from another person do you currently need...    Turning from your back to your side while in flat bed without using bedrails? 2  -BC      Moving from lying on back to sitting on the side of a flat bed without bedrails? 2  -BC      Moving to and from a bed to a chair (including a wheelchair)? 2  -BC      Standing up from a chair using your arms (e.g., wheelchair, bedside chair)? 2  -BC      Climbing 3-5 steps with a railing? 1  -BC      To walk in hospital room? 2  -BC      AM-PAC 6 Clicks Score 11  -BC      How much help from another is currently needed...    Putting on and taking off regular lower body clothing?  2  -KR     Bathing (including washing, rinsing, and drying)  2  -KR     Toileting (which includes using toilet bed pan or urinal)  2  -KR     Putting on and taking off regular upper body clothing  3  -KR     Taking care of personal grooming (such as brushing teeth)  3  -KR     Eating meals  3  -KR     Score  15  -KR     Functional Assessment    Outcome Measure Options AM-PAC 6 Clicks Basic Mobility (PT)  -BC  AM-PAC 6 Clicks Daily Activity (OT)  -KR      User Key  (r) = Recorded By, (t) = Taken By, (c) = Cosigned By    Initials Name Provider Type    KEYLA Daugherty PT Physical Therapist    KR Aquiles Erickson, OT Occupational Therapist           Time Calculation:         PT Charges       07/21/17 2141          Time Calculation    Start Time --   30  -BC      PT Received On 07/21/17  -BC        User Key  (r) = Recorded By, (t) = Taken By, (c) = Cosigned By    Initials Name Provider Type    BC Angie Daugherty PT Physical Therapist          Therapy Charges for Today     Code Description Service Date Service Provider Modifiers Qty    17822501433 HC PT MOBILITY CURRENT 7/20/2017 Angie Daugherty, PT GP, CL 1    72640425248 HC PT MOBILITY PROJECTED 7/20/2017 Angie Daugherty, PT GP, CK 1    56180982499 HC PT THERAPEUTIC ACT EA 15 MIN 7/20/2017 Angie Daugherty PT GP 1    72505716102 HC PT  THER SUPP EA 15 MIN 7/20/2017 Angie Daugherty, PT GP 2    09092269333 HC PT THER PROC EA 15 MIN 7/20/2017 Angie Daugherty, PT GP 1    04278319658 HC PT EVAL MOD COMPLEXITY 2 7/20/2017 Angie Daugherty, PT GP 1    25650264473 HC PT THERAPEUTIC ACT EA 15 MIN 7/21/2017 Angie Daugherty, PT GP 2    08464417942 HC PT THER SUPP EA 15 MIN 7/21/2017 Angie Daugherty, PT GP 2          PT G-Codes  Outcome Measure Options: AM-PAC 6 Clicks Basic Mobility (PT)  Score: 11  Functional Limitation: Mobility: Walking and moving around  Mobility: Walking and Moving Around Current Status (): At least 60 percent but less than 80 percent impaired, limited or restricted  Mobility: Walking and Moving Around Goal Status (): At least 40 percent but less than 60 percent impaired, limited or restricted         Angie Daugherty, PT  7/21/2017

## 2017-07-21 NOTE — PROGRESS NOTES
LOS: 7 days     Chief Complaint:  Pulmonology is following for pneumonia     Subjective     Interval History:     Mrs. Rodriguez is resting comfortably in her bed this morning, no family is at her bedside. She did not have any acute events overnight.     History taken from: patient chart RN    Review of Systems:   Review of Systems   Constitutional: Positive for fatigue and unexpected weight change. Negative for chills, diaphoresis and fever.   HENT: Negative for congestion, rhinorrhea and sore throat.    Respiratory: Negative for shortness of breath and wheezing.    Cardiovascular: Negative for chest pain and leg swelling.   Gastrointestinal: Negative for abdominal distention and abdominal pain.   Genitourinary: Negative for difficulty urinating.   Musculoskeletal: Negative for arthralgias and myalgias.   Skin: Negative for color change and pallor.   Neurological: Positive for weakness. Negative for dizziness and tremors.   Psychiatric/Behavioral: Negative for agitation, behavioral problems and confusion.                     Objective     Vital Signs  Temp:  [97.4 °F (36.3 °C)-99.8 °F (37.7 °C)] 98.8 °F (37.1 °C)  Heart Rate:  [] 113  Resp:  [16-20] 20  BP: ()/(42-69) 106/49  Body mass index is 15.15 kg/(m^2).    Intake/Output Summary (Last 24 hours) at 07/21/17 0904  Last data filed at 07/21/17 0732   Gross per 24 hour   Intake              950 ml   Output              375 ml   Net              575 ml     I/O this shift:  In: 240 [P.O.:240]  Out: -     Physical Exam:  GENERAL APPEARANCE: frail, thin female, in no distress.     HEAD: normocephalic.    EYES: PERRL    NECK: Neck supple.     CARDIAC: Normal S1 and S2. No S3, S4 or murmurs. Rhythm is regular. There is no peripheral edema, cyanosis or pallor. Extremities are warm and well perfused. Capillary refill is less than 2 seconds. No carotid bruits.    LUNGS: Bilateral lower lobes fine crackles    ABDOMEN: Positive bowel sounds. Soft, nondistended,  nontender.     EXTREMITIES: No significant deformity or joint abnormality. No edema. Peripheral pulses intact.     NEUROLOGICAL: Strength and sensation symmetric and intact throughout, weak.      PSYCHIATRIC: The mental examination revealed the patient was oriented to person and place, she is confused on time frame.                  Results Review:                I reviewed the patient's new clinical results.  I reviewed the patient's new imaging results and agree with the interpretation.    Results from last 7 days  Lab Units 07/21/17 0117 07/19/17 0051 07/16/17 0111   WBC 10*3/mm3 11.23 8.61 5.50   HEMOGLOBIN g/dL 8.3* 9.4* 9.2*   PLATELETS 10*3/mm3 254 272 257       Results from last 7 days  Lab Units 07/21/17 0117 07/19/17 0051 07/16/17  1718 07/16/17 0111   SODIUM mmol/L 135 139  --  139   POTASSIUM mmol/L 4.2 3.8  --  3.8   CHLORIDE mmol/L 103 104  --  106   CO2 mmol/L 31.0 30.9  --  28.8   BUN mg/dL 12 8  --  5*   CREATININE mg/dL 0.54 0.59  --  0.64   CALCIUM mg/dL 8.8 8.9  --  8.7   GLUCOSE mg/dL 146* 115*  --  110   MAGNESIUM mg/dL 2.0  --  3.0* 1.7     No results found for: INR, PROTIME    Results from last 7 days  Lab Units 07/21/17  0117 07/19/17 0051 07/16/17 0111   ALK PHOS U/L 82 76 64   BILIRUBIN mg/dL 0.3 0.4 0.3   ALT (SGPT) U/L 11 8* 5*   AST (SGOT) U/L 26 16 18         Imaging Results (last 24 hours)     ** No results found for the last 24 hours. **             Medication Review:   Scheduled Medications:    ceftriaxone 1 g Intravenous Q24H   cyproheptadine 4 mg Oral TID   doxycycline 100 mg Intravenous Q12H   enoxaparin 40 mg Subcutaneous Q24H   gabapentin 100 mg Oral QAM   gabapentin 300 mg Oral Nightly   ipratropium-albuterol 3 mL Nebulization 4x Daily - RT   lactulose 10 g Oral TID   lidocaine 20 mL Subcutaneous Once   multivitamin 5 mL Oral Daily   pantoprazole 40 mg Oral Q AM   povidone-iodine  Topical Daily   sodium chloride 10 mL Intracatheter Q12H     Continuous infusions:        Assessment/Plan      LLL and RLL pneumonia: stable. WBC 11, CRP 9.32 and afebrile. Continue rocephin and doxycycline. Continue schedudled inhalants and breathing treatments. Turn cough and deep breath, incentive spirometer 10 times an hour during the day.   We will continue to trend CBC and CRP, monitor respiratory status closely. Supplemental oxygen as needed, maintain Sp02 >94%.     Aspiration precautions.     Renal: BUN/Creatinine WNL. Electrolytes WNL. Magnesium 2, phosphorous 2.8, potassium 4.2.     Please continue to monitor for refeeding syndrome over the weekend, with monitoring electrolytes closely.     Continue DVT prophylaxis.     Patient Active Problem List   Diagnosis Code   • Dehydration E86.0       No family at bedside to discuss case with.       JW Stevens  07/21/17  9:04 AM      Scribed for Dr. Linda by JW Wheeler.   IBennie M.D. attest that the above note accurately reflects the work and decisions made  by me.  Patient was seen and evaluated by Dr. Linda, including history of present illness, physical exam, assessment, and treatment plan.  The above note was reviewed and edited by Dr. Linda.

## 2017-07-22 LAB
ALBUMIN SERPL-MCNC: 3.4 G/DL (ref 3.4–4.8)
ALBUMIN/GLOB SERPL: 0.9 G/DL (ref 1.5–2.5)
ALP SERPL-CCNC: 91 U/L (ref 35–104)
ALT SERPL W P-5'-P-CCNC: 14 U/L (ref 10–36)
ANION GAP SERPL CALCULATED.3IONS-SCNC: 1.9 MMOL/L (ref 3.6–11.2)
AST SERPL-CCNC: 28 U/L (ref 10–30)
BASOPHILS # BLD AUTO: 0.02 10*3/MM3 (ref 0–0.3)
BASOPHILS NFR BLD AUTO: 0.2 % (ref 0–2)
BILIRUB SERPL-MCNC: 0.2 MG/DL (ref 0.2–1.8)
BUN BLD-MCNC: 18 MG/DL (ref 7–21)
BUN/CREAT SERPL: 32.7 (ref 7–25)
CALCIUM SPEC-SCNC: 9.4 MG/DL (ref 7.7–10)
CHLORIDE SERPL-SCNC: 104 MMOL/L (ref 99–112)
CO2 SERPL-SCNC: 31.1 MMOL/L (ref 24.3–31.9)
CREAT BLD-MCNC: 0.55 MG/DL (ref 0.43–1.29)
CRP SERPL-MCNC: 11.71 MG/DL (ref 0–0.99)
DEPRECATED RDW RBC AUTO: 48.9 FL (ref 37–54)
EOSINOPHIL # BLD AUTO: 0.13 10*3/MM3 (ref 0–0.7)
EOSINOPHIL NFR BLD AUTO: 1.6 % (ref 0–7)
ERYTHROCYTE [DISTWIDTH] IN BLOOD BY AUTOMATED COUNT: 14.9 % (ref 11.5–14.5)
GFR SERPL CREATININE-BSD FRML MDRD: 107 ML/MIN/1.73
GLOBULIN UR ELPH-MCNC: 3.6 GM/DL
GLUCOSE BLD-MCNC: 134 MG/DL (ref 70–110)
HCT VFR BLD AUTO: 29.2 % (ref 37–47)
HGB BLD-MCNC: 8.2 G/DL (ref 12–16)
HYPOCHROMIA BLD QL: NORMAL
IMM GRANULOCYTES # BLD: 0.01 10*3/MM3 (ref 0–0.03)
IMM GRANULOCYTES NFR BLD: 0.1 % (ref 0–0.5)
LYMPHOCYTES # BLD AUTO: 0.99 10*3/MM3 (ref 1–3)
LYMPHOCYTES NFR BLD AUTO: 12 % (ref 16–46)
MACROCYTES BLD QL SMEAR: NORMAL
MAGNESIUM SERPL-MCNC: 2 MG/DL (ref 1.7–2.6)
MCH RBC QN AUTO: 26.9 PG (ref 27–33)
MCHC RBC AUTO-ENTMCNC: 28.1 G/DL (ref 33–37)
MCV RBC AUTO: 95.7 FL (ref 80–94)
MONOCYTES # BLD AUTO: 1.16 10*3/MM3 (ref 0.1–0.9)
MONOCYTES NFR BLD AUTO: 14 % (ref 0–12)
NEUTROPHILS # BLD AUTO: 5.96 10*3/MM3 (ref 1.4–6.5)
NEUTROPHILS NFR BLD AUTO: 72.1 % (ref 40–75)
OSMOLALITY SERPL CALC.SUM OF ELEC: 277.7 MOSM/KG (ref 273–305)
PHOSPHATE SERPL-MCNC: 3.8 MG/DL (ref 2.7–4.5)
PLAT MORPH BLD: NORMAL
PLATELET # BLD AUTO: 275 10*3/MM3 (ref 130–400)
PMV BLD AUTO: 10.3 FL (ref 6–10)
POTASSIUM BLD-SCNC: 4.3 MMOL/L (ref 3.5–5.3)
PROT SERPL-MCNC: 7 G/DL (ref 6–8)
RBC # BLD AUTO: 3.05 10*6/MM3 (ref 4.2–5.4)
SODIUM BLD-SCNC: 137 MMOL/L (ref 135–153)
WBC NRBC COR # BLD: 8.27 10*3/MM3 (ref 4.5–12.5)

## 2017-07-22 PROCEDURE — 97530 THERAPEUTIC ACTIVITIES: CPT

## 2017-07-22 PROCEDURE — 84100 ASSAY OF PHOSPHORUS: CPT | Performed by: FAMILY MEDICINE

## 2017-07-22 PROCEDURE — 85025 COMPLETE CBC W/AUTO DIFF WBC: CPT | Performed by: FAMILY MEDICINE

## 2017-07-22 PROCEDURE — 80053 COMPREHEN METABOLIC PANEL: CPT | Performed by: FAMILY MEDICINE

## 2017-07-22 PROCEDURE — 85007 BL SMEAR W/DIFF WBC COUNT: CPT | Performed by: FAMILY MEDICINE

## 2017-07-22 PROCEDURE — 94799 UNLISTED PULMONARY SVC/PX: CPT

## 2017-07-22 PROCEDURE — 86140 C-REACTIVE PROTEIN: CPT | Performed by: FAMILY MEDICINE

## 2017-07-22 PROCEDURE — 97110 THERAPEUTIC EXERCISES: CPT

## 2017-07-22 PROCEDURE — 97163 PT EVAL HIGH COMPLEX 45 MIN: CPT

## 2017-07-22 PROCEDURE — 83735 ASSAY OF MAGNESIUM: CPT | Performed by: FAMILY MEDICINE

## 2017-07-22 PROCEDURE — 25010000002 CEFTRIAXONE: Performed by: FAMILY MEDICINE

## 2017-07-22 PROCEDURE — 25010000002 ENOXAPARIN PER 10 MG: Performed by: FAMILY MEDICINE

## 2017-07-22 PROCEDURE — 97167 OT EVAL HIGH COMPLEX 60 MIN: CPT

## 2017-07-22 RX ADMIN — LACTULOSE 10 G: 20 SOLUTION ORAL at 21:29

## 2017-07-22 RX ADMIN — IPRATROPIUM BROMIDE AND ALBUTEROL SULFATE 3 ML: .5; 3 SOLUTION RESPIRATORY (INHALATION) at 06:45

## 2017-07-22 RX ADMIN — IPRATROPIUM BROMIDE AND ALBUTEROL SULFATE 3 ML: .5; 3 SOLUTION RESPIRATORY (INHALATION) at 12:16

## 2017-07-22 RX ADMIN — ENOXAPARIN SODIUM 40 MG: 40 INJECTION SUBCUTANEOUS at 08:18

## 2017-07-22 RX ADMIN — DOXYCYCLINE 100 MG: 100 INJECTION, POWDER, LYOPHILIZED, FOR SOLUTION INTRAVENOUS at 13:37

## 2017-07-22 RX ADMIN — LACTULOSE 10 G: 20 SOLUTION ORAL at 08:17

## 2017-07-22 RX ADMIN — IPRATROPIUM BROMIDE AND ALBUTEROL SULFATE 3 ML: .5; 3 SOLUTION RESPIRATORY (INHALATION) at 19:01

## 2017-07-22 RX ADMIN — POVIDONE-IODINE: 100 OINTMENT TOPICAL at 08:18

## 2017-07-22 RX ADMIN — GABAPENTIN 100 MG: 100 CAPSULE ORAL at 21:29

## 2017-07-22 RX ADMIN — Medication 10 ML: at 21:29

## 2017-07-22 RX ADMIN — CYPROHEPTADINE HYDROCHLORIDE 4 MG: 4 TABLET ORAL at 16:47

## 2017-07-22 RX ADMIN — LACTULOSE 10 G: 20 SOLUTION ORAL at 16:47

## 2017-07-22 RX ADMIN — DOXYCYCLINE 100 MG: 100 INJECTION, POWDER, LYOPHILIZED, FOR SOLUTION INTRAVENOUS at 01:03

## 2017-07-22 RX ADMIN — IPRATROPIUM BROMIDE AND ALBUTEROL SULFATE 3 ML: .5; 3 SOLUTION RESPIRATORY (INHALATION) at 00:08

## 2017-07-22 RX ADMIN — ACETAMINOPHEN 975 MG: 325 TABLET ORAL at 10:31

## 2017-07-22 RX ADMIN — Medication 10 ML: at 08:18

## 2017-07-22 RX ADMIN — PANTOPRAZOLE SODIUM 40 MG: 40 TABLET, DELAYED RELEASE ORAL at 05:20

## 2017-07-22 RX ADMIN — CYPROHEPTADINE HYDROCHLORIDE 4 MG: 4 TABLET ORAL at 21:28

## 2017-07-22 RX ADMIN — CYPROHEPTADINE HYDROCHLORIDE 4 MG: 4 TABLET ORAL at 08:18

## 2017-07-22 RX ADMIN — MULTIPLE VITAMIN LIQUID 5 ML: LIQUID at 08:17

## 2017-07-22 RX ADMIN — CEFTRIAXONE 1 G: 1 INJECTION, POWDER, FOR SOLUTION INTRAMUSCULAR; INTRAVENOUS at 11:07

## 2017-07-22 RX ADMIN — GABAPENTIN 100 MG: 100 CAPSULE ORAL at 08:17

## 2017-07-23 LAB
ALBUMIN SERPL-MCNC: 3.4 G/DL (ref 3.4–4.8)
ALBUMIN/GLOB SERPL: 1 G/DL (ref 1.5–2.5)
ALP SERPL-CCNC: 89 U/L (ref 35–104)
ALT SERPL W P-5'-P-CCNC: 25 U/L (ref 10–36)
ANION GAP SERPL CALCULATED.3IONS-SCNC: 1.3 MMOL/L (ref 3.6–11.2)
AST SERPL-CCNC: 43 U/L (ref 10–30)
BASOPHILS # BLD AUTO: 0.03 10*3/MM3 (ref 0–0.3)
BASOPHILS NFR BLD AUTO: 0.4 % (ref 0–2)
BILIRUB SERPL-MCNC: 0.2 MG/DL (ref 0.2–1.8)
BUN BLD-MCNC: 20 MG/DL (ref 7–21)
BUN/CREAT SERPL: 37.7 (ref 7–25)
CALCIUM SPEC-SCNC: 9.3 MG/DL (ref 7.7–10)
CHLORIDE SERPL-SCNC: 105 MMOL/L (ref 99–112)
CO2 SERPL-SCNC: 31.7 MMOL/L (ref 24.3–31.9)
CREAT BLD-MCNC: 0.53 MG/DL (ref 0.43–1.29)
DEPRECATED RDW RBC AUTO: 51.7 FL (ref 37–54)
EOSINOPHIL # BLD AUTO: 0.23 10*3/MM3 (ref 0–0.7)
EOSINOPHIL NFR BLD AUTO: 3.1 % (ref 0–7)
ERYTHROCYTE [DISTWIDTH] IN BLOOD BY AUTOMATED COUNT: 15.1 % (ref 11.5–14.5)
GFR SERPL CREATININE-BSD FRML MDRD: 112 ML/MIN/1.73
GLOBULIN UR ELPH-MCNC: 3.5 GM/DL
GLUCOSE BLD-MCNC: 112 MG/DL (ref 70–110)
HCT VFR BLD AUTO: 26.6 % (ref 37–47)
HGB BLD-MCNC: 7.9 G/DL (ref 12–16)
IMM GRANULOCYTES # BLD: 0.01 10*3/MM3 (ref 0–0.03)
IMM GRANULOCYTES NFR BLD: 0.1 % (ref 0–0.5)
LYMPHOCYTES # BLD AUTO: 0.7 10*3/MM3 (ref 1–3)
LYMPHOCYTES NFR BLD AUTO: 9.4 % (ref 16–46)
MAGNESIUM SERPL-MCNC: 2.1 MG/DL (ref 1.7–2.6)
MCH RBC QN AUTO: 27.5 PG (ref 27–33)
MCHC RBC AUTO-ENTMCNC: 29.7 G/DL (ref 33–37)
MCV RBC AUTO: 92.7 FL (ref 80–94)
MONOCYTES # BLD AUTO: 0.97 10*3/MM3 (ref 0.1–0.9)
MONOCYTES NFR BLD AUTO: 13 % (ref 0–12)
NEUTROPHILS # BLD AUTO: 5.53 10*3/MM3 (ref 1.4–6.5)
NEUTROPHILS NFR BLD AUTO: 74 % (ref 40–75)
OSMOLALITY SERPL CALC.SUM OF ELEC: 279 MOSM/KG (ref 273–305)
PLATELET # BLD AUTO: 276 10*3/MM3 (ref 130–400)
PMV BLD AUTO: 10.3 FL (ref 6–10)
POTASSIUM BLD-SCNC: 4.5 MMOL/L (ref 3.5–5.3)
PROT SERPL-MCNC: 6.9 G/DL (ref 6–8)
RBC # BLD AUTO: 2.87 10*6/MM3 (ref 4.2–5.4)
SODIUM BLD-SCNC: 138 MMOL/L (ref 135–153)
WBC NRBC COR # BLD: 7.47 10*3/MM3 (ref 4.5–12.5)

## 2017-07-23 PROCEDURE — 94799 UNLISTED PULMONARY SVC/PX: CPT

## 2017-07-23 PROCEDURE — 83735 ASSAY OF MAGNESIUM: CPT | Performed by: FAMILY MEDICINE

## 2017-07-23 PROCEDURE — 25010000002 ENOXAPARIN PER 10 MG: Performed by: FAMILY MEDICINE

## 2017-07-23 PROCEDURE — 80053 COMPREHEN METABOLIC PANEL: CPT | Performed by: FAMILY MEDICINE

## 2017-07-23 PROCEDURE — 85025 COMPLETE CBC W/AUTO DIFF WBC: CPT | Performed by: FAMILY MEDICINE

## 2017-07-23 RX ORDER — DOXYCYCLINE 100 MG/1
100 CAPSULE ORAL EVERY 12 HOURS SCHEDULED
Status: DISCONTINUED | OUTPATIENT
Start: 2017-07-23 | End: 2017-07-27

## 2017-07-23 RX ORDER — CEFDINIR 300 MG/1
300 CAPSULE ORAL EVERY 12 HOURS SCHEDULED
Status: DISCONTINUED | OUTPATIENT
Start: 2017-07-23 | End: 2017-07-26

## 2017-07-23 RX ADMIN — CYPROHEPTADINE HYDROCHLORIDE 4 MG: 4 TABLET ORAL at 10:57

## 2017-07-23 RX ADMIN — DOXYCYCLINE 100 MG: 100 INJECTION, POWDER, LYOPHILIZED, FOR SOLUTION INTRAVENOUS at 00:36

## 2017-07-23 RX ADMIN — LACTULOSE 10 G: 20 SOLUTION ORAL at 17:23

## 2017-07-23 RX ADMIN — CEFDINIR 300 MG: 300 CAPSULE ORAL at 15:20

## 2017-07-23 RX ADMIN — GABAPENTIN 100 MG: 100 CAPSULE ORAL at 20:58

## 2017-07-23 RX ADMIN — CYPROHEPTADINE HYDROCHLORIDE 4 MG: 4 TABLET ORAL at 17:23

## 2017-07-23 RX ADMIN — MULTIPLE VITAMIN LIQUID 5 ML: LIQUID at 10:59

## 2017-07-23 RX ADMIN — DOXYCYCLINE 100 MG: 100 CAPSULE ORAL at 20:58

## 2017-07-23 RX ADMIN — IPRATROPIUM BROMIDE AND ALBUTEROL SULFATE 3 ML: .5; 3 SOLUTION RESPIRATORY (INHALATION) at 00:07

## 2017-07-23 RX ADMIN — LACTULOSE 10 G: 20 SOLUTION ORAL at 10:58

## 2017-07-23 RX ADMIN — ENOXAPARIN SODIUM 40 MG: 40 INJECTION SUBCUTANEOUS at 10:59

## 2017-07-23 RX ADMIN — CEFDINIR 300 MG: 300 CAPSULE ORAL at 20:58

## 2017-07-23 RX ADMIN — GABAPENTIN 100 MG: 100 CAPSULE ORAL at 10:57

## 2017-07-23 RX ADMIN — IPRATROPIUM BROMIDE AND ALBUTEROL SULFATE 3 ML: .5; 3 SOLUTION RESPIRATORY (INHALATION) at 13:25

## 2017-07-23 RX ADMIN — Medication 10 ML: at 11:00

## 2017-07-23 RX ADMIN — POVIDONE-IODINE: 100 OINTMENT TOPICAL at 10:59

## 2017-07-23 RX ADMIN — CYPROHEPTADINE HYDROCHLORIDE 4 MG: 4 TABLET ORAL at 20:57

## 2017-07-23 RX ADMIN — PANTOPRAZOLE SODIUM 40 MG: 40 TABLET, DELAYED RELEASE ORAL at 05:17

## 2017-07-23 RX ADMIN — LACTULOSE 10 G: 20 SOLUTION ORAL at 20:59

## 2017-07-23 RX ADMIN — IPRATROPIUM BROMIDE AND ALBUTEROL SULFATE 3 ML: .5; 3 SOLUTION RESPIRATORY (INHALATION) at 19:45

## 2017-07-23 RX ADMIN — ACETAMINOPHEN 975 MG: 325 TABLET ORAL at 11:22

## 2017-07-23 RX ADMIN — IPRATROPIUM BROMIDE AND ALBUTEROL SULFATE 3 ML: .5; 3 SOLUTION RESPIRATORY (INHALATION) at 06:55

## 2017-07-23 RX ADMIN — DOXYCYCLINE 100 MG: 100 CAPSULE ORAL at 15:20

## 2017-07-24 LAB
ALBUMIN SERPL-MCNC: 3.5 G/DL (ref 3.4–4.8)
ALBUMIN/GLOB SERPL: 0.9 G/DL (ref 1.5–2.5)
ALP SERPL-CCNC: 96 U/L (ref 35–104)
ALT SERPL W P-5'-P-CCNC: 29 U/L (ref 10–36)
ANION GAP SERPL CALCULATED.3IONS-SCNC: 3.9 MMOL/L (ref 3.6–11.2)
AST SERPL-CCNC: 40 U/L (ref 10–30)
BASOPHILS # BLD AUTO: 0.02 10*3/MM3 (ref 0–0.3)
BASOPHILS NFR BLD AUTO: 0.3 % (ref 0–2)
BILIRUB SERPL-MCNC: 0.2 MG/DL (ref 0.2–1.8)
BUN BLD-MCNC: 20 MG/DL (ref 7–21)
BUN/CREAT SERPL: 36.4 (ref 7–25)
CALCIUM SPEC-SCNC: 9.7 MG/DL (ref 7.7–10)
CHLORIDE SERPL-SCNC: 102 MMOL/L (ref 99–112)
CO2 SERPL-SCNC: 32.1 MMOL/L (ref 24.3–31.9)
CREAT BLD-MCNC: 0.55 MG/DL (ref 0.43–1.29)
DEPRECATED RDW RBC AUTO: 49.2 FL (ref 37–54)
EOSINOPHIL # BLD AUTO: 0.26 10*3/MM3 (ref 0–0.7)
EOSINOPHIL NFR BLD AUTO: 3.7 % (ref 0–7)
ERYTHROCYTE [DISTWIDTH] IN BLOOD BY AUTOMATED COUNT: 14.7 % (ref 11.5–14.5)
GFR SERPL CREATININE-BSD FRML MDRD: 107 ML/MIN/1.73
GLOBULIN UR ELPH-MCNC: 3.8 GM/DL
GLUCOSE BLD-MCNC: 120 MG/DL (ref 70–110)
HCT VFR BLD AUTO: 27.4 % (ref 37–47)
HGB BLD-MCNC: 8.1 G/DL (ref 12–16)
IMM GRANULOCYTES # BLD: 0.01 10*3/MM3 (ref 0–0.03)
IMM GRANULOCYTES NFR BLD: 0.1 % (ref 0–0.5)
LYMPHOCYTES # BLD AUTO: 1.05 10*3/MM3 (ref 1–3)
LYMPHOCYTES NFR BLD AUTO: 14.9 % (ref 16–46)
MCH RBC QN AUTO: 26.9 PG (ref 27–33)
MCHC RBC AUTO-ENTMCNC: 29.6 G/DL (ref 33–37)
MCV RBC AUTO: 91 FL (ref 80–94)
MONOCYTES # BLD AUTO: 0.97 10*3/MM3 (ref 0.1–0.9)
MONOCYTES NFR BLD AUTO: 13.8 % (ref 0–12)
NEUTROPHILS # BLD AUTO: 4.74 10*3/MM3 (ref 1.4–6.5)
NEUTROPHILS NFR BLD AUTO: 67.2 % (ref 40–75)
NRBC BLD MANUAL-RTO: 0 /100 WBC (ref 0–0)
OSMOLALITY SERPL CALC.SUM OF ELEC: 279.5 MOSM/KG (ref 273–305)
PLATELET # BLD AUTO: 323 10*3/MM3 (ref 130–400)
PMV BLD AUTO: 10.5 FL (ref 6–10)
POTASSIUM BLD-SCNC: 4.3 MMOL/L (ref 3.5–5.3)
PROT SERPL-MCNC: 7.3 G/DL (ref 6–8)
RBC # BLD AUTO: 3.01 10*6/MM3 (ref 4.2–5.4)
SODIUM BLD-SCNC: 138 MMOL/L (ref 135–153)
WBC NRBC COR # BLD: 7.05 10*3/MM3 (ref 4.5–12.5)

## 2017-07-24 PROCEDURE — 85025 COMPLETE CBC W/AUTO DIFF WBC: CPT | Performed by: FAMILY MEDICINE

## 2017-07-24 PROCEDURE — 80053 COMPREHEN METABOLIC PANEL: CPT | Performed by: FAMILY MEDICINE

## 2017-07-24 PROCEDURE — 97530 THERAPEUTIC ACTIVITIES: CPT

## 2017-07-24 PROCEDURE — 94799 UNLISTED PULMONARY SVC/PX: CPT

## 2017-07-24 PROCEDURE — 97535 SELF CARE MNGMENT TRAINING: CPT

## 2017-07-24 PROCEDURE — 94640 AIRWAY INHALATION TREATMENT: CPT

## 2017-07-24 PROCEDURE — 25010000002 ENOXAPARIN PER 10 MG: Performed by: FAMILY MEDICINE

## 2017-07-24 PROCEDURE — 97116 GAIT TRAINING THERAPY: CPT

## 2017-07-24 PROCEDURE — 97110 THERAPEUTIC EXERCISES: CPT

## 2017-07-24 RX ADMIN — GABAPENTIN 100 MG: 100 CAPSULE ORAL at 20:41

## 2017-07-24 RX ADMIN — CEFDINIR 300 MG: 300 CAPSULE ORAL at 08:53

## 2017-07-24 RX ADMIN — IPRATROPIUM BROMIDE AND ALBUTEROL SULFATE 3 ML: .5; 3 SOLUTION RESPIRATORY (INHALATION) at 07:44

## 2017-07-24 RX ADMIN — MULTIPLE VITAMIN LIQUID 5 ML: LIQUID at 08:53

## 2017-07-24 RX ADMIN — CYPROHEPTADINE HYDROCHLORIDE 4 MG: 4 TABLET ORAL at 17:32

## 2017-07-24 RX ADMIN — POVIDONE-IODINE: 100 OINTMENT TOPICAL at 08:54

## 2017-07-24 RX ADMIN — CYPROHEPTADINE HYDROCHLORIDE 4 MG: 4 TABLET ORAL at 20:52

## 2017-07-24 RX ADMIN — GABAPENTIN 100 MG: 100 CAPSULE ORAL at 08:56

## 2017-07-24 RX ADMIN — CEFDINIR 300 MG: 300 CAPSULE ORAL at 20:41

## 2017-07-24 RX ADMIN — ACETAMINOPHEN 975 MG: 325 TABLET ORAL at 13:57

## 2017-07-24 RX ADMIN — DOXYCYCLINE 100 MG: 100 CAPSULE ORAL at 20:41

## 2017-07-24 RX ADMIN — PANTOPRAZOLE SODIUM 40 MG: 40 TABLET, DELAYED RELEASE ORAL at 05:07

## 2017-07-24 RX ADMIN — ENOXAPARIN SODIUM 40 MG: 40 INJECTION SUBCUTANEOUS at 08:54

## 2017-07-24 RX ADMIN — IPRATROPIUM BROMIDE AND ALBUTEROL SULFATE 3 ML: .5; 3 SOLUTION RESPIRATORY (INHALATION) at 19:58

## 2017-07-24 RX ADMIN — LACTULOSE 10 G: 20 SOLUTION ORAL at 08:53

## 2017-07-24 RX ADMIN — CYPROHEPTADINE HYDROCHLORIDE 4 MG: 4 TABLET ORAL at 08:52

## 2017-07-24 RX ADMIN — LACTULOSE 10 G: 20 SOLUTION ORAL at 20:42

## 2017-07-24 RX ADMIN — IPRATROPIUM BROMIDE AND ALBUTEROL SULFATE 3 ML: .5; 3 SOLUTION RESPIRATORY (INHALATION) at 12:29

## 2017-07-24 RX ADMIN — DOXYCYCLINE 100 MG: 100 CAPSULE ORAL at 08:53

## 2017-07-24 RX ADMIN — LACTULOSE 10 G: 20 SOLUTION ORAL at 17:31

## 2017-07-24 RX ADMIN — IPRATROPIUM BROMIDE AND ALBUTEROL SULFATE 3 ML: .5; 3 SOLUTION RESPIRATORY (INHALATION) at 00:12

## 2017-07-25 LAB
BACTERIA SPEC AEROBE CULT: NORMAL
BACTERIA SPEC AEROBE CULT: NORMAL

## 2017-07-25 PROCEDURE — 97116 GAIT TRAINING THERAPY: CPT

## 2017-07-25 PROCEDURE — 97535 SELF CARE MNGMENT TRAINING: CPT

## 2017-07-25 PROCEDURE — 97150 GROUP THERAPEUTIC PROCEDURES: CPT

## 2017-07-25 PROCEDURE — 25010000002 ENOXAPARIN PER 10 MG: Performed by: FAMILY MEDICINE

## 2017-07-25 PROCEDURE — 97110 THERAPEUTIC EXERCISES: CPT

## 2017-07-25 PROCEDURE — 97530 THERAPEUTIC ACTIVITIES: CPT

## 2017-07-25 PROCEDURE — 94799 UNLISTED PULMONARY SVC/PX: CPT

## 2017-07-25 RX ADMIN — GABAPENTIN 100 MG: 100 CAPSULE ORAL at 09:18

## 2017-07-25 RX ADMIN — CEFDINIR 300 MG: 300 CAPSULE ORAL at 09:18

## 2017-07-25 RX ADMIN — CYPROHEPTADINE HYDROCHLORIDE 4 MG: 4 TABLET ORAL at 17:14

## 2017-07-25 RX ADMIN — MULTIPLE VITAMIN LIQUID 5 ML: LIQUID at 09:18

## 2017-07-25 RX ADMIN — DOXYCYCLINE 100 MG: 100 CAPSULE ORAL at 21:08

## 2017-07-25 RX ADMIN — CYPROHEPTADINE HYDROCHLORIDE 4 MG: 4 TABLET ORAL at 09:18

## 2017-07-25 RX ADMIN — IPRATROPIUM BROMIDE AND ALBUTEROL SULFATE 3 ML: .5; 3 SOLUTION RESPIRATORY (INHALATION) at 13:28

## 2017-07-25 RX ADMIN — CYPROHEPTADINE HYDROCHLORIDE 4 MG: 4 TABLET ORAL at 21:08

## 2017-07-25 RX ADMIN — IPRATROPIUM BROMIDE AND ALBUTEROL SULFATE 3 ML: .5; 3 SOLUTION RESPIRATORY (INHALATION) at 19:37

## 2017-07-25 RX ADMIN — ACETAMINOPHEN 975 MG: 325 TABLET ORAL at 09:18

## 2017-07-25 RX ADMIN — LACTULOSE 10 G: 20 SOLUTION ORAL at 21:08

## 2017-07-25 RX ADMIN — ENOXAPARIN SODIUM 40 MG: 40 INJECTION SUBCUTANEOUS at 09:18

## 2017-07-25 RX ADMIN — CEFDINIR 300 MG: 300 CAPSULE ORAL at 21:08

## 2017-07-25 RX ADMIN — PANTOPRAZOLE SODIUM 40 MG: 40 TABLET, DELAYED RELEASE ORAL at 05:37

## 2017-07-25 RX ADMIN — POVIDONE-IODINE: 100 OINTMENT TOPICAL at 09:17

## 2017-07-25 RX ADMIN — LACTULOSE 10 G: 20 SOLUTION ORAL at 17:14

## 2017-07-25 RX ADMIN — DOXYCYCLINE 100 MG: 100 CAPSULE ORAL at 09:18

## 2017-07-25 RX ADMIN — GABAPENTIN 100 MG: 100 CAPSULE ORAL at 21:08

## 2017-07-25 RX ADMIN — IPRATROPIUM BROMIDE AND ALBUTEROL SULFATE 3 ML: .5; 3 SOLUTION RESPIRATORY (INHALATION) at 07:20

## 2017-07-25 RX ADMIN — LACTULOSE 10 G: 20 SOLUTION ORAL at 09:19

## 2017-07-26 ENCOUNTER — APPOINTMENT (OUTPATIENT)
Dept: GENERAL RADIOLOGY | Facility: HOSPITAL | Age: 77
End: 2017-07-26

## 2017-07-26 LAB
ANION GAP SERPL CALCULATED.3IONS-SCNC: 7.2 MMOL/L (ref 3.6–11.2)
BACTERIA UR QL AUTO: ABNORMAL /HPF
BASOPHILS # BLD AUTO: 0.02 10*3/MM3 (ref 0–0.3)
BASOPHILS NFR BLD AUTO: 0.1 % (ref 0–2)
BILIRUB UR QL STRIP: NEGATIVE
BUN BLD-MCNC: 26 MG/DL (ref 7–21)
BUN/CREAT SERPL: 48.1 (ref 7–25)
CALCIUM SPEC-SCNC: 9.4 MG/DL (ref 7.7–10)
CHLORIDE SERPL-SCNC: 101 MMOL/L (ref 99–112)
CK MB SERPL-CCNC: <0.18 NG/ML (ref 0–5)
CK MB SERPL-RTO: NORMAL % (ref 0–3)
CK SERPL-CCNC: 22 U/L (ref 24–173)
CLARITY UR: CLEAR
CO2 SERPL-SCNC: 30.8 MMOL/L (ref 24.3–31.9)
COLOR UR: YELLOW
CREAT BLD-MCNC: 0.54 MG/DL (ref 0.43–1.29)
DEPRECATED RDW RBC AUTO: 47.2 FL (ref 37–54)
EOSINOPHIL # BLD AUTO: 0.1 10*3/MM3 (ref 0–0.7)
EOSINOPHIL NFR BLD AUTO: 0.7 % (ref 0–7)
ERYTHROCYTE [DISTWIDTH] IN BLOOD BY AUTOMATED COUNT: 14.5 % (ref 11.5–14.5)
GFR SERPL CREATININE-BSD FRML MDRD: 109 ML/MIN/1.73
GLUCOSE BLD-MCNC: 98 MG/DL (ref 70–110)
GLUCOSE UR STRIP-MCNC: NEGATIVE MG/DL
HCT VFR BLD AUTO: 29.3 % (ref 37–47)
HGB BLD-MCNC: 8.7 G/DL (ref 12–16)
HGB UR QL STRIP.AUTO: ABNORMAL
HYALINE CASTS UR QL AUTO: ABNORMAL /LPF
IMM GRANULOCYTES # BLD: 0.02 10*3/MM3 (ref 0–0.03)
IMM GRANULOCYTES NFR BLD: 0.1 % (ref 0–0.5)
KETONES UR QL STRIP: NEGATIVE
LEUKOCYTE ESTERASE UR QL STRIP.AUTO: NEGATIVE
LYMPHOCYTES # BLD AUTO: 0.9 10*3/MM3 (ref 1–3)
LYMPHOCYTES NFR BLD AUTO: 6.4 % (ref 16–46)
MCH RBC QN AUTO: 27.4 PG (ref 27–33)
MCHC RBC AUTO-ENTMCNC: 29.7 G/DL (ref 33–37)
MCV RBC AUTO: 92.1 FL (ref 80–94)
MONOCYTES # BLD AUTO: 0.89 10*3/MM3 (ref 0.1–0.9)
MONOCYTES NFR BLD AUTO: 6.3 % (ref 0–12)
NEUTROPHILS # BLD AUTO: 12.11 10*3/MM3 (ref 1.4–6.5)
NEUTROPHILS NFR BLD AUTO: 86.4 % (ref 40–75)
NITRITE UR QL STRIP: NEGATIVE
OSMOLALITY SERPL CALC.SUM OF ELEC: 282.3 MOSM/KG (ref 273–305)
PH UR STRIP.AUTO: 7.5 [PH] (ref 5–8)
PLATELET # BLD AUTO: 371 10*3/MM3 (ref 130–400)
PMV BLD AUTO: 9.9 FL (ref 6–10)
POTASSIUM BLD-SCNC: 4.4 MMOL/L (ref 3.5–5.3)
PROT UR QL STRIP: ABNORMAL
RBC # BLD AUTO: 3.18 10*6/MM3 (ref 4.2–5.4)
RBC # UR: ABNORMAL /HPF
REF LAB TEST METHOD: ABNORMAL
SODIUM BLD-SCNC: 139 MMOL/L (ref 135–153)
SP GR UR STRIP: 1.02 (ref 1–1.03)
SQUAMOUS #/AREA URNS HPF: ABNORMAL /HPF
TROPONIN I SERPL-MCNC: <0.006 NG/ML
UROBILINOGEN UR QL STRIP: ABNORMAL
WBC NRBC COR # BLD: 14.04 10*3/MM3 (ref 4.5–12.5)
WBC UR QL AUTO: ABNORMAL /HPF

## 2017-07-26 PROCEDURE — 81001 URINALYSIS AUTO W/SCOPE: CPT | Performed by: FAMILY MEDICINE

## 2017-07-26 PROCEDURE — 25010000002 ENOXAPARIN PER 10 MG: Performed by: FAMILY MEDICINE

## 2017-07-26 PROCEDURE — 93005 ELECTROCARDIOGRAM TRACING: CPT | Performed by: FAMILY MEDICINE

## 2017-07-26 PROCEDURE — 25010000002 VANCOMYCIN PER 500 MG

## 2017-07-26 PROCEDURE — 84484 ASSAY OF TROPONIN QUANT: CPT | Performed by: FAMILY MEDICINE

## 2017-07-26 PROCEDURE — 97110 THERAPEUTIC EXERCISES: CPT

## 2017-07-26 PROCEDURE — 71010 XR CHEST 1 VW: CPT | Performed by: RADIOLOGY

## 2017-07-26 PROCEDURE — 85025 COMPLETE CBC W/AUTO DIFF WBC: CPT | Performed by: FAMILY MEDICINE

## 2017-07-26 PROCEDURE — 82553 CREATINE MB FRACTION: CPT | Performed by: FAMILY MEDICINE

## 2017-07-26 PROCEDURE — 94799 UNLISTED PULMONARY SVC/PX: CPT

## 2017-07-26 PROCEDURE — 25010000002 PIPERACILLIN-TAZOBACTAM: Performed by: FAMILY MEDICINE

## 2017-07-26 PROCEDURE — 82550 ASSAY OF CK (CPK): CPT | Performed by: FAMILY MEDICINE

## 2017-07-26 PROCEDURE — 97116 GAIT TRAINING THERAPY: CPT

## 2017-07-26 PROCEDURE — 71010 HC CHEST PA OR AP: CPT

## 2017-07-26 PROCEDURE — 97535 SELF CARE MNGMENT TRAINING: CPT

## 2017-07-26 PROCEDURE — 87040 BLOOD CULTURE FOR BACTERIA: CPT | Performed by: FAMILY MEDICINE

## 2017-07-26 PROCEDURE — 80048 BASIC METABOLIC PNL TOTAL CA: CPT | Performed by: FAMILY MEDICINE

## 2017-07-26 PROCEDURE — 97530 THERAPEUTIC ACTIVITIES: CPT

## 2017-07-26 PROCEDURE — 93010 ELECTROCARDIOGRAM REPORT: CPT | Performed by: INTERNAL MEDICINE

## 2017-07-26 RX ADMIN — LACTULOSE 10 G: 20 SOLUTION ORAL at 21:54

## 2017-07-26 RX ADMIN — POVIDONE-IODINE: 100 OINTMENT TOPICAL at 09:39

## 2017-07-26 RX ADMIN — GABAPENTIN 100 MG: 100 CAPSULE ORAL at 09:39

## 2017-07-26 RX ADMIN — CYPROHEPTADINE HYDROCHLORIDE 4 MG: 4 TABLET ORAL at 17:48

## 2017-07-26 RX ADMIN — DOXYCYCLINE 100 MG: 100 CAPSULE ORAL at 21:54

## 2017-07-26 RX ADMIN — CYPROHEPTADINE HYDROCHLORIDE 4 MG: 4 TABLET ORAL at 21:55

## 2017-07-26 RX ADMIN — IPRATROPIUM BROMIDE AND ALBUTEROL SULFATE 3 ML: .5; 3 SOLUTION RESPIRATORY (INHALATION) at 07:37

## 2017-07-26 RX ADMIN — GABAPENTIN 100 MG: 100 CAPSULE ORAL at 21:54

## 2017-07-26 RX ADMIN — MULTIPLE VITAMIN LIQUID 5 ML: LIQUID at 05:00

## 2017-07-26 RX ADMIN — CYPROHEPTADINE HYDROCHLORIDE 4 MG: 4 TABLET ORAL at 09:39

## 2017-07-26 RX ADMIN — VANCOMYCIN HYDROCHLORIDE 750 MG: 5 INJECTION, POWDER, LYOPHILIZED, FOR SOLUTION INTRAVENOUS at 19:26

## 2017-07-26 RX ADMIN — PANTOPRAZOLE SODIUM 40 MG: 40 TABLET, DELAYED RELEASE ORAL at 05:59

## 2017-07-26 RX ADMIN — CEFDINIR 300 MG: 300 CAPSULE ORAL at 09:39

## 2017-07-26 RX ADMIN — PIPERACILLIN SODIUM,TAZOBACTAM SODIUM 3.38 G: 3; .375 INJECTION, POWDER, FOR SOLUTION INTRAVENOUS at 18:31

## 2017-07-26 RX ADMIN — LACTULOSE 10 G: 20 SOLUTION ORAL at 09:40

## 2017-07-26 RX ADMIN — ACETAMINOPHEN 975 MG: 325 TABLET ORAL at 18:31

## 2017-07-26 RX ADMIN — DOXYCYCLINE 100 MG: 100 CAPSULE ORAL at 09:40

## 2017-07-26 RX ADMIN — ENOXAPARIN SODIUM 40 MG: 40 INJECTION SUBCUTANEOUS at 09:39

## 2017-07-26 NOTE — DISCHARGE SUMMARY
Date of Admission:   7/14/2017 10:26 AM    Date of Discharge:    7/21/2017    Discharge Diagnosis:   Active Problems:   1) Dehydration  2) hypokalemia  3) severe dysphagia  4) severe weight loss  5) debility  6) anemia-multifactorial  7) dementia       Presenting Problem/History of Present Illness  See History and Physical for Presenting Problems / Illnesses.       Hospital Course  Patient is a 76 y.o. female presented with severe dysphagia, weight loss and dehydration.  She has had gradual deterioration and associated dementia for several months.  Gastrologist were consulted.  Feeding tube was placed.  After feeding tube was placed to feeding is been started.  I discussed disposition with her family at length.  The are adamant that she go home.  Unfortunately she was not ready for discharge home due to her severe debility.  Physical rehabilitation was consulted and she was transferred to physical rehabilitation on July 21.      Procedures Performed  Procedure(s):  ESOPHAGOGASTRODUODENOSCOPY WITH PERCUTANEOUS ENDOSCOPIC GASTROSTOMY TUBE INSERTION       Consults:   Consults     Date and Time Order Name Status Description    7/20/2017 0725 Inpatient Consult to Pulmonology Completed     7/16/2017 1041 Inpatient Consult to Psychiatrist Completed           Condition on Discharge:   Fair    Vital Signs  Temp:  [97.7 °F (36.5 °C)-98.7 °F (37.1 °C)] 98.7 °F (37.1 °C)  Heart Rate:  [104-118] 107  Resp:  [16-20] 18  BP: (110-130)/(62-66) 130/66    Physical Exam:   Physical exam performed in progress note on this discharge date.    Discharge Disposition  Short Term Hospital ([facility])    Discharge Medications   Tamie Rodriguez   Home Medication Instructions GARRETT:[id number]    Printed on:07/26/17 0902   Medication Information                      acetaminophen (TYLENOL) 500 MG tablet  Take 1,000 mg by mouth Every 6 (Six) Hours As Needed for Mild Pain (1-3).             cyproheptadine (PERIACTIN) 4 MG tablet  Take 4  mg by mouth 3 (Three) Times a Day.             gabapentin (NEURONTIN) 100 MG capsule  Take 100 mg by mouth Every Morning. 100MG IN AM AND 300MG AT BEDTIME             gabapentin (NEURONTIN) 300 MG capsule  Take 300 mg by mouth Every Night.                 Discharge Diet:     Activity at Discharge:     Follow-up Appointments  Follow-up Information     Follow up with Link Fulton MD .    Specialty:  Internal Medicine    Contact information:    [address] [facility] [address] [phone]          Follow up with Cat Barros MD .    Specialty:  Physical Medicine and Rehabilitation    Contact information:    [address] [facility] [address]  [phone]             Link Fulton MD  07/26/17  9:02 AM    Time: Discharge 30 min

## 2017-07-27 LAB
TROPONIN I SERPL-MCNC: <0.006 NG/ML
TROPONIN I SERPL-MCNC: <0.006 NG/ML

## 2017-07-27 PROCEDURE — 84484 ASSAY OF TROPONIN QUANT: CPT | Performed by: FAMILY MEDICINE

## 2017-07-27 PROCEDURE — 97530 THERAPEUTIC ACTIVITIES: CPT

## 2017-07-27 PROCEDURE — 94799 UNLISTED PULMONARY SVC/PX: CPT

## 2017-07-27 PROCEDURE — 97150 GROUP THERAPEUTIC PROCEDURES: CPT

## 2017-07-27 PROCEDURE — 25010000002 PIPERACILLIN-TAZOBACTAM: Performed by: FAMILY MEDICINE

## 2017-07-27 PROCEDURE — 25010000002 ENOXAPARIN PER 10 MG: Performed by: FAMILY MEDICINE

## 2017-07-27 PROCEDURE — 25010000002 VANCOMYCIN PER 500 MG

## 2017-07-27 PROCEDURE — 97116 GAIT TRAINING THERAPY: CPT

## 2017-07-27 PROCEDURE — 97535 SELF CARE MNGMENT TRAINING: CPT

## 2017-07-27 PROCEDURE — 97110 THERAPEUTIC EXERCISES: CPT

## 2017-07-27 RX ADMIN — GABAPENTIN 100 MG: 100 CAPSULE ORAL at 08:46

## 2017-07-27 RX ADMIN — PIPERACILLIN SODIUM,TAZOBACTAM SODIUM 3.38 G: 3; .375 INJECTION, POWDER, FOR SOLUTION INTRAVENOUS at 00:01

## 2017-07-27 RX ADMIN — PANTOPRAZOLE SODIUM 40 MG: 40 TABLET, DELAYED RELEASE ORAL at 05:01

## 2017-07-27 RX ADMIN — GABAPENTIN 100 MG: 100 CAPSULE ORAL at 21:09

## 2017-07-27 RX ADMIN — DOXYCYCLINE 100 MG: 100 CAPSULE ORAL at 08:46

## 2017-07-27 RX ADMIN — LACTULOSE 10 G: 20 SOLUTION ORAL at 08:46

## 2017-07-27 RX ADMIN — ACETAMINOPHEN 975 MG: 325 TABLET ORAL at 08:46

## 2017-07-27 RX ADMIN — LACTULOSE 10 G: 20 SOLUTION ORAL at 21:09

## 2017-07-27 RX ADMIN — LACTULOSE 10 G: 20 SOLUTION ORAL at 17:28

## 2017-07-27 RX ADMIN — CYPROHEPTADINE HYDROCHLORIDE 4 MG: 4 TABLET ORAL at 08:46

## 2017-07-27 RX ADMIN — PIPERACILLIN SODIUM,TAZOBACTAM SODIUM 3.38 G: 3; .375 INJECTION, POWDER, FOR SOLUTION INTRAVENOUS at 17:28

## 2017-07-27 RX ADMIN — IPRATROPIUM BROMIDE AND ALBUTEROL SULFATE 3 ML: .5; 3 SOLUTION RESPIRATORY (INHALATION) at 13:46

## 2017-07-27 RX ADMIN — CYPROHEPTADINE HYDROCHLORIDE 4 MG: 4 TABLET ORAL at 17:28

## 2017-07-27 RX ADMIN — PIPERACILLIN SODIUM,TAZOBACTAM SODIUM 3.38 G: 3; .375 INJECTION, POWDER, FOR SOLUTION INTRAVENOUS at 05:00

## 2017-07-27 RX ADMIN — IPRATROPIUM BROMIDE AND ALBUTEROL SULFATE 3 ML: .5; 3 SOLUTION RESPIRATORY (INHALATION) at 19:13

## 2017-07-27 RX ADMIN — VANCOMYCIN HYDROCHLORIDE 500 MG: 5 INJECTION, POWDER, LYOPHILIZED, FOR SOLUTION INTRAVENOUS at 18:13

## 2017-07-27 RX ADMIN — ENOXAPARIN SODIUM 40 MG: 40 INJECTION SUBCUTANEOUS at 08:45

## 2017-07-27 RX ADMIN — IPRATROPIUM BROMIDE AND ALBUTEROL SULFATE 3 ML: .5; 3 SOLUTION RESPIRATORY (INHALATION) at 07:18

## 2017-07-27 RX ADMIN — MULTIPLE VITAMIN LIQUID 5 ML: LIQUID at 08:46

## 2017-07-27 RX ADMIN — POVIDONE-IODINE: 100 OINTMENT TOPICAL at 08:47

## 2017-07-27 RX ADMIN — PIPERACILLIN SODIUM,TAZOBACTAM SODIUM 3.38 G: 3; .375 INJECTION, POWDER, FOR SOLUTION INTRAVENOUS at 12:04

## 2017-07-27 RX ADMIN — CYPROHEPTADINE HYDROCHLORIDE 4 MG: 4 TABLET ORAL at 21:09

## 2017-07-28 LAB
ANION GAP SERPL CALCULATED.3IONS-SCNC: 4.1 MMOL/L (ref 3.6–11.2)
BASOPHILS # BLD AUTO: 0.03 10*3/MM3 (ref 0–0.3)
BASOPHILS NFR BLD AUTO: 0.5 % (ref 0–2)
BUN BLD-MCNC: 21 MG/DL (ref 7–21)
BUN/CREAT SERPL: 36.8 (ref 7–25)
CALCIUM SPEC-SCNC: 8.7 MG/DL (ref 7.7–10)
CHLORIDE SERPL-SCNC: 106 MMOL/L (ref 99–112)
CO2 SERPL-SCNC: 28.9 MMOL/L (ref 24.3–31.9)
CREAT BLD-MCNC: 0.57 MG/DL (ref 0.43–1.29)
CRP SERPL-MCNC: 3.88 MG/DL (ref 0–0.99)
DEPRECATED RDW RBC AUTO: 48.6 FL (ref 37–54)
EOSINOPHIL # BLD AUTO: 0.31 10*3/MM3 (ref 0–0.7)
EOSINOPHIL NFR BLD AUTO: 5.1 % (ref 0–7)
ERYTHROCYTE [DISTWIDTH] IN BLOOD BY AUTOMATED COUNT: 14.8 % (ref 11.5–14.5)
GFR SERPL CREATININE-BSD FRML MDRD: 103 ML/MIN/1.73
GLUCOSE BLD-MCNC: 103 MG/DL (ref 70–110)
HCT VFR BLD AUTO: 24.1 % (ref 37–47)
HGB BLD-MCNC: 7.2 G/DL (ref 12–16)
IMM GRANULOCYTES # BLD: 0.02 10*3/MM3 (ref 0–0.03)
IMM GRANULOCYTES NFR BLD: 0.3 % (ref 0–0.5)
LYMPHOCYTES # BLD AUTO: 0.93 10*3/MM3 (ref 1–3)
LYMPHOCYTES NFR BLD AUTO: 15.3 % (ref 16–46)
MCH RBC QN AUTO: 26.9 PG (ref 27–33)
MCHC RBC AUTO-ENTMCNC: 29.9 G/DL (ref 33–37)
MCV RBC AUTO: 89.9 FL (ref 80–94)
MONOCYTES # BLD AUTO: 0.9 10*3/MM3 (ref 0.1–0.9)
MONOCYTES NFR BLD AUTO: 14.8 % (ref 0–12)
NEUTROPHILS # BLD AUTO: 3.88 10*3/MM3 (ref 1.4–6.5)
NEUTROPHILS NFR BLD AUTO: 64 % (ref 40–75)
NRBC BLD MANUAL-RTO: 0 /100 WBC (ref 0–0)
OSMOLALITY SERPL CALC.SUM OF ELEC: 280.8 MOSM/KG (ref 273–305)
PLATELET # BLD AUTO: 370 10*3/MM3 (ref 130–400)
PMV BLD AUTO: 10 FL (ref 6–10)
POTASSIUM BLD-SCNC: 4.5 MMOL/L (ref 3.5–5.3)
RBC # BLD AUTO: 2.68 10*6/MM3 (ref 4.2–5.4)
SODIUM BLD-SCNC: 139 MMOL/L (ref 135–153)
WBC NRBC COR # BLD: 6.07 10*3/MM3 (ref 4.5–12.5)

## 2017-07-28 PROCEDURE — C1751 CATH, INF, PER/CENT/MIDLINE: HCPCS

## 2017-07-28 PROCEDURE — 97110 THERAPEUTIC EXERCISES: CPT

## 2017-07-28 PROCEDURE — 97116 GAIT TRAINING THERAPY: CPT

## 2017-07-28 PROCEDURE — 94799 UNLISTED PULMONARY SVC/PX: CPT

## 2017-07-28 PROCEDURE — 86140 C-REACTIVE PROTEIN: CPT | Performed by: FAMILY MEDICINE

## 2017-07-28 PROCEDURE — 97530 THERAPEUTIC ACTIVITIES: CPT

## 2017-07-28 PROCEDURE — 25010000002 PIPERACILLIN-TAZOBACTAM: Performed by: FAMILY MEDICINE

## 2017-07-28 PROCEDURE — 85025 COMPLETE CBC W/AUTO DIFF WBC: CPT | Performed by: FAMILY MEDICINE

## 2017-07-28 PROCEDURE — 80048 BASIC METABOLIC PNL TOTAL CA: CPT | Performed by: FAMILY MEDICINE

## 2017-07-28 PROCEDURE — 25010000002 ENOXAPARIN PER 10 MG: Performed by: FAMILY MEDICINE

## 2017-07-28 PROCEDURE — 25010000002 VANCOMYCIN PER 500 MG

## 2017-07-28 PROCEDURE — 97535 SELF CARE MNGMENT TRAINING: CPT

## 2017-07-28 RX ORDER — SODIUM CHLORIDE 0.9 % (FLUSH) 0.9 %
10 SYRINGE (ML) INJECTION AS NEEDED
Status: DISCONTINUED | OUTPATIENT
Start: 2017-07-28 | End: 2017-08-04 | Stop reason: HOSPADM

## 2017-07-28 RX ORDER — SODIUM CHLORIDE 0.9 % (FLUSH) 0.9 %
10 SYRINGE (ML) INJECTION EVERY 12 HOURS SCHEDULED
Status: DISCONTINUED | OUTPATIENT
Start: 2017-07-28 | End: 2017-08-04 | Stop reason: HOSPADM

## 2017-07-28 RX ADMIN — CEFEPIME HYDROCHLORIDE 1 G: 1 INJECTION, POWDER, FOR SOLUTION INTRAMUSCULAR; INTRAVENOUS at 11:49

## 2017-07-28 RX ADMIN — Medication 10 ML: at 21:09

## 2017-07-28 RX ADMIN — METRONIDAZOLE 500 MG: 500 INJECTION, SOLUTION INTRAVENOUS at 21:10

## 2017-07-28 RX ADMIN — LACTULOSE 10 G: 20 SOLUTION ORAL at 16:45

## 2017-07-28 RX ADMIN — ENOXAPARIN SODIUM 40 MG: 40 INJECTION SUBCUTANEOUS at 08:50

## 2017-07-28 RX ADMIN — POVIDONE-IODINE: 100 OINTMENT TOPICAL at 08:50

## 2017-07-28 RX ADMIN — CYPROHEPTADINE HYDROCHLORIDE 4 MG: 4 TABLET ORAL at 08:51

## 2017-07-28 RX ADMIN — PIPERACILLIN SODIUM,TAZOBACTAM SODIUM 3.38 G: 3; .375 INJECTION, POWDER, FOR SOLUTION INTRAVENOUS at 00:28

## 2017-07-28 RX ADMIN — METRONIDAZOLE 500 MG: 500 INJECTION, SOLUTION INTRAVENOUS at 11:50

## 2017-07-28 RX ADMIN — VANCOMYCIN HYDROCHLORIDE 500 MG: 5 INJECTION, POWDER, LYOPHILIZED, FOR SOLUTION INTRAVENOUS at 21:07

## 2017-07-28 RX ADMIN — GABAPENTIN 100 MG: 100 CAPSULE ORAL at 21:08

## 2017-07-28 RX ADMIN — IPRATROPIUM BROMIDE AND ALBUTEROL SULFATE 3 ML: .5; 3 SOLUTION RESPIRATORY (INHALATION) at 19:42

## 2017-07-28 RX ADMIN — PANTOPRAZOLE SODIUM 40 MG: 40 TABLET, DELAYED RELEASE ORAL at 05:41

## 2017-07-28 RX ADMIN — LACTULOSE 10 G: 20 SOLUTION ORAL at 08:50

## 2017-07-28 RX ADMIN — GABAPENTIN 100 MG: 100 CAPSULE ORAL at 08:51

## 2017-07-28 RX ADMIN — MULTIPLE VITAMIN LIQUID 5 ML: LIQUID at 08:50

## 2017-07-28 RX ADMIN — CEFEPIME HYDROCHLORIDE 1 G: 1 INJECTION, POWDER, FOR SOLUTION INTRAMUSCULAR; INTRAVENOUS at 22:51

## 2017-07-28 RX ADMIN — CYPROHEPTADINE HYDROCHLORIDE 4 MG: 4 TABLET ORAL at 16:45

## 2017-07-28 RX ADMIN — PIPERACILLIN SODIUM,TAZOBACTAM SODIUM 3.38 G: 3; .375 INJECTION, POWDER, FOR SOLUTION INTRAVENOUS at 05:41

## 2017-07-28 RX ADMIN — LACTULOSE 10 G: 20 SOLUTION ORAL at 21:08

## 2017-07-28 RX ADMIN — IPRATROPIUM BROMIDE AND ALBUTEROL SULFATE 3 ML: .5; 3 SOLUTION RESPIRATORY (INHALATION) at 07:32

## 2017-07-28 RX ADMIN — CYPROHEPTADINE HYDROCHLORIDE 4 MG: 4 TABLET ORAL at 21:09

## 2017-07-28 RX ADMIN — IPRATROPIUM BROMIDE AND ALBUTEROL SULFATE 3 ML: .5; 3 SOLUTION RESPIRATORY (INHALATION) at 13:41

## 2017-07-29 LAB
MAGNESIUM SERPL-MCNC: 2.1 MG/DL (ref 1.7–2.6)
POTASSIUM BLD-SCNC: 4.1 MMOL/L (ref 3.5–5.3)
VANCOMYCIN TROUGH SERPL-MCNC: 8.4 MCG/ML (ref 5–15)

## 2017-07-29 PROCEDURE — 94799 UNLISTED PULMONARY SVC/PX: CPT

## 2017-07-29 PROCEDURE — 84132 ASSAY OF SERUM POTASSIUM: CPT | Performed by: FAMILY MEDICINE

## 2017-07-29 PROCEDURE — 25010000002 ENOXAPARIN PER 10 MG: Performed by: FAMILY MEDICINE

## 2017-07-29 PROCEDURE — 25010000002 VANCOMYCIN PER 500 MG

## 2017-07-29 PROCEDURE — 80202 ASSAY OF VANCOMYCIN: CPT | Performed by: FAMILY MEDICINE

## 2017-07-29 PROCEDURE — 83735 ASSAY OF MAGNESIUM: CPT | Performed by: FAMILY MEDICINE

## 2017-07-29 RX ADMIN — MULTIPLE VITAMIN LIQUID 5 ML: LIQUID at 09:03

## 2017-07-29 RX ADMIN — Medication 10 ML: at 09:04

## 2017-07-29 RX ADMIN — ENOXAPARIN SODIUM 40 MG: 40 INJECTION SUBCUTANEOUS at 09:03

## 2017-07-29 RX ADMIN — METRONIDAZOLE 500 MG: 500 INJECTION, SOLUTION INTRAVENOUS at 03:31

## 2017-07-29 RX ADMIN — CYPROHEPTADINE HYDROCHLORIDE 4 MG: 4 TABLET ORAL at 16:30

## 2017-07-29 RX ADMIN — GABAPENTIN 100 MG: 100 CAPSULE ORAL at 20:55

## 2017-07-29 RX ADMIN — IPRATROPIUM BROMIDE AND ALBUTEROL SULFATE 3 ML: .5; 3 SOLUTION RESPIRATORY (INHALATION) at 18:57

## 2017-07-29 RX ADMIN — METRONIDAZOLE 500 MG: 500 INJECTION, SOLUTION INTRAVENOUS at 20:54

## 2017-07-29 RX ADMIN — LACTULOSE 10 G: 20 SOLUTION ORAL at 16:30

## 2017-07-29 RX ADMIN — Medication 10 ML: at 20:55

## 2017-07-29 RX ADMIN — CYPROHEPTADINE HYDROCHLORIDE 4 MG: 4 TABLET ORAL at 20:55

## 2017-07-29 RX ADMIN — GABAPENTIN 100 MG: 100 CAPSULE ORAL at 09:03

## 2017-07-29 RX ADMIN — POVIDONE-IODINE: 100 OINTMENT TOPICAL at 09:03

## 2017-07-29 RX ADMIN — LACTULOSE 10 G: 20 SOLUTION ORAL at 09:03

## 2017-07-29 RX ADMIN — CEFEPIME HYDROCHLORIDE 1 G: 1 INJECTION, POWDER, FOR SOLUTION INTRAMUSCULAR; INTRAVENOUS at 22:56

## 2017-07-29 RX ADMIN — CYPROHEPTADINE HYDROCHLORIDE 4 MG: 4 TABLET ORAL at 09:03

## 2017-07-29 RX ADMIN — CEFEPIME HYDROCHLORIDE 1 G: 1 INJECTION, POWDER, FOR SOLUTION INTRAMUSCULAR; INTRAVENOUS at 11:51

## 2017-07-29 RX ADMIN — VANCOMYCIN HYDROCHLORIDE 500 MG: 5 INJECTION, POWDER, LYOPHILIZED, FOR SOLUTION INTRAVENOUS at 19:57

## 2017-07-29 RX ADMIN — PANTOPRAZOLE SODIUM 40 MG: 40 TABLET, DELAYED RELEASE ORAL at 05:59

## 2017-07-29 RX ADMIN — METRONIDAZOLE 500 MG: 500 INJECTION, SOLUTION INTRAVENOUS at 11:51

## 2017-07-29 RX ADMIN — IPRATROPIUM BROMIDE AND ALBUTEROL SULFATE 3 ML: .5; 3 SOLUTION RESPIRATORY (INHALATION) at 13:40

## 2017-07-29 RX ADMIN — LACTULOSE 10 G: 20 SOLUTION ORAL at 20:55

## 2017-07-29 RX ADMIN — IPRATROPIUM BROMIDE AND ALBUTEROL SULFATE 3 ML: .5; 3 SOLUTION RESPIRATORY (INHALATION) at 01:05

## 2017-07-29 RX ADMIN — IPRATROPIUM BROMIDE AND ALBUTEROL SULFATE 3 ML: .5; 3 SOLUTION RESPIRATORY (INHALATION) at 07:23

## 2017-07-30 LAB
MAGNESIUM SERPL-MCNC: 2.2 MG/DL (ref 1.7–2.6)
POTASSIUM BLD-SCNC: 4.4 MMOL/L (ref 3.5–5.3)

## 2017-07-30 PROCEDURE — 84132 ASSAY OF SERUM POTASSIUM: CPT | Performed by: FAMILY MEDICINE

## 2017-07-30 PROCEDURE — 25010000002 VANCOMYCIN PER 500 MG

## 2017-07-30 PROCEDURE — 25010000002 ENOXAPARIN PER 10 MG: Performed by: FAMILY MEDICINE

## 2017-07-30 PROCEDURE — 94799 UNLISTED PULMONARY SVC/PX: CPT

## 2017-07-30 PROCEDURE — 83735 ASSAY OF MAGNESIUM: CPT | Performed by: FAMILY MEDICINE

## 2017-07-30 RX ORDER — MULTIVITAMIN
1 TABLET ORAL DAILY
Status: DISCONTINUED | OUTPATIENT
Start: 2017-07-30 | End: 2017-08-04 | Stop reason: HOSPADM

## 2017-07-30 RX ADMIN — METRONIDAZOLE 500 MG: 500 INJECTION, SOLUTION INTRAVENOUS at 04:10

## 2017-07-30 RX ADMIN — Medication 10 ML: at 20:28

## 2017-07-30 RX ADMIN — CEFEPIME HYDROCHLORIDE 1 G: 1 INJECTION, POWDER, FOR SOLUTION INTRAMUSCULAR; INTRAVENOUS at 10:37

## 2017-07-30 RX ADMIN — VANCOMYCIN HYDROCHLORIDE 500 MG: 5 INJECTION, POWDER, LYOPHILIZED, FOR SOLUTION INTRAVENOUS at 20:27

## 2017-07-30 RX ADMIN — LACTULOSE 10 G: 20 SOLUTION ORAL at 15:40

## 2017-07-30 RX ADMIN — MULTIPLE VITAMIN LIQUID 5 ML: LIQUID at 08:42

## 2017-07-30 RX ADMIN — CYPROHEPTADINE HYDROCHLORIDE 4 MG: 4 TABLET ORAL at 20:27

## 2017-07-30 RX ADMIN — POVIDONE-IODINE: 100 OINTMENT TOPICAL at 08:45

## 2017-07-30 RX ADMIN — IPRATROPIUM BROMIDE AND ALBUTEROL SULFATE 3 ML: .5; 3 SOLUTION RESPIRATORY (INHALATION) at 13:00

## 2017-07-30 RX ADMIN — CYPROHEPTADINE HYDROCHLORIDE 4 MG: 4 TABLET ORAL at 08:42

## 2017-07-30 RX ADMIN — IPRATROPIUM BROMIDE AND ALBUTEROL SULFATE 3 ML: .5; 3 SOLUTION RESPIRATORY (INHALATION) at 19:03

## 2017-07-30 RX ADMIN — METRONIDAZOLE 500 MG: 500 INJECTION, SOLUTION INTRAVENOUS at 11:58

## 2017-07-30 RX ADMIN — PANTOPRAZOLE SODIUM 40 MG: 40 TABLET, DELAYED RELEASE ORAL at 05:21

## 2017-07-30 RX ADMIN — IPRATROPIUM BROMIDE AND ALBUTEROL SULFATE 3 ML: .5; 3 SOLUTION RESPIRATORY (INHALATION) at 07:30

## 2017-07-30 RX ADMIN — IPRATROPIUM BROMIDE AND ALBUTEROL SULFATE 3 ML: .5; 3 SOLUTION RESPIRATORY (INHALATION) at 00:00

## 2017-07-30 RX ADMIN — Medication 10 ML: at 08:46

## 2017-07-30 RX ADMIN — GABAPENTIN 100 MG: 100 CAPSULE ORAL at 20:28

## 2017-07-30 RX ADMIN — CEFEPIME HYDROCHLORIDE 1 G: 1 INJECTION, POWDER, FOR SOLUTION INTRAMUSCULAR; INTRAVENOUS at 22:51

## 2017-07-30 RX ADMIN — LACTULOSE 10 G: 20 SOLUTION ORAL at 20:27

## 2017-07-30 RX ADMIN — CYPROHEPTADINE HYDROCHLORIDE 4 MG: 4 TABLET ORAL at 15:40

## 2017-07-30 RX ADMIN — ENOXAPARIN SODIUM 40 MG: 40 INJECTION SUBCUTANEOUS at 08:42

## 2017-07-30 RX ADMIN — Medication 10 ML: at 09:34

## 2017-07-30 RX ADMIN — VANCOMYCIN HYDROCHLORIDE 500 MG: 5 INJECTION, POWDER, LYOPHILIZED, FOR SOLUTION INTRAVENOUS at 09:34

## 2017-07-30 RX ADMIN — GABAPENTIN 100 MG: 100 CAPSULE ORAL at 08:42

## 2017-07-30 RX ADMIN — METRONIDAZOLE 500 MG: 500 INJECTION, SOLUTION INTRAVENOUS at 20:27

## 2017-07-31 LAB
ANION GAP SERPL CALCULATED.3IONS-SCNC: 1.6 MMOL/L (ref 3.6–11.2)
BACTERIA SPEC AEROBE CULT: NORMAL
BACTERIA SPEC AEROBE CULT: NORMAL
BASOPHILS # BLD AUTO: 0.02 10*3/MM3 (ref 0–0.3)
BASOPHILS NFR BLD AUTO: 0.3 % (ref 0–2)
BUN BLD-MCNC: 16 MG/DL (ref 7–21)
BUN/CREAT SERPL: 30.2 (ref 7–25)
CALCIUM SPEC-SCNC: 9.1 MG/DL (ref 7.7–10)
CHLORIDE SERPL-SCNC: 109 MMOL/L (ref 99–112)
CO2 SERPL-SCNC: 28.4 MMOL/L (ref 24.3–31.9)
CREAT BLD-MCNC: 0.53 MG/DL (ref 0.43–1.29)
DEPRECATED RDW RBC AUTO: 48.2 FL (ref 37–54)
EOSINOPHIL # BLD AUTO: 0.4 10*3/MM3 (ref 0–0.7)
EOSINOPHIL NFR BLD AUTO: 6.7 % (ref 0–7)
ERYTHROCYTE [DISTWIDTH] IN BLOOD BY AUTOMATED COUNT: 14.8 % (ref 11.5–14.5)
GFR SERPL CREATININE-BSD FRML MDRD: 112 ML/MIN/1.73
GLUCOSE BLD-MCNC: 113 MG/DL (ref 70–110)
HCT VFR BLD AUTO: 24.8 % (ref 37–47)
HGB BLD-MCNC: 7.2 G/DL (ref 12–16)
IMM GRANULOCYTES # BLD: 0.01 10*3/MM3 (ref 0–0.03)
IMM GRANULOCYTES NFR BLD: 0.2 % (ref 0–0.5)
LYMPHOCYTES # BLD AUTO: 1.81 10*3/MM3 (ref 1–3)
LYMPHOCYTES NFR BLD AUTO: 30.3 % (ref 16–46)
MAGNESIUM SERPL-MCNC: 2.1 MG/DL (ref 1.7–2.6)
MCH RBC QN AUTO: 27.2 PG (ref 27–33)
MCHC RBC AUTO-ENTMCNC: 29 G/DL (ref 33–37)
MCV RBC AUTO: 93.6 FL (ref 80–94)
MONOCYTES # BLD AUTO: 0.68 10*3/MM3 (ref 0.1–0.9)
MONOCYTES NFR BLD AUTO: 11.4 % (ref 0–12)
NEUTROPHILS # BLD AUTO: 3.06 10*3/MM3 (ref 1.4–6.5)
NEUTROPHILS NFR BLD AUTO: 51.1 % (ref 40–75)
OSMOLALITY SERPL CALC.SUM OF ELEC: 279.5 MOSM/KG (ref 273–305)
PLATELET # BLD AUTO: 325 10*3/MM3 (ref 130–400)
PMV BLD AUTO: 10.7 FL (ref 6–10)
POTASSIUM BLD-SCNC: 4.5 MMOL/L (ref 3.5–5.3)
RBC # BLD AUTO: 2.65 10*6/MM3 (ref 4.2–5.4)
SODIUM BLD-SCNC: 139 MMOL/L (ref 135–153)
WBC NRBC COR # BLD: 5.98 10*3/MM3 (ref 4.5–12.5)

## 2017-07-31 PROCEDURE — 94799 UNLISTED PULMONARY SVC/PX: CPT

## 2017-07-31 PROCEDURE — 97116 GAIT TRAINING THERAPY: CPT

## 2017-07-31 PROCEDURE — 25010000002 VANCOMYCIN PER 500 MG

## 2017-07-31 PROCEDURE — 97110 THERAPEUTIC EXERCISES: CPT

## 2017-07-31 PROCEDURE — 83735 ASSAY OF MAGNESIUM: CPT | Performed by: FAMILY MEDICINE

## 2017-07-31 PROCEDURE — 97530 THERAPEUTIC ACTIVITIES: CPT

## 2017-07-31 PROCEDURE — 97535 SELF CARE MNGMENT TRAINING: CPT

## 2017-07-31 PROCEDURE — 25010000002 ENOXAPARIN PER 10 MG: Performed by: FAMILY MEDICINE

## 2017-07-31 PROCEDURE — 80048 BASIC METABOLIC PNL TOTAL CA: CPT | Performed by: FAMILY MEDICINE

## 2017-07-31 PROCEDURE — 85025 COMPLETE CBC W/AUTO DIFF WBC: CPT | Performed by: FAMILY MEDICINE

## 2017-07-31 RX ORDER — FERROUS SULFATE 325(65) MG
325 TABLET ORAL 2 TIMES DAILY WITH MEALS
Status: DISCONTINUED | OUTPATIENT
Start: 2017-07-31 | End: 2017-08-04 | Stop reason: HOSPADM

## 2017-07-31 RX ADMIN — GABAPENTIN 100 MG: 100 CAPSULE ORAL at 08:54

## 2017-07-31 RX ADMIN — VANCOMYCIN HYDROCHLORIDE 500 MG: 5 INJECTION, POWDER, LYOPHILIZED, FOR SOLUTION INTRAVENOUS at 08:54

## 2017-07-31 RX ADMIN — LACTULOSE 10 G: 20 SOLUTION ORAL at 15:11

## 2017-07-31 RX ADMIN — CYPROHEPTADINE HYDROCHLORIDE 4 MG: 4 TABLET ORAL at 15:11

## 2017-07-31 RX ADMIN — METRONIDAZOLE 500 MG: 500 INJECTION, SOLUTION INTRAVENOUS at 11:10

## 2017-07-31 RX ADMIN — POVIDONE-IODINE: 100 OINTMENT TOPICAL at 09:00

## 2017-07-31 RX ADMIN — LACTULOSE 10 G: 20 SOLUTION ORAL at 20:31

## 2017-07-31 RX ADMIN — IPRATROPIUM BROMIDE AND ALBUTEROL SULFATE 3 ML: .5; 3 SOLUTION RESPIRATORY (INHALATION) at 18:43

## 2017-07-31 RX ADMIN — VANCOMYCIN HYDROCHLORIDE 500 MG: 5 INJECTION, POWDER, LYOPHILIZED, FOR SOLUTION INTRAVENOUS at 21:37

## 2017-07-31 RX ADMIN — Medication 10 ML: at 20:36

## 2017-07-31 RX ADMIN — Medication 10 ML: at 09:18

## 2017-07-31 RX ADMIN — CEFEPIME HYDROCHLORIDE 1 G: 1 INJECTION, POWDER, FOR SOLUTION INTRAMUSCULAR; INTRAVENOUS at 22:44

## 2017-07-31 RX ADMIN — IPRATROPIUM BROMIDE AND ALBUTEROL SULFATE 3 ML: .5; 3 SOLUTION RESPIRATORY (INHALATION) at 07:12

## 2017-07-31 RX ADMIN — METRONIDAZOLE 500 MG: 500 INJECTION, SOLUTION INTRAVENOUS at 03:22

## 2017-07-31 RX ADMIN — ENOXAPARIN SODIUM 40 MG: 40 INJECTION SUBCUTANEOUS at 08:53

## 2017-07-31 RX ADMIN — CYPROHEPTADINE HYDROCHLORIDE 4 MG: 4 TABLET ORAL at 08:54

## 2017-07-31 RX ADMIN — LACTULOSE 10 G: 20 SOLUTION ORAL at 08:53

## 2017-07-31 RX ADMIN — METRONIDAZOLE 500 MG: 500 INJECTION, SOLUTION INTRAVENOUS at 20:31

## 2017-07-31 RX ADMIN — FERROUS SULFATE TAB 325 MG (65 MG ELEMENTAL FE) 325 MG: 325 (65 FE) TAB at 17:52

## 2017-07-31 RX ADMIN — FERROUS SULFATE TAB 325 MG (65 MG ELEMENTAL FE) 325 MG: 325 (65 FE) TAB at 13:12

## 2017-07-31 RX ADMIN — PANTOPRAZOLE SODIUM 40 MG: 40 TABLET, DELAYED RELEASE ORAL at 05:32

## 2017-07-31 RX ADMIN — CYPROHEPTADINE HYDROCHLORIDE 4 MG: 4 TABLET ORAL at 20:31

## 2017-07-31 RX ADMIN — CEFEPIME HYDROCHLORIDE 1 G: 1 INJECTION, POWDER, FOR SOLUTION INTRAMUSCULAR; INTRAVENOUS at 10:13

## 2017-07-31 RX ADMIN — IPRATROPIUM BROMIDE AND ALBUTEROL SULFATE 3 ML: .5; 3 SOLUTION RESPIRATORY (INHALATION) at 00:07

## 2017-07-31 RX ADMIN — GABAPENTIN 100 MG: 100 CAPSULE ORAL at 20:31

## 2017-07-31 RX ADMIN — Medication 1 TABLET: at 08:53

## 2017-07-31 RX ADMIN — IPRATROPIUM BROMIDE AND ALBUTEROL SULFATE 3 ML: .5; 3 SOLUTION RESPIRATORY (INHALATION) at 13:33

## 2017-08-01 LAB
MAGNESIUM SERPL-MCNC: 2.1 MG/DL (ref 1.7–2.6)
POTASSIUM BLD-SCNC: 4 MMOL/L (ref 3.5–5.3)
VANCOMYCIN TROUGH SERPL-MCNC: 15.4 MCG/ML (ref 5–15)

## 2017-08-01 PROCEDURE — 84132 ASSAY OF SERUM POTASSIUM: CPT | Performed by: FAMILY MEDICINE

## 2017-08-01 PROCEDURE — 94799 UNLISTED PULMONARY SVC/PX: CPT

## 2017-08-01 PROCEDURE — 25010000002 ENOXAPARIN PER 10 MG: Performed by: FAMILY MEDICINE

## 2017-08-01 PROCEDURE — 83735 ASSAY OF MAGNESIUM: CPT | Performed by: FAMILY MEDICINE

## 2017-08-01 PROCEDURE — 97116 GAIT TRAINING THERAPY: CPT

## 2017-08-01 PROCEDURE — 97110 THERAPEUTIC EXERCISES: CPT

## 2017-08-01 PROCEDURE — 80202 ASSAY OF VANCOMYCIN: CPT | Performed by: FAMILY MEDICINE

## 2017-08-01 PROCEDURE — 97535 SELF CARE MNGMENT TRAINING: CPT | Performed by: OCCUPATIONAL THERAPIST

## 2017-08-01 PROCEDURE — 97530 THERAPEUTIC ACTIVITIES: CPT | Performed by: OCCUPATIONAL THERAPIST

## 2017-08-01 PROCEDURE — 25010000002 VANCOMYCIN PER 500 MG

## 2017-08-01 RX ADMIN — FERROUS SULFATE TAB 325 MG (65 MG ELEMENTAL FE) 325 MG: 325 (65 FE) TAB at 08:22

## 2017-08-01 RX ADMIN — CYPROHEPTADINE HYDROCHLORIDE 4 MG: 4 TABLET ORAL at 08:22

## 2017-08-01 RX ADMIN — CYPROHEPTADINE HYDROCHLORIDE 4 MG: 4 TABLET ORAL at 20:54

## 2017-08-01 RX ADMIN — PANTOPRAZOLE SODIUM 40 MG: 40 TABLET, DELAYED RELEASE ORAL at 06:15

## 2017-08-01 RX ADMIN — VANCOMYCIN HYDROCHLORIDE 500 MG: 5 INJECTION, POWDER, LYOPHILIZED, FOR SOLUTION INTRAVENOUS at 22:04

## 2017-08-01 RX ADMIN — FERROUS SULFATE TAB 325 MG (65 MG ELEMENTAL FE) 325 MG: 325 (65 FE) TAB at 17:50

## 2017-08-01 RX ADMIN — LACTULOSE 10 G: 20 SOLUTION ORAL at 20:54

## 2017-08-01 RX ADMIN — METRONIDAZOLE 500 MG: 500 INJECTION, SOLUTION INTRAVENOUS at 11:17

## 2017-08-01 RX ADMIN — ENOXAPARIN SODIUM 40 MG: 40 INJECTION SUBCUTANEOUS at 08:24

## 2017-08-01 RX ADMIN — Medication 10 ML: at 09:06

## 2017-08-01 RX ADMIN — GABAPENTIN 100 MG: 100 CAPSULE ORAL at 20:54

## 2017-08-01 RX ADMIN — GABAPENTIN 100 MG: 100 CAPSULE ORAL at 08:24

## 2017-08-01 RX ADMIN — VANCOMYCIN HYDROCHLORIDE 500 MG: 5 INJECTION, POWDER, LYOPHILIZED, FOR SOLUTION INTRAVENOUS at 09:06

## 2017-08-01 RX ADMIN — METRONIDAZOLE 500 MG: 500 INJECTION, SOLUTION INTRAVENOUS at 03:57

## 2017-08-01 RX ADMIN — IPRATROPIUM BROMIDE AND ALBUTEROL SULFATE 3 ML: .5; 3 SOLUTION RESPIRATORY (INHALATION) at 19:14

## 2017-08-01 RX ADMIN — IPRATROPIUM BROMIDE AND ALBUTEROL SULFATE 3 ML: .5; 3 SOLUTION RESPIRATORY (INHALATION) at 01:10

## 2017-08-01 RX ADMIN — Medication 10 ML: at 20:52

## 2017-08-01 RX ADMIN — IPRATROPIUM BROMIDE AND ALBUTEROL SULFATE 3 ML: .5; 3 SOLUTION RESPIRATORY (INHALATION) at 06:52

## 2017-08-01 RX ADMIN — METRONIDAZOLE 500 MG: 500 INJECTION, SOLUTION INTRAVENOUS at 20:53

## 2017-08-01 RX ADMIN — CYPROHEPTADINE HYDROCHLORIDE 4 MG: 4 TABLET ORAL at 16:07

## 2017-08-01 RX ADMIN — Medication 1 TABLET: at 08:22

## 2017-08-01 RX ADMIN — CEFEPIME HYDROCHLORIDE 1 G: 1 INJECTION, POWDER, FOR SOLUTION INTRAMUSCULAR; INTRAVENOUS at 10:03

## 2017-08-01 RX ADMIN — IPRATROPIUM BROMIDE AND ALBUTEROL SULFATE 3 ML: .5; 3 SOLUTION RESPIRATORY (INHALATION) at 12:55

## 2017-08-01 RX ADMIN — POVIDONE-IODINE: 100 OINTMENT TOPICAL at 08:22

## 2017-08-01 RX ADMIN — CEFEPIME HYDROCHLORIDE 1 G: 1 INJECTION, POWDER, FOR SOLUTION INTRAMUSCULAR; INTRAVENOUS at 23:09

## 2017-08-01 RX ADMIN — LACTULOSE 10 G: 20 SOLUTION ORAL at 16:07

## 2017-08-01 RX ADMIN — LACTULOSE 10 G: 20 SOLUTION ORAL at 08:22

## 2017-08-01 NOTE — PLAN OF CARE
Problem: Patient Care Overview (Adult)  Goal: Adult Individualization and Mutuality  Outcome: Ongoing (interventions implemented as appropriate)  Goal: Discharge Needs Assessment  Outcome: Ongoing (interventions implemented as appropriate)    Problem: Fall Risk (Adult)  Goal: Absence of Falls  Outcome: Ongoing (interventions implemented as appropriate)    Problem: Skin Integrity Impairment, Risk/Actual (Adult)  Goal: Skin Integrity/Wound Healing  Outcome: Ongoing (interventions implemented as appropriate)    Problem: Pressure Ulcer Risk (Victor Hugo Scale) (Adult,Obstetrics,Pediatric)  Goal: Skin Integrity  Outcome: Ongoing (interventions implemented as appropriate)

## 2017-08-01 NOTE — PLAN OF CARE
Problem: Activity Intolerance (Adult)  Goal: Activity Tolerance  Outcome: Ongoing (interventions implemented as appropriate)    Problem: Fall Risk (Adult)  Goal: Absence of Falls  Outcome: Ongoing (interventions implemented as appropriate)    Problem: Skin Integrity Impairment, Risk/Actual (Adult)  Goal: Skin Integrity/Wound Healing  Outcome: Ongoing (interventions implemented as appropriate)    Problem: Pressure Ulcer Risk (Victor Hugo Scale) (Adult,Obstetrics,Pediatric)  Goal: Skin Integrity  Outcome: Ongoing (interventions implemented as appropriate)

## 2017-08-01 NOTE — THERAPY TREATMENT NOTE
Inpatient Rehabilitation - Physical Therapy Treatment Note   [facility]     Patient Name: Tamie Rodriguez  : 1940  MRN: [id number]  Today's Date: 2017  Onset of Illness/Injury or Date of Surgery Date: 17  Date of Referral to PT: 17  Referring Physician: Dr. Segovia    Admit Date: 2017    Visit Dx:  No diagnosis found.  Patient Active Problem List   Diagnosis   • Dehydration   • Debility               Adult Rehabilitation Note       17 1449 17 1417 17 1616    Rehab Assessment/Intervention    Discipline physical therapy assistant  -LL occupational therapist  -BF occupational therapist  -BC    Document Type therapy note (daily note)  -LL therapy note (daily note)  -BF therapy note (daily note)  -BC    Subjective Information agree to therapy;no complaints  -LL agree to therapy;no complaints  -BF agree to therapy;complains of;fatigue  -BC    Patient Effort, Rehab Treatment good  -LL good  -BF good  -BC    Precautions/Limitations fall precautions;other (see comments)   PEG, abdominal binder, soft diet with thin liquids, [place]  -LL fall precautions;other (see comments)   PEG, abd binder; soft diet w/ thin liquids; [place]  -BF     Patient Response to Treatment Patient did well with PT with adequate rest breaks  -LL  Pt. tolerated both AM/PM session well with rest breaks provided as needed  -BC    Recorded by [LL] Tiara Alcala PTA [BF] Fadia Rubio, ELIDIA [BC] Anyi Sands, OT    Pain Assessment    Pain Assessment Huffman-Butt FACES  -LL      Huffman-Baker FACES Pain Rating 4  -LL      Pain Type Chronic pain  -LL      Pain Location Back  -LL      Pain Intervention(s) Repositioned;Rest  -LL      Recorded by [LL] Tiara Alcala PTA      Cognitive Assessment/Intervention    Current Cognitive/Communication Assessment functional  -LL functional  -BF functional  -BC    Orientation Status oriented to;person;place  -LL oriented x 4  -BF oriented to;person;place;situation  -BC    Follows  Commands/Answers Questions 100% of the time;able to follow single-step instructions  -% of the time;able to follow single-step instructions  -% of the time  -BC    Personal Safety decreased awareness, need for assist;decreased awareness, need for safety  -      Personal Safety Interventions fall prevention program maintained;gait belt;nonskid shoes/slippers when out of bed  -  fall prevention program maintained  -BC    Recorded by [LL] Tiara Alcala PTA [BF] Fadia Rubio OT [BC] Anyi Sands OT    Communication Assessment/Intervention    Communication Assessment [place]  -      Recorded by [LL] Tiara Alcala PTA      Bed Mobility, Assessment/Treatment    Bed Mobility, Assistive Device bed rails  -      Bed Mobility, Scoot/Bridge, [place] supervision required;verbal cues required;nonverbal cues required (demo/gesture)  -      Bed Mob, Supine to Sit, [place] minimum assist (75% patient effort);contact guard assist  -      Bed Mob, Sit to Supine, [place] minimum assist (75% patient effort);contact guard assist  -      Bed Mobility, Safety Issues decreased use of arms for pushing/pulling;decreased use of legs for bridging/pushing  -      Bed Mobility, Impairments strength decreased;impaired balance;pain  -      Recorded by [LL] Tiara Alcala PTA      Transfer Assessment/Treatment    Transfers, Bed-Chair [place] verbal cues required;nonverbal cues required (demo/gesture);minimum assist (75% patient effort);contact guard assist   CGA in AM; Min A in PM  - minimum assist (75% patient effort);contact guard assist;verbal cues required  -BF     Transfers, Chair-Bed [place] verbal cues required;nonverbal cues required (demo/gesture);minimum assist (75% patient effort);contact guard assist  - minimum assist (75% patient effort);contact guard assist;verbal cues required  -BF     Transfers, Bed-Chair-Bed, Assist Device rolling walker  -LL other (see  comments)   w/c arm rests  -BF     Transfers, Sit-Stand [place] verbal cues required;nonverbal cues required (demo/gesture);minimum assist (75% patient effort);contact guard assist  -LL      Transfers, Stand-Sit [place] verbal cues required;nonverbal cues required (demo/gesture);minimum assist (75% patient effort);contact guard assist  -LL      Transfers, Sit-Stand-Sit, Assist Device rolling walker  -LL      Transfer, Safety Issues balance decreased during turns;step length decreased;knees buckling  -LL      Transfer, Impairments strength decreased;impaired balance;coordination impaired  -LL      Recorded by [LL] Tiara Alcaal PTA [BF] Fadia Rubio OT     Gait Assessment/Treatment    Gait, [place] Level verbal cues required;nonverbal cues required (demo/gesture);minimum assist (75% patient effort);2 person assist required  -LL      Gait, Assistive Device rolling walker  -LL      Gait, Distance (Feet) 80   x 3 then 60  -LL      Gait, Gait Pattern Analysis swing-to gait  -LL      Gait, Gait Deviations andrae decreased;forward flexed posture;step length decreased  -LL      Gait, Safety Issues balance decreased during turns;step length decreased  -LL      Gait, Impairments strength decreased;impaired balance;coordination impaired  -LL      Recorded by [LL] Tiara Alcala PTA      Upper Body Dressing Assessment/Training    UB Dressing Assess/Train, Clothing Type   donning:   dressing gown  -BC    UB Dressing Assess/Train, Position   sitting;supported sitting  -BC    UB Dressing Assess/Train, [place]   set up required  -BC    UB Dressing Assess/Train, Impairments   strength decreased;coordination impaired  -BC    UB Dressing Assess/Train, Comment   [place]  -BC    Recorded by   [BC] Anyi Sands, ELIDIA    Grooming Assessment/Training    Grooming Assess/Train, Position   sitting  -BC    Grooming Assess/Train, [place] Level   set up required;minimum assist (75% patient effort)  -BC    Grooming  Assess/Train, Impairments   strength decreased;coordination impaired  -BC    Recorded by   [BC] Anyi Sands OT    Self-Feeding Assessment/Training    Self-Feeding Assess/Train, Comment   set up with meals, tube feeding graded to night time only.  -BC    Recorded by   [BC] Anyi Sands OT    Balance Skills Training    Sitting-Level of Assistance Close supervision  -LL      Sitting-Balance Support Feet supported  -LL      Sitting-Balance Activities Reaching for objects;Reaching across midline   Sitting balloon tap  -LL      Standing-Level of Assistance Minimum assistance;Contact guard  -LL      Static Standing Balance Support assistive device  -LL      Gait Balance-Level of Assistance Minimum assistance  -LL      Gait Balance Support assistive device  -LL      Recorded by [LL] Tiara Alcala PTA      Therapy Exercises    Bilateral Lower Extremities AROM:;sitting;standing;supine  -LL      BLE Resistance theraband  -LL      Bilateral Upper Extremity  AROM:;sitting   [place] [facility] [place] [place]; reaching; light strengthening  -BF AROM:;10 reps;20 reps;sitting   BUE ther ex/aact, [facility] [place] [place]  -BC    BUE Resistance  theraputty   Wrist rolls X1  -BF     Recorded by [LL] Tiara Alcala PTA [BF] Fadia Rubio OT [BC] Anyi Sands OT    Positioning and Restraints    Pre-Treatment Position --   WC with OT  -LL  sitting in chair/recliner  -BC    Post Treatment Position bed  -LL  bed  -BC    In Bed supine;call light within reach;encouraged to call for assist;side rails up x2;heels elevated  -LL supine;call light within reach;encouraged to call for assist   In PM  -BF notified nsg;call light within reach;encouraged to call for assist;supine  -BC    In Wheelchair  sitting;with PT   In AM  -BF     Recorded by [LL] Tiara Alcala PTA [BF] Fadia Rubio OT [BC] Anyi Sands OT      07/31/17 1510          Rehab Assessment/Intervention    Discipline physical therapy assistant  -      Document Type therapy note  (daily note)  -      Subjective Information agree to therapy;no complaints  -LL      Patient Effort, Rehab Treatment good  -LL      Precautions/Limitations fall precautions;other (see comments)   PEG, abdominal binder, soft diet with think liquids, [place]  -LL      Patient Response to Treatment Patient did well with therapy with adequate rest breaks  -LL      Recorded by [LL] Tiara Alcala PTA      Pain Assessment    Pain Assessment Huffman-Butt FACES  -LL      Huffman-Baker FACES Pain Rating 2  -LL      Pain Type Chronic pain  -LL      Pain Location Back  -LL      Pain Intervention(s) Repositioned;Rest  -LL      Recorded by [LL] Tiara Alcala PTA      Cognitive Assessment/Intervention    Current Cognitive/Communication Assessment functional  -LL      Orientation Status oriented to;person;place  -LL      Follows Commands/Answers Questions 100% of the time;able to follow single-step instructions;needs cueing  -      Personal Safety decreased awareness, need for assist;decreased awareness, need for safety;decreased insight to deficits  -      Personal Safety Interventions fall prevention program maintained;gait belt;nonskid shoes/slippers when out of bed  -LL      Recorded by [LL] Tiara Alcala PTA      Communication Assessment/Intervention    Communication Assessment [place]  -      Recorded by [LL] Tiara Alcala PTA      Bed Mobility, Assessment/Treatment    Bed Mobility, Assistive Device bed rails  -LL      Bed Mobility, [place]/Bridge, [place] supervision required;verbal cues required;nonverbal cues required (demo/gesture)  -LL      Bed Mob, Supine to Sit, [place] minimum assist (75% patient effort);contact guard assist  -LL      Bed Mob, Sit to Supine, [place] minimum assist (75% patient effort);contact guard assist  -LL      Bed Mobility, Safety Issues decreased use of arms for pushing/pulling;decreased use of legs for bridging/pushing  -      Bed Mobility, Impairments strength  decreased;impaired balance;pain  -LL      Recorded by [LL] Tiara Alcala PTA      Transfer Assessment/Treatment    Transfers, Bed-Chair [place] verbal cues required;nonverbal cues required (demo/gesture);minimum assist (75% patient effort);contact guard assist   CGA in AM; Min A in PM  -LL      Transfers, Chair-Bed [place] verbal cues required;nonverbal cues required (demo/gesture);minimum assist (75% patient effort);contact guard assist  -LL      Transfers, Bed-Chair-Bed, Assist Device rolling walker  -LL      Transfers, Sit-Stand [place] verbal cues required;nonverbal cues required (demo/gesture);minimum assist (75% patient effort);contact guard assist  -LL      Transfers, Stand-Sit [place] verbal cues required;nonverbal cues required (demo/gesture);minimum assist (75% patient effort);contact guard assist  -LL      Transfers, Sit-Stand-Sit, Assist Device rolling walker  -LL      Transfer, Safety Issues balance decreased during turns;step length decreased;knees buckling  -LL      Transfer, Impairments strength decreased;impaired balance;coordination impaired  -LL      Recorded by [LL] Tiara Alcala PTA      Gait Assessment/Treatment    Gait, [place] Level verbal cues required;nonverbal cues required (demo/gesture);minimum assist (75% patient effort);2 person assist required  -LL      Gait, Assistive Device rolling walker  -LL      Gait, Distance (Feet) 80   x 2; 60 x 2  -LL      Gait, Gait Pattern Analysis swing-to gait  -LL      Gait, Gait Deviations andrae decreased;forward flexed posture;step length decreased  -LL      Gait, Safety Issues balance decreased during turns;step length decreased  -LL      Gait, Impairments strength decreased;impaired balance;coordination impaired  -LL      Recorded by [LL] Tiara Alcala PTA      Balance Skills Training    Sitting-Level of Assistance Close supervision  -LL      Sitting-Balance Support Feet supported  -LL      Sitting-Balance Activities  Reaching for objects;Reaching across midline   Sitting balloon tap  -LL      Standing-Level of Assistance Minimum assistance;Contact guard  -LL      Static Standing Balance Support assistive device  -LL      Gait Balance-Level of Assistance Minimum assistance  -LL      Gait Balance Support assistive device  -LL      Recorded by [LL] Tiara Alcala PTA      Therapy Exercises    Bilateral Lower Extremities AROM:;sitting;standing  -LL      BLE Resistance theraband  -LL      Recorded by [LL] Tiara Alcala PTA      Positioning and Restraints    Pre-Treatment Position --   WC with OT  -LL      Post Treatment Position bed  -LL      In Bed supine;call light within reach;encouraged to call for assist;heels elevated  -LL      Recorded by [LL] Tiara Alcala PTA        User Key  (r) = Recorded By, (t) = Taken By, (c) = Cosigned By    Initials Name Effective Dates    BF Fadia Rubio, OT 03/14/16 -     LL Tiara Alcala, PTA 05/02/16 -     BC Anyi Wicho, OT 01/21/17 -                 IP PT Goals       07/28/17 1543 07/22/17 1413 07/20/17 1730    Bed Mobility PT STG    Bed Mobility PT STG, Date Established  07/22/17  -RT     Bed Mobility PT STG, Time to Achieve  1 wk  -RT     Bed Mobility PT STG, Activity Type  all bed mobility  -RT     Bed Mobility PT STG, [place] Level  minimum assist (75% patient effort);moderate assist (50% patient effort)  -RT     Bed Mobility PT STG, Date Goal Reviewed 07/28/17  -LL      Bed Mobility PT STG, Outcome goal met  -LL      Bed Mobility PT LTG    Bed Mobility PT LTG, Date Established  07/22/17  -RT 07/20/17  -BC    Bed Mobility PT LTG, Time to Achieve  2 wks  -RT 3 days  -BC    Bed Mobility PT LTG, Activity Type  all bed mobility  -RT all bed mobility  -BC    Bed Mobility PT LTG, [place] Level  contact guard assist  -RT minimum assist (75% patient effort)  -BC    Bed Mobility PT Goal  LTG, Assist Device   bed rails  -BC    Bed Mobility PT LTG, Date Goal Reviewed  07/28/17  -LL      Bed Mobility PT LTG, Outcome goal ongoing  -LL      Transfer Training PT STG    Transfer Training PT STG, Date Established  07/22/17  -RT     Transfer Training PT STG, Time to Achieve  1 wk  -RT     Transfer Training PT STG, Activity Type  all transfers  -RT     Transfer Training PT STG, [place] Level  minimum assist (75% patient effort);moderate assist (50% patient effort)  -RT     Transfer Training PT STG, Assist Device  walker, rolling  -RT     Transfer Training PT STG, Date Goal Reviewed 07/28/17  -LL      Transfer Training PT STG, Outcome goal met  -LL      Transfer Training PT LTG    Transfer Training PT LTG, Date Established  07/22/17  -RT 07/20/17  -BC    Transfer Training PT LTG, Time to Achieve  2 wks  -RT 3 days  -BC    Transfer Training PT LTG, Activity Type  all transfers  -RT all transfers  -BC    Transfer Training PT LTG, [place] Level  contact guard assist  -RT minimum assist (75% patient effort)  -BC    Transfer Training PT LTG, Assist Device  walker, rolling  -RT walker, rolling  -BC    Transfer Training PT  LTG, Date Goal Reviewed 07/28/17  -LL      Transfer Training PT LTG, Outcome goal ongoing  -LL      Gait Training PT STG    Gait Training Goal PT STG, Date Established  07/22/17  -RT     Gait Training Goal PT STG, Time to Achieve  1 wk  -RT     Gait Training Goal PT STG, [place] Level  moderate assist (50% patient effort);2 person assist required;minimum assist (75% patient effort)  -RT     Gait Training Goal PT STG, Assist Device  walker, rolling  -RT     Gait Training Goal PT STG, Date Goal Reviewed 07/28/17  -LL      Gait Training Goal PT STG, Outcome goal met  -LL      Gait Training PT LTG    Gait Training Goal PT LTG, Date Established  07/22/17  -RT 07/20/17  -BC    Gait Training Goal PT LTG, Time to Achieve  2 wks  -RT 3 days  -BC    Gait Training Goal PT LTG, [place] Level  contact guard assist  -RT minimum assist (75% patient effort)  -BC    Gait  Training Goal PT LTG, Assist Device  walker, rolling  -RT walker, rolling  -BC    Gait Training Goal PT LTG, Distance to Achieve  50  -RT 20  -BC    Gait Training Goal PT LTG, Date Goal Reviewed 07/28/17  -LL      Gait Training Goal PT LTG, Outcome goal ongoing  -LL        User Key  (r) = Recorded By, (t) = Taken By, (c) = Cosigned By    Initials Name Provider Type    BC Angie Daugherty, PT Physical Therapist    LL Tiara Alcala, PTA Physical Therapy Assistant    RT Shant Jim, PT Physical Therapist          Physical Therapy Education     Title: PT OT SLP Therapies (Active)     Topic: Physical Therapy (Done)     Point: Mobility training (Done)    Learning Progress Summary    Learner Readiness Method Response Comment Documented by Status   Patient Acceptance E,D VU,NR  LL 08/01/17 1453 Done    Acceptance E,D VU,NR   07/31/17 1523 Done    Acceptance E,D VU,NR  LL 07/28/17 1542 Done    Acceptance E,D VU,NR   07/27/17 1705 Done    Acceptance E,D NR,VU   07/26/17 1749 Done    Acceptance E NR   07/25/17 2055 Active    Acceptance E VU,NR  LB 07/25/17 1745 Done    Acceptance D,E NR  AG 07/24/17 1706 Active               Point: Home exercise program (Done)    Learning Progress Summary    Learner Readiness Method Response Comment Documented by Status   Patient Acceptance E,D VU,NR  LL 08/01/17 1453 Done    Acceptance E,D VU,NR  LL 07/31/17 1523 Done    Acceptance E,D VU,NR   07/28/17 1542 Done    Acceptance E,D VU,NR   07/27/17 1705 Done    Acceptance E,D NR,VU   07/26/17 1749 Done    Acceptance E NR   07/25/17 2055 Active    Acceptance E VU,NR  LB 07/25/17 1745 Done    Acceptance D,E NR  AG 07/24/17 1706 Active               Point: Body mechanics (Done)    Learning Progress Summary    Learner Readiness Method Response Comment Documented by Status   Patient Acceptance E,D VU,NR  LL 08/01/17 1453 Done    Acceptance E,D VU,NR  LL 07/31/17 1523 Done    Acceptance E,D VU,NR  LL 07/28/17 1542 Done     Acceptance E,D VU,NR   07/27/17 1705 Done    Acceptance E,D NR,VU   07/26/17 1749 Done    Acceptance E NR   07/25/17 2055 Active    Acceptance E VU,NR   07/25/17 1745 Done    Acceptance D,E NR   07/24/17 1706 Active               Point: Precautions (Done)    Learning Progress Summary    Learner Readiness Method Response Comment Documented by Status   Patient Acceptance E,D VU,NR   08/01/17 1453 Done    Acceptance E,D VU,NR   07/31/17 1523 Done    Acceptance E,D VU,NR   07/28/17 1542 Done    Acceptance E,D VU,NR   07/27/17 1705 Done    Acceptance E,D NR,VU   07/26/17 1749 Done    Acceptance E NR   07/25/17 2055 Active    Acceptance E VU,NR   07/25/17 1745 Done    Acceptance D,E NR   07/24/17 1706 Active                      User Key     Initials Effective Dates Name Provider Type Discipline     06/16/16 -  Shahrzad Varela, RN Registered Nurse Nurse     03/14/16 -  Chrystal Galvan, PT Physical Therapist PT     03/14/16 -  Herminia Estrada, PT Physical Therapist PT     05/02/16 -  Tiara Alcala PTA Physical Therapy Assistant PT                    PT Recommendation and Plan  Planned Therapy Interventions: balance training, bed mobility training, gait training, home exercise program, lumbar stabilization, manual therapy techniques, neuromuscular re-education, patient/family education, postural re-education, ROM (Range of Motion), stair training, wheelchair management/propulsion training  PT Frequency: 5 times/wk, per priority policy            Time Calculation:         PT Charges       08/01/17 1457          Time Calculation    Start Time 1000  -LL      Stop Time 1140  -LL      Time Calculation (min) 100 min  -LL      PT Received On 08/01/17  -LL      PT Goal Re-Cert Due Date 08/04/17  -LL        User Key  (r) = Recorded By, (t) = Taken By, (c) = Cosigned By    Initials Name Provider Type     Tiara Alcala PTA Physical Therapy Assistant          Therapy Charges for Today      Code Description Service Date Service Provider Modifiers Qty    05024315088 HC GAIT TRAINING EA 15 MIN 7/31/2017 Tiara Alcala, PTA GP 1    03023572737 HC PT THERAPEUTIC ACT EA 15 MIN 7/31/2017 Tiara Alcala, PTA GP 1    74285984201 HC PT THER SUPP EA 15 MIN 7/31/2017 Tiara Alcala, PTA GP 2    68092260871 HC PT THER PROC EA 15 MIN 7/31/2017 Tiara Alcala PTA GP 4    65826126105 HC GAIT TRAINING EA 15 MIN 8/1/2017 Tiara Alcala PTA GP 1    03419680033 HC PT THER PROC EA 15 MIN 8/1/2017 Tiara Alcala PTA GP 6    26800006001 HC PT THER SUPP EA 15 MIN 8/1/2017 Tiara Alcala, PTA GP 2    31667795570 HC PT CARE PLAN EACH 15 MIN 8/1/2017 Tiara Alcala PTA GP 1               Em Alcala PTA  8/1/2017

## 2017-08-01 NOTE — PROGRESS NOTES
Kinetics :  Vancomycin  Day 7,  Cefepime - flagyl  Day 5    The pre-dose vancomycin level was within the goal range and plan to continue the same for now.

## 2017-08-01 NOTE — NURSING NOTE
Patients  Jerad here and started instructing in tube feeding and checking tube placement. Gave patients  a stethoscope and showed him how to unclamp tube feeding, then showed him how to place syringe in opening of tube feeding and push small amount of air into tube while holding stethoscope on abdomen and listening for  air. Patient heard the air and I had my stethoscope down on abdomin to make sure. Then I showed him how to check for placement by aspirating little bit of  Stomach contents through tube. I then showed him how to hook feeding into tube feeding machine and prime tubing then place feeding into opening of tube and unclamp hit start on tube feeding machine.  He stated understanding and did well with talk through but will need further instruction

## 2017-08-01 NOTE — PROGRESS NOTES
Case Management  Inpatient Rehabilitation Team Conference    Conference Date/Time: 8/1/2017 5:31:44 AM    Team Conference Attendees:  MD Najma Nolen, NANDA,   NANDA Morse, PT  Fadia Rubio, OT  DONALDO Guzman    Demographics            Age: 76Y            Gender: Female    Admission Date: 7/21/2017 1:44:00 PM  Rehabilitation Diagnosis:  debility  Comorbidities:      Plan of Care  Anticipated Discharge Date/Estimated Length of Stay: 8-4-17  Anticipated Discharge Destination: Community discharge with assistance  Discharge Plan : Pt plans to return home with spouse and daughter providing  assistance.  Medical Necessity Expected Level Rationale: Guarded-fair  Intensity and Duration: an average of 3 hours/5 days per week  Medical Supervision and 24 Hour Rehab Nursing: x  Physical Therapy: x  PT Intensity/Duration: PT 1.5 hours per day/5 days per week  Occupational Therapy: x  OT Intensity/Duration: OT 1.5 hours per day/5 days per week  Social Work: x  Therapeutic Recreation: x  Registered Dietician: x  Updated (if changes indicated)    Anticipated Discharge Date/Estimated Length of Stay:   8-4-17      Discharge Plan of Care:Home Health Services Physical Therapy strengthening,  endurance, gait training, transfer training, balance, therapeutic exercise, bed  mobility, home safety 3x per week for 4 weeks Aide bathing/personal care 3x per  week for 4 weeks Nursing   tube feeding Osmolite 1.5 55 ml/hr 12 hours per day 2x/week for 4 weeks    Based on the patient's medical and functional status, their prognosis and  expected level of functional improvement is: Guarded-fair      Interdisciplinary Problem/Goals/Status  Body Systems    [RN] Integumentary(Active)  Current Status(07/21/2017): risk for skin breakdown  Weekly Goal(08/02/2017):  no breakdown thiss shift  Discharge Goal: no breakdown this stay        Mobility    [RN] Walk(Active)  Current Status(07/21/2017): risk  for falls  Weekly Goal(08/02/2017): no falls this shift  Discharge Goal: no falls this stay    [PT] Bed/Chair/Wheelchair(Active)  Current Status(07/31/2017): min A  Weekly Goal(08/07/2017): CGA  Discharge Goal: CGA    [PT] Bed Mobility(Active)  Current Status(07/31/2017): min A  Weekly Goal(08/07/2017): CGA  Discharge Goal: CGA    [PT] Walk(Active)  Current Status(07/31/2017): 80 ft, Min A x 2, RW  Weekly Goal(08/07/2017): 50' RW CGA  Discharge Goal: 50' RW CGA        Safety    [RN] Potential for Elopement(Active)  Current Status(07/24/2017): no falls  Weekly Goal(08/04/2017): no falls this admission  Discharge Goal: no falls this admission        Self Care    [OT] Dressing (Lower)(Active)  Current Status(07/31/2017): TotalA  Weekly Goal(08/08/2017): MaxA  Discharge Goal: ModA    [OT] Dressing (Upper)(Active)  Current Status(07/31/2017): ModA/Anuj  Weekly Goal(08/08/2017): Anuj  Discharge Goal: Anuj    Comments: Pt plans to return home with spouse and daughter assisting with care.    Signed by: DONALDO Guzman    Physician CoSigned By: Shatn Rubio 08/01/2017 08:37:12

## 2017-08-01 NOTE — PROGRESS NOTES
"     LOS: 11 days     Chief Complaint:  Debility    Subjective     Interval History:     Resting well today and otherwise without complaints, pateint with BP controlled, tolerating diet and more optimistic this morning.       Objective     Vital Signs  /71 (BP Location: Right arm, Patient Position: Lying)  Pulse 104  Temp 98.4 °F (36.9 °C) (Oral)   Resp 20  Ht 64\" (162.6 cm)  Wt 82 lb (37.2 kg)  SpO2 97%  BMI 14.08 kg/m2  Intake & Output (last day)       07/31 0701 - 08/01 0700 08/01 0701 - 08/02 0700    P.O. 720     Other 275     NG/     IV Piggyback      Total Intake(mL/kg) 1915 (51.5)     Net +1915            Unmeasured Urine Occurrence 8 x             Physical Exam:     General Appearance:    Alert, cooperative, in no acute distress.  Thin and frail, cachectic    Head:    Normocephalic, without obvious abnormality, atraumatic   Eyes:            Lids and lashes normal, conjunctivae and sclerae normal, no   icterus, no pallor, corneas clear, PERRLA   Ears:    Ears appear intact with no abnormalities noted       Neck:   No adenopathy, supple, trachea midline, no thyromegaly, no   carotid bruit, no JVD   Lungs:     Bibasilar rales,respirations regular, even and                  unlabored    Heart:    Regular rhythm and normal rate, normal S1 and S2, no            murmur, no gallop, no rub, no click   Chest Wall:    No abnormalities observed   Abdomen:     Normal bowel sounds, no masses, no organomegaly, soft        non-tender, non-distended, no guarding, no rebound                tenderness   Extremities:   Moves all extremities well, no edema, no cyanosis, no             redness.  Decreased muscle mass    Pulses:   Pulses palpable and equal bilaterally   Skin:   No bleeding, bruising or rash       Neurologic:   Cranial nerves 2 - 12 grossly intact, sensation intact, DTR       present and equal bilaterally        Results Review:    Lab Results   Component Value Date    WBC 5.98 07/31/2017    HGB " 7.2 (L) 07/31/2017    HCT 24.8 (L) 07/31/2017    MCV 93.6 07/31/2017     07/31/2017       Lab Results   Component Value Date    GLUCOSE 113 (H) 07/31/2017    BUN 16 07/31/2017    CREATININE 0.53 07/31/2017    EGFRIFNONA 112 07/31/2017    BCR 30.2 (H) 07/31/2017     07/31/2017    K 4.0 08/01/2017     07/31/2017    CO2 28.4 07/31/2017    CALCIUM 9.1 07/31/2017    ALBUMIN 3.50 07/24/2017    LABIL2 0.9 (L) 07/24/2017    AST 40 (H) 07/24/2017    ALT 29 07/24/2017     No results found for: INR    No results found for: POCGLU       Medication Review:     Current Facility-Administered Medications:   •  acetaminophen (TYLENOL) tablet 975 mg, 975 mg, Oral, Q6H PRN, Samuel Duane Kreis, MD, 975 mg at 07/27/17 0846  •  bisacodyl (DULCOLAX) suppository 10 mg, 10 mg, Rectal, Daily PRN, Samuel Duane Kreis, MD  •  cefepime (MAXIPIME) 1 g/100 mL 0.9% NS IVPB (mbp), 1 g, Intravenous, Q12H, Samuel Duane Kreis, MD, 1 g at 07/31/17 2244  •  cyproheptadine (PERIACTIN) 4 MG tablet 4 mg, 4 mg, Oral, TID, Samuel Duane Kreis, MD, 4 mg at 07/31/17 2031  •  enoxaparin (LOVENOX) syringe 40 mg, 40 mg, Subcutaneous, Q24H, Samuel Duane Kreis, MD, 40 mg at 07/31/17 0853  •  ferrous sulfate tablet 325 mg, 325 mg, Oral, BID With Meals, Shant Rubio MD, 325 mg at 07/31/17 1752  •  gabapentin (NEURONTIN) capsule 100 mg, 100 mg, Oral, Q12H, Samuel Duane Kreis, MD, 100 mg at 07/31/17 2031  •  ipratropium-albuterol (DUO-NEB) nebulizer solution 3 mL, 3 mL, Nebulization, 4x Daily - RT, Samuel Duane Kreis, MD, 3 mL at 08/01/17 0652  •  lactulose (CHRONULAC) 10 GM/15ML solution 10 g, 10 g, Oral, TID, Samuel Duane Kreis, MD, 10 g at 07/31/17 2031  •  magnesium hydroxide (MILK OF MAGNESIA) suspension 2400 mg/10mL 10 mL, 10 mL, Oral, Daily PRN, Samuel Duane Kreis, MD  •  Magnesium Sulfate 2 gram Bolus, followed by 8 gram infusion (total Mg dose 10 grams)- Mg less than or equal to 1mg/dL, 2 g, Intravenous, PRN **OR** Magnesium Sulfate 6 gram  Infusion (2 gm x 3) -Mg 1.1 -1.5 mg/dL, 2 g, Intravenous, PRN **OR** magnesium sulfate 4 gram infusion- Mg 1.6-1.9 mg/dL, 4 g, Intravenous, PRN, Samuel Duane Kreis, MD  •  metroNIDAZOLE (FLAGYL) IVPB 500 mg, 500 mg, Intravenous, Q8H, Samuel Duane Kreis, MD, Last Rate: 100 mL/hr at 08/01/17 0357, 500 mg at 08/01/17 0357  •  multivitamin (DAILY BERNIE) tablet 1 tablet, 1 tablet, Oral, Daily, Samuel Duane Kreis, MD, 1 tablet at 07/31/17 0853  •  ondansetron (ZOFRAN) tablet 4 mg, 4 mg, Oral, Q6H PRN **OR** ondansetron ODT (ZOFRAN-ODT) disintegrating tablet 4 mg, 4 mg, Oral, Q6H PRN **OR** ondansetron (ZOFRAN) injection 4 mg, 4 mg, Intravenous, Q6H PRN, Samuel Duane Kreis, MD  •  pantoprazole (PROTONIX) EC tablet 40 mg, 40 mg, Oral, Q AM, Samuel Duane Kreis, MD, 40 mg at 08/01/17 0615  •  potassium & sodium phosphates (PHOS-NAK) 280-160-250 MG packet - for Phosphorus less than 1.25 mg/dL, 1 packet, Oral, Q6H PRN **OR** potassium & sodium phosphates (PHOS-NAK) 280-160-250 MG packet - for Phosphorus 1.25 - 2.1 mg/dL, 1 packet, Oral, Once PRN, Samuel Duane Kreis, MD  •  potassium chloride (KLOR-CON) packet 40 mEq, 40 mEq, Oral, PRN, Samuel Duane Kreis, MD  •  potassium chloride (MICRO-K) CR capsule 40 mEq, 40 mEq, Oral, PRN, Samuel Duane Kreis, MD  •  povidone-iodine (BETADINE) ointment, , Topical, Daily, Samuel Duane Kreis, MD  •  sodium chloride 0.9 % flush 10 mL, 10 mL, Intracatheter, PRN, Samuel Duane Kreis, MD  •  sodium chloride 0.9 % flush 10 mL, 10 mL, Intracatheter, Q12H, Samuel Duane Kreis, MD, 10 mL at 07/31/17 2036  •  sodium chloride 0.9 % flush 10 mL, 10 mL, Intracatheter, PRN, Samuel Duane Kreis, MD, 10 mL at 07/30/17 0846  •  vancomycin (VANCOCIN) 500 mg in sodium chloride 0.9 % 100 mL IVPB, 500 mg, Intravenous, Q12H, Madisyn Gayle, [facility], 500 mg at 07/31/17 2137      Assessment/Plan     Debility  Aspiration pneumonia with recurrent sepsis- vanc, cefepime and flagyl Blood cx, negative, doing well.   Cachexia  with malnutrition- tube feeds currently, tolerating PO but very poor intake, mood better today  Anemia- stable, start iron        PT and OT ongoing      Continue tube feeds via PEG      Shant Rubio MD  08/01/17  8:19 AM

## 2017-08-01 NOTE — PROGRESS NOTES
Inpatient Rehabilitation Functional Measures Assessment    Functional Measures  CATHY Eating:  CATHY Grooming:  CATHY Bathing:  CATHY Upper Body Dressing:  CATHY Lower Body Dressing:  CATHY Toileting:    CATHY Bladder Management  Level of Assistance:  Frequency/Number of Accidents this Shift:    CATHY Bowel Management  Level of Assistance:  Frequency/Number of Accidents this Shift:    CATHY Bed/Chair/Wheelchair Transfer:  Bed/chair/wheelchair Transfer Score = 4.  Patient performs 75% or more of effort and minimal assistance (little/incidental  help/lifting of one limb/steadying) for transferring to and from the  bed/chair/wheelchair, requiring: Steadying. Patient requires the following  assistive device(s): Arm rest.  CATHY Toilet Transfer:  CATHY Tub/Shower Transfer:    Previously Documented Mode of Locomotion at Discharge:  [facility] Expected Mode of Locomotion at Discharge:  [facility] Walk/Wheelchair:  [facility] Stairs:    [facility] Comprehension:  [facility] Expression:  [facility] Social Interaction:  [facility] Problem Solving:  CATHY Memory:    Therapy Mode Minutes  Occupational Therapy: Individual: 90 minutes.  Physical Therapy:  Speech Language Pathology:    Discharge Functional Goals:    Signed by: Fadia Rubio OT

## 2017-08-01 NOTE — THERAPY TREATMENT NOTE
Inpatient Rehabilitation - Occupational Therapy Treatment Note   [place]     Patient Name: Tamie Rodriguez  : 1940  MRN: [id number]  Today's Date: 2017  Onset of Illness/Injury or Date of Surgery Date: 17  Date of Referral to OT: 17  Referring Physician: Dr. Segovia      Admit Date: 2017    Visit Dx:   No diagnosis found.  Patient Active Problem List   Diagnosis   • Dehydration   • Debility             Adult Rehabilitation Note       17 1417 17 1616 17 1510    Rehab Assessment/Intervention    Discipline occupational therapist  -BF occupational therapist  -BC physical therapy assistant  -LL    Document Type therapy note (daily note)  -BF therapy note (daily note)  -BC therapy note (daily note)  -LL    Subjective Information agree to therapy;no complaints  -BF agree to therapy;complains of;fatigue  -BC agree to therapy;no complaints  -LL    Patient Effort, Rehab Treatment good  -BF good  -BC good  -LL    Precautions/Limitations fall precautions;other (see comments)   PEG, abd binder; soft diet w/ thin liquids; [place]  -BF  fall precautions;other (see comments)   PEG, abdominal binder, soft diet with think liquids, [place]  -LL    Patient Response to Treatment  Pt. tolerated both AM/PM session well with rest breaks provided as needed  -BC Patient did well with therapy with adequate rest breaks  -LL    Recorded by [BF] Fadia Rubio, OT [BC] Anyi Sands, OT [LL] Tiara Alcala PTA    Pain Assessment    Pain Assessment   Huffman-Baker FACES  -LL    Huffman-Baker FACES Pain Rating   2  -LL    Pain Type   Chronic pain  -LL    Pain Location   Back  -LL    Pain Intervention(s)   Repositioned;Rest  -LL    Recorded by   [LL] Tiara Alcala PTA    Cognitive Assessment/Intervention    Current Cognitive/Communication Assessment functional  -BF functional  -BC functional  -LL    Orientation Status oriented x 4  -BF oriented to;person;place;situation  -BC oriented to;person;place  -LL     Follows Commands/Answers Questions 100% of the time;able to follow single-step instructions  -% of the time  -% of the time;able to follow single-step instructions;needs cueing  -LL    Personal Safety   decreased awareness, need for assist;decreased awareness, need for safety;decreased insight to deficits  -LL    Personal Safety Interventions  fall prevention program maintained  -BC fall prevention program maintained;gait belt;nonskid shoes/slippers when out of bed  -LL    Recorded by [BF] Fadia Rubio, OT [BC] Anyi Sands, OT [LL] Tiara Alcala PTA    Communication Assessment/Intervention    Communication Assessment   [place]  -LL    Recorded by   [LL] Tiara Alcala PTA    Bed Mobility, Assessment/Treatment    Bed Mobility, Assistive Device   bed rails  -LL    Bed Mobility, Scoot/Bridge, [place]   supervision required;verbal cues required;nonverbal cues required (demo/gesture)  -LL    Bed Mob, Supine to Sit, [place]   minimum assist (75% patient effort);contact guard assist  -LL    Bed Mob, Sit to Supine, [place]   minimum assist (75% patient effort);contact guard assist  -LL    Bed Mobility, Safety Issues   decreased use of arms for pushing/pulling;decreased use of legs for bridging/pushing  -LL    Bed Mobility, Impairments   strength decreased;impaired balance;pain  -LL    Recorded by   [LL] Tiara Alcala PTA    Transfer Assessment/Treatment    Transfers, Bed-Chair [place] minimum assist (75% patient effort);contact guard assist;verbal cues required  -BF  verbal cues required;nonverbal cues required (demo/gesture);minimum assist (75% patient effort);contact guard assist   CGA in AM; Min A in PM  -LL    Transfers, Chair-Bed [place] minimum assist (75% patient effort);contact guard assist;verbal cues required  -BF  verbal cues required;nonverbal cues required (demo/gesture);minimum assist (75% patient effort);contact guard assist  -LL    Transfers, Bed-Chair-Bed,  Assist Device other (see comments)   w/c arm rests  -BF  rolling walker  -LL    Transfers, Sit-Stand [place]   verbal cues required;nonverbal cues required (demo/gesture);minimum assist (75% patient effort);contact guard assist  -LL    Transfers, Stand-Sit [place]   verbal cues required;nonverbal cues required (demo/gesture);minimum assist (75% patient effort);contact guard assist  -LL    Transfers, Sit-Stand-Sit, Assist Device   rolling walker  -LL    Transfer, Safety Issues   balance decreased during turns;step length decreased;knees buckling  -LL    Transfer, Impairments   strength decreased;impaired balance;coordination impaired  -LL    Recorded by [BF] Fadia Rubio OT  [LL] Tiara Alcala PTA    Gait Assessment/Treatment    Gait, [place] Level   verbal cues required;nonverbal cues required (demo/gesture);minimum assist (75% patient effort);2 person assist required  -LL    Gait, Assistive Device   rolling walker  -LL    Gait, Distance (Feet)   80   x 2; 60 x 2  -LL    Gait, Gait Pattern Analysis   swing-to gait  -LL    Gait, Gait Deviations   andrae decreased;forward flexed posture;step length decreased  -LL    Gait, Safety Issues   balance decreased during turns;step length decreased  -LL    Gait, Impairments   strength decreased;impaired balance;coordination impaired  -LL    Recorded by   [LL] Tiara Alcala PTA    Upper Body Dressing Assessment/Training    UB Dressing Assess/Train, Clothing Type  donning:   dressing gown  -BC     UB Dressing Assess/Train, Position  sitting;supported sitting  -BC     UB Dressing Assess/Train, [place]  set up required  -BC     UB Dressing Assess/Train, Impairments  strength decreased;coordination impaired  -BC     UB Dressing Assess/Train, Comment  Anuj  -BC     Recorded by  [BC] Anyi Sands, OT     Grooming Assessment/Training    Grooming Assess/Train, Position  sitting  -BC     Grooming Assess/Train, [place]epen Level  set up required;minimum  assist (75% patient effort)  -BC     Grooming Assess/Train, Impairments  strength decreased;coordination impaired  -BC     Recorded by  [BC] Anyi Sands OT     Self-Feeding Assessment/Training    Self-Feeding Assess/Train, Comment  set up with meals, tube feeding graded to night time only.  -BC     Recorded by  [BC] Anyi Sands OT     Balance Skills Training    Sitting-Level of Assistance   Close supervision  -LL    Sitting-Balance Support   Feet supported  -LL    Sitting-Balance Activities   Reaching for objects;Reaching across midline   Sitting balloon tap  -LL    Standing-Level of Assistance   Minimum assistance;Contact guard  -LL    Static Standing Balance Support   assistive device  -LL    Gait Balance-Level of Assistance   Minimum assistance  -LL    Gait Balance Support   assistive device  -LL    Recorded by   [LL] Tiara Alcala PTA    Therapy Exercises    Bilateral Lower Extremities   AROM:;sitting;standing  -LL    BLE Resistance   theraband  -LL    Bilateral Upper Extremity AROM:;sitting   [place] [facility] [place] act; reaching; light strengthening  - AROM:;10 reps;20 reps;sitting   BUE ther ex/aact, [facility] [place]  -BC     BUE Resistance theraputty   Wrist rolls X1  -BF      Recorded by [BF] Fadia Rubio, ELIDIA [BC] Anyi Sands OT [LL] Tiara Alcala PTA    Positioning and Restraints    Pre-Treatment Position  sitting in chair/recliner  -BC --   WC with OT  -    Post Treatment Position  bed  -BC bed  -LL    In Bed supine;call light within reach;encouraged to call for assist   In PM  -BF notified nsg;call light within reach;encouraged to call for assist;supine  -BC supine;call light within reach;encouraged to call for assist;heels elevated  -    In Wheelchair sitting;with PT   In AM  -BF      Recorded by [BF] Fadia Rubio, ELIDIA [BC] Anyi Sands, OT [LL] Tiara Alcala PTA      User Key  (r) = Recorded By, (t) = Taken By, (c) = Cosigned By    Initials Name Effective Dates     Fadia  Bella Rubio, OT 03/14/16 -      Tiara Alcala, PTA 05/02/16 -     [facility] 01/21/17 -                 OT Goals       07/28/17 1424 07/28/17 1422 07/22/17 1224    Transfer Training OT STG    Transfer Training OT STG, Date Established   07/22/17  -    Transfer Training OT STG, Time to Achieve   1 wk  -    Transfer Training OT STG, [place] Level   moderate assist (50% patient effort)  -    Transfer Training OT STG, Outcome  goal met  -     Transfer Training OT LTG    Transfer Training OT LTG, Date Established   07/22/17  -    Transfer Training OT LTG, Time to Achieve   by discharge  -    Transfer Training OT LTG, [place] Level   minimum assist (75% patient effort)  -    Patient Education OT LTG    Patient Education OT LTG, Date Established   07/22/17  -    Patient Education OT LTG, Time to Achieve   by discharge  -    Patient Education OT LTG, Education Type   written program;home safety  -    Bathing OT LTG    Bathing Goal OT LTG, Date Established   07/22/17  -    Bathing Goal OT LTG, Time to Achieve   by discharge  -    Bathing Goal OT LTG, [place] Level   moderate assist (50% patient effort)  -    Toileting OT STG    Toileting Goal OT STG, Date Established 07/28/17  -CJ 07/22/17  -    Toileting Goal OT STG, Time to Achieve 1 wk  -CJ  1 wk  -    Toileting Goal OT STG, [place] Level minimum assist (75% patient effort)  -  maximum assist (25% patient effort)  -    Toileting Goal OT STG, Outcome  goal met  -     Toileting OT LTG    Toileting Goal OT LTG, Date Established   07/22/17  -    Toileting Goal OT LTG, Time to Achieve   by discharge  -    Toileting Goal OT LTG, [place] Level   moderate assist (50% patient effort)  -    LB Dressing OT STG    LB Dressing Goal OT STG, Date Established 07/28/17  -CJ 07/22/17  -    LB Dressing Goal OT STG, Time to Achieve 1 wk  -CJ  1 wk  -    LB Dressing Goal OT STG, [place] Level contact  guard assist;minimum assist (75% patient effort)  -  maximum assist (25% patient effort)  -    LB Dressing Goal OT STG, Outcome  goal met  -     LB Dressing OT LTG    LB Dressing Goal OT LTG, Date Established   07/22/17  -    LB Dressing Goal OT LTG, Time to Achieve   by discharge  -    LB Dressing Goal OT LTG, [place] Level   moderate assist (50% patient effort)  -      07/20/17 1435          Strength OT LTG    Strength Goal OT LTG, Date Established 07/20/17  -KR      Strength Goal OT LTG, Time to Achieve 5 - 7 days  -KR      Strength Goal OT LTG, Measure to Achieve BUE increase x 1 to enhance self care/mobility skills  -KR      ADL OT LTG    ADL OT LTG, Date Established 07/20/17  -KR      ADL OT LTG, Time to Achieve 5 - 7 days  -KR      ADL OT LTG, Activity Type ADL skills  -KR      ADL OT LTG, [place] Level mod assist  -KR        User Key  (r) = Recorded By, (t) = Taken By, (c) = Cosigned By    Initials Name Provider Type     Hyacinth Hoyt, OT Occupational Therapist    TYSON Erickson, OT Occupational Therapist          Occupational Therapy Education     Title: PT OT SLP Therapies (Active)     Topic: Occupational Therapy (Active)     Point: ADL training (Active)    Description: Instruct learner(s) on proper safety adaptation and remediation techniques during self care or transfers.   Instruct in proper use of assistive devices.    Learning Progress Summary    Learner Readiness Method Response Comment Documented by Status   Patient Acceptance E,D NR  BF 08/01/17 1420 Active    Acceptance E NR  BC 07/31/17 1622 Active    Acceptance E,D VU,NR   07/28/17 1421 Done    Acceptance E,D NR,VU   07/27/17 1442 Done    Acceptance E,D NR,VU   07/26/17 1233 Done    Acceptance E,D NR   07/25/17 1511 Active    Acceptance E,D NR,VU  TM 07/25/17 1224 Done    Acceptance E,D NR  AB 07/24/17 1541 Active    Acceptance E,D NR   07/24/17 1501 Active    Acceptance E NR  RT 07/22/17 1408 Active     Acceptance E,D NR  CJ 07/22/17 1223 Active               Point: Home exercise program (Active)    Description: Instruct learner(s) on appropriate technique for monitoring, assisting and/or progressing therapeutic exercises/activities.    Learning Progress Summary    Learner Readiness Method Response Comment Documented by Status   Patient Acceptance E NR  RT 07/22/17 1408 Active               Point: Precautions (Done)    Description: Instruct learner(s) on prescribed precautions during self-care and functional transfers.    Learning Progress Summary    Learner Readiness Method Response Comment Documented by Status   Patient Acceptance E,D VU,NR  CJ 07/28/17 1421 Done    Acceptance E,D NR,VU  CJ 07/27/17 1442 Done    Acceptance E,D NR,VU  CJ 07/26/17 1233 Done    Acceptance E,D NR  CJ 07/25/17 1511 Active    Acceptance E,D NR,VU  TM 07/25/17 1224 Done    Acceptance E,D NR  AB 07/24/17 1541 Active    Acceptance E,D NR  CJ 07/24/17 1501 Active    Acceptance E NR  RT 07/22/17 1408 Active    Acceptance E,D NR   07/22/17 1223 Active                      User Key     Initials Effective Dates Name Provider Type Discipline    AB 02/04/16 -  Zarina Dean, OT Occupational Therapist OT    BF 03/14/16 -  Fadia Rubio, OT Occupational Therapist OT    CJ 03/14/16 -  Hyacinth Hoyt, OT Occupational Therapist OT    TM 03/14/16 -  Jennifer Lazcano, OT Occupational Therapist OT    RT 03/14/16 -  Shant Jim, PT Physical Therapist PT    BC 01/21/17 -  Anyi Sands, OT Occupational Therapist OT                  OT Recommendation and Plan  Anticipated Equipment Needs At Discharge:  (TBD)  Anticipated Discharge Disposition: home with 24/7 care  Planned Therapy Interventions: activity intolerance, adaptive equipment training, ADL retraining, IADL retraining, fine motor coordination training, home exercise program, motor coordination training, ROM (Range of Motion), strengthening, transfer training, other (see  comments) (therapeutic group activities as deemed beneficial)  Therapy Frequency: 3-5 times/wk          Time Calculation:         Time Calculation- OT       08/01/17 1420 08/01/17 0915       Time Calculation- OT    OT Start Time 1330  -BF 0915  -BF     OT Stop Time 1415  -BF 1000  -BF     OT Time Calculation (min) 45 min  -BF 45 min  -BF     Total Timed Code Minutes- OT 45 minute(s)  -BF 45 minute(s)  -BF     OT Non-Billable Time (min)  20 min  -BF       User Key  (r) = Recorded By, (t) = Taken By, (c) = Cosigned By    Initials Name Provider Type    BF Fadia Rubio OT Occupational Therapist           Therapy Charges for Today     Code Description Service Date Service Provider Modifiers Qty    95842608429  OT THER SUPP EA 15 MIN 8/1/2017 Fadia Rubio OT GO 1    [id number]  OT THERAPEUTIC ACT EA 15 MIN 8/1/2017 Fadia Rubio OT GO 5    [id number]  OT SELF CARE/MGMT/TRAIN EA 15 MIN 8/1/2017 Fadia Rubio OT GO 1               Fadia Rubio OT  8/1/2017

## 2017-08-01 NOTE — PROGRESS NOTES
Rehabilitation Nursing  Inpatient Rehabilitation Plan of Care Note    Plan of Care  Copy from POCMobility    Walk (Active)  Current Status (7/21/2017 2:00:00 PM): risk for falls  Weekly Goal: no falls this shift  Discharge Goal: no falls this stay    Safety    Potential for Elopement (Active)  Current Status (7/24/2017 9:30:00 AM): no falls  Weekly Goal: no falls this admission  Discharge Goal: no falls this admission    Body Systems    Integumentary (Active)  Current Status (7/21/2017 2:00:00 PM): risk for skin breakdown  Weekly Goal:  no breakdown thiss shift  Discharge Goal: no breakdown this stay    Signed by: Shahrzad Varela, Nurse

## 2017-08-01 NOTE — PROGRESS NOTES
Inpatient Rehabilitation Functional Measures Assessment    Functional Measures  CATHY Eating:  Eating Score = 5. Patient is supervision/set-up for eating,  requiring: Opening containers. Buttering bread. Cutting meat. No assistive  devices were required.  CATHY Grooming: Grooming Score = 5. Patient is supervision/set-up for grooming,  requiring: No assistive devices were required.  CATHY Bathing:  Patient bathed in bed. Patient requires no physical assistance for  washing, rinsing, and drying the right arm. Patient requires no physical  assistance for washing, rinsing, and drying the left arm. Patient requires no  physical assistance for washing, rinsing, and drying the chest. Patient requires  no physical assistance for washing, rinsing, and drying the abdomen. Patient  requires maximal assistance for washing, rinsing, or drying the perineal area.  Patient requires maximal assistance for washing, rinsing, or drying the  buttocks. Patient requires moderate assistance for washing, rinsing, or drying  the right upper leg. Patient requires moderate assistance for washing, rinsing,  or drying the left upper leg. Patient requires maximal assistance for washing,  rinsing, or drying the right lower leg, including the foot. Patient requires  maximal assistance for washing, rinsing, or drying the left lower leg, including  the foot. Patient performs 40 % of bathing tasks. Bathing Score = 2, Maximal  Assistance. No assistive devices were required.  CATHY Upper Body Dressing:  Upper Body Dressing Score = 4. Patient requires  minimal assistance for upper body dressing, requiring incidental help only (such  as task initiation or assist with buttons/zips/snaps). No assistive devices were  required.  CATHY Lower Body Dressing:  Patient requires total assistance for gathering  clothes. Patient requires moderate/considerable physical assistance for  threading the right leg through the undergarment. Patient requires  moderate/considerable  physical assistance for threading the left leg through the  undergarment. Patient requires moderate/considerable physical assistance for  pulling undergarment over hips and adjusting fasteners. Patient requires  moderate/considerable physical assistance for threading the right leg through  the pants/skirt. Patient requires moderate/considerable physical assistance for  threading the left leg through the pants/skirt. Patient requires  moderate/considerable physical assistance for pulling pants/skirt over hips and  adjusting fasteners. Patient requires maximal/significant physical assistance  for holding clothing and/or donning and/or doffing right sock. Patient requires  maximal/significant physical assistance for holding clothing and/or donning  and/or doffing left sock. Donning and/or doffing right shoe is not applicable  for this patient. Patient requires no physical assistance for donning and/or  doffing left shoe. Patient performs 45 % of lower body dressing tasks. Lower  Body Dressing Score = 2, Maximal Assistance. No assistive devices were required.    CATHY Toileting:  Patient requires moderate assistance for adjusting clothing  before using a toilet, commode, bedpan, or urinal. Patient requires moderate  assistance for hygiene. Patient requires moderate assistance for adjusting  clothing after using a toilet, commode, bedpan, or urinal. Patient performs 0 -  24% of toileting tasks.  Toileting Score = 1, Total Assistance. Patient requires  the following assistive device(s): Grab bar.    CATHY Bladder Management  Level of Assistance:  Bladder Score = 5.  Patient is supervision/set-up for  bladder management, requiring: No assistive devices were required.  Frequency/Number of Accidents this Shift:  Bladder accidents this shift:  0 .  Patient has not had an accident, but used a device/medication this shift  requiring: bedpan .    CATHY Bowel Management  Level of Assistance: Bowel Score = 7.  Patient is completely  independent for  bowel management.  Patient did not have bowel movement.  No  medication/intervention was provided.  Frequency/Number of Accidents this Shift: Bowel accidents this shift: 0 .  Patient has not had an accident, but used a device/medication this shift  requiring: bedpan at times .    CATHY Bed/Chair/Wheelchair Transfer:  Bed/chair/wheelchair Transfer Score = 3.  Patient performs 50-74% of effort and requires moderate assistance (some  lifting) for transferring to and from the bed/chair/wheelchair. No assistive  devices were required.  CATHY Toilet Transfer:  Toilet Transfer Score = 3.  Patient performs 50-74% of  effort and requires moderate assistance (some lifting) for transferring to and  from the toilet/commode. Patient requires the following assistive device(s):  Grab bars.  CATHY Tub/Shower Transfer:  Activity was not observed.    Previously Documented Mode of Locomotion at Discharge:  [facility] Expected Mode of Locomotion at Discharge:  CATHY Walk/Wheelchair:  CATHY Stairs:    CATHY Comprehension:  CATHY Expression:  CATHY Social Interaction:  CATHY Problem Solving:  CATHY Memory:    Therapy Mode Minutes  Occupational Therapy:  Physical Therapy:  Speech Language Pathology:    Discharge Functional Goals:    Signed by: BABAK Shah

## 2017-08-01 NOTE — PROGRESS NOTES
Rehabilitation Nursing  Inpatient Rehabilitation Admission Assessment of Functional Measures    Functional Measures  CATHY Eating:  CATHY Grooming:  CATHY Bathing:  CATHY Upper Body Dressing:  CATHY Lower Body Dressing:  CATHY Toileting:    CATHY Bladder Management  Level of Assistance:  Frequency/Number of Accidents (4 days prior to admission):  Frequency/Number of Accidents this Shift:    CATHY Bowel Management  Level of Assistance:  Frequency/Number of Accidents (4 days prior to admission):  Frequency/Number of Accidents this Shift:    CATHY Bed/Chair/Wheelchair Transfer:  CATHY Toilet Transfer:  CATHY Tub/Shower Transfer:    Previously Documented Mode of Locomotion at Discharge:  [facility] Expected Mode of Locomotion at Discharge:  CATHY Walk/Wheelchair:  CATHY Stairs:    CATHY Comprehension:  Both ( auditory and visual) modes of comprehension are used  equally. Comprehension Score = 7, Independent.  Patient comprehends  complex/abstract information in their primary language.  Patient is completely  independent for auditory and visual comprehension.  There are no activity  limitations.  CATHY Expression:  Both ( vocal and non-vocal) modes of expression are used  equally. Expression Score = 7, Independent. Patient expresses complex/abstract  information in their primary language.  Patient is completely independent for  vocal and non-vocal expression.  There are no activity limitations.  CATHY Social Interaction:  Social Interaction Score = 7, Independent. Patient is  completely independent for social interaction.  There are no activity  limitations.  CATHY Problem Solving:  Problem Solving Score = 7, Independent.  Patient makes  appropriate decisions in order to solve complex problems.  Patient is completely  independent for problem solving.  There are no activity limitations.  CATHY Memory:  Memory Score = 7, Independent.  Patient is completely independent  for memory.  There are no activity limitations.    Discharge Functional Goals:    Signed by: Tiara  Romeo, Nurse

## 2017-08-01 NOTE — PROGRESS NOTES
Inpatient Rehabilitation Functional Measures Assessment    Functional Measures  CATHY Eating:  Eating Score = 5. Patient is supervision/set-up for eating,  requiring: Opening containers. Cutting meat. Pouring liquids. No assistive  devices were required.  CATHY Grooming:  CATHY Bathing:  CATHY Upper Body Dressing:  CATHY Lower Body Dressing:  CATHY Toileting:  Patient requires moderate assistance for adjusting clothing  before using a toilet, commode, bedpan, or urinal. Patient requires moderate  assistance for hygiene. Patient requires moderate assistance for adjusting  clothing after using a toilet, commode, bedpan, or urinal. Patient performs 0 -  24% of toileting tasks.  Toileting Score = 1, Total Assistance. Patient requires  the following assistive device(s): Grab bar. Arm rest of a specialized seat.    CATHY Bladder Management  Level of Assistance:  Bladder Score = 5.  Patient is supervision/set-up for  bladder management, requiring: Emptying equipment. Setting out equipment. Stand  by assistance. Patient requires the following assistive device(s):  Bedpan.  Frequency/Number of Accidents this Shift:  Bladder accidents this shift:  0 .  Patient has not had an accident this shift.    CATHY Bowel Management  Level of Assistance: Bowel Score = 7.  Patient is completely independent for  bowel management.  Patient did not have bowel movement.  No  medication/intervention was provided.  Frequency/Number of Accidents this Shift: Bowel accidents this shift: 0 .  Patient has not had an accident this shift.    CATHY Bed/Chair/Wheelchair Transfer:  Activity was not observed.  CATHY Toilet Transfer:  Toilet Transfer was not observed this shift because  patient used bedpan/urinal only.  CATHY Tub/Shower Transfer:    Previously Documented Mode of Locomotion at Discharge:  [facility] Expected Mode of Locomotion at Discharge:  [facility] Walk/Wheelchair:  CATHY Stairs:    [facility] Comprehension:  CTAHY Expression:  [facility] Social Interaction:  [facility] Problem Solving:  CATHY  Memory:    Therapy Mode Minutes  Occupational Therapy:  Physical Therapy:  Speech Language Pathology:    Discharge Functional Goals:    Signed by: BABAK Schneider

## 2017-08-01 NOTE — PROGRESS NOTES
Inpatient Rehabilitation Functional Measures Assessment    Functional Measures  CATHY Eating:  CATHY Grooming:  CATHY Bathing:  CATHY Upper Body Dressing:  CATHY Lower Body Dressing:  CATHY Toileting:    CATHY Bladder Management  Level of Assistance:  Frequency/Number of Accidents this Shift:    CATHY Bowel Management  Level of Assistance:  Frequency/Number of Accidents this Shift:    CATHY Bed/Chair/Wheelchair Transfer:  Bed/chair/wheelchair Transfer Score = 4.  Patient performs 75% or more of effort and minimal assistance (little/incidental  help/lifting of one limb/steadying) for transferring to and from the  bed/chair/wheelchair, requiring: Contact guard. Steadying. Patient requires the  following assistive device(s): Arm rest. Bed rails.  CATHY Toilet Transfer:  CATHY Tub/Shower Transfer:    Previously Documented Mode of Locomotion at Discharge:  CATHY Expected Mode of Locomotion at Discharge:  CATHY Walk/Wheelchair:  WHEELCHAIR OBSERVATION   Activity was not observed.    WALK OBSERVATION   Walk Distance Scale = 2.  Distance walked is 50 -149 feet. Walk Score = 1.  Patient performs 75% or more of effort and requires minimal assistance of two or  more people. weakness & debility . Patient walked a distance of 80 feet. Patient  requires the following assistive device(s): Rolling walker.  CATHY Stairs:  Activity was not observed.    CATHY Comprehension:  CATHY Expression:  CATHY Social Interaction:  CATHY Problem Solving:  CATHY Memory:    Therapy Mode Minutes  Occupational Therapy:  Physical Therapy: Individual: 100 minutes.  Speech Language Pathology:    Discharge Functional Goals:    Signed by: Tiara Alcala PTA

## 2017-08-02 LAB
MAGNESIUM SERPL-MCNC: 2.1 MG/DL (ref 1.7–2.6)
POTASSIUM BLD-SCNC: 4.3 MMOL/L (ref 3.5–5.3)

## 2017-08-02 PROCEDURE — 25010000002 ENOXAPARIN PER 10 MG: Performed by: FAMILY MEDICINE

## 2017-08-02 PROCEDURE — 94799 UNLISTED PULMONARY SVC/PX: CPT

## 2017-08-02 PROCEDURE — 83735 ASSAY OF MAGNESIUM: CPT | Performed by: FAMILY MEDICINE

## 2017-08-02 PROCEDURE — 97530 THERAPEUTIC ACTIVITIES: CPT | Performed by: OCCUPATIONAL THERAPIST

## 2017-08-02 PROCEDURE — 97110 THERAPEUTIC EXERCISES: CPT

## 2017-08-02 PROCEDURE — 97116 GAIT TRAINING THERAPY: CPT

## 2017-08-02 PROCEDURE — 97535 SELF CARE MNGMENT TRAINING: CPT | Performed by: OCCUPATIONAL THERAPIST

## 2017-08-02 PROCEDURE — 84132 ASSAY OF SERUM POTASSIUM: CPT | Performed by: FAMILY MEDICINE

## 2017-08-02 PROCEDURE — 25010000002 VANCOMYCIN PER 500 MG

## 2017-08-02 RX ADMIN — CYPROHEPTADINE HYDROCHLORIDE 4 MG: 4 TABLET ORAL at 16:11

## 2017-08-02 RX ADMIN — Medication 10 ML: at 23:17

## 2017-08-02 RX ADMIN — IPRATROPIUM BROMIDE AND ALBUTEROL SULFATE 3 ML: .5; 3 SOLUTION RESPIRATORY (INHALATION) at 19:27

## 2017-08-02 RX ADMIN — LACTULOSE 10 G: 20 SOLUTION ORAL at 22:54

## 2017-08-02 RX ADMIN — FERROUS SULFATE TAB 325 MG (65 MG ELEMENTAL FE) 325 MG: 325 (65 FE) TAB at 08:30

## 2017-08-02 RX ADMIN — GABAPENTIN 100 MG: 100 CAPSULE ORAL at 09:21

## 2017-08-02 RX ADMIN — METRONIDAZOLE 500 MG: 500 INJECTION, SOLUTION INTRAVENOUS at 12:45

## 2017-08-02 RX ADMIN — CYPROHEPTADINE HYDROCHLORIDE 4 MG: 4 TABLET ORAL at 09:23

## 2017-08-02 RX ADMIN — PANTOPRAZOLE SODIUM 40 MG: 40 TABLET, DELAYED RELEASE ORAL at 05:15

## 2017-08-02 RX ADMIN — GABAPENTIN 100 MG: 100 CAPSULE ORAL at 22:46

## 2017-08-02 RX ADMIN — FERROUS SULFATE TAB 325 MG (65 MG ELEMENTAL FE) 325 MG: 325 (65 FE) TAB at 17:39

## 2017-08-02 RX ADMIN — POVIDONE-IODINE: 100 OINTMENT TOPICAL at 09:21

## 2017-08-02 RX ADMIN — VANCOMYCIN HYDROCHLORIDE 500 MG: 5 INJECTION, POWDER, LYOPHILIZED, FOR SOLUTION INTRAVENOUS at 23:54

## 2017-08-02 RX ADMIN — ENOXAPARIN SODIUM 40 MG: 40 INJECTION SUBCUTANEOUS at 09:21

## 2017-08-02 RX ADMIN — METRONIDAZOLE 500 MG: 500 INJECTION, SOLUTION INTRAVENOUS at 22:47

## 2017-08-02 RX ADMIN — Medication 10 ML: at 10:04

## 2017-08-02 RX ADMIN — CYPROHEPTADINE HYDROCHLORIDE 4 MG: 4 TABLET ORAL at 22:45

## 2017-08-02 RX ADMIN — IPRATROPIUM BROMIDE AND ALBUTEROL SULFATE 3 ML: .5; 3 SOLUTION RESPIRATORY (INHALATION) at 13:34

## 2017-08-02 RX ADMIN — METRONIDAZOLE 500 MG: 500 INJECTION, SOLUTION INTRAVENOUS at 03:55

## 2017-08-02 RX ADMIN — Medication 1 TABLET: at 09:21

## 2017-08-02 RX ADMIN — LACTULOSE 10 G: 20 SOLUTION ORAL at 09:21

## 2017-08-02 RX ADMIN — LACTULOSE 10 G: 20 SOLUTION ORAL at 16:11

## 2017-08-02 RX ADMIN — CEFEPIME HYDROCHLORIDE 1 G: 1 INJECTION, POWDER, FOR SOLUTION INTRAMUSCULAR; INTRAVENOUS at 10:04

## 2017-08-02 RX ADMIN — IPRATROPIUM BROMIDE AND ALBUTEROL SULFATE 3 ML: .5; 3 SOLUTION RESPIRATORY (INHALATION) at 06:44

## 2017-08-02 RX ADMIN — VANCOMYCIN HYDROCHLORIDE 500 MG: 5 INJECTION, POWDER, LYOPHILIZED, FOR SOLUTION INTRAVENOUS at 10:03

## 2017-08-02 NOTE — SIGNIFICANT NOTE
08/02/17 0944   Case Management/Social Work Plan   Plan Team conference meeting held on 8-1-17.  Spoke to pt about plans for discharge on 8-4-17, tube feeding and home health services.  Pt wanted to know if family could do therapy with her at home.  Encouraged pt to allow home health to come to her home and provide services.  Pt agreeable to home health referral.  Pt does not have HH agency preference.  Pt prefers [facility] for tube feeding.  Pt plans to go home with spouse and daughter providing assistance at discharge.

## 2017-08-02 NOTE — PROGRESS NOTES
Inpatient Rehabilitation Functional Measures Assessment    Functional Measures  CATHY Eating:  CATHY Grooming:  CATHY Bathing:  CATHY Upper Body Dressing:  CATHY Lower Body Dressing:  CATHY Toileting:    CATHY Bladder Management  Level of Assistance:  Frequency/Number of Accidents this Shift:    CATHY Bowel Management  Level of Assistance:  Frequency/Number of Accidents this Shift:    CATHY Bed/Chair/Wheelchair Transfer:  Bed/chair/wheelchair Transfer Score = 4.  Patient performs 75% or more of effort and minimal assistance (little/incidental  help/lifting of one limb/steadying) for transferring to and from the  bed/chair/wheelchair, requiring: Contact guard. Steadying. Patient requires the  following assistive device(s): Arm rest. Bed rails.  CATHY Toilet Transfer:  CATHY Tub/Shower Transfer:    Previously Documented Mode of Locomotion at Discharge:  CATHY Expected Mode of Locomotion at Discharge:  CATHY Walk/Wheelchair:  WHEELCHAIR OBSERVATION   Activity was not observed.    WALK OBSERVATION   Walk Distance Scale = 2.  Distance walked is 50 -149 feet. Walk Score = 2.  Patient performs 75% or more of effort and requires minimal assistance.  Incidental assistance, contact guard or steadying was provided. Patient walked a  distance of 90 feet. Patient requires the following assistive device(s): Rolling  walker.  CATHY Stairs:  Activity was not observed.    CATHY Comprehension:  CATHY Expression:  CATHY Social Interaction:  CATHY Problem Solving:  CATHY Memory:    Therapy Mode Minutes  Occupational Therapy:  Physical Therapy: Individual: 90 minutes.  Speech Language Pathology:    Discharge Functional Goals:    Signed by: Tiara Alcala PTA

## 2017-08-02 NOTE — PROGRESS NOTES
Inpatient Rehabilitation Functional Measures Assessment    Functional Measures  CATHY Eating:  Eating Score = 5. Patient is supervision/set-up for eating,  requiring: Opening containers. [place]tering bread. Cutting meat. No assistive  devices were required.  CATHY Grooming:  CATHY Bathing:  CATHY Upper Body Dressing:  CATHY Lower Body Dressing:  CATHY Toileting:  Toileting Score = 4.  Patient requires minimal assistance for  toileting, such as steadying for balance while cleansing or adjusting clothes.  Patient requires the following assistive device(s): Grab bar.    CATHY Bladder Management  Level of Assistance:  Bladder Score = 5.  Patient is supervision/set-up for  bladder management, requiring: Patient requires the following assistive  device(s):  Adult brief.  Frequency/Number of Accidents this Shift:  Bladder accidents this shift:  0 .  Patient has not had an accident, but used a device/medication this shift  requiring: pads .    CATHY Bowel Management  Level of Assistance: Bowel Score = 7.  Patient is completely independent for  bowel management.  Patient did not have bowel movement.  No  medication/intervention was provided.  Frequency/Number of Accidents this Shift: Bowel accidents this shift: 0 .  Patient has not had an accident, but used a device/medication this shift  requiring: pads .    CATHY Bed/Chair/Wheelchair Transfer:  Bed/chair/wheelchair Transfer Score = 3.  Patient performs 50-74% of effort and requires moderate assistance (some  lifting) for transferring to and from the bed/chair/wheelchair. No assistive  devices were required.  CATHY Toilet Transfer:  Toilet Transfer Score = 3.  Patient performs 50-74% of  effort and requires moderate assistance (some lifting) for transferring to and  from the toilet/commode. Patient requires the following assistive device(s):  Grab bars.  CATHY Tub/Shower Transfer:  Activity was not observed.    Previously Documented Mode of Locomotion at Discharge:  [facility] Expected Mode of Locomotion  at Discharge:  CATHY Walk/Wheelchair:  CATHY Stairs:    CATHY Comprehension:  CATHY Expression:  CATHY Social Interaction:  CATHY Problem Solving:  CATHY Memory:    Therapy Mode Minutes  Occupational Therapy:  Physical Therapy:  Speech Language Pathology:    Discharge Functional Goals:    Signed by: BABAK Shah

## 2017-08-02 NOTE — SIGNIFICANT NOTE
08/02/17 1200   Case Management/Social Work Plan   Plan SS received call from daughter Kami Cid who was inquiring about home health services at discharge.   Informed her pt is recommended to have home health for PT, aide, and nursing.  Daughter is coming to rehab at 1700 today to receive teaching from RN about tube feeding.  Daughter will be assisting with pt's care at home, however, states she has to work at least two days a week.  Informed daughter how pt is doing in therapy and she says pt will want to be dependent when she gets home.  Daughter inquired about how to get POA.  Explained pt has to have capacity to make her own decisions and appoint someone to be POA  and if she lacks capacity of make her own decisions a physician has to say pt needs an emergency guardian to make medical and financial decisions. Daughter says she will talk to Dr. Fulton, PCP, if needed.  Daughter will be present for discharge and transportation home.

## 2017-08-02 NOTE — THERAPY TREATMENT NOTE
Inpatient Rehabilitation - Occupational Therapy Treatment Note   [place]     Patient Name: Tamie Rodriguez  : 1940  MRN: [id number]  Today's Date: 2017  Onset of Illness/Injury or Date of Surgery Date: 17  Date of Referral to OT: 17  Referring Physician: Dr. Segovia      Admit Date: 2017    Visit Dx:   No diagnosis found.  Patient Active Problem List   Diagnosis   • Dehydration   • Debility             Adult Rehabilitation Note       17 1516 17 1449 17 1417    Rehab Assessment/Intervention    Discipline occupational therapist  -BF physical therapy assistant  -LL occupational therapist  -BF    Document Type therapy note (daily note)  -BF therapy note (daily note)  -LL therapy note (daily note)  -BF    Subjective Information agree to therapy;no complaints  -BF agree to therapy;no complaints  -LL agree to therapy;no complaints  -BF    Patient Effort, Rehab Treatment good  -BF good  -LL good  -BF    Precautions/Limitations fall precautions;other (see comments)   PEG, abd binder; soft diet w/ thin liquids; [place]  -BF fall precautions;other (see comments)   PEG, abdominal binder, soft diet with thin liquids, [place]  -LL fall precautions;other (see comments)   PEG, abd binder; soft diet w/ thin liquids; [place]  -BF    Patient Response to Treatment  Patient did well with PT with adequate rest breaks  -LL     Recorded by [BF] Fadia Rubio, OT [LL] Tiara Alcala PTA [BF] Fadia Rubio, OT    Pain Assessment    Pain Assessment  [place]uff[place]-Baker FACES  -LL     Huffman-Baker FACES Pain Rating  4  -LL     Pain Type  Chronic pain  -LL     Pain Location  Back  -LL     Pain Intervention(s)  Repositioned;Rest  -LL     Recorded by  [LL] Tiara Alcala PTA     Cognitive Assessment/Intervention    Current Cognitive/Communication Assessment functional  -BF functional  -LL functional  -BF    Orientation Status oriented to;person;place;situation  -BF oriented to;person;place  -LL oriented x 4   -BF    Follows Commands/Answers Questions 100% of the time;able to follow single-step instructions  -% of the time;able to follow single-step instructions  -% of the time;able to follow single-step instructions  -BF    Personal Safety  decreased awareness, need for assist;decreased awareness, need for safety  -LL     Personal Safety Interventions  fall prevention program maintained;gait belt;nonskid shoes/slippers when out of bed  -LL     Recorded by [BF] Fadia Rubio OT [LL] Tiara Alcala PTA [BF] Fadia Rubio OT    Communication Assessment/Intervention    Communication Assessment  [place]  -LL     Recorded by  [LL] Tiara Alcaal PTA     Bed Mobility, Assessment/Treatment    Bed Mobility, Assistive Device  bed rails  -LL     Bed Mobility, Scoot/Bridge, [employer]  supervision required;verbal cues required;nonverbal cues required (demo/gesture)  -LL     Bed Mob, Supine to Sit, [employer]  minimum assist (75% patient effort);contact guard assist  -LL     Bed Mob, Sit to Supine, [employer]  minimum assist (75% patient effort);contact guard assist  -LL     Bed Mobility, Safety Issues  decreased use of arms for pushing/pulling;decreased use of legs for bridging/pushing  -LL     Bed Mobility, Impairments  strength decreased;impaired balance;pain  -LL     Recorded by  [LL] Tiara Alcala PTA     Transfer Assessment/Treatment    Transfers, Bed-Chair [employer] [place] contact guard assist;verbal cues required  -BF verbal cues required;nonverbal cues required (demo/gesture);minimum assist (75% patient effort);contact guard assist   CGA in AM; Min A in PM  -LL minimum assist (75% patient effort);contact guard assist;verbal cues required  -BF    Transfers, Chair-Bed [employer] [place] contact guard assist;verbal cues required  -BF verbal cues required;nonverbal cues required (demo/gesture);minimum assist (75% patient effort);contact guard assist  -LL minimum assist (75% patient effort);contact guard  assist;verbal cues required  -BF    Transfers, Bed-Chair-Bed, Assist Device bed rails;other (see comments)   w/c arm rests  -BF rolling walker  -LL other (see comments)   w/c arm rests  -BF    Transfers, Sit-Stand [place]  verbal cues required;nonverbal cues required (demo/gesture);minimum assist (75% patient effort);contact guard assist  -LL     Transfers, Stand-Sit [place]  verbal cues required;nonverbal cues required (demo/gesture);minimum assist (75% patient effort);contact guard assist  -LL     Transfers, Sit-Stand-Sit, Assist Device  rolling walker  -LL     Transfer, Safety Issues  balance decreased during turns;step length decreased;knees buckling  -LL     Transfer, Impairments  strength decreased;impaired balance;coordination impaired  -LL     Recorded by [BF] Fadia Rubio OT [LL] Tiara Alcala PTA [BF] Fadia Rubio, ELIDIA    Gait Assessment/Treatment    Gait, [place] Level  verbal cues required;nonverbal cues required (demo/gesture);minimum assist (75% patient effort);2 person assist required  -LL     Gait, Assistive Device  rolling walker  -LL     Gait, Distance (Feet)  80   x 3 then 60  -LL     Gait, Gait Pattern Analysis  swing-to gait  -LL     Gait, Gait Deviations  andrae decreased;forward flexed posture;step length decreased  -LL     Gait, Safety Issues  balance decreased during turns;step length decreased  -LL     Gait, Impairments  strength decreased;impaired balance;coordination impaired  -LL     Recorded by  [LL] Tiara Alcala PTA     Balance Skills Training    Sitting-Level of Assistance  Close supervision  -LL     Sitting-Balance Support  Feet supported  -LL     Sitting-Balance Activities  Reaching for objects;Reaching across midline   Sitting balloon tap  -LL     Standing-Level of Assistance  Minimum assistance;Contact guard  -LL     Static Standing Balance Support  assistive device  -LL     Gait Balance-Level of Assistance  Minimum assistance  -LL     Gait  Balance Support  assistive device  -LL     Recorded by  [LL] Tiara Alcala PTA     Therapy Exercises    Bilateral Lower Extremities  AROM:;sitting;standing;supine  -LL     BLE Resistance  theraband  -LL     Bilateral Upper Extremity AROM:;sitting   BUE GMC/FMC ther act; reaching; light strengthening  -BF  AROM:;sitting   BUE GMC/FMC ther act; reaching; light strengthening  -BF    BUE Resistance theraputty  -BF  theraputty   Wrist rolls X1  -BF    Recorded by [BF] Fadia Rubio OT [LL] Tiara Alcala PTA [BF] Fadia Rubio OT    Positioning and Restraints    Pre-Treatment Position  --   WC with OT  -LL     Post Treatment Position bed  -BF bed  -LL     In Bed supine;call light within reach;encouraged to call for assist   After both sessions  -BF supine;call light within reach;encouraged to call for assist;side rails up x2;heels elevated  -LL supine;call light within reach;encouraged to call for assist   In PM  -BF    In Wheelchair   sitting;with PT   In AM  -BF    Recorded by [BF] Fadia Rubio OT [LL] Tiara Alcala PTA [BF] Fadia Rubio OT      07/31/17 1616 07/31/17 1510       Rehab Assessment/Intervention    Discipline occupational therapist  -BC physical therapy assistant  -     Document Type therapy note (daily note)  -BC therapy note (daily note)  -     Subjective Information agree to therapy;complains of;fatigue  -BC agree to therapy;no complaints  -     Patient Effort, Rehab Treatment good  -BC good  -     Precautions/Limitations  fall precautions;other (see comments)   PEG, abdominal binder, soft diet with think liquids, [place]  -     Patient Response to Treatment Pt. tolerated both AM/PM session well with rest breaks provided as needed  -BC Patient did well with therapy with adequate rest breaks  -LL     Recorded by [BC] Anyi Sands, OT [LL] Tiara Alcala PTA     Pain Assessment    Pain Assessment  Huffman-Baker FACES  -LL     Huffman-Baker FACES Pain Rating  2   -LL     Pain Type  Chronic pain  -LL     Pain Location  Back  -LL     Pain Intervention(s)  Repositioned;Rest  -LL     Recorded by  [LL] Tiara Alcala PTA     Cognitive Assessment/Intervention    Current Cognitive/Communication Assessment functional  -BC functional  -LL     Orientation Status oriented to;person;place;situation  -BC oriented to;person;place  -LL     Follows Commands/Answers Questions 100% of the time  -% of the time;able to follow single-step instructions;needs cueing  -LL     Personal Safety  decreased awareness, need for assist;decreased awareness, need for safety;decreased insight to deficits  -LL     Personal Safety Interventions fall prevention program maintained  -BC fall prevention program maintained;gait belt;nonskid shoes/slippers when out of bed  -LL     Recorded by [BC] Anyi Sands, OT [LL] Tiara Alcala PTA     Communication Assessment/Intervention    Communication Assessment  [place]  -LL     Recorded by  [LL] Tiara Alcala PTA     Bed Mobility, Assessment/Treatment    Bed Mobility, Assistive Device  bed rails  -LL     Bed Mobility, Scoot/Bridge, [place]  supervision required;verbal cues required;nonverbal cues required (demo/gesture)  -LL     Bed Mob, Supine to Sit, [place]  minimum assist (75% patient effort);contact guard assist  -LL     Bed Mob, Sit to Supine, [place]  minimum assist (75% patient effort);contact guard assist  -LL     Bed Mobility, Safety Issues  decreased use of arms for pushing/pulling;decreased use of legs for bridging/pushing  -LL     Bed Mobility, Impairments  strength decreased;impaired balance;pain  -LL     Recorded by  [LL] Tiara Alcala PTA     Transfer Assessment/Treatment    Transfers, Bed-Chair [place]  verbal cues required;nonverbal cues required (demo/gesture);minimum assist (75% patient effort);contact guard assist   CGA in AM; Min A in PM  -LL     Transfers, Chair-Bed [place]  verbal cues required;nonverbal cues  required (demo/gesture);minimum assist (75% patient effort);contact guard assist  -LL     Transfers, Bed-Chair-Bed, Assist Device  rolling walker  -LL     Transfers, Sit-Stand [place]  verbal cues required;nonverbal cues required (demo/gesture);minimum assist (75% patient effort);contact guard assist  -LL     Transfers, Stand-Sit [place]  verbal cues required;nonverbal cues required (demo/gesture);minimum assist (75% patient effort);contact guard assist  -LL     Transfers, Sit-Stand-Sit, Assist Device  rolling walker  -LL     Transfer, Safety Issues  balance decreased during turns;step length decreased;knees buckling  -LL     Transfer, Impairments  strength decreased;impaired balance;coordination impaired  -LL     Recorded by  [] Tiara Alcala PTA     Gait Assessment/Treatment    Gait, [place] Level  verbal cues required;nonverbal cues required (demo/gesture);minimum assist (75% patient effort);2 person assist required  -LL     Gait, Assistive Device  rolling walker  -LL     Gait, Distance (Feet)  80   x 2; 60 x 2  -LL     Gait, Gait Pattern Analysis  swing-to gait  -LL     Gait, Gait Deviations  andrae decreased;forward flexed posture;step length decreased  -LL     Gait, Safety Issues  balance decreased during turns;step length decreased  -LL     Gait, Impairments  strength decreased;impaired balance;coordination impaired  -LL     Recorded by  [] Tiara Alcala PTA     Upper Body Dressing Assessment/Training    UB Dressing Assess/Train, Clothing Type donning:   dressing gown  -BC      UB Dressing Assess/Train, Position sitting;supported sitting  -BC      UB Dressing Assess/Train, [place] set up required  -BC      UB Dressing Assess/Train, Impairments strength decreased;coordination impaired  -BC      UB Dressing Assess/Train, Comment [place]  -BC      Recorded by [BC] Anyi Sands, OT      Grooming Assessment/Training    Grooming Assess/Train, Position sitting  -BC      Grooming  Assess/Train, Indep[place] Level set up required;minimum assist (75% patient effort)  -BC      Grooming Assess/Train, Impairments strength decreased;coordination impaired  -BC      Recorded by [BC] Anyi Sands OT      Self-Feeding Assessment/Training    Self-Feeding Assess/Train, Comment set up with meals, tube feeding graded to night time only.  -BC      Recorded by [BC] Anyi Sands OT      Balance Skills Training    Sitting-Level of Assistance  Close supervision  -     Sitting-Balance Support  Feet supported  -LL     Sitting-Balance Activities  Reaching for objects;Reaching across midline   Sitting balloon tap  -     Standing-Level of Assistance  Minimum assistance;Contact guard  -LL     Static Standing Balance Support  assistive device  -LL     Gait Balance-Level of Assistance  Minimum assistance  -LL     Gait Balance Support  assistive device  -     Recorded by  [] Tiara Alcala PTA     Therapy Exercises    Bilateral Lower Extremities  AROM:;sitting;standing  -LL     BLE Resistance  theraband  -     Bilateral Upper Extremity AROM:;10 reps;20 reps;sitting   BUE ther ex/aact, GMC/FMC  -BC      Recorded by [BC] Anyi Sands, OT [LL] Tiara Alcala PTA     Positioning and Restraints    Pre-Treatment Position sitting in chair/recliner  -BC --   WC with OT  -     Post Treatment Position bed  -BC bed  -LL     In Bed notified nsg;call light within reach;encouraged to call for assist;supine  -BC supine;call light within reach;encouraged to call for assist;heels elevated  -     Recorded by [BC] Anyi Sands, OT [LL] Tiara Alcala PTA       User Key  (r) = Recorded By, (t) = Taken By, (c) = Cosigned By    Initials Name Effective Dates     Fadia Rubio, OT 03/14/16 -      Tiara Alcala PTA 05/02/16 -     [facility]                 OT Goals       07/28/17 1424 07/28/17 1422 07/22/17 1224    Transfer Training OT STG    Transfer Training OT STG, Date Established    07/22/17  -    Transfer Training OT STG, Time to Achieve   1 wk  -    Transfer Training OT STG, [place] Level   moderate assist (50% patient effort)  -    Transfer Training OT STG, Outcome  goal met  -     Transfer Training OT LTG    Transfer Training OT LTG, Date Established   07/22/17  -    Transfer Training OT LTG, Time to Achieve   by discharge  -    Transfer Training OT LTG, [place] Level   minimum assist (75% patient effort)  -    Patient Education OT LTG    Patient Education OT LTG, Date Established   07/22/17  -    Patient Education OT LTG, Time to Achieve   by discharge  -    Patient Education OT LTG, Education Type   written program;home safety  -    Bathing OT LTG    Bathing Goal OT LTG, Date Established   07/22/17  -CJ    Bathing Goal OT LTG, Time to Achieve   by discharge  -    Bathing Goal OT LTG, [place] Level   moderate assist (50% patient effort)  -    Toileting OT STG    Toileting Goal OT STG, Date Established 07/28/17  -CJ 07/22/17  -    Toileting Goal OT STG, Time to Achieve 1 wk  -CJ  1 wk  -    Toileting Goal OT STG, [place] Level minimum assist (75% patient effort)  -  maximum assist (25% patient effort)  -    Toileting Goal OT STG, Outcome  goal met  -     Toileting OT LTG    Toileting Goal OT LTG, Date Established   07/22/17  -    Toileting Goal OT LTG, Time to Achieve   by discharge  -    Toileting Goal OT LTG, [place] Level   moderate assist (50% patient effort)  -    LB Dressing OT STG    LB Dressing Goal OT STG, Date Established 07/28/17  -CJ 07/22/17  -CJ    LB Dressing Goal OT STG, Time to Achieve 1 wk  -CJ  1 wk  -    LB Dressing Goal OT STG, [place] Level contact guard assist;minimum assist (75% patient effort)  -  maximum assist (25% patient effort)  -    LB Dressing Goal OT STG, Outcome  goal met  -     LB Dressing OT LTG    LB Dressing Goal OT LTG, Date Established   07/22/17  -CJ    LB Dressing Goal  OT LTG, Time to Achieve   by discharge  -    LB Dressing Goal OT LTG, [place] Level   moderate assist (50% patient effort)  -      07/20/17 1435          Strength OT LTG    Strength Goal OT LTG, Date Established 07/20/17  -KR      Strength Goal OT LTG, Time to Achieve 5 - 7 days  -KR      Strength Goal OT LTG, Measure to Achieve BUE increase x 1 to enhance self care/mobility skills  -KR      ADL OT LTG    ADL OT LTG, Date Established 07/20/17  -KR      ADL OT LTG, Time to Achieve 5 - 7 days  -KR      ADL OT LTG, Activity Type ADL skills  -KR      ADL OT LTG, [place] Level mod assist  -KR        User Key  (r) = Recorded By, (t) = Taken By, (c) = Cosigned By    Initials Name Provider Type    CJ Hyacinth Hoyt, OT Occupational Therapist    TYSON Erickson, OT Occupational Therapist          Occupational Therapy Education     Title: PT OT SLP Therapies (Active)     Topic: Occupational Therapy (Active)     Point: ADL training (Done)    Description: Instruct learner(s) on proper safety adaptation and remediation techniques during self care or transfers.   Instruct in proper use of assistive devices.    Learning Progress Summary    Learner Readiness Method Response Comment Documented by Status   Patient Acceptance E VU,NR  BF 08/02/17 1520 Done    Acceptance E,D NR  BF 08/01/17 1420 Active    Acceptance E NR  BC 07/31/17 1622 Active    Acceptance E,D VU,NR   07/28/17 1421 Done    Acceptance E,D NR,VU   07/27/17 1442 Done    Acceptance E,D NR,VU   07/26/17 1233 Done    Acceptance E,D NR   07/25/17 1511 Active    Acceptance E,D NR,VU   07/25/17 1224 Done    Acceptance E,D NR  AB 07/24/17 1541 Active    Acceptance E,D NR   07/24/17 1501 Active    Acceptance E NR  RT 07/22/17 1408 Active    Acceptance E,D NR   07/22/17 1223 Active               Point: Home exercise program (Active)    Description: Instruct learner(s) on appropriate technique for monitoring, assisting and/or progressing therapeutic  exercises/activities.    Learning Progress Summary    Learner Readiness Method Response Comment Documented by Status   Patient Acceptance E NR  RT 07/22/17 1408 Active               Point: Precautions (Done)    Description: Instruct learner(s) on prescribed precautions during self-care and functional transfers.    Learning Progress Summary    Learner Readiness Method Response Comment Documented by Status   Patient Acceptance E VU,NR  BF 08/02/17 1520 Done    Acceptance E,D VU,NR  CJ 07/28/17 1421 Done    Acceptance E,D NR,VU  CJ 07/27/17 1442 Done    Acceptance E,D NR,VU  CJ 07/26/17 1233 Done    Acceptance E,D NR  CJ 07/25/17 1511 Active    Acceptance E,D NR,VU  TM 07/25/17 1224 Done    Acceptance E,D NR  AB 07/24/17 1541 Active    Acceptance E,D NR  CJ 07/24/17 1501 Active    Acceptance E NR  RT 07/22/17 1408 Active    Acceptance E,D NR  CJ 07/22/17 1223 Active                      User Key     Initials Effective Dates Name Provider Type Discipline    AB 02/04/16 -  Zarina Dean, OT Occupational Therapist OT    BF 03/14/16 -  Fadia Rubio, OT Occupational Therapist OT    CJ 03/14/16 -  Hyacinth Hoyt, OT Occupational Therapist OT    TM 03/14/16 -  Jennifer Lazcano, OT Occupational Therapist OT    RT 03/14/16 -  Shant Jim, PT Physical Therapist PT    BC 01/21/17 -  Anyi Sands, OT Occupational Therapist OT                  OT Recommendation and Plan  Anticipated Equipment Needs At Discharge:  (TBD)  Anticipated Discharge Disposition: home with 24/7 care  Planned Therapy Interventions: activity intolerance, adaptive equipment training, ADL retraining, IADL retraining, fine motor coordination training, home exercise program, motor coordination training, ROM (Range of Motion), strengthening, transfer training, other (see comments) (therapeutic group activities as deemed beneficial)  Therapy Frequency: 3-5 times/wk          Time Calculation:         Time Calculation- OT       08/02/17  1520 08/02/17 1045       Time Calculation- OT    OT Start Time 1325  -BF 1045  -BF     OT Stop Time 1405  -BF 1135  -BF     OT Time Calculation (min) 40 min  -BF 50 min  -BF     Total Timed Code Minutes- OT 40 minute(s)  -BF 50 minute(s)  -BF     OT Non-Billable Time (min)  20 min  -BF       User Key  (r) = Recorded By, (t) = Taken By, (c) = Cosigned By    Initials Name Provider Type     Fadia Rubio OT Occupational Therapist           Therapy Charges for Today     Code Description Service Date Service Provider Modifiers Qty    85963110021 HC OT THER SUPP EA 15 MIN 8/1/2017 Faida Rubio OT GO 1    98592851162 HC OT THERAPEUTIC ACT EA 15 MIN 8/1/2017 Fadia Rubio OT GO 5    76083612497 HC OT SELF CARE/MGMT/TRAIN EA 15 MIN 8/1/2017 Fadia Rubio OT GO 1    03171467825 HC OT THER SUPP EA 15 MIN 8/2/2017 Fadia Rubio OT GO 1    02965948021 HC OT SELF CARE/MGMT/TRAIN EA 15 MIN 8/2/2017 Fadia Rubio OT GO 1    88052296519 HC OT THERAPEUTIC ACT EA 15 MIN 8/2/2017 Fadia Rubio OT GO 5               Fadia Rubio OT  8/2/2017

## 2017-08-02 NOTE — PLAN OF CARE
Problem: Patient Care Overview (Adult)  Goal: Plan of Care Review  Outcome: Ongoing (interventions implemented as appropriate)    Problem: Activity Intolerance (Adult)  Goal: Activity Tolerance  Outcome: Ongoing (interventions implemented as appropriate)    Problem: Fall Risk (Adult)  Goal: Absence of Falls  Outcome: Ongoing (interventions implemented as appropriate)    Problem: Skin Integrity Impairment, Risk/Actual (Adult)  Goal: Skin Integrity/Wound Healing  Outcome: Ongoing (interventions implemented as appropriate)    Problem: Pressure Ulcer Risk ([place] Scale) (Adult,Obstetrics,Pediatric)  Goal: Skin Integrity  Outcome: Ongoing (interventions implemented as appropriate)

## 2017-08-02 NOTE — PROGRESS NOTES
Rehabilitation Nursing  Inpatient Rehabilitation Functional Measures Assessment and Plan of Care    Plan of Care/Interventions  Copy from POC    Functional Measures  CATHY Eating:  CATHY Grooming:  CATHY Bathing:  CATHY Upper Body Dressing:  CATHY Lower Body Dressing:  CATHY Toileting:    CATHY Bladder Management  Level of Assistance:  Frequency/Number of Accidents this Shift:    CATHY Bowel Management  Level of Assistance:  Frequency/Number of Accidents this Shift:    CATHY Bed/Chair/Wheelchair Transfer:  CATHY Toilet Transfer:  CATHY Tub/Shower Transfer:    Previously Documented Mode of Locomotion at Discharge:  [facility] Expected Mode of Locomotion at Discharge:  [facility] Walk/Wheelchair:  CATHY Stairs:    CATHY Comprehension:  Auditory comprehension is the usual mode. Comprehension  Score = 6, Modified [place].  Patient comprehends complex/abstract  information in their primary language, requiring:  CATHY Expression:  Vocal expression is the usual mode. Expression Score = 6,  Modified Independent.  Patient expresses complex/abstract information in their  primary language, requiring:  CATHY Social Interaction:  Social Interaction Score = 6, Modified Independent.  Patient is modified independent for social interaction, requiring:  CATHY Problem Solving:  Problem Solving Score = 6, Modified [place].  Patient  makes appropriate decisions in order to solve complex problems, but requires  extra time.  CATHY Memory:  Memory Score = 6, Modified [place].  Patient is modified  independent for memory, requiring:    Signed by: Kimberly Crum Nurse

## 2017-08-02 NOTE — SIGNIFICANT NOTE
08/02/17 1430   Case Management/Social Work Plan   Plan SS contacted [facility] [phone] per preference per Kassy with orders for Osmolite 1.5 tube feeding at 55ml/hr with 25 ml/H2O flush every 5 hours, tube feeding pump, supplies and discharge on 8-4-17.  Faxed [facility], face sheet, H&P, and MD progress note to [phone].  [facility] will deliver tube feeding, pump and supplies to pt's home on 8-3-17.  Contacted [facility] [phone] per rotation per Theron with referral, discharge on 8-4-17 and orders for PT 3 x week x 4 weeks for strengthening, endurance, gait training, transfer training, home safety, bed mobility, aide 3 x week x 4 weeks for bathing/personal care, nursing 2 x week x 4 weeks for peg site care: clean with soap and water, apply drain sponge daily, Osmolite 1.5 tube feeding at 55 ml/hr with 25 ml H2O flush every 5 hours, and coccyx: apply Desitin and mepilex daily for protection.  Faxed HH referral with orders, face sheet, H&P, PT notes and MD progress notes to  [phone].  HH to be contacted at discharge and start care on 8-4-17.  SS contacted pt's home [phone] and spoke to daughter Kami who states her father is not home.  Informed daughter about arrangements with [facility] for tube feeding, delivery on 8-3-17 and referral sent to [facility]; she will inform her father.  Will follow.

## 2017-08-02 NOTE — PROGRESS NOTES
Inpatient Rehabilitation Functional Measures Assessment    Functional Measures  CATHY Eating:  CATHY Grooming:  CATHY Bathing:  CATHY Upper Body Dressing:  CATHY Lower Body Dressing:  CATHY Toileting:    CATHY Bladder Management  Level of Assistance:  Frequency/Number of Accidents this Shift:    CATHY Bowel Management  Level of Assistance:  Frequency/Number of Accidents this Shift:    CATHY Bed/Chair/Wheelchair Transfer:  Bed/chair/wheelchair Transfer Score = 4.  Patient performs 75% or more of effort and minimal assistance (little/incidental  help/lifting of one limb/steadying) for transferring to and from the  bed/chair/wheelchair, requiring: Contact guard. Patient requires the following  assistive device(s): Arm rest. Bed rails.  CATHY Toilet Transfer:  CATHY Tub/Shower Transfer:    Previously Documented Mode of Locomotion at Discharge:  [facility] Expected Mode of Locomotion at Discharge:  [facility] Walk/Wheelchair:  [facility] Stairs:    [facility] Comprehension:  [facility] Expression:  CATHY Social Interaction:  CATHY Problem Solving:  CATHY Memory:    Therapy Mode Minutes  Occupational Therapy: Individual: 90 minutes.  Physical Therapy:  Speech Language Pathology:    Discharge Functional Goals:    Signed by: Fadia Rubio OT

## 2017-08-02 NOTE — DISCHARGE INSTR - OTHER ORDERS
[facility] [phone] for PT 3 x week x 4 weeks, aide 3 x week x 4 weeks and nursing 2 x week x 4 weeks.    [facility] [phone] for Osmolite 1.5 tube feeding at 55ml/hr with 25 ml/H2O flush every 5 hours.

## 2017-08-02 NOTE — PROGRESS NOTES
"     LOS: 12 days     Chief Complaint:  Debility    Subjective     Interval History:     Doing better and participating with care, no other concerns or complaints, no other concerns, continues to do well.       Objective     Vital Signs  /56 (BP Location: Right arm, Patient Position: Lying)  Pulse 85  Temp 98.6 °F (37 °C) (Oral)   Resp 18  Ht 64\" (162.6 cm)  Wt 82 lb (37.2 kg)  SpO2 96%  BMI 14.08 kg/m2  Intake & Output (last day)       08/01 0701 - 08/02 0700 08/02 0701 - 08/03 0700    P.O. 360     Other 210     NG/     Total Intake(mL/kg) 1181 (31.8)     Net +1181            Unmeasured Urine Occurrence 10 x     Unmeasured Stool Occurrence 1 x             Physical Exam:     General Appearance:    Alert, cooperative, in no acute distress.  Thin and frail, cachectic    Head:    Normocephalic, without obvious abnormality, atraumatic   Eyes:            Lids and lashes normal, conjunctivae and sclerae normal, no   icterus, no pallor, corneas clear, PERRLA   Ears:    Ears appear intact with no abnormalities noted       Neck:   No adenopathy, supple, trachea midline, no thyromegaly, no   carotid bruit, no JVD   Lungs:     Bibasilar rales,respirations regular, even and                  unlabored    Heart:    Regular rhythm and normal rate, normal S1 and S2, no            murmur, no gallop, no rub, no click   Chest Wall:    No abnormalities observed   Abdomen:     Normal bowel sounds, no masses, no organomegaly, soft        non-tender, non-distended, no guarding, no rebound                tenderness   Extremities:   Moves all extremities well, no edema, no cyanosis, no             redness.  Decreased muscle mass    Pulses:   Pulses palpable and equal bilaterally   Skin:   No bleeding, bruising or rash       Neurologic:   Cranial nerves 2 - 12 grossly intact, sensation intact, DTR       present and equal bilaterally        Results Review:    Lab Results   Component Value Date    WBC 5.98 07/31/2017    HGB " 7.2 (L) 07/31/2017    HCT 24.8 (L) 07/31/2017    MCV 93.6 07/31/2017     07/31/2017       Lab Results   Component Value Date    GLUCOSE 113 (H) 07/31/2017    BUN 16 07/31/2017    CREATININE 0.53 07/31/2017    EGFRIFNONA 112 07/31/2017    BCR 30.2 (H) 07/31/2017     07/31/2017    K 4.3 08/02/2017     07/31/2017    CO2 28.4 07/31/2017    CALCIUM 9.1 07/31/2017    ALBUMIN 3.50 07/24/2017    LABIL2 0.9 (L) 07/24/2017    AST 40 (H) 07/24/2017    ALT 29 07/24/2017     No results found for: INR    No results found for: POCGLU       Medication Review:     Current Facility-Administered Medications:   •  acetaminophen (TYLENOL) tablet 975 mg, 975 mg, Oral, Q6H PRN, Samuel Duane Kreis, MD, 975 mg at 07/27/17 0846  •  bisacodyl (DULCOLAX) suppository 10 mg, 10 mg, Rectal, Daily PRN, Samuel Duane Kreis, MD  •  cefepime (MAXIPIME) 1 g/100 mL 0.9% NS IVPB (mbp), 1 g, Intravenous, Q12H, Samuel Duane Kreis, MD, 1 g at 08/01/17 2309  •  cyproheptadine (PERIACTIN) 4 MG tablet 4 mg, 4 mg, Oral, TID, Samuel Duane Kreis, MD, 4 mg at 08/02/17 0923  •  enoxaparin (LOVENOX) syringe 40 mg, 40 mg, Subcutaneous, Q24H, Samuel Duane Kreis, MD, 40 mg at 08/02/17 0921  •  ferrous sulfate tablet 325 mg, 325 mg, Oral, BID With Meals, Shant Rubio MD, 325 mg at 08/02/17 0830  •  gabapentin (NEURONTIN) capsule 100 mg, 100 mg, Oral, Q12H, Samuel Duane Kreis, MD, 100 mg at 08/02/17 0921  •  ipratropium-albuterol (DUO-NEB) nebulizer solution 3 mL, 3 mL, Nebulization, 4x Daily - RT, Samuel Duane Kreis, MD, 3 mL at 08/02/17 0644  •  lactulose (CHRONULAC) 10 GM/15ML solution 10 g, 10 g, Oral, TID, Samuel Duane Kreis, MD, 10 g at 08/02/17 0921  •  magnesium hydroxide (MILK OF MAGNESIA) suspension 2400 mg/10mL 10 mL, 10 mL, Oral, Daily PRN, Samuel Duane Kreis, MD  •  Magnesium Sulfate 2 gram Bolus, followed by 8 gram infusion (total Mg dose 10 grams)- Mg less than or equal to 1mg/dL, 2 g, Intravenous, PRN **OR** Magnesium Sulfate 6 gram  Infusion (2 gm x 3) -Mg 1.1 -1.5 mg/dL, 2 g, Intravenous, PRN **OR** magnesium sulfate 4 gram infusion- Mg 1.6-1.9 mg/dL, 4 g, Intravenous, PRN, Samuel Duane Kreis, MD  •  metroNIDAZOLE (FLAGYL) IVPB 500 mg, 500 mg, Intravenous, Q8H, Samuel Duane Kreis, MD, Last Rate: 100 mL/hr at 08/02/17 0355, 500 mg at 08/02/17 0355  •  multivitamin (DAILY BERNIE) tablet 1 tablet, 1 tablet, Oral, Daily, Samuel Duane Kreis, MD, 1 tablet at 08/02/17 0921  •  ondansetron (ZOFRAN) tablet 4 mg, 4 mg, Oral, Q6H PRN **OR** ondansetron ODT (ZOFRAN-ODT) disintegrating tablet 4 mg, 4 mg, Oral, Q6H PRN **OR** ondansetron (ZOFRAN) injection 4 mg, 4 mg, Intravenous, Q6H PRN, Samuel Duane Kreis, MD  •  pantoprazole (PROTONIX) EC tablet 40 mg, 40 mg, Oral, Q AM, Samuel Duane Kreis, MD, 40 mg at 08/02/17 0515  •  potassium & sodium phosphates (PHOS-NAK) 280-160-250 MG packet - for Phosphorus less than 1.25 mg/dL, 1 packet, Oral, Q6H PRN **OR** potassium & sodium phosphates (PHOS-NAK) 280-160-250 MG packet - for Phosphorus 1.25 - 2.1 mg/dL, 1 packet, Oral, Once PRN, Samuel Duane Kreis, MD  •  potassium chloride (KLOR-CON) packet 40 mEq, 40 mEq, Oral, PRN, Samuel Duane Kreis, MD  •  potassium chloride (MICRO-K) CR capsule 40 mEq, 40 mEq, Oral, PRN, Samuel Duane Kreis, MD  •  povidone-iodine (BETADINE) ointment, , Topical, Daily, Samuel Duane Kreis, MD  •  sodium chloride 0.9 % flush 10 mL, 10 mL, Intracatheter, PRN, Samuel Duane Kreis, MD  •  sodium chloride 0.9 % flush 10 mL, 10 mL, Intracatheter, Q12H, Samuel Duane Kreis, MD, 10 mL at 08/01/17 2052  •  sodium chloride 0.9 % flush 10 mL, 10 mL, Intracatheter, PRN, Samuel Duane Kreis, MD, 10 mL at 07/30/17 0846  •  vancomycin (VANCOCIN) 500 mg in sodium chloride 0.9 % 100 mL IVPB, 500 mg, Intravenous, Q12H, Madisyn Gayle, [facility], 500 mg at 08/01/17 2204      Assessment/Plan     Debility  Aspiration pneumonia with recurrent sepsis- vanc, cefepime and flagyl Blood cx, negative, doing well.   Cachexia  with malnutrition- tube feeds currently, tolerating PO but very poor intake, mood better today  Anemia- stable, start iron  Mood is better and will follow with planned dc in 2 days, will follow in the near future.         PT and OT ongoing      Continue tube feeds via PEG      Shant Rubio MD  08/02/17  9:41 AM

## 2017-08-02 NOTE — PROGRESS NOTES
Inpatient Rehabilitation Functional Measures Assessment    Functional Measures  CATHY Eating:  Eating Score = 5. Patient is supervision/set-up for eating,  requiring: Opening containers. Cutting meat. Pouring liquids. No assistive  devices were required.  CATHY Grooming: Patient requires minimal assistance for washing, rinsing and  drying the face. Patient requires minimal assistance for washing, rinsing and  drying the hands. Patient requires minimal assistance for brushing teeth.  Patient requires minimal assistance for brushing/combing hair. Shaving or  applying makeup was not observed for this patient. Patient performs 0 -  24% of  grooming tasks.  Grooming Score = 1, Total Assistance. No assistive devices were  required.  CATHY Bathing:  Patient bathed in bed. Patient requires moderate assistance for  washing, rinsing, or drying the right arm. Patient requires moderate assistance  for washing, rinsing, or drying the left arm. Patient requires moderate  assistance for washing, rinsing, or drying the chest. Patient requires moderate  assistance for washing, rinsing, or drying the abdomen. Patient requires total  assistance for washing, rinsing, or drying the perineal area. Patient requires  total assistance for washing, rinsing, or drying the buttocks. Patient requires  total assistance for washing, rinsing, or drying the right upper leg. Patient  requires total assistance for washing, rinsing, or drying the left upper leg.  Patient requires total assistance for washing, rinsing, or drying the right  lower leg, including the foot. Patient requires total assistance for washing,  rinsing, or drying the left lower leg, including the foot. Patient performs 0 -  24% of bathing tasks.  Bathing Score = 1, Total Assistance. No assistive devices  were required.  CATHY Upper Body Dressing:  Patient requires total assistance for gathering  clothes. Wearing a bra or undershirt was not observed for this patient. Patient  requires  moderate/considerable physical assistance for threading the right arm  through the garment (shirt/sweater). Patient requires moderate/considerable  physical assistance for threading the left arm through the garment  (shirt/sweater). Patient requires moderate/considerable physical assistance for  pulling an over-head-garment over head or pulling front-fastening-garment around  back. Patient requires moderate/considerable physical assistance for pulling an  over-head-garment down the trunk or adjusting/fastening together a  front-fastening-garment. Patient performs 60 % of upper body dressing tasks.  Upper Body Dressing Score = 3, Moderate Assistance. No assistive devices were  required.  CATHY Lower Body Dressing:  Patient requires total assistance for gathering  clothes. Patient requires moderate/considerable physical assistance for  threading the right leg through the undergarment. Patient requires  moderate/considerable physical assistance for threading the left leg through the  undergarment. Patient requires moderate/considerable physical assistance for  pulling undergarment over hips and adjusting fasteners. Patient requires  moderate/considerable physical assistance for threading the right leg through  the pants/skirt. Patient requires moderate/considerable physical assistance for  threading the left leg through the pants/skirt. Patient requires  moderate/considerable physical assistance for pulling pants/skirt over hips and  adjusting fasteners. Patient requires total assistance for holding clothing  and/or donning and/or doffing right sock. Patient requires total assistance for  holding clothing and/or donning and/or doffing left sock. Donning and/or doffing  right shoe was not observed for this patient. Donning and/or doffing left shoe  was not observed for this patient. Patient performs 33.33 % of lower body  dressing tasks. Lower Body Dressing Score = 2, Maximal Assistance. No assistive  devices were  required.  CATHY Toileting:  Patient requires moderate assistance for adjusting clothing  before using a toilet, commode, bedpan, or urinal. Patient requires moderate  assistance for hygiene. Patient requires moderate assistance for adjusting  clothing after using a toilet, commode, bedpan, or urinal. Patient performs 0 -  24% of toileting tasks.  Toileting Score = 1, Total Assistance. Patient requires  the following assistive device(s): Grab bar. Arm rest of a specialized seat.    CATHY Bladder Management  Level of Assistance:  Bladder Score = 5.  Patient is supervision/set-up for  bladder management, requiring: Emptying equipment. Setting out equipment. Stand  by assistance. Patient requires the following assistive device(s):  Bedside  commode. Bedpan.  Frequency/Number of Accidents this Shift:  Bladder accidents this shift:  0 .  Patient has not had an accident this shift.    [facility] Bowel Management  Level of Assistance: Bowel Score = 7.  Patient is completely independent for  bowel management.  Patient did not have bowel movement.  No  medication/intervention was provided.  Frequency/Number of Accidents this Shift: Bowel accidents this shift: 0 .  Patient has not had an accident this shift.    CATHY Bed/Chair/Wheelchair Transfer:  Bed/chair/wheelchair Transfer Score = 3.  Patient performs 50-74% of effort and requires moderate assistance (some  lifting) for transferring to and from the bed/chair/wheelchair. Patient requires  the following assistive device(s): Seating system of wheelchair.  CATHY Toilet Transfer:  Toilet Transfer Score = 3.  Patient performs 50-74% of  effort and requires moderate assistance (some lifting) for transferring to and  from the toilet/commode. Patient requires the following assistive device(s):  Grab bars. Bedside Commode. Safety frame/over the toilet. Specialized seat.  CATHY Tub/Shower Transfer:    Previously Documented Mode of Locomotion at Discharge:  [facility] Expected Mode of Locomotion at  Discharge:  CATHY Walk/Wheelchair:  CATHY Stairs:    CATHY Comprehension:  CATHY Expression:  CATHY Social Interaction:  CATHY Problem Solving:  CATHY Memory:    Therapy Mode Minutes  Occupational Therapy:  Physical Therapy:  Speech Language Pathology:    Discharge Functional Goals:    Signed by: BABAK Schneider

## 2017-08-02 NOTE — PROGRESS NOTES
Inpatient Rehabilitation Functional Measures Assessment    Functional Measures  CATHY Eating:  CATHY Grooming:  CATHY Bathing:  CATHY Upper Body Dressing:  CATHY Lower Body Dressing:  CATHY Toileting:    CATHY Bladder Management  Level of Assistance:  Frequency/Number of Accidents this Shift:    CATHY Bowel Management  Level of Assistance:  Frequency/Number of Accidents this Shift:    CATHY Bed/Chair/Wheelchair Transfer:  CATHY Toilet Transfer:  CATHY Tub/Shower Transfer:    Previously Documented Mode of Locomotion at Discharge:  CATHY Expected Mode of Locomotion at Discharge:  CATHY Walk/Wheelchair:  CATHY Stairs:    CATHY Comprehension:  Both ( auditory and visual) modes of comprehension are used  equally. Comprehension Score = 6, Modified [place].  Patient comprehends  complex/abstract information in their primary language, requiring: Additional  time. Glasses.  CATHY Expression:  Both ( vocal and non-vocal) modes of expression are used  equally. Expression Score = 6,  Modified Independent. Patient expresses  complex/abstract information in their primary language, requiring: Additional  time.  CATHY Social Interaction:  Social Interaction Score = 6, Modified Independent.  Patient is modified independent for social interaction, requiring: Requires  additional time.  CATHY Problem Solving:  Problem Solving Score = 6, Modified [place].  Patient  makes appropriate decisions in order to solve complex problems, but requires  extra time.  CATHY Memory:  Memory Score = 6, Modified [place].  Patient is modified  independent for memory, requiring: Requires additional time.    Therapy Mode Minutes  Occupational Therapy:  Physical Therapy:  Speech Language Pathology:    Discharge Functional Goals:    Signed by: Nurse Arnaud

## 2017-08-02 NOTE — THERAPY TREATMENT NOTE
Inpatient Rehabilitation - Physical Therapy Treatment Note   [place]     Patient Name: Tamie Rodriguez  : 1940  MRN: [id number]  Today's Date: 2017  Onset of Illness/Injury or Date of Surgery Date: 17  Date of Referral to PT: 17  Referring Physician: Dr. Segovia    Admit Date: 2017    Visit Dx:  No diagnosis found.  Patient Active Problem List   Diagnosis   • Dehydration   • Debility               Adult Rehabilitation Note       17 1544 17 1516 17 1449    Rehab Assessment/Intervention    Discipline physical therapy assistant  -LL occupational therapist  -BF physical therapy assistant  -LL    Document Type therapy note (daily note)  -LL therapy note (daily note)  -BF therapy note (daily note)  -    Subjective Information no complaints;agree to therapy  -LL agree to therapy;no complaints  -BF agree to therapy;no complaints  -LL    Patient Effort, Rehab Treatment good  -LL good  -BF good  -LL    Precautions/Limitations fall precautions;other (see comments)   PEG, abdominal binder, soft diet with thin liquids, [place]  -LL fall precautions;other (see comments)   PEG, abd binder; soft diet w/ thin liquids; [place]  -BF fall precautions;other (see comments)   PEG, abdominal binder, soft diet with thin liquids, [place]  -LL    Patient Response to Treatment Patient did well with therapy with adequate rest breaks  -LL  Patient did well with PT with adequate rest breaks  -LL    Recorded by [LL] Tiara Alcala PTA [BF] Fadia Rubio, OT [LL] Tiara Alcala PTA    Pain Assessment    Pain Assessment Huffman-Butt FACES  -LL  Huffman-Butt FACES  -LL    Huffman-Baker FACES Pain Rating 4  -LL  4  -LL    Pain Type Chronic pain  -LL  Chronic pain  -LL    Pain Location Back  -LL  Back  -LL    Pain Intervention(s) Repositioned;Rest  -LL  Repositioned;Rest  -LL    Recorded by [LL] Tiara Alcala PTA  [LL] Tiara Alcala PTA    Cognitive Assessment/Intervention    Current Cognitive/Communication  Assessment functional  -LL functional  -BF functional  -LL    Orientation Status oriented to;person;place  -LL oriented to;person;place;situation  -BF oriented to;person;place  -LL    Follows Commands/Answers Questions 100% of the time;able to follow single-step instructions  -% of the time;able to follow single-step instructions  -% of the time;able to follow single-step instructions  -LL    Personal Safety decreased awareness, need for assist;decreased awareness, need for safety  -LL  decreased awareness, need for assist;decreased awareness, need for safety  -LL    Personal Safety Interventions fall prevention program maintained;gait belt;nonskid shoes/slippers when out of bed  -LL  fall prevention program maintained;gait belt;nonskid shoes/slippers when out of bed  -LL    Recorded by [LL] Tiara Alcala PTA [BF] Fadia Rubio OT [LL] Tiara Alcala PTA    Communication Assessment/Intervention    Communication Assessment [place]  -LL  [place]  -LL    Recorded by [LL] Tiara Alcala PTA  [LL] Tiara Alcala PTA    Bed Mobility, Assessment/Treatment    Bed Mobility, Assistive Device bed rails  -LL  bed rails  -LL    Bed Mobility, Scoot/Bridge, [place] supervision required;verbal cues required;nonverbal cues required (demo/gesture)  -LL  supervision required;verbal cues required;nonverbal cues required (demo/gesture)  -LL    Bed Mob, Supine to Sit, [place] contact guard assist  -LL  minimum assist (75% patient effort);contact guard assist  -LL    Bed Mob, Sit to Supine, [place] contact guard assist  -LL  minimum assist (75% patient effort);contact guard assist  -LL    Bed Mobility, Safety Issues decreased use of arms for pushing/pulling;decreased use of legs for bridging/pushing  -LL  decreased use of arms for pushing/pulling;decreased use of legs for bridging/pushing  -LL    Bed Mobility, Impairments strength decreased;impaired balance;pain  -LL  strength decreased;impaired  balance;pain  -LL    Recorded by [LL] Tiara Alcala PTA  [LL] Tiara Alcala PTA    Transfer Assessment/Treatment    Transfers, Bed-Chair [place] verbal cues required;nonverbal cues required (demo/gesture);contact guard assist   CGA in AM; Min A in PM  -LL contact guard assist;verbal cues required  -BF verbal cues required;nonverbal cues required (demo/gesture);minimum assist (75% patient effort);contact guard assist   CGA in AM; Min A in PM  -LL    Transfers, Chair-Bed [place] verbal cues required;nonverbal cues required (demo/gesture);contact guard assist  -LL contact guard assist;verbal cues required  -BF verbal cues required;nonverbal cues required (demo/gesture);minimum assist (75% patient effort);contact guard assist  -LL    Transfers, Bed-Chair-Bed, Assist Device rolling walker  -LL bed rails;other (see comments)   w/c arm rests  -BF rolling walker  -LL    Transfers, Sit-Stand [place] verbal cues required;nonverbal cues required (demo/gesture);contact guard assist  -LL  verbal cues required;nonverbal cues required (demo/gesture);minimum assist (75% patient effort);contact guard assist  -LL    Transfers, Stand-Sit [place] verbal cues required;nonverbal cues required (demo/gesture);contact guard assist  -LL  verbal cues required;nonverbal cues required (demo/gesture);minimum assist (75% patient effort);contact guard assist  -LL    Transfers, Sit-Stand-Sit, Assist Device rolling walker  -LL  rolling walker  -LL    Transfer, Safety Issues balance decreased during turns;step length decreased;knees buckling  -LL  balance decreased during turns;step length decreased;knees buckling  -LL    Transfer, Impairments strength decreased;impaired balance;coordination impaired  -LL  strength decreased;impaired balance;coordination impaired  -LL    Recorded by [LL] Tiara Alcala PTA [BF] Fadia Rubio OT [LL] Tiara Alcala PTA    Gait Assessment/Treatment    Gait, [place] Level verbal  cues required;nonverbal cues required (demo/gesture);minimum assist (75% patient effort)  -LL  verbal cues required;nonverbal cues required (demo/gesture);minimum assist (75% patient effort);2 person assist required  -LL    Gait, Assistive Device rolling walker  -LL  rolling walker  -LL    Gait, Distance (Feet) 90   80, 60 then 40  -LL  80   x 3 then 60  -LL    Gait, Gait Pattern Analysis swing-to gait  -LL  swing-to gait  -LL    Gait, Gait Deviations andrae decreased;forward flexed posture;step length decreased  -LL  andrae decreased;forward flexed posture;step length decreased  -LL    Gait, Safety Issues balance decreased during turns;step length decreased  -LL  balance decreased during turns;step length decreased  -LL    Gait, Impairments strength decreased;impaired balance;coordination impaired  -LL  strength decreased;impaired balance;coordination impaired  -LL    Recorded by [LL] Tiara Alcala PTA  [LL] Tiara Alcala PTA    Balance Skills Training    Sitting-Level of Assistance Close supervision  -LL  Close supervision  -LL    Sitting-Balance Support Feet supported  -LL  Feet supported  -LL    Sitting-Balance Activities Reaching for objects;Reaching across midline   Sitting balloon tap  -LL  Reaching for objects;Reaching across midline   Sitting balloon tap  -LL    Standing-Level of Assistance Contact guard  -LL  Minimum assistance;Contact guard  -LL    Static Standing Balance Support assistive device  -LL  assistive device  -LL    Gait Balance-Level of Assistance Minimum assistance  -LL  Minimum assistance  -LL    Gait Balance Support assistive device  -LL  assistive device  -LL    Recorded by [LL] Tiara Alcala PTA  [LL] Tiara Alcala PTA    Therapy Exercises    Bilateral Lower Extremities AROM:;sitting;standing;supine  -LL  AROM:;sitting;standing;supine  -LL    BLE Resistance theraband  -LL  theraband  -LL    Bilateral Upper Extremity  AROM:;sitting   BUE [facility] ther act; reaching; light  strengthening  -BF     BUE Resistance  theraputty  -BF     Recorded by [LL] Tiara Alcala PTA [BF] Fadia Rubio, OT [LL] Tiara Alcaal PTA    Positioning and Restraints    Pre-Treatment Position in bed  -LL  --   WC with OT  -LL    Post Treatment Position bed  -LL bed  -BF bed  -LL    In Bed supine;call light within reach;encouraged to call for assist;heels elevated  -LL supine;call light within reach;encouraged to call for assist   After both sessions  -BF supine;call light within reach;encouraged to call for assist;side rails up x2;heels elevated  -LL    Recorded by [LL] Tiara Alcala PTA [BF] Fadia Rubio, OT [LL] Tiara Alcala PTA      08/01/17 1417 07/31/17 1616 07/31/17 1510    Rehab Assessment/Intervention    Discipline occupational therapist  -BF occupational therapist  -BC physical therapy assistant  -LL    Document Type therapy note (daily note)  -BF therapy note (daily note)  -BC therapy note (daily note)  -LL    Subjective Information agree to therapy;no complaints  -BF agree to therapy;complains of;fatigue  -BC agree to therapy;no complaints  -LL    Patient Effort, Rehab Treatment good  -BF good  -BC good  -LL    Precautions/Limitations fall precautions;other (see comments)   PEG, abd binder; soft diet w/ thin liquids; [place]  -BF  fall precautions;other (see comments)   PEG, abdominal binder, soft diet with think liquids, [place]  -LL    Patient Response to Treatment  Pt. tolerated both AM/PM session well with rest breaks provided as needed  -BC Patient did well with therapy with adequate rest breaks  -LL    Recorded by [BF] Fadia Rubio, OT [BC] Anyi Sands, OT [LL] Tiara Alcala PTA    Pain Assessment    Pain Assessment   Huffman-Baker FACES  -LL    Huffman-Baker FACES Pain Rating   2  -LL    Pain Type   Chronic pain  -LL    Pain Location   Back  -LL    Pain Intervention(s)   Repositioned;Rest  -LL    Recorded by   [LL] Tiara Alcala PTA    Cognitive  Assessment/Intervention    Current Cognitive/Communication Assessment functional  -BF functional  -BC functional  -LL    Orientation Status oriented x 4  -BF oriented to;person;place;situation  -BC oriented to;person;place  -LL    Follows Commands/Answers Questions 100% of the time;able to follow single-step instructions  -% of the time  -% of the time;able to follow single-step instructions;needs cueing  -LL    Personal Safety   decreased awareness, need for assist;decreased awareness, need for safety;decreased insight to deficits  -LL    Personal Safety Interventions  fall prevention program maintained  -BC fall prevention program maintained;gait belt;nonskid shoes/slippers when out of bed  -LL    Recorded by [BF] Fadia Rubio, OT [BC] Anyi Sands, OT [LL] Tiara Alcala PTA    Communication Assessment/Intervention    Communication Assessment   [place]  -LL    Recorded by   [LL] Tiara Alcala PTA    Bed Mobility, Assessment/Treatment    Bed Mobility, Assistive Device   bed rails  -LL    Bed Mobility, Scoot/Bridge, [place]   supervision required;verbal cues required;nonverbal cues required (demo/gesture)  -LL    Bed Mob, Supine to Sit, [place]   minimum assist (75% patient effort);contact guard assist  -LL    Bed Mob, Sit to Supine, [place]   minimum assist (75% patient effort);contact guard assist  -LL    Bed Mobility, Safety Issues   decreased use of arms for pushing/pulling;decreased use of legs for bridging/pushing  -LL    Bed Mobility, Impairments   strength decreased;impaired balance;pain  -LL    Recorded by   [LL] Tiara Alcala PTA    Transfer Assessment/Treatment    Transfers, Bed-Chair [place] minimum assist (75% patient effort);contact guard assist;verbal cues required  -BF  verbal cues required;nonverbal cues required (demo/gesture);minimum assist (75% patient effort);contact guard assist   CGA in AM; Min A in PM  -LL    Transfers, Chair-Bed [place]  minimum assist (75% patient effort);contact guard assist;verbal cues required  -BF  verbal cues required;nonverbal cues required (demo/gesture);minimum assist (75% patient effort);contact guard assist  -LL    Transfers, Bed-Chair-Bed, Assist Device other (see comments)   w/c arm rests  -BF  rolling walker  -LL    Transfers, Sit-Stand [place]   verbal cues required;nonverbal cues required (demo/gesture);minimum assist (75% patient effort);contact guard assist  -LL    Transfers, Stand-Sit [place]   verbal cues required;nonverbal cues required (demo/gesture);minimum assist (75% patient effort);contact guard assist  -LL    Transfers, Sit-Stand-Sit, Assist Device   rolling walker  -LL    Transfer, Safety Issues   balance decreased during turns;step length decreased;knees buckling  -LL    Transfer, Impairments   strength decreased;impaired balance;coordination impaired  -LL    Recorded by [BF] Fadia Rubio, OT  [LL] Tiara Alcala PTA    Gait Assessment/Treatment    Gait, [place] Level   verbal cues required;nonverbal cues required (demo/gesture);minimum assist (75% patient effort);2 person assist required  -LL    Gait, Assistive Device   rolling walker  -LL    Gait, Distance (Feet)   80   x 2; 60 x 2  -LL    Gait, Gait Pattern Analysis   swing-to gait  -LL    Gait, Gait Deviations   andrae decreased;forward flexed posture;step length decreased  -LL    Gait, Safety Issues   balance decreased during turns;step length decreased  -LL    Gait, Impairments   strength decreased;impaired balance;coordination impaired  -LL    Recorded by   [LL] Tiara Alcala PTA    Upper Body Dressing Assessment/Training    UB Dressing Assess/Train, Clothing Type  donning:   dressing gown  -BC     UB Dressing Assess/Train, Position  sitting;supported sitting  -BC     UB Dressing Assess/Train, [place]  set up required  -BC     UB Dressing Assess/Train, Impairments  strength decreased;coordination impaired  -BC      UB Dressing Assess/Train, Comment  [place]  -BC     Recorded by  [BC] Anyi Sands, OT     Grooming Assessment/Training    Grooming Assess/Train, Position  sitting  -BC     Grooming Assess/Train, [place] Level  set up required;minimum assist (75% patient effort)  -BC     Grooming Assess/Train, Impairments  strength decreased;coordination impaired  -BC     Recorded by  [BC] Anyi Sands, OT     Self-Feeding Assessment/Training    Self-Feeding Assess/Train, Comment  set up with meals, tube feeding graded to night time only.  -BC     Recorded by  [BC] Anyi Sands OT     Balance Skills Training    Sitting-Level of Assistance   Close supervision  -    Sitting-Balance Support   Feet supported  -LL    Sitting-Balance Activities   Reaching for objects;Reaching across midline   Sitting balloon tap  -    Standing-Level of Assistance   Minimum assistance;Contact guard  -LL    Static Standing Balance Support   assistive device  -LL    Gait Balance-Level of Assistance   Minimum assistance  -LL    Gait Balance Support   assistive device  -LL    Recorded by   [LL] Tiara Alcala PTA    Therapy Exercises    Bilateral Lower Extremities   AROM:;sitting;standing  -LL    BLE Resistance   theraband  -LL    Bilateral Upper Extremity AROM:;sitting   BUE [facility] ther act; reaching; light strengthening  -BF AROM:;10 reps;20 reps;sitting   BUE ther ex/aact, [facility]  -BC     BUE Resistance theraputty   Wrist rolls X1  -BF      Recorded by [BF] Fadia Rubio OT [BC] Anyi Sands, OT [LL] Tiara Alcala PTA    Positioning and Restraints    Pre-Treatment Position  sitting in chair/recliner  -BC --   WC with OT  -    Post Treatment Position  bed  -BC bed  -LL    In Bed supine;call light within reach;encouraged to call for assist   In PM  -BF notified nsg;call light within reach;encouraged to call for assist;supine  -BC supine;call light within reach;encouraged to call for assist;heels elevated  -LL    In Wheelchair  sitting;with PT   In AM  -BF      Recorded by [BF] Fadia Rubio, OT [BC] Nayi [place], OT [LL] Tiara Alcala, HERNAN      User Key  (r) = Recorded By, (t) = Taken By, (c) = Cosigned By    Initials Name Effective Dates    BF Fadiareji Rubio, OT 03/14/16 -     LL Tiara Alcala, PTA 05/02/16 -     BC [facility], OT 01/21/17 -                 IP PT Goals       07/28/17 1543 07/22/17 1413 07/20/17 1730    Bed Mobility PT STG    Bed Mobility PT STG, Date Established  07/22/17  -RT     Bed Mobility PT STG, Time to Achieve  1 wk  -RT     Bed Mobility PT STG, Activity Type  all bed mobility  -RT     Bed Mobility PT STG, [place] Level  minimum assist (75% patient effort);moderate assist (50% patient effort)  -RT     Bed Mobility PT STG, Date Goal Reviewed 07/28/17  -LL      Bed Mobility PT STG, Outcome goal met  -LL      Bed Mobility PT LTG    Bed Mobility PT LTG, Date Established  07/22/17  -RT 07/20/17  -BC    Bed Mobility PT LTG, Time to Achieve  2 wks  -RT 3 days  -BC    Bed Mobility PT LTG, Activity Type  all bed mobility  -RT all bed mobility  -BC    Bed Mobility PT LTG, [place] Level  contact guard assist  -RT minimum assist (75% patient effort)  -BC    Bed Mobility PT Goal  LTG, Assist Device   bed rails  -BC    Bed Mobility PT LTG, Date Goal Reviewed 07/28/17  -LL      Bed Mobility PT LTG, Outcome goal ongoing  -LL      Transfer Training PT STG    Transfer Training PT STG, Date Established  07/22/17  -RT     Transfer Training PT STG, Time to Achieve  1 wk  -RT     Transfer Training PT STG, Activity Type  all transfers  -RT     Transfer Training PT STG, [place] Level  minimum assist (75% patient effort);moderate assist (50% patient effort)  -RT     Transfer Training PT STG, Assist Device  walker, rolling  -RT     Transfer Training PT STG, Date Goal Reviewed 07/28/17  -LL      Transfer Training PT STG, Outcome goal met  -LL      Transfer Training PT LTG    Transfer Training PT LTG,  Date Established  07/22/17  -RT 07/20/17  -BC    Transfer Training PT LTG, Time to Achieve  2 wks  -RT 3 days  -BC    Transfer Training PT LTG, Activity Type  all transfers  -RT all transfers  -BC    Transfer Training PT LTG, [place] Level  contact guard assist  -RT minimum assist (75% patient effort)  -BC    Transfer Training PT LTG, Assist Device  walker, rolling  -RT walker, rolling  -BC    Transfer Training PT  LTG, Date Goal Reviewed 07/28/17  -LL      Transfer Training PT LTG, Outcome goal ongoing  -LL      Gait Training PT STG    Gait Training Goal PT STG, Date Established  07/22/17  -RT     Gait Training Goal PT STG, Time to Achieve  1 wk  -RT     Gait Training Goal PT STG, [place] Level  moderate assist (50% patient effort);2 person assist required;minimum assist (75% patient effort)  -RT     Gait Training Goal PT STG, Assist Device  walker, rolling  -RT     Gait Training Goal PT STG, Date Goal Reviewed 07/28/17  -LL      Gait Training Goal PT STG, Outcome goal met  -LL      Gait Training PT LTG    Gait Training Goal PT LTG, Date Established  07/22/17  -RT 07/20/17  -BC    Gait Training Goal PT LTG, Time to Achieve  2 wks  -RT 3 days  -BC    Gait Training Goal PT LTG, [place] Level  contact guard assist  -RT minimum assist (75% patient effort)  -BC    Gait Training Goal PT LTG, Assist Device  walker, rolling  -RT walker, rolling  -BC    Gait Training Goal PT LTG, Distance to Achieve  50  -RT 20  -BC    Gait Training Goal PT LTG, Date Goal Reviewed 07/28/17  -LL      Gait Training Goal PT LTG, Outcome goal ongoing  -LL        User Key  (r) = Recorded By, (t) = Taken By, (c) = Cosigned By    Initials Name Provider Type    KEYLA Daugherty, PT Physical Therapist    LL Tiara Alcala, PTA Physical Therapy Assistant    RT Shant Jim, PT Physical Therapist          Physical Therapy Education     Title: PT OT SLP Therapies (Active)     Topic: Physical Therapy (Done)     Point: Mobility  training (Done)    Learning Progress Summary    Learner Readiness Method Response Comment Documented by Status   Patient Acceptance E,D VU,NR  LL 08/02/17 1549 Done    Acceptance E,D VU,NR   08/01/17 1453 Done    Acceptance E,D VU,NR   07/31/17 1523 Done    Acceptance E,D VU,NR   07/28/17 1542 Done    Acceptance E,D VU,NR   07/27/17 1705 Done    Acceptance E,D NR,VU  AG 07/26/17 1749 Done    Acceptance E NR   07/25/17 2055 Active    Acceptance E VU,NR   07/25/17 1745 Done    Acceptance D,E NR  AG 07/24/17 1706 Active               Point: Home exercise program (Done)    Learning Progress Summary    Learner Readiness Method Response Comment Documented by Status   Patient Acceptance E,D VU,NR   08/02/17 1549 Done    Acceptance E,D VU,NR   08/01/17 1453 Done    Acceptance E,D VU,NR   07/31/17 1523 Done    Acceptance E,D VU,NR   07/28/17 1542 Done    Acceptance E,D VU,NR   07/27/17 1705 Done    Acceptance E,D NR,VU  AG 07/26/17 1749 Done    Acceptance E NR   07/25/17 2055 Active    Acceptance E VU,NR   07/25/17 1745 Done    Acceptance D,E NR  AG 07/24/17 1706 Active               Point: Body mechanics (Done)    Learning Progress Summary    Learner Readiness Method Response Comment Documented by Status   Patient Acceptance E,D VU,NR   08/02/17 1549 Done    Acceptance E,D VU,NR   08/01/17 1453 Done    Acceptance E,D VU,NR   07/31/17 1523 Done    Acceptance E,D VU,NR   07/28/17 1542 Done    Acceptance E,D VU,NR   07/27/17 1705 Done    Acceptance E,D NR,VU   07/26/17 1749 Done    Acceptance E NR   07/25/17 2055 Active    Acceptance E VU,NR   07/25/17 1745 Done    Acceptance D,E NR  AG 07/24/17 1706 Active               Point: Precautions (Done)    Learning Progress Summary    Learner Readiness Method Response Comment Documented by Status   Patient Acceptance E,D VU,NR   08/02/17 1549 Done    Acceptance E,D VU,NR   08/01/17 1453 Done    Acceptance TULIO VU NR LL 07/31/17 1523 Done     Acceptance E,D VU,NR  LL 07/28/17 1542 Done    Acceptance E,D VU,NR  LL 07/27/17 1705 Done    Acceptance E,D NR,VU  AG 07/26/17 1749 Done    Acceptance E NR   07/25/17 2055 Active    Acceptance E VU,NR  LB 07/25/17 1745 Done    Acceptance D,E NR  AG 07/24/17 1706 Active                      User Key     Initials Effective Dates Name Provider Type Discipline     06/16/16 -  Shahrzad Varela, RN Registered Nurse Nurse     03/14/16 -  Chrystal Galvan, PT Physical Therapist PT     03/14/16 -  Herminia Estrada, PT Physical Therapist PT     05/02/16 -  Tiara Alcala PTA Physical Therapy Assistant PT                    PT Recommendation and Plan  Planned Therapy Interventions: balance training, bed mobility training, gait training, home exercise program, lumbar stabilization, manual therapy techniques, neuromuscular re-education, patient/family education, postural re-education, ROM (Range of Motion), stair training, wheelchair management/propulsion training  PT Frequency: 5 times/wk, per priority policy            Time Calculation:         PT Charges       08/02/17 1549          Time Calculation    Start Time 0745  -LL      Stop Time 0915  -LL      Time Calculation (min) 90 min  -LL      PT Received On 08/02/17  -LL      PT Goal Re-Cert Due Date 08/04/17  -LL        User Key  (r) = Recorded By, (t) = Taken By, (c) = Cosigned By    Initials Name Provider Type     Tiara Alcala PTA Physical Therapy Assistant          Therapy Charges for Today     Code Description Service Date Service Provider Modifiers Qty    37781038080 HC GAIT TRAINING EA 15 MIN 8/1/2017 Tiara Alcala PTA GP 1    89605090455 HC PT THER PROC EA 15 MIN 8/1/2017 Tiara Alcala PTA GP 6    29199931263 HC PT THER SUPP EA 15 MIN 8/1/2017 Tiara Alcala PTA GP 2    22637850950 HC PT CARE PLAN EACH 15 MIN 8/1/2017 Tiara Alcala PTA GP 1    51761820269 HC GAIT TRAINING EA 15 MIN 8/2/2017 Tiara Alcala PTA GP 1    11420480669 HC PT  THER PROC EA 15 MIN 8/2/2017 Tiara Alcala PTA GP 5    78783599445 HC PT THER SUPP EA 15 MIN 8/2/2017 Tiara Alcala PTA GP 1               Em Alcala PTA  8/2/2017

## 2017-08-03 PROCEDURE — 97116 GAIT TRAINING THERAPY: CPT

## 2017-08-03 PROCEDURE — 25010000002 ENOXAPARIN PER 10 MG: Performed by: FAMILY MEDICINE

## 2017-08-03 PROCEDURE — 97110 THERAPEUTIC EXERCISES: CPT

## 2017-08-03 PROCEDURE — 25010000002 VANCOMYCIN PER 500 MG

## 2017-08-03 PROCEDURE — 94799 UNLISTED PULMONARY SVC/PX: CPT

## 2017-08-03 PROCEDURE — 97535 SELF CARE MNGMENT TRAINING: CPT | Performed by: OCCUPATIONAL THERAPIST

## 2017-08-03 PROCEDURE — 97530 THERAPEUTIC ACTIVITIES: CPT | Performed by: OCCUPATIONAL THERAPIST

## 2017-08-03 RX ADMIN — CEFEPIME HYDROCHLORIDE 1 G: 1 INJECTION, POWDER, FOR SOLUTION INTRAMUSCULAR; INTRAVENOUS at 01:00

## 2017-08-03 RX ADMIN — ENOXAPARIN SODIUM 40 MG: 40 INJECTION SUBCUTANEOUS at 08:58

## 2017-08-03 RX ADMIN — LACTULOSE 10 G: 20 SOLUTION ORAL at 16:09

## 2017-08-03 RX ADMIN — CYPROHEPTADINE HYDROCHLORIDE 4 MG: 4 TABLET ORAL at 08:59

## 2017-08-03 RX ADMIN — GABAPENTIN 100 MG: 100 CAPSULE ORAL at 08:58

## 2017-08-03 RX ADMIN — LACTULOSE 10 G: 20 SOLUTION ORAL at 21:20

## 2017-08-03 RX ADMIN — GABAPENTIN 100 MG: 100 CAPSULE ORAL at 21:21

## 2017-08-03 RX ADMIN — Medication 10 ML: at 21:21

## 2017-08-03 RX ADMIN — LACTULOSE 10 G: 20 SOLUTION ORAL at 08:58

## 2017-08-03 RX ADMIN — PANTOPRAZOLE SODIUM 40 MG: 40 TABLET, DELAYED RELEASE ORAL at 06:38

## 2017-08-03 RX ADMIN — CYPROHEPTADINE HYDROCHLORIDE 4 MG: 4 TABLET ORAL at 16:09

## 2017-08-03 RX ADMIN — Medication 10 ML: at 09:00

## 2017-08-03 RX ADMIN — FERROUS SULFATE TAB 325 MG (65 MG ELEMENTAL FE) 325 MG: 325 (65 FE) TAB at 18:12

## 2017-08-03 RX ADMIN — Medication 1 TABLET: at 08:58

## 2017-08-03 RX ADMIN — CYPROHEPTADINE HYDROCHLORIDE 4 MG: 4 TABLET ORAL at 21:21

## 2017-08-03 RX ADMIN — IPRATROPIUM BROMIDE AND ALBUTEROL SULFATE 3 ML: .5; 3 SOLUTION RESPIRATORY (INHALATION) at 19:39

## 2017-08-03 RX ADMIN — VANCOMYCIN HYDROCHLORIDE 500 MG: 5 INJECTION, POWDER, LYOPHILIZED, FOR SOLUTION INTRAVENOUS at 08:58

## 2017-08-03 RX ADMIN — FERROUS SULFATE TAB 325 MG (65 MG ELEMENTAL FE) 325 MG: 325 (65 FE) TAB at 08:58

## 2017-08-03 RX ADMIN — POVIDONE-IODINE: 100 OINTMENT TOPICAL at 09:00

## 2017-08-03 RX ADMIN — IPRATROPIUM BROMIDE AND ALBUTEROL SULFATE 3 ML: .5; 3 SOLUTION RESPIRATORY (INHALATION) at 13:49

## 2017-08-03 RX ADMIN — METRONIDAZOLE 500 MG: 500 INJECTION, SOLUTION INTRAVENOUS at 04:22

## 2017-08-03 RX ADMIN — IPRATROPIUM BROMIDE AND ALBUTEROL SULFATE 3 ML: .5; 3 SOLUTION RESPIRATORY (INHALATION) at 07:31

## 2017-08-03 NOTE — PROGRESS NOTES
Inpatient Rehabilitation Functional Measures Assessment    Functional Measures  CATHY Eating:  CATHY Grooming:  CATHY Bathing:  CATHY Upper Body Dressing:  CATHY Lower Body Dressing:  CATHY Toileting:    CATHY Bladder Management  Level of Assistance:  Frequency/Number of Accidents this Shift:    CATHY Bowel Management  Level of Assistance:  Frequency/Number of Accidents this Shift:    CATHY Bed/Chair/Wheelchair Transfer:  CATHY Toilet Transfer:  CATHY Tub/Shower Transfer:    Previously Documented Mode of Locomotion at Discharge:  CATHY Expected Mode of Locomotion at Discharge:  CATHY Walk/Wheelchair:  CATHY Stairs:    CATHY Comprehension:  Both ( auditory and visual) modes of comprehension are used  equally. Comprehension Score = 6, Modified [place].  Patient comprehends  complex/abstract information in their primary language, requiring: Additional  time.  CATHY Expression:  Both ( vocal and non-vocal) modes of expression are used  equally. Expression Score = 6,  Modified Independent. Patient expresses  complex/abstract information in their primary language, requiring: Additional  time.  CATHY Social Interaction:  Social Interaction Score = 6, Modified Independent.  Patient is modified independent for social interaction, requiring: Requires  additional time.  CATHY Problem Solving:  Problem Solving Score = 6, Modified [place].  Patient  makes appropriate decisions in order to solve complex problems, but requires  extra time.  CATHY Memory:  Memory Score = 6, Modified [place].  Patient is modified  independent for memory, requiring: Requires additional time.    Therapy Mode Minutes  Occupational Therapy:  Physical Therapy:  Speech Language Pathology:    Discharge Functional Goals:    Signed by: Jefferson Linton RN

## 2017-08-03 NOTE — PROGRESS NOTES
Inpatient Rehabilitation Functional Measures Assessment    Functional Measures  CATHY Eating:  CATHY Grooming: Grooming Score = 5. Patient is supervision/set-up for grooming,  requiring: Verbal cuing, prompting, or instructing. Setting out grooming  equipment. No assistive devices were required.  CATHY Bathing:  Patient took sponge bath. Bathing Score = 5.  Patient is  supervision/set-up for bathing, requiring: Standing by. Verbal cuing, prompting,  or instructing. Preparing the water. Preparing washing materials. Patient  requires the following assistive device(s): Grab bar/arm rest to maintain  balance.  CATHY Upper Body Dressing:  Upper Body Dressing Score = 5. Patient is supervision  for upper body dressing, requiring: Verbal cuing, prompting, or instructing.  Gathering/setting out clothes. No assistive devices were required.  CATHY Lower Body Dressing:  Lower Body Dressing Score = 5. Patient is  supervision/set-up for lower body dressing, requiring: Standing by. Verbal  cuing, prompting, or instructing. Gathering/setting out clothes. No assistive  devices were required.  CATHY Toileting:  Toileting Score = 5.  Patient is supervision/set-up for  toileting, requiring: Stand by assistance. Verbal cuing, prompting, or  instructing. Patient requires the following assistive device(s): Grab bar. Arm  rest of a specialized seat.    CATHY Bladder Management  Level of Assistance:  Frequency/Number of Accidents this Shift:    CATHY Bowel Management  Level of Assistance:  Frequency/Number of Accidents this Shift:    CATHY Bed/Chair/Wheelchair Transfer:  Bed/chair/wheelchair Transfer Score = 4.  Patient performs 75% or more of effort and minimal assistance (little/incidental  help/lifting of one limb/steadying) for transferring to and from the  bed/chair/wheelchair, requiring: Contact guard. Patient requires the following  assistive device(s): Arm rest.  CATHY Toilet Transfer:  Toilet Transfer Score = 4.  Patient performs 75% or more  of  effort and minimal assistance (little/incidental help/steadying) for  transferring to and from the toilet/commode, requiring: Contact guard. Patient  requires the following assistive device(s): Grab bars. Safety frame/over the  toilet.  CATHY Tub/Shower Transfer:    Previously Documented Mode of Locomotion at Discharge:  CATHY Expected Mode of Locomotion at Discharge:  CATHY Walk/Wheelchair:  CATHY Stairs:    CATHY Comprehension:  CATHY Expression:  CATHY Social Interaction:  [facility] Problem Solving:  CATHY Memory:    Therapy Mode Minutes  Occupational Therapy: Individual: 90 minutes.  Physical Therapy:  Speech Language Pathology:    Discharge Functional Goals:    Signed by: Fadia Rubio OT

## 2017-08-03 NOTE — PLAN OF CARE
Problem: Patient Care Overview (Adult)  Goal: Plan of Care Review  Outcome: Ongoing (interventions implemented as appropriate)    08/03/17 0806   Coping/Psychosocial Response Interventions   Plan Of Care Reviewed With patient   Patient Care Overview   Progress no change         Problem: Activity Intolerance (Adult)  Goal: Activity Tolerance  Outcome: Ongoing (interventions implemented as appropriate)    Problem: Fall Risk (Adult)  Goal: Absence of Falls  Outcome: Ongoing (interventions implemented as appropriate)    Problem: Skin Integrity Impairment, Risk/Actual (Adult)  Goal: Skin Integrity/Wound Healing  Outcome: Ongoing (interventions implemented as appropriate)    Problem: Pressure Ulcer Risk ([place] Scale) (Adult,Obstetrics,Pediatric)  Goal: Skin Integrity  Outcome: Ongoing (interventions implemented as appropriate)

## 2017-08-03 NOTE — PROGRESS NOTES
"     LOS: 13 days     Chief Complaint:  Debility    Subjective     Interval History:     Doing better and participating with care, no other concerns or complaints, no other concerns, continues to do well.       Objective     Vital Signs  /56 (BP Location: Left arm, Patient Position: Lying)  Pulse 88  Temp 97.3 °F (36.3 °C) (Oral)   Resp 18  Ht 64\" (162.6 cm)  Wt 82 lb (37.2 kg)  SpO2 95%  BMI 14.08 kg/m2  Intake & Output (last day)       08/02 0701 - 08/03 0700 08/03 0701 - 08/04 0700    P.O. 840     Other 101     NG/GT 1334     Total Intake(mL/kg) 2275 (61.2)     Net +2275            Unmeasured Urine Occurrence 10 x     Unmeasured Stool Occurrence 2 x             Physical Exam:     General Appearance:    Alert, cooperative, in no acute distress.  Thin and frail, cachectic    Head:    Normocephalic, without obvious abnormality, atraumatic   Eyes:            Lids and lashes normal, conjunctivae and sclerae normal, no   icterus, no pallor, corneas clear, PERRLA   Ears:    Ears appear intact with no abnormalities noted       Neck:   No adenopathy, supple, trachea midline, no thyromegaly, no   carotid bruit, no JVD   Lungs:     Bibasilar rales,respirations regular, even and                  unlabored    Heart:    Regular rhythm and normal rate, normal S1 and S2, no            murmur, no gallop, no rub, no click   Chest Wall:    No abnormalities observed   Abdomen:     Normal bowel sounds, no masses, no organomegaly, soft        non-tender, non-distended, no guarding, no rebound                tenderness   Extremities:   Moves all extremities well, no edema, no cyanosis, no             redness.  Decreased muscle mass    Pulses:   Pulses palpable and equal bilaterally   Skin:   No bleeding, bruising or rash       Neurologic:   Cranial nerves 2 - 12 grossly intact, sensation intact, DTR       present and equal bilaterally        Results Review:    Lab Results   Component Value Date    WBC 5.98 07/31/2017    " HGB 7.2 (L) 07/31/2017    HCT 24.8 (L) 07/31/2017    MCV 93.6 07/31/2017     07/31/2017       Lab Results   Component Value Date    GLUCOSE 113 (H) 07/31/2017    BUN 16 07/31/2017    CREATININE 0.53 07/31/2017    EGFRIFNONA 112 07/31/2017    BCR 30.2 (H) 07/31/2017     07/31/2017    K 4.3 08/02/2017     07/31/2017    CO2 28.4 07/31/2017    CALCIUM 9.1 07/31/2017    ALBUMIN 3.50 07/24/2017    LABIL2 0.9 (L) 07/24/2017    AST 40 (H) 07/24/2017    ALT 29 07/24/2017     No results found for: INR    No results found for: POCGLU       Medication Review:     Current Facility-Administered Medications:   •  acetaminophen (TYLENOL) tablet 975 mg, 975 mg, Oral, Q6H PRN, Samuel Duane Kreis, MD, 975 mg at 07/27/17 0846  •  bisacodyl (DULCOLAX) suppository 10 mg, 10 mg, Rectal, Daily PRN, Samuel Duane Kreis, MD  •  cefepime (MAXIPIME) 1 g/100 mL 0.9% NS IVPB (mbp), 1 g, Intravenous, Q12H, Samuel Duane Kreis, MD, 1 g at 08/03/17 0100  •  cyproheptadine (PERIACTIN) 4 MG tablet 4 mg, 4 mg, Oral, TID, Samuel Duane Kreis, MD, 4 mg at 08/03/17 0859  •  enoxaparin (LOVENOX) syringe 40 mg, 40 mg, Subcutaneous, Q24H, Samuel Duane Kreis, MD, 40 mg at 08/03/17 0858  •  ferrous sulfate tablet 325 mg, 325 mg, Oral, BID With Meals, Shant Rubio MD, 325 mg at 08/03/17 0858  •  gabapentin (NEURONTIN) capsule 100 mg, 100 mg, Oral, Q12H, Samuel Duane Kreis, MD, 100 mg at 08/03/17 0858  •  ipratropium-albuterol (DUO-NEB) nebulizer solution 3 mL, 3 mL, Nebulization, 4x Daily - RT, Samuel Duane Kreis, MD, 3 mL at 08/03/17 0731  •  lactulose (CHRONULAC) 10 GM/15ML solution 10 g, 10 g, Oral, TID, Samuel Duane Kreis, MD, 10 g at 08/03/17 0858  •  magnesium hydroxide (MILK OF MAGNESIA) suspension 2400 mg/10mL 10 mL, 10 mL, Oral, Daily PRN, Samuel Duane Kreis, MD  •  Magnesium Sulfate 2 gram Bolus, followed by 8 gram infusion (total Mg dose 10 grams)- Mg less than or equal to 1mg/dL, 2 g, Intravenous, PRN **OR** Magnesium Sulfate 6 gram  Infusion (2 gm x 3) -Mg 1.1 -1.5 mg/dL, 2 g, Intravenous, PRN **OR** magnesium sulfate 4 gram infusion- Mg 1.6-1.9 mg/dL, 4 g, Intravenous, PRN, Samuel Duane Kreis, MD  •  metroNIDAZOLE (FLAGYL) IVPB 500 mg, 500 mg, Intravenous, Q8H, Samuel Duane Kreis, MD, Last Rate: 100 mL/hr at 08/02/17 0355, 500 mg at 08/03/17 0422  •  multivitamin (DAILY BERNIE) tablet 1 tablet, 1 tablet, Oral, Daily, Samuel Duane Kreis, MD, 1 tablet at 08/03/17 0858  •  ondansetron (ZOFRAN) tablet 4 mg, 4 mg, Oral, Q6H PRN **OR** ondansetron ODT (ZOFRAN-ODT) disintegrating tablet 4 mg, 4 mg, Oral, Q6H PRN **OR** ondansetron (ZOFRAN) injection 4 mg, 4 mg, Intravenous, Q6H PRN, Samuel Duane Kreis, MD  •  pantoprazole (PROTONIX) EC tablet 40 mg, 40 mg, Oral, Q AM, Samuel Duane Kreis, MD, 40 mg at 08/03/17 0638  •  potassium & sodium phosphates (PHOS-NAK) 280-160-250 MG packet - for Phosphorus less than 1.25 mg/dL, 1 packet, Oral, Q6H PRN **OR** potassium & sodium phosphates (PHOS-NAK) 280-160-250 MG packet - for Phosphorus 1.25 - 2.1 mg/dL, 1 packet, Oral, Once PRN, Samuel Duane Kreis, MD  •  potassium chloride (KLOR-CON) packet 40 mEq, 40 mEq, Oral, PRN, Samuel Duane Kreis, MD  •  potassium chloride (MICRO-K) CR capsule 40 mEq, 40 mEq, Oral, PRN, Samuel Duane Kreis, MD  •  povidone-iodine (BETADINE) ointment, , Topical, Daily, Samuel Duane Kreis, MD  •  sodium chloride 0.9 % flush 10 mL, 10 mL, Intracatheter, PRN, Samuel Duane Kreis, MD  •  sodium chloride 0.9 % flush 10 mL, 10 mL, Intracatheter, Q12H, Samuel Duane Kreis, MD, 10 mL at 08/03/17 0900  •  sodium chloride 0.9 % flush 10 mL, 10 mL, Intracatheter, PRN, Samuel Duane Kreis, MD, 10 mL at 07/30/17 0846  •  vancomycin (VANCOCIN) 500 mg in sodium chloride 0.9 % 100 mL IVPB, 500 mg, Intravenous, Q12H, Madisyn Gayle [facility], 500 mg at 08/03/17 0858      Assessment/Plan     Debility  Aspiration pneumonia with recurrent sepsis-Stop antibiotics having completed >10 day course and will follow if not  improving  Cachexia with malnutrition- tube feeds currently, tolerating PO but very poor intake, mood better today  Anemia- stable, start iron  Mood is better and will follow with planned dc in 2 days, will follow in the near future.         PT and OT ongoing      Continue tube feeds via PEG      Shant Rubio MD  08/03/17  9:34 AM

## 2017-08-03 NOTE — THERAPY TREATMENT NOTE
Inpatient Rehabilitation - Occupational Therapy Treatment Note   [place]     Patient Name: Tamie Rodriguez  : 1940  MRN: [id number]  Today's Date: 8/3/2017  Onset of Illness/Injury or Date of Surgery Date: 17  Date of Referral to OT: 17  Referring Physician: Dr. Segovia      Admit Date: 2017    Visit Dx:   No diagnosis found.  Patient Active Problem List   Diagnosis   • Dehydration   • Debility             Adult Rehabilitation Note       17 1601 17 1522 17 1544    Rehab Assessment/Intervention    Discipline occupational therapist  -BF physical therapy assistant  -LL physical therapy assistant  -LL    Document Type therapy note (daily note)  -BF therapy note (daily note)  -LL therapy note (daily note)  -LL    Subjective Information agree to therapy;no complaints  -BF agree to therapy;no complaints  -LL no complaints;agree to therapy  -LL    Patient Effort, Rehab Treatment good  -BF good  -LL good  -LL    Precautions/Limitations fall precautions;other (see comments)   PEG, abd binder; soft diet w/ thin liquids; [place]  -BF fall precautions;other (see comments)   PEG, abdominal binder, soft diet with thing liquids, [place]  -LL fall precautions;other (see comments)   PEG, abdominal binder, soft diet with thin liquids, [place]  -LL    Patient Response to Treatment  Patient did well with therapy with adequate rest breaks  -LL Patient did well with therapy with adequate rest breaks  -LL    Recorded by [BF] Fadia Rubio, OT [LL] Tiara Alcala PTA [LL] Tiara Alcala PTA    Pain Assessment    Pain Assessment  Huffman-Baker FACES  -LL Huffman-Butt FACES  -LL    Huffman-Baker FACES Pain Rating  4  -LL 4  -LL    Pain Type  Chronic pain  -LL Chronic pain  -LL    Pain Location  Back  -LL Back  -LL    Pain Intervention(s)  Repositioned;Rest  -LL Repositioned;Rest  -LL    Recorded by  [LL] Tiara Alcala PTA [LL] Tiara Alcala PTA    Cognitive Assessment/Intervention    Current  Cognitive/Communication Assessment functional  -BF functional  -LL functional  -LL    Orientation Status oriented to;person;place  -BF oriented to;person;place  -LL oriented to;person;place  -LL    Follows Commands/Answers Questions 100% of the time;able to follow single-step instructions  -% of the time;able to follow single-step instructions  -% of the time;able to follow single-step instructions  -LL    Personal Safety  decreased awareness, need for assist;decreased awareness, need for safety  -LL decreased awareness, need for assist;decreased awareness, need for safety  -LL    Personal Safety Interventions  fall prevention program maintained;gait belt;nonskid shoes/slippers when out of bed  -LL fall prevention program maintained;gait belt;nonskid shoes/slippers when out of bed  -LL    Recorded by [BF] Fadia Rubio OT [LL] Tiara Alcala PTA [LL] Tiara Alcala PTA    Communication Assessment/Intervention    Communication Assessment  [place]  -LL [place]  -LL    Recorded by  [LL] Tiara Alcala PTA [LL] Tiara Alcala PTA    Bed Mobility, Assessment/Treatment    Bed Mobility, Assistive Device  bed rails  -LL bed rails  -LL    Bed Mobility, Scoot/Bridge, [place]  supervision required;verbal cues required;nonverbal cues required (demo/gesture)  -LL supervision required;verbal cues required;nonverbal cues required (demo/gesture)  -LL    Bed Mob, Supine to Sit, [place]  contact guard assist  -LL contact guard assist  -LL    Bed Mob, Sit to Supine, [place]  contact guard assist  -LL contact guard assist  -LL    Bed Mobility, Safety Issues  decreased use of arms for pushing/pulling;decreased use of legs for bridging/pushing  -LL decreased use of arms for pushing/pulling;decreased use of legs for bridging/pushing  -LL    Bed Mobility, Impairments  strength decreased;impaired balance;pain  -LL strength decreased;impaired balance;pain  -LL    Recorded by  [LL] Tiara Alcala PTA [LL]  Tiara Alcala PTA    Transfer Assessment/Treatment    Transfers, Bed-Chair [place] contact guard assist;verbal cues required  -BF verbal cues required;nonverbal cues required (demo/gesture);contact guard assist   CGA in AM; Min A in PM  -LL verbal cues required;nonverbal cues required (demo/gesture);contact guard assist   CGA in AM; Min A in PM  -LL    Transfers, Chair-Bed [place] contact guard assist;verbal cues required  -BF verbal cues required;nonverbal cues required (demo/gesture);contact guard assist  -LL verbal cues required;nonverbal cues required (demo/gesture);contact guard assist  -LL    Transfers, Bed-Chair-Bed, Assist Device other (see comments)   w/c arm rests  -BF rolling walker  -LL rolling walker  -LL    Transfers, Sit-Stand [place]  verbal cues required;nonverbal cues required (demo/gesture);contact guard assist  -LL verbal cues required;nonverbal cues required (demo/gesture);contact guard assist  -LL    Transfers, Stand-Sit [place]  verbal cues required;nonverbal cues required (demo/gesture);contact guard assist  -LL verbal cues required;nonverbal cues required (demo/gesture);contact guard assist  -LL    Transfers, Sit-Stand-Sit, Assist Device  rolling walker  -LL rolling walker  -LL    Toilet Transfer, [place] contact guard assist;verbal cues required  -BF      Toilet Transfer, Assistive Device elevated toilet seat;other (see comments)   grab bars  -BF      Transfer, Safety Issues  balance decreased during turns;step length decreased;knees buckling  -LL balance decreased during turns;step length decreased;knees buckling  -LL    Transfer, Impairments  strength decreased;impaired balance;coordination impaired  -LL strength decreased;impaired balance;coordination impaired  -LL    Recorded by [BF] Fadia Rubio OT [LL] Tiara Alcala PTA [LL] Tiara Alcala PTA    Gait Assessment/Treatment    Gait, [place] Level  verbal cues required;nonverbal cues required  (demo/gesture);minimum assist (75% patient effort)  -LL verbal cues required;nonverbal cues required (demo/gesture);minimum assist (75% patient effort)  -LL    Gait, Assistive Device  rolling walker  -LL rolling walker  -LL    Gait, Distance (Feet)  100   x 2  -LL 90   80, 60 then 40  -LL    Gait, Gait Pattern Analysis  swing-to gait  -LL swing-to gait  -LL    Gait, Gait Deviations  andrae decreased;decreased heel strike;step length decreased  -LL andrae decreased;forward flexed posture;step length decreased  -LL    Gait, Safety Issues  balance decreased during turns;step length decreased  -LL balance decreased during turns;step length decreased  -LL    Gait, Impairments  strength decreased;impaired balance;coordination impaired  -LL strength decreased;impaired balance;coordination impaired  -LL    Recorded by  [LL] Tiara Alcala PTA [LL] Tiara Alcala PTA    Lower Body Bathing Assessment/Training    LB Bathing Assess/Train, Position sitting  -BF      LB Bathing Assess/Train, [place] Level stand by assist;verbal cues required  -BF      LB Bathing Assess/Train, Comment TB bathing: SBA  -BF      Recorded by [BF] Fadia Rubio OT      Upper Body Dressing Assessment/Training    UB Dressing Assess/Train, Position sitting  -BF      UB Dressing Assess/Train, [place] set up required  -BF      UB Dressing Assess/Train, Comment Set-up  -BF      Recorded by [BF] Fadia Rubio OT      Lower Body Dressing Assessment/Training    LB Dressing Assess/Train, Position sitting  -BF      LB Dressing Assess/Train, [place] set up required;supervision required;verbal cues required   SBA  -BF      LB Dressing Assess/Train, Comment SBA  -BF      Recorded by [BF] Fadia Rubio OT      Toileting Assessment/Training    Toileting Assess/Train, Assistive Device raised toilet seat;grab bars  -BF      Toileting Assess/Train, [place] Level set up required;supervision required;verbal cues required  -BF       Toileting Assess/Train, Comment SBA  -BF      Recorded by [BF] Fadia Rubio, ELIDIA      Grooming Assessment/Training    Grooming Assess/Train, Position sitting  -BF      Grooming Assess/Train, [place] Level set up required;supervision required  -BF      Grooming Assess/Train, Comment Set-up/supervision  -BF      Recorded by [BF] Fadia Rubio, ELIDIA      Balance Skills Training    Sitting-Level of Assistance  Close supervision  -LL Close supervision  -LL    Sitting-Balance Support  Feet supported  -LL Feet supported  -LL    Sitting-Balance Activities  Reaching for objects;Reaching across midline   Sitting balloon tap  -LL Reaching for objects;Reaching across midline   Sitting balloon tap  -LL    Standing-Level of Assistance  Contact guard  -LL Contact guard  -LL    Static Standing Balance Support  assistive device  -LL assistive device  -LL    Gait Balance-Level of Assistance  Minimum assistance  -LL Minimum assistance  -LL    Gait Balance Support  assistive device  -LL assistive device  -LL    Recorded by  [LL] Tiara Alcala PTA [LL] Tiara Alcala PTA    Therapy Exercises    Bilateral Lower Extremities  AROM:;sitting;standing;supine  -LL AROM:;sitting;standing;supine  -LL    BLE Resistance  theraband  -LL theraband  -LL    Bilateral Upper Extremity AROM:;sitting   BUE GMC/FMC ther act; reaching; light strengthening  -BF      BUE Resistance other (comment)   Arm bike 2 mins X2; Handgripper 20 reps  -BF      Recorded by [BF] Fadia Rubio OT [LL] Tiara Alcala PTA [LL] Tiara Alcala PTA    Positioning and Restraints    Pre-Treatment Position  in bed  -LL in bed  -LL    Post Treatment Position  bed  -LL bed  -LL    In Bed supine;call light within reach;encouraged to call for assist  -BF supine;call light within reach;encouraged to call for assist;side rails up x2;heels elevated  -LL supine;call light within reach;encouraged to call for assist;heels elevated  -LL    Recorded by [BF]  Fadia Rubio OT [LL] Tiara Alcala PTA [LL] Tiara Alcala PTA      08/02/17 1516 08/01/17 1449 08/01/17 1417    Rehab Assessment/Intervention    Discipline occupational therapist  -BF physical therapy assistant  -LL occupational therapist  -BF    Document Type therapy note (daily note)  -BF therapy note (daily note)  -LL therapy note (daily note)  -BF    Subjective Information agree to therapy;no complaints  -BF agree to therapy;no complaints  -LL agree to therapy;no complaints  -BF    Patient Effort, Rehab Treatment good  -BF good  -LL good  -BF    Precautions/Limitations fall precautions;other (see comments)   PEG, abd binder; soft diet w/ thin liquids; [place]  -BF fall precautions;other (see comments)   PEG, abdominal binder, soft diet with thin liquids, [place]  -LL fall precautions;other (see comments)   PEG, abd binder; soft diet w/ thin liquids; [place]  -BF    Patient Response to Treatment  Patient did well with PT with adequate rest breaks  -LL     Recorded by [BF] Fadia Rubio OT [LL] Tiara Alcala PTA [BF] Fadia Rbuio, OT    Pain Assessment    Pain Assessment  Huffman-Baker FACES  -LL     Huffman-Baker FACES Pain Rating  4  -LL     Pain Type  Chronic pain  -LL     Pain Location  Back  -LL     Pain Intervention(s)  Repositioned;Rest  -LL     Recorded by  [LL] Tiara Alcala PTA     Cognitive Assessment/Intervention    Current Cognitive/Communication Assessment functional  -BF functional  -LL functional  -BF    Orientation Status oriented to;person;place;situation  -BF oriented to;person;place  -LL oriented x 4  -BF    Follows Commands/Answers Questions 100% of the time;able to follow single-step instructions  -% of the time;able to follow single-step instructions  -% of the time;able to follow single-step instructions  -BF    Personal Safety  decreased awareness, need for assist;decreased awareness, need for safety  -LL     Personal Safety Interventions  fall prevention  program maintained;gait belt;nonskid shoes/slippers when out of bed  -LL     Recorded by [BF] Fadia Rubio OT [LL] Tiara Alcala PTA [BF] Fadia Rubio OT    Communication Assessment/Intervention    Communication Assessment  [place]  -LL     Recorded by  [LL] Tiara Alcala PTA     Bed Mobility, Assessment/Treatment    Bed Mobility, Assistive Device  bed rails  -LL     Bed Mobility, Scoot/Bridge, [place]  supervision required;verbal cues required;nonverbal cues required (demo/gesture)  -LL     Bed Mob, Supine to Sit, [place]  minimum assist (75% patient effort);contact guard assist  -LL     Bed Mob, Sit to Supine, [place]  minimum assist (75% patient effort);contact guard assist  -LL     Bed Mobility, Safety Issues  decreased use of arms for pushing/pulling;decreased use of legs for bridging/pushing  -LL     Bed Mobility, Impairments  strength decreased;impaired balance;pain  -LL     Recorded by  [LL] Tiara Alcala PTA     Transfer Assessment/Treatment    Transfers, Bed-Chair [place] contact guard assist;verbal cues required  -BF verbal cues required;nonverbal cues required (demo/gesture);minimum assist (75% patient effort);contact guard assist   CGA in AM; Min A in PM  -LL minimum assist (75% patient effort);contact guard assist;verbal cues required  -BF    Transfers, Chair-Bed [place] contact guard assist;verbal cues required  -BF verbal cues required;nonverbal cues required (demo/gesture);minimum assist (75% patient effort);contact guard assist  -LL minimum assist (75% patient effort);contact guard assist;verbal cues required  -BF    Transfers, Bed-Chair-Bed, Assist Device bed rails;other (see comments)   w/c arm rests  -BF rolling walker  -LL other (see comments)   w/c arm rests  -BF    Transfers, Sit-Stand [place]  verbal cues required;nonverbal cues required (demo/gesture);minimum assist (75% patient effort);contact guard assist  -LL     Transfers, Stand-Sit  [place]  verbal cues required;nonverbal cues required (demo/gesture);minimum assist (75% patient effort);contact guard assist  -LL     Transfers, Sit-Stand-Sit, Assist Device  rolling walker  -LL     Transfer, Safety Issues  balance decreased during turns;step length decreased;knees buckling  -LL     Transfer, Impairments  strength decreased;impaired balance;coordination impaired  -LL     Recorded by [BF] Fadia Rubio OT [LL] Tiara Alcala PTA [BF] Fadia Rubio, ELIDIA    Gait Assessment/Treatment    Gait, [place] Level  verbal cues required;nonverbal cues required (demo/gesture);minimum assist (75% patient effort);2 person assist required  -LL     Gait, Assistive Device  rolling walker  -LL     Gait, Distance (Feet)  80   x 3 then 60  -LL     Gait, Gait Pattern Analysis  swing-to gait  -LL     Gait, Gait Deviations  andrae decreased;forward flexed posture;step length decreased  -LL     Gait, Safety Issues  balance decreased during turns;step length decreased  -LL     Gait, Impairments  strength decreased;impaired balance;coordination impaired  -LL     Recorded by  [LL] Tiara Alcala PTA     Balance Skills Training    Sitting-Level of Assistance  Close supervision  -LL     Sitting-Balance Support  Feet supported  -LL     Sitting-Balance Activities  Reaching for objects;Reaching across midline   Sitting balloon tap  -LL     Standing-Level of Assistance  Minimum assistance;Contact guard  -LL     Static Standing Balance Support  assistive device  -LL     Gait Balance-Level of Assistance  Minimum assistance  -LL     Gait Balance Support  assistive device  -LL     Recorded by  [LL] Tiara Alcala PTA     Therapy Exercises    Bilateral Lower Extremities  AROM:;sitting;standing;supine  -LL     BLE Resistance  theraband  -LL     Bilateral Upper Extremity AROM:;sitting   BUE GMC/FMC ther act; reaching; light strengthening  -BF  AROM:;sitting   BUE GMC/FMC ther act; reaching; light strengthening   -BF    BUE Resistance theraputty  -BF  theraputty   Wrist rolls X1  -BF    Recorded by [BF] Fadia Rubio OT [LL] Tiara Alcala PTA [BF] Fadia Rubio OT    Positioning and Restraints    Pre-Treatment Position  --   WC with OT  -LL     Post Treatment Position bed  -BF bed  -LL     In Bed supine;call light within reach;encouraged to call for assist   After both sessions  -BF supine;call light within reach;encouraged to call for assist;side rails up x2;heels elevated  -LL supine;call light within reach;encouraged to call for assist   In PM  -BF    In Wheelchair   sitting;with PT   In AM  -BF    Recorded by [BF] Fadia Rubio OT [LL] Tiara Alcala PTA [BF] Fadia Rubio OT      07/31/17 1616          Rehab Assessment/Intervention    Discipline occupational therapist  -BC      Document Type therapy note (daily note)  -BC      Subjective Information agree to therapy;complains of;fatigue  -BC      Patient Effort, Rehab Treatment good  -BC      Patient Response to Treatment Pt. tolerated both AM/PM session well with rest breaks provided as needed  -BC      Recorded by [BC] Anyi Sands OT      Cognitive Assessment/Intervention    Current Cognitive/Communication Assessment functional  -BC      Orientation Status oriented to;person;place;situation  -BC      Follows Commands/Answers Questions 100% of the time  -BC      Personal Safety Interventions fall prevention program maintained  -BC      Recorded by [BC] Anyi Sands OT      Upper Body Dressing Assessment/Training    UB Dressing Assess/Train, Clothing Type donning:   dressing gown  -BC      UB Dressing Assess/Train, Position sitting;supported sitting  -BC      UB Dressing Assess/Train, [place] set up required  -BC      UB Dressing Assess/Train, Impairments strength decreased;coordination impaired  -BC      UB Dressing Assess/Train, Comment [place]  -BC      Recorded by [BC] Anyi Sands OT      Grooming  Assessment/Training    Grooming Assess/Train, Position sitting  -BC      Grooming Assess/Train, [place] Level set up required;minimum assist (75% patient effort)  -BC      Grooming Assess/Train, Impairments strength decreased;coordination impaired  -BC      Recorded by [BC] [facility], OT      Self-Feeding Assessment/Training    Self-Feeding Assess/Train, Comment set up with meals, tube feeding graded to night time only.  -BC      Recorded by [[facility], OT      Therapy Exercises    Bilateral Upper Extremity AROM:;10 reps;20 reps;sitting   BUE ther ex/aact, GMC/FMC  -BC      Recorded by [BC] [facility], OT      Positioning and Restraints    Pre-Treatment Position sitting in chair/recliner  -BC      Post Treatment Position bed  -BC      In Bed notified nsg;call light within reach;encouraged to call for assist;supine  -BC      Recorded by [[facility], OT        User Key  (r) = Recorded By, (t) = Taken By, (c) = Cosigned By    Initials Name Effective Dates    BF Fadia Rubio, OT 03/14/16 -      Tiara Alcala, PTA 05/02/16 -     [facility], OT 01/21/17 -                 OT Goals       07/28/17 1424 07/28/17 1422 07/22/17 1224    Transfer Training OT STG    Transfer Training OT STG, Date Established   07/22/17  -    Transfer Training OT STG, Time to Achieve   1 wk  -    Transfer Training OT STG, [place] Level   moderate assist (50% patient effort)  -    Transfer Training OT STG, Outcome  goal met  -     Transfer Training OT LTG    Transfer Training OT LTG, Date Established   07/22/17  -    Transfer Training OT LTG, Time to Achieve   by discharge  -    Transfer Training OT LTG, [place] Level   minimum assist (75% patient effort)  -    Patient Education OT LTG    Patient Education OT LTG, Date Established   07/22/17  -    Patient Education OT LTG, Time to Achieve   by discharge  -    Patient Education OT LTG, Education Type   written program;home  safety  -CJ    Bathing OT LTG    Bathing Goal OT LTG, Date Established   07/22/17  -    Bathing Goal OT LTG, Time to Achieve   by discharge  -    Bathing Goal OT LTG, [place] Level   moderate assist (50% patient effort)  -    Toileting OT STG    Toileting Goal OT STG, Date Established 07/28/17  -CJ 07/22/17  -CJ    Toileting Goal OT STG, Time to Achieve 1 wk  -CJ  1 wk  -    Toileting Goal OT STG, [place] Level minimum assist (75% patient effort)  -  maximum assist (25% patient effort)  -    Toileting Goal OT STG, Outcome  goal met  -     Toileting OT LTG    Toileting Goal OT LTG, Date Established   07/22/17  -CJ    Toileting Goal OT LTG, Time to Achieve   by discharge  -    Toileting Goal OT LTG, [place] Level   moderate assist (50% patient effort)  -    LB Dressing OT STG    LB Dressing Goal OT STG, Date Established 07/28/17  -CJ 07/22/17  -    LB Dressing Goal OT STG, Time to Achieve 1 wk  -CJ  1 wk  -    LB Dressing Goal OT STG, [place] Level contact guard assist;minimum assist (75% patient effort)  -  maximum assist (25% patient effort)  -    LB Dressing Goal OT STG, Outcome  goal met  -     LB Dressing OT LTG    LB Dressing Goal OT LTG, Date Established   07/22/17  -    LB Dressing Goal OT LTG, Time to Achieve   by discharge  -    LB Dressing Goal OT LTG, [place] Level   moderate assist (50% patient effort)  -      User Key  (r) = Recorded By, (t) = Taken By, (c) = Cosigned By    Initials Name Provider Type    FABY Hoyt OT Occupational Therapist          Occupational Therapy Education     Title: PT OT SLP Therapies (Active)     Topic: Occupational Therapy (Active)     Point: ADL training (Done)    Description: Instruct learner(s) on proper safety adaptation and remediation techniques during self care or transfers.   Instruct in proper use of assistive devices.    Learning Progress Summary    Learner Readiness Method Response Comment  Documented by Status   Patient Acceptance E DU,NR  BF 08/03/17 1607 Done    Acceptance E VU,NR  BF 08/02/17 1520 Done    Acceptance E,D NR  BF 08/01/17 1420 Active    Acceptance E NR  BC 07/31/17 1622 Active    Acceptance E,D VU,NR   07/28/17 1421 Done    Acceptance E,D NR,VU   07/27/17 1442 Done    Acceptance E,D NR,VU   07/26/17 1233 Done    Acceptance E,D NR   07/25/17 1511 Active    Acceptance E,D NR,VU  TM 07/25/17 1224 Done    Acceptance E,D NR  AB 07/24/17 1541 Active    Acceptance E,D NR   07/24/17 1501 Active    Acceptance E NR  RT 07/22/17 1408 Active    Acceptance E,D NR   07/22/17 1223 Active               Point: Home exercise program (Active)    Description: Instruct learner(s) on appropriate technique for monitoring, assisting and/or progressing therapeutic exercises/activities.    Learning Progress Summary    Learner Readiness Method Response Comment Documented by Status   Patient Acceptance E NR  RT 07/22/17 1408 Active               Point: Precautions (Done)    Description: Instruct learner(s) on prescribed precautions during self-care and functional transfers.    Learning Progress Summary    Learner Readiness Method Response Comment Documented by Status   Patient Acceptance E DU,NR   08/03/17 1607 Done    Acceptance E VU,NR   08/02/17 1520 Done    Acceptance E,D VU,NR   07/28/17 1421 Done    Acceptance E,D NR,VU   07/27/17 1442 Done    Acceptance E,D NR,VU   07/26/17 1233 Done    Acceptance E,D NR   07/25/17 1511 Active    Acceptance E,D NR,VU  TM 07/25/17 1224 Done    Acceptance E,D NR  AB 07/24/17 1541 Active    Acceptance E,D NR   07/24/17 1501 Active    Acceptance E NR  RT 07/22/17 1408 Active    Acceptance E,D NR   07/22/17 1223 Active                      User Key     Initials Effective Dates Name Provider Type Discipline    AB 02/04/16 -  Zarina Dean, OT Occupational Therapist OT     03/14/16 -  Fadia Rubio, OT Occupational Therapist OT     CJ 03/14/16 -  Hyacinth Hoyt, OT Occupational Therapist OT    TM 03/14/16 -  Jennifer Lazcano, OT Occupational Therapist OT    RT 03/14/16 -  Shant Jim, PT Physical Therapist PT    BC 01/21/17 -  Anyi Sands, OT Occupational Therapist OT                  OT Recommendation and Plan  Anticipated Equipment Needs At Discharge:  (TBD)  Anticipated Discharge Disposition: home with 24/7 care  Planned Therapy Interventions: activity intolerance, adaptive equipment training, ADL retraining, IADL retraining, fine motor coordination training, home exercise program, motor coordination training, ROM (Range of Motion), strengthening, transfer training, other (see comments) (therapeutic group activities as deemed beneficial)  Therapy Frequency: 3-5 times/wk          Time Calculation:         Time Calculation- OT       08/03/17 1607          Time Calculation- OT    OT Start Time 0915  -BF      OT Stop Time 1045  -BF      OT Time Calculation (min) 90 min  -BF      Total Timed Code Minutes- OT 90 minute(s)  -BF      OT Non-Billable Time (min) 30 min  -BF        User Key  (r) = Recorded By, (t) = Taken By, (c) = Cosigned By    Initials Name Provider Type    BF Fadia Rubio, OT Occupational Therapist           Therapy Charges for Today     Code Description Service Date Service Provider Modifiers Qty    56357532574 HC OT THER SUPP EA 15 MIN 8/2/2017 Fadia Rubio OT GO 1    63873001623 HC OT SELF CARE/MGMT/TRAIN EA 15 MIN 8/2/2017 Fadia Rubio OT GO 1    [id number] HC OT THERAPEUTIC ACT EA 15 MIN 8/2/2017 Fadia Rubio OT GO 5    [id number] HC OT THER SUPP EA 15 MIN 8/3/2017 Fadia Rubio, OT GO 1    [id number] HC OT THERAPEUTIC ACT EA 15 MIN 8/3/2017 Fadia Rubio OT GO 3    90650408717 HC OT SELF CARE/MGMT/TRAIN EA 15 MIN 8/3/2017 Fadia Rubio OT GO 3               Fadia Rubio OT  8/3/2017

## 2017-08-03 NOTE — PROGRESS NOTES
Inpatient Rehabilitation Functional Measures Assessment    Functional Measures  CATHY Eating:  CATHY Grooming:  CATHY Bathing:  CATHY Upper Body Dressing:  CATHY Lower Body Dressing:  CATHY Toileting:    CATHY Bladder Management  Level of Assistance:  Frequency/Number of Accidents this Shift:    CATHY Bowel Management  Level of Assistance:  Frequency/Number of Accidents this Shift:    CATHY Bed/Chair/Wheelchair Transfer:  Bed/chair/wheelchair Transfer Score = 4.  Patient performs 75% or more of effort and minimal assistance (little/incidental  help/lifting of one limb/steadying) for transferring to and from the  bed/chair/wheelchair, requiring: Contact guard. Steadying. Patient requires the  following assistive device(s): Arm rest. Bed rails.  CATHY Toilet Transfer:  CATHY Tub/Shower Transfer:    Previously Documented Mode of Locomotion at Discharge:  CATHY Expected Mode of Locomotion at Discharge:  CATHY Walk/Wheelchair:  WHEELCHAIR OBSERVATION   Activity was not observed.    WALK OBSERVATION   Walk Distance Scale = 2.  Distance walked is 50 -149 feet. Walk Score = 2.  Patient performs 75% or more of effort and requires minimal assistance.  Incidental assistance, contact guard or steadying was provided. Patient walked a  distance of 100 feet. Patient requires the following assistive device(s):  Rolling walker.  CATHY Stairs:  Activity was not observed.    CATHY Comprehension:  CATHY Expression:  CATHY Social Interaction:  CATHY Problem Solving:  CATHY Memory:    Therapy Mode Minutes  Occupational Therapy:  Physical Therapy: Individual: 90 minutes.  Speech Language Pathology:    Discharge Functional Goals:    Signed by: Tiara Alcala PTA

## 2017-08-03 NOTE — THERAPY TREATMENT NOTE
Inpatient Rehabilitation - Physical Therapy Treatment Note   [place]     Patient Name: Tamie Rodriguez  : 1940  MRN: [id number]  Today's Date: 8/3/2017  Onset of Illness/Injury or Date of Surgery Date: 17  Date of Referral to PT: 17  Referring Physician: Dr. Segovia    Admit Date: 2017    Visit Dx:  No diagnosis found.  Patient Active Problem List   Diagnosis   • Dehydration   • Debility               Adult Rehabilitation Note       17 1522 17 1544 17 1516    Rehab Assessment/Intervention    Discipline physical therapy assistant  -LL physical therapy assistant  -LL occupational therapist  -BF    Document Type therapy note (daily note)  -LL therapy note (daily note)  -LL therapy note (daily note)  -BF    Subjective Information agree to therapy;no complaints  -LL no complaints;agree to therapy  -LL agree to therapy;no complaints  -BF    Patient Effort, Rehab Treatment good  -LL good  -LL good  -BF    Precautions/Limitations fall precautions;other (see comments)   PEG, abdominal binder, soft diet with thing liquids, [place]  -LL fall precautions;other (see comments)   PEG, abdominal binder, soft diet with thin liquids, [place]  -LL fall precautions;other (see comments)   PEG, abd binder; soft diet w/ thin liquids; [place]  -BF    Patient Response to Treatment Patient did well with therapy with adequate rest breaks  -LL Patient did well with therapy with adequate rest breaks  -LL     Recorded by [LL] Tiara Alcala PTA [LL] Tiara Alcala PTA [BF] Fadia Rubio, OT    Pain Assessment    Pain Assessment Huffman-Butt FACES  -LL Huffman-Butt FACES  -LL     Huffman-Baker FACES Pain Rating 4  -LL 4  -LL     Pain Type Chronic pain  -LL Chronic pain  -LL     Pain Location Back  -LL Back  -LL     Pain Intervention(s) Repositioned;Rest  -LL Repositioned;Rest  -LL     Recorded by [LL] Tiara Alcala PTA [LL] Tiara Alcala PTA     Cognitive Assessment/Intervention    Current  Cognitive/Communication Assessment functional  -LL functional  -LL functional  -BF    Orientation Status oriented to;person;place  -LL oriented to;person;place  -LL oriented to;person;place;situation  -BF    Follows Commands/Answers Questions 100% of the time;able to follow single-step instructions  -% of the time;able to follow single-step instructions  -% of the time;able to follow single-step instructions  -BF    Personal Safety decreased awareness, need for assist;decreased awareness, need for safety  -LL decreased awareness, need for assist;decreased awareness, need for safety  -LL     Personal Safety Interventions fall prevention program maintained;gait belt;nonskid shoes/slippers when out of bed  -LL fall prevention program maintained;gait belt;nonskid shoes/slippers when out of bed  -LL     Recorded by [LL] Tiara Alcala PTA [LL] Tiara Alcala PTA [BF] Fadia Rubio OT    Communication Assessment/Intervention    Communication Assessment [place]  -LL [place]  -LL     Recorded by [LL] Tiara Alcala PTA [LL] Tiara Alcala PTA     Bed Mobility, Assessment/Treatment    Bed Mobility, Assistive Device bed rails  -LL bed rails  -LL     Bed Mobility, Scoot/Bridge, [place] supervision required;verbal cues required;nonverbal cues required (demo/gesture)  -LL supervision required;verbal cues required;nonverbal cues required (demo/gesture)  -LL     Bed Mob, Supine to Sit, [place] contact guard assist  -LL contact guard assist  -LL     Bed Mob, Sit to Supine, [place] contact guard assist  -LL contact guard assist  -LL     Bed Mobility, Safety Issues decreased use of arms for pushing/pulling;decreased use of legs for bridging/pushing  -LL decreased use of arms for pushing/pulling;decreased use of legs for bridging/pushing  -LL     Bed Mobility, Impairments strength decreased;impaired balance;pain  -LL strength decreased;impaired balance;pain  -LL     Recorded by [LL] Tiara Alcala PTA  [LL] Tiara Alcala PTA     Transfer Assessment/Treatment    Transfers, Bed-Chair [place] verbal cues required;nonverbal cues required (demo/gesture);contact guard assist   CGA in AM; Min A in PM  -LL verbal cues required;nonverbal cues required (demo/gesture);contact guard assist   CGA in AM; Min A in PM  -LL contact guard assist;verbal cues required  -BF    Transfers, Chair-Bed [place] verbal cues required;nonverbal cues required (demo/gesture);contact guard assist  -LL verbal cues required;nonverbal cues required (demo/gesture);contact guard assist  -LL contact guard assist;verbal cues required  -BF    Transfers, Bed-Chair-Bed, Assist Device rolling walker  -LL rolling walker  -LL bed rails;other (see comments)   w/c arm rests  -BF    Transfers, Sit-Stand [place] verbal cues required;nonverbal cues required (demo/gesture);contact guard assist  -LL verbal cues required;nonverbal cues required (demo/gesture);contact guard assist  -LL     Transfers, Stand-Sit [place] verbal cues required;nonverbal cues required (demo/gesture);contact guard assist  -LL verbal cues required;nonverbal cues required (demo/gesture);contact guard assist  -LL     Transfers, Sit-Stand-Sit, Assist Device rolling walker  -LL rolling walker  -LL     Transfer, Safety Issues balance decreased during turns;step length decreased;knees buckling  -LL balance decreased during turns;step length decreased;knees buckling  -LL     Transfer, Impairments strength decreased;impaired balance;coordination impaired  -LL strength decreased;impaired balance;coordination impaired  -LL     Recorded by [LL] Tiara Alcala PTA [LL] Tiara Alcala PTA [BF] Fadia Rubio OT    Gait Assessment/Treatment    Gait, [place] Level verbal cues required;nonverbal cues required (demo/gesture);minimum assist (75% patient effort)  - verbal cues required;nonverbal cues required (demo/gesture);minimum assist (75% patient effort)  -      Gait, Assistive Device rolling walker  -LL rolling walker  -LL     Gait, Distance (Feet) 100   x 2  -LL 90   80, 60 then 40  -LL     Gait, Gait Pattern Analysis swing-to gait  -LL swing-to gait  -LL     Gait, Gait Deviations andrae decreased;decreased heel strike;step length decreased  -LL andrae decreased;forward flexed posture;step length decreased  -LL     Gait, Safety Issues balance decreased during turns;step length decreased  -LL balance decreased during turns;step length decreased  -LL     Gait, Impairments strength decreased;impaired balance;coordination impaired  -LL strength decreased;impaired balance;coordination impaired  -LL     Recorded by [LL] Tiara Alcala PTA [LL] Tiara Alcala PTA     Balance Skills Training    Sitting-Level of Assistance Close supervision  -LL Close supervision  -LL     Sitting-Balance Support Feet supported  -LL Feet supported  -LL     Sitting-Balance Activities Reaching for objects;Reaching across midline   Sitting balloon tap  -LL Reaching for objects;Reaching across midline   Sitting balloon tap  -LL     Standing-Level of Assistance Contact guard  -LL Contact guard  -LL     Static Standing Balance Support assistive device  -LL assistive device  -LL     Gait Balance-Level of Assistance Minimum assistance  -LL Minimum assistance  -LL     Gait Balance Support assistive device  -LL assistive device  -LL     Recorded by [LL] Tiara Alcala PTA [LL] Tiara Alcala PTA     Therapy Exercises    Bilateral Lower Extremities AROM:;sitting;standing;supine  -LL AROM:;sitting;standing;supine  -LL     BLE Resistance theraband  -LL theraband  -LL     Bilateral Upper Extremity   AROM:;sitting   BUE [facility] ther act; reaching; light strengthening  -BF    BUE Resistance   theraputty  -BF    Recorded by [LL] Tiara Alcala PTA [LL] Tiara Alcala PTA [BF] Fadia Rubio, ELIDIA    Positioning and Restraints    Pre-Treatment Position in bed  -LL in bed  -LL     Post Treatment Position  bed  -LL bed  -LL bed  -BF    In Bed supine;call light within reach;encouraged to call for assist;side rails up x2;heels elevated  -LL supine;call light within reach;encouraged to call for assist;heels elevated  -LL supine;call light within reach;encouraged to call for assist   After both sessions  -BF    Recorded by [LL] Tiara Alcala PTA [LL] Tiara Alcala PTA [BF] Fadia Rubio, OT      08/01/17 1449 08/01/17 1417 07/31/17 1616    Rehab Assessment/Intervention    Discipline physical therapy assistant  -LL occupational therapist  -BF occupational therapist  -BC    Document Type therapy note (daily note)  -LL therapy note (daily note)  -BF therapy note (daily note)  -BC    Subjective Information agree to therapy;no complaints  -LL agree to therapy;no complaints  -BF agree to therapy;complains of;fatigue  -BC    Patient Effort, Rehab Treatment good  -LL good  -BF good  -BC    Precautions/Limitations fall precautions;other (see comments)   PEG, abdominal binder, soft diet with thin liquids, [place]  -LL fall precautions;other (see comments)   PEG, abd binder; soft diet w/ thin liquids; [place]  -BF     Patient Response to Treatment Patient did well with PT with adequate rest breaks  -LL  Pt. tolerated both AM/PM session well with rest breaks provided as needed  -BC    Recorded by [LL] Tiara Alcala PTA [BF] Fadia Rubio, OT [BC] Anyi Sands, OT    Pain Assessment    Pain Assessment Huffman-Butt FACES  -LL      Huffman-Baker FACES Pain Rating 4  -LL      Pain Type Chronic pain  -LL      Pain Location Back  -LL      Pain Intervention(s) Repositioned;Rest  -LL      Recorded by [LL] Tiara Alcala PTA      Cognitive Assessment/Intervention    Current Cognitive/Communication Assessment functional  -LL functional  -BF functional  -BC    Orientation Status oriented to;person;place  -LL oriented x 4  -BF oriented to;person;place;situation  -BC    Follows Commands/Answers Questions 100% of the time;able to  follow single-step instructions  -% of the time;able to follow single-step instructions  - 100% of the time  -BC    Personal Safety decreased awareness, need for assist;decreased awareness, need for safety  -      Personal Safety Interventions fall prevention program maintained;gait belt;nonskid shoes/slippers when out of bed  -  fall prevention program maintained  -BC    Recorded by [LL] Tiara Alcala PTA [BF] Fadia Rubio, OT [BC] Anyi Sands OT    Communication Assessment/Intervention    Communication Assessment [place]  -LL      Recorded by [LL] Tiara Alcala PTA      Bed Mobility, Assessment/Treatment    Bed Mobility, Assistive Device bed rails  -      Bed Mobility, Scoot/Bridge, [place] supervision required;verbal cues required;nonverbal cues required (demo/gesture)  -LL      Bed Mob, Supine to Sit, [place] minimum assist (75% patient effort);contact guard assist  -      Bed Mob, Sit to Supine, [place] minimum assist (75% patient effort);contact guard assist  -      Bed Mobility, Safety Issues decreased use of arms for pushing/pulling;decreased use of legs for bridging/pushing  -      Bed Mobility, Impairments strength decreased;impaired balance;pain  -      Recorded by [LL] Tiara Alcala PTA      Transfer Assessment/Treatment    Transfers, Bed-Chair [place] verbal cues required;nonverbal cues required (demo/gesture);minimum assist (75% patient effort);contact guard assist   CGA in AM; Min A in PM  - minimum assist (75% patient effort);contact guard assist;verbal cues required  -BF     Transfers, Chair-Bed [place] verbal cues required;nonverbal cues required (demo/gesture);minimum assist (75% patient effort);contact guard assist  - minimum assist (75% patient effort);contact guard assist;verbal cues required  -BF     Transfers, Bed-Chair-Bed, Assist Device rolling walker  - other (see comments)   w/c arm rests  -BF     Transfers, Sit-Stand  [place] verbal cues required;nonverbal cues required (demo/gesture);minimum assist (75% patient effort);contact guard assist  -LL      Transfers, Stand-Sit [place] verbal cues required;nonverbal cues required (demo/gesture);minimum assist (75% patient effort);contact guard assist  -LL      Transfers, Sit-Stand-Sit, Assist Device rolling walker  -LL      Transfer, Safety Issues balance decreased during turns;step length decreased;knees buckling  -LL      Transfer, Impairments strength decreased;impaired balance;coordination impaired  -LL      Recorded by [LL] Tiara Alcala PTA [BF] Fadia Rubio OT     Gait Assessment/Treatment    Gait, [place] Level verbal cues required;nonverbal cues required (demo/gesture);minimum assist (75% patient effort);2 person assist required  -LL      Gait, Assistive Device rolling walker  -LL      Gait, Distance (Feet) 80   x 3 then 60  -LL      Gait, Gait Pattern Analysis swing-to gait  -LL      Gait, Gait Deviations andrae decreased;forward flexed posture;step length decreased  -LL      Gait, Safety Issues balance decreased during turns;step length decreased  -LL      Gait, Impairments strength decreased;impaired balance;coordination impaired  -LL      Recorded by [LL] Tiara Alcala PTA      Upper Body Dressing Assessment/Training    UB Dressing Assess/Train, Clothing Type   donning:   dressing gown  -BC    UB Dressing Assess/Train, Position   sitting;supported sitting  -BC    UB Dressing Assess/Train, [place]   set up required  -BC    UB Dressing Assess/Train, Impairments   strength decreased;coordination impaired  -BC    UB Dressing Assess/Train, Comment   [place]  -BC    Recorded by   [BC] Anyi Sands, OT    Grooming Assessment/Training    Grooming Assess/Train, Position   sitting  -BC    Grooming Assess/Train, [place] Level   set up required;minimum assist (75% patient effort)  -BC    Grooming Assess/Train, Impairments   strength  decreased;coordination impaired  -BC    Recorded by   [BC] Anyi Sands OT    Self-Feeding Assessment/Training    Self-Feeding Assess/Train, Comment   set up with meals, tube feeding graded to night time only.  -BC    Recorded by   [BC] Anyi Sands OT    Balance Skills Training    Sitting-Level of Assistance Close supervision  -LL      Sitting-Balance Support Feet supported  -LL      Sitting-Balance Activities Reaching for objects;Reaching across midline   Sitting balloon tap  -LL      Standing-Level of Assistance Minimum assistance;Contact guard  -LL      Static Standing Balance Support assistive device  -LL      Gait Balance-Level of Assistance Minimum assistance  -LL      Gait Balance Support assistive device  -LL      Recorded by [LL] Tiara Alcala PTA      Therapy Exercises    Bilateral Lower Extremities AROM:;sitting;standing;supine  -LL      BLE Resistance theraband  -LL      Bilateral Upper Extremity  AROM:;sitting   BUE [facility] ther act; reaching; light strengthening  -BF AROM:;10 reps;20 reps;sitting   BUE ther ex/aact, [facility]  -BC    BUE Resistance  theraputty   Wrist rolls X1  -BF     Recorded by [LL] Tiara Alcala PTA [BF] Fadia Rubio OT [BC] Anyi Sands OT    Positioning and Restraints    Pre-Treatment Position --   WC with OT  -LL  sitting in chair/recliner  -BC    Post Treatment Position bed  -LL  bed  -BC    In Bed supine;call light within reach;encouraged to call for assist;side rails up x2;heels elevated  -LL supine;call light within reach;encouraged to call for assist   In PM  -BF notified nsg;call light within reach;encouraged to call for assist;supine  -BC    In Wheelchair  sitting;with PT   In AM  -BF     Recorded by [LL] Tiara Alcala PTA [BF] Fadia Rubio OT [BC] Anyi Sands OT      User Key  (r) = Recorded By, (t) = Taken By, (c) = Cosigned By    Initials Name Effective Dates    BF Fadia Rubio OT 03/14/16 -     LL Tiara Alcala PTA  05/02/16 -     [facility], OT 01/21/17 -                 IP PT Goals       07/28/17 1543 07/22/17 1413       Bed Mobility PT STG    Bed Mobility PT STG, Date Established  07/22/17  -RT     Bed Mobility PT STG, Time to Achieve  1 wk  -RT     Bed Mobility PT STG, Activity Type  all bed mobility  -RT     Bed Mobility PT STG, [place] Level  minimum assist (75% patient effort);moderate assist (50% patient effort)  -RT     Bed Mobility PT STG, Date Goal Reviewed 07/28/17  -LL      Bed Mobility PT STG, Outcome goal met  -LL      Bed Mobility PT LTG    Bed Mobility PT LTG, Date Established  07/22/17  -RT     Bed Mobility PT LTG, Time to Achieve  2 wks  -RT     Bed Mobility PT LTG, Activity Type  all bed mobility  -RT     Bed Mobility PT LTG, [place] Level  contact guard assist  -RT     Bed Mobility PT LTG, Date Goal Reviewed 07/28/17  -LL      Bed Mobility PT LTG, Outcome goal ongoing  -LL      Transfer Training PT STG    Transfer Training PT STG, Date Established  07/22/17  -RT     Transfer Training PT STG, Time to Achieve  1 wk  -RT     Transfer Training PT STG, Activity Type  all transfers  -RT     Transfer Training PT STG, [place] Level  minimum assist (75% patient effort);moderate assist (50% patient effort)  -RT     Transfer Training PT STG, Assist Device  walker, rolling  -RT     Transfer Training PT STG, Date Goal Reviewed 07/28/17  -LL      Transfer Training PT STG, Outcome goal met  -LL      Transfer Training PT LTG    Transfer Training PT LTG, Date Established  07/22/17  -RT     Transfer Training PT LTG, Time to Achieve  2 wks  -RT     Transfer Training PT LTG, Activity Type  all transfers  -RT     Transfer Training PT LTG, [place] Level  contact guard assist  -RT     Transfer Training PT LTG, Assist Device  walker, rolling  -RT     Transfer Training PT  LTG, Date Goal Reviewed 07/28/17  -LL      Transfer Training PT LTG, Outcome goal ongoing  -LL      Gait Training PT STG    Gait  Training Goal PT STG, Date Established  07/22/17  -RT     Gait Training Goal PT STG, Time to Achieve  1 wk  -RT     Gait Training Goal PT STG, [place] Level  moderate assist (50% patient effort);2 person assist required;minimum assist (75% patient effort)  -RT     Gait Training Goal PT STG, Assist Device  walker, rolling  -RT     Gait Training Goal PT STG, Date Goal Reviewed 07/28/17  -LL      Gait Training Goal PT STG, Outcome goal met  -LL      Gait Training PT LTG    Gait Training Goal PT LTG, Date Established  07/22/17  -RT     Gait Training Goal PT LTG, Time to Achieve  2 wks  -RT     Gait Training Goal PT LTG, [place] Level  contact guard assist  -RT     Gait Training Goal PT LTG, Assist Device  walker, rolling  -RT     Gait Training Goal PT LTG, Distance to Achieve  50  -RT     Gait Training Goal PT LTG, Date Goal Reviewed 07/28/17  -LL      Gait Training Goal PT LTG, Outcome goal ongoing  -LL        User Key  (r) = Recorded By, (t) = Taken By, (c) = Cosigned By    Initials Name Provider Type    KASHMIR Alcala, PTA Physical Therapy Assistant    RT Shant Jim, PT Physical Therapist          Physical Therapy Education     Title: PT OT SLP Therapies (Active)     Topic: Physical Therapy (Done)     Point: Mobility training (Done)    Learning Progress Summary    Learner Readiness Method Response Comment Documented by Status   Patient Acceptance E,D VU,NR  LL 08/03/17 1528 Done    Acceptance E,D VU,NR  LL 08/02/17 1549 Done    Acceptance E,D VU,NR  LL 08/01/17 1453 Done    Acceptance E,D VU,NR  LL 07/31/17 1523 Done    Acceptance E,D VU,NR  LL 07/28/17 1542 Done    Acceptance E,D VU,NR  LL 07/27/17 1705 Done    Acceptance E,D NR,VU  AG 07/26/17 1749 Done    Acceptance E NR  FH 07/25/17 2055 Active    Acceptance E VU,NR  LB 07/25/17 1745 Done    Acceptance D,E NR  AG 07/24/17 1706 Active               Point: Home exercise program (Done)    Learning Progress Summary    Learner Readiness Method  Response Comment Documented by Status   Patient Acceptance E,D VU,NR   08/03/17 1528 Done    Acceptance E,D VU,NR   08/02/17 1549 Done    Acceptance E,D VU,NR   08/01/17 1453 Done    Acceptance E,D VU,NR   07/31/17 1523 Done    Acceptance E,D VU,NR   07/28/17 1542 Done    Acceptance E,D VU,NR   07/27/17 1705 Done    Acceptance E,D NR,VU  AG 07/26/17 1749 Done    Acceptance E NR   07/25/17 2055 Active    Acceptance E VU,NR   07/25/17 1745 Done    Acceptance D,E NR  AG 07/24/17 1706 Active               Point: Body mechanics (Done)    Learning Progress Summary    Learner Readiness Method Response Comment Documented by Status   Patient Acceptance E,D VU,NR   08/03/17 1528 Done    Acceptance E,D VU,NR   08/02/17 1549 Done    Acceptance E,D VU,NR   08/01/17 1453 Done    Acceptance E,D VU,NR   07/31/17 1523 Done    Acceptance E,D VU,NR   07/28/17 1542 Done    Acceptance E,D VU,NR   07/27/17 1705 Done    Acceptance E,D NR,VU   07/26/17 1749 Done    Acceptance E [facility] 07/25/17 2055 Active    Acceptance E VU,NR   07/25/17 1745 Done    Acceptance D,E NR   07/24/17 1706 Active               Point: Precautions (Done)    Learning Progress Summary    Learner Readiness Method Response Comment Documented by Status   Patient Acceptance E,D VU,NR   08/03/17 1528 Done    Acceptance E,D VU,NR   08/02/17 1549 Done    Acceptance E,D VU,NR   08/01/17 1453 Done    Acceptance E,D VU,NR   07/31/17 1523 Done    Acceptance E,D VU,NR   07/28/17 1542 Done    Acceptance E,D VU,NR   07/27/17 1705 Done    Acceptance E,D NR,VU   07/26/17 1749 Done    Acceptance E NR   07/25/17 2055 Active    Acceptance E VU,NR   07/25/17 1745 Done    Acceptance D,E NR  AG 07/24/17 1706 Active                      User Key     Initials Effective Dates Name Provider Type Discipline     06/16/16 -  Shahrzad Varela, RN Registered Nurse Nurse    LB 03/14/16 -  Chrystal Galvan, PT Physical Therapist PT    AG  03/14/16 -  Herminia Estrada, PT Physical Therapist PT     05/02/16 -  Tiara Alcala PTA Physical Therapy Assistant PT                    PT Recommendation and Plan  Planned Therapy Interventions: balance training, bed mobility training, gait training, home exercise program, lumbar stabilization, manual therapy techniques, neuromuscular re-education, patient/family education, postural re-education, ROM (Range of Motion), stair training, wheelchair management/propulsion training  PT Frequency: 5 times/wk, per priority policy            Time Calculation:         PT Charges       08/03/17 1528          Time Calculation    Start Time 0745  -LL      Stop Time 0915  -LL      Time Calculation (min) 90 min  -LL      PT Received On 08/03/17  -LL      PT Goal Re-Cert Due Date 08/04/17  -LL        User Key  (r) = Recorded By, (t) = Taken By, (c) = Cosigned By    Initials Name Provider Type     Tiara Alcala PTA Physical Therapy Assistant          Therapy Charges for Today     Code Description Service Date Service Provider Modifiers Qty    90441318776 HC GAIT TRAINING EA 15 MIN 8/2/2017 Tiara Alcaal PTA GP 1    86361728231 HC PT THER PROC EA 15 MIN 8/2/2017 Tiara Alcala PTA GP 5    47773090182 HC PT THER SUPP EA 15 MIN 8/2/2017 Tiara Alcala PTA GP 1    15837758962 HC GAIT TRAINING EA 15 MIN 8/3/2017 Tiara Alcala PTA GP 1    95744906904 HC PT THER PROC EA 15 MIN 8/3/2017 Tiara Alcala PTA GP 5    90916766629 HC PT THER SUPP EA 15 MIN 8/3/2017 Tiara Alcala PTA GP 2               Em Alcala PTA  8/3/2017

## 2017-08-03 NOTE — PROGRESS NOTES
Inpatient Rehabilitation Functional Measures Assessment    Functional Measures  CATHY Eating:  CATHY Grooming:  CATHY Bathing:  CATHY Upper Body Dressing:  CATHY Lower Body Dressing:  CATHY Toileting:    CATHY Bladder Management  Level of Assistance:  Bladder Score = 5.  Patient is supervision/set-up for  bladder management, requiring: Emptying equipment. Setting out equipment.  Patient requires the following assistive device(s):  Bedpan.  Frequency/Number of Accidents this Shift:  Bladder accidents this shift:  0 .  Patient has not had an accident, but used a device/medication this shift  requiring: bedpan .    CATHY Bowel Management  Level of Assistance: Bowel Score = 7.  Patient is completely independent for  bowel management.  Patient did not have bowel movement.  No  medication/intervention was provided.  Frequency/Number of Accidents this Shift:    CATHY Bed/Chair/Wheelchair Transfer:  CATHY Toilet Transfer:  Toilet Transfer was not observed this shift because  patient used bedpan/urinal only.  CATHY Tub/Shower Transfer:    Previously Documented Mode of Locomotion at Discharge:  [facility] Expected Mode of Locomotion at Discharge:  [facility] Walk/Wheelchair:  [facility] Stairs:    [facility] Comprehension:  [facility] Expression:  [facility] Social Interaction:  [facility] Problem Solving:  [facility] Memory:    Therapy Mode Minutes  Occupational Therapy:  Physical Therapy:  Speech Language Pathology:    Discharge Functional Goals:    Signed by: BABAK Varela

## 2017-08-04 VITALS
HEIGHT: 64 IN | WEIGHT: 82 LBS | TEMPERATURE: 98.2 F | SYSTOLIC BLOOD PRESSURE: 129 MMHG | HEART RATE: 117 BPM | BODY MASS INDEX: 14 KG/M2 | DIASTOLIC BLOOD PRESSURE: 71 MMHG | OXYGEN SATURATION: 98 % | RESPIRATION RATE: 20 BRPM

## 2017-08-04 PROCEDURE — 97535 SELF CARE MNGMENT TRAINING: CPT

## 2017-08-04 PROCEDURE — 94799 UNLISTED PULMONARY SVC/PX: CPT

## 2017-08-04 PROCEDURE — 97110 THERAPEUTIC EXERCISES: CPT

## 2017-08-04 PROCEDURE — 25010000002 ENOXAPARIN PER 10 MG: Performed by: FAMILY MEDICINE

## 2017-08-04 PROCEDURE — 97530 THERAPEUTIC ACTIVITIES: CPT

## 2017-08-04 RX ORDER — PANTOPRAZOLE SODIUM 40 MG/1
40 TABLET, DELAYED RELEASE ORAL DAILY
Qty: 30 TABLET | Refills: 0 | Status: SHIPPED | OUTPATIENT
Start: 2017-08-04 | End: 2018-01-24

## 2017-08-04 RX ORDER — MULTIVITAMIN
1 TABLET ORAL DAILY
Qty: 30 TABLET | Refills: 0 | Status: SHIPPED | OUTPATIENT
Start: 2017-08-04 | End: 2017-10-16 | Stop reason: ALTCHOICE

## 2017-08-04 RX ORDER — FERROUS SULFATE 325(65) MG
325 TABLET ORAL 2 TIMES DAILY WITH MEALS
Qty: 60 TABLET | Refills: 0 | Status: SHIPPED | OUTPATIENT
Start: 2017-08-04 | End: 2018-01-24

## 2017-08-04 RX ORDER — GABAPENTIN 100 MG/1
100 CAPSULE ORAL EVERY 12 HOURS SCHEDULED
Qty: 60 CAPSULE | Refills: 0 | Status: SHIPPED | OUTPATIENT
Start: 2017-08-04 | End: 2018-01-24

## 2017-08-04 RX ORDER — POVIDONE-IODINE 10 MG/G
OINTMENT TOPICAL DAILY
Qty: 28.35 G | Refills: 0 | Status: SHIPPED | OUTPATIENT
Start: 2017-08-04 | End: 2017-08-04

## 2017-08-04 RX ADMIN — PANTOPRAZOLE SODIUM 40 MG: 40 TABLET, DELAYED RELEASE ORAL at 05:34

## 2017-08-04 RX ADMIN — GABAPENTIN 100 MG: 100 CAPSULE ORAL at 08:33

## 2017-08-04 RX ADMIN — ENOXAPARIN SODIUM 40 MG: 40 INJECTION SUBCUTANEOUS at 08:34

## 2017-08-04 RX ADMIN — IPRATROPIUM BROMIDE AND ALBUTEROL SULFATE 3 ML: .5; 3 SOLUTION RESPIRATORY (INHALATION) at 00:07

## 2017-08-04 RX ADMIN — CYPROHEPTADINE HYDROCHLORIDE 4 MG: 4 TABLET ORAL at 08:33

## 2017-08-04 RX ADMIN — POVIDONE-IODINE: 100 OINTMENT TOPICAL at 08:34

## 2017-08-04 RX ADMIN — Medication 1 TABLET: at 08:33

## 2017-08-04 RX ADMIN — FERROUS SULFATE TAB 325 MG (65 MG ELEMENTAL FE) 325 MG: 325 (65 FE) TAB at 08:33

## 2017-08-04 RX ADMIN — LACTULOSE 10 G: 20 SOLUTION ORAL at 08:33

## 2017-08-04 RX ADMIN — IPRATROPIUM BROMIDE AND ALBUTEROL SULFATE 3 ML: .5; 3 SOLUTION RESPIRATORY (INHALATION) at 07:28

## 2017-08-04 RX ADMIN — Medication 10 ML: at 08:34

## 2017-08-04 NOTE — PROGRESS NOTES
Inpatient Rehabilitation Functional Measures Assessment    Functional Measures  CATHY Eating:  Eating Score = 5. Patient is supervision/set-up for eating,  requiring: Opening containers. No assistive devices were required.  CATHY Grooming: Grooming Score = 5. Patient is supervision/set-up for grooming,  requiring: Initial preparation. Opening containers. No assistive devices were  required.  CATHY Bathing:  Patient took sponge bath. Bathing Score = 4.  Patient requires  minimal assistance for bathing, requiring steadying for balance only. Patient  requires the following assistive device(s): Grab bar/arm rest to maintain  balance.  CATHY Upper Body Dressing:  Upper Body Dressing Score = 4. Patient requires  minimal assistance for upper body dressing, requiring incidental help only (such  as task initiation or assist with buttons/zips/snaps). No assistive devices were  required.  CATHY Lower Body Dressing:  Patient requires moderate/considerable physical  assistance for gathering clothes. Wearing underwear or an undergarment is not  applicable for this patient. Patient requires minimal/incidental assistance for  threading the right leg through the pants/skirt. Patient requires  minimal/incidental assistance for threading the left leg through the  pants/skirt. Patient requires minimal/incidental physical assistance for pulling  pants/skirt over hips and adjusting fasteners. Patient requires  minimal/incidental physical assistance for donning and/or doffing right sock.  Patient requires minimal/incidental physical assistance for donning and/or  doffing left sock. Donning and/or doffing right shoe is not applicable for this  patient. Donning and/or doffing left shoe is not applicable for this patient.  Patient performs 70.83 % of lower body dressing tasks. Lower Body Dressing Score  = 3, Moderate Assistance. No assistive devices were required.  CATHY Toileting:  Toileting Score = 4.  Patient requires minimal assistance  for  toileting, such as steadying for balance while cleansing or adjusting clothes.  Patient requires the following assistive device(s): Grab bar.    CATHY Bladder Management  Level of Assistance:  Bladder Score = 5.  Patient is supervision/set-up for  bladder management, requiring: Setting out equipment. Emptying equipment.  Patient requires the following assistive device(s):  Bedside commode.  Frequency/Number of Accidents this Shift:  Bladder accidents this shift:  0 .  Patient has not had an accident this shift.    CATHY Bowel Management  Level of Assistance: Bowel Score = 5.  Patient is supervision/set-up for bowel  management, requiring: No assistive devices were required.  Frequency/Number of Accidents this Shift: Bowel accidents this shift: 0 .  Patient has not had an accident this shift.    CATHY Bed/Chair/Wheelchair Transfer:  Bed/chair/wheelchair Transfer Score = 4.  Patient performs 75% or more of effort and minimal assistance (little/incidental  help/lifting of one limb/steadying) for transferring to and from the  bed/chair/wheelchair, requiring: Steadying. Contact guard. Patient requires the  following assistive device(s): Walker.  CATHY Toilet Transfer:  Toilet Transfer Score = 4.  Patient performs 75% or more  of effort and minimal assistance (little/incidental help/steadying) for  transferring to and from the toilet/commode, requiring: Steadying. Contact  guard. Patient requires the following assistive device(s): Grab bars. Safety  frame/over the toilet.  CATHY Tub/Shower Transfer:    Previously Documented Mode of Locomotion at Discharge:  [facility] Expected Mode of Locomotion at Discharge:  [facility] Walk/Wheelchair:  [facility] Stairs:    [facility] Comprehension:  [facility] Expression:  [facility] Social Interaction:  [facility] Problem Solving:  CATHY Memory:    Therapy Mode Minutes  Occupational Therapy:  Physical Therapy:  Speech Language Pathology:    Discharge Functional Goals:    Signed by: BABAK Mathias

## 2017-08-04 NOTE — PLAN OF CARE
Problem: Inpatient Physical Therapy  Goal: Bed Mobility Goal LTG- PT  Outcome: Outcome(s) achieved Date Met:  08/04/17 08/04/17 1429   Bed Mobility PT LTG   Bed Mobility PT LTG, [place] contact guard assist   Bed Mobility PT LTG, Outcome goal met   Bed Mobility PT LTG, Reason Goal Not Met discharged from facility       Goal: Transfer Training Goal 1 LTG- PT  Outcome: Outcome(s) achieved Date Met:  08/04/17 08/04/17 1429   Transfer Training PT LTG   Transfer Training PT LTG, [place] contact guard assist   Transfer Training PT LTG, Outcome goal met   Transfer Training PT LTG, Reason Goal Not Met discharged from facility       Goal: Gait Training Goal LTG- PT  Outcome: Outcome(s) achieved Date Met:  08/04/17 08/04/17 1429   Gait Training PT LTG   Gait Training Goal PT LTG, Outcome goal met   Gait Training Goal PT LTG, Reason Goal Not Met discharged from facility

## 2017-08-04 NOTE — PROGRESS NOTES
Inpatient Rehabilitation Functional Measures Assessment    Functional Measures  CATHY Eating:  CATHY Grooming:  CATHY Bathing:  CATHY Upper Body Dressing:  CATHY Lower Body Dressing:  CATHY Toileting:    CATHY Bladder Management  Level of Assistance:  Bladder Score = 1. Patient performs less than 25% of tasks  and requires total assist or assist of two for use of bedpan in bladder  management.  Frequency/Number of Accidents this Shift:  Bladder accidents this shift:  0 .  Patient has not had an accident, but used a device/medication this shift  requiring: bedpan .    CATHY Bowel Management  Level of Assistance: Bowel Score = 7.  Patient is completely independent for  bowel management.  Patient did not have bowel movement.  No  medication/intervention was provided.  Frequency/Number of Accidents this Shift: Bowel accidents this shift: 0 .  Patient has not had an accident this shift.    [facility] Bed/Chair/Wheelchair Transfer:  [facility] Toilet Transfer:  Toilet Transfer was not observed this shift because  patient used bedpan/urinal only.  CATHY Tub/Shower Transfer:    Previously Documented Mode of Locomotion at Discharge:  [facility] Expected Mode of Locomotion at Discharge:  [facility] Walk/Wheelchair:  [facility] Stairs:    [facility] Comprehension:  [facility] Expression:  [facility] Social Interaction:  [facility] Problem Solving:  [facility] Memory:    Therapy Mode Minutes  Occupational Therapy:  Physical Therapy:  Speech Language Pathology:    Discharge Functional Goals:    Signed by: BABAK Varela

## 2017-08-04 NOTE — PLAN OF CARE
Problem: Patient Care Overview (Adult)  Goal: Plan of Care Review  Outcome: Ongoing (interventions implemented as appropriate)    08/04/17 1009   Coping/Psychosocial Response Interventions   Plan Of Care Reviewed With patient   Patient Care Overview   Progress improving       Goal: Adult Individualization and Mutuality  Outcome: Ongoing (interventions implemented as appropriate)  Goal: Discharge Needs Assessment  Outcome: Ongoing (interventions implemented as appropriate)    Problem: Activity Intolerance (Adult)  Goal: Activity Tolerance  Outcome: Outcome(s) achieved Date Met:  08/04/17    Problem: Fall Risk (Adult)  Goal: Absence of Falls  Outcome: Outcome(s) achieved Date Met:  08/04/17    Problem: Skin Integrity Impairment, Risk/Actual (Adult)  Goal: Skin Integrity/Wound Healing  Outcome: Outcome(s) achieved Date Met:  08/04/17    Problem: Pressure Ulcer Risk (Victor Hugo Scale) (Adult,Obstetrics,Pediatric)  Goal: Skin Integrity  Outcome: Outcome(s) achieved Date Met:  08/04/17

## 2017-08-04 NOTE — SIGNIFICANT NOTE
08/04/17 1053   Case Management/Social Work Plan   Plan SS contacted [facility]  [phone] per Theron about discharge today.  HH will start care today and request family contact them after pt arrives home.  Faxed discharge summary to  [phone].  Spoke to pt, spouse and daughter Kami about discharge and to contact HH when they arrive home.  Daughter states tube feeding was delivered to their home and instructed to give pt 5 cans per pm.  Daughter says she figured up pt has a two week supply.   Contacted [facility] [phone] per Gabby who states she will call back.

## 2017-08-04 NOTE — PLAN OF CARE
Problem: Patient Care Overview (Adult)  Goal: Plan of Care Review  Outcome: Ongoing (interventions implemented as appropriate)    08/04/17 0902   Coping/Psychosocial Response Interventions   Plan Of Care Reviewed With patient   Patient Care Overview   Progress improving       Goal: Adult Individualization and Mutuality  Outcome: Ongoing (interventions implemented as appropriate)  Goal: Discharge Needs Assessment  Outcome: Ongoing (interventions implemented as appropriate)    Problem: Activity Intolerance (Adult)  Goal: Activity Tolerance  Outcome: Ongoing (interventions implemented as appropriate)    Problem: Fall Risk (Adult)  Goal: Absence of Falls  Outcome: Ongoing (interventions implemented as appropriate)    Problem: Skin Integrity Impairment, Risk/Actual (Adult)  Goal: Skin Integrity/Wound Healing  Outcome: Ongoing (interventions implemented as appropriate)    Problem: Pressure Ulcer Risk (Victor Hugo Scale) (Adult,Obstetrics,Pediatric)  Goal: Skin Integrity  Outcome: Ongoing (interventions implemented as appropriate)

## 2017-08-04 NOTE — PROGRESS NOTES
Inpatient Rehabilitation Functional Measures Assessment    Functional Measures  CATHY Eating:  CATHY Grooming:  CATHY Bathing:  CATHY Upper Body Dressing:  CATHY Lower Body Dressing:  CATHY Toileting:    CATHY Bladder Management  Level of Assistance:  Frequency/Number of Accidents this Shift:    CATHY Bowel Management  Level of Assistance:  Frequency/Number of Accidents this Shift:    CATHY Bed/Chair/Wheelchair Transfer:  Bed/chair/wheelchair Transfer Score = 5.  Patient is supervision/set-up for transferring to and from the  bed/chair/wheelchair, requiring: Stand by assistance. Patient requires the  following assistive device(s): Bed rails.  CATHY Toilet Transfer:  CATHY Tub/Shower Transfer:    Previously Documented Mode of Locomotion at Discharge:  CATHY Expected Mode of Locomotion at Discharge:  CATHY Walk/Wheelchair:  WHEELCHAIR OBSERVATION   Activity was not observed.    WALK OBSERVATION   Activity was not observed.  CATHY Stairs:  Activity was not observed.    CATHY Comprehension:  CATHY Expression:  CATHY Social Interaction:  CATHY Problem Solving:  CATHY Memory:    Therapy Mode Minutes  Occupational Therapy:  Physical Therapy: Individual: 25 minutes.  Speech Language Pathology:    Discharge Functional Goals:    Signed by: Kortney Bower PTA

## 2017-08-04 NOTE — PROGRESS NOTES
Patient Assessment Instrument  Quality Indicators - Discharge    Section GG. Self-Care Performance      Section GG. Mobility Performance     Roll Left and Right: [place] provides verbal cues or touching/steadying  assistance as patient completes activity.   Sit to Lying: [place] provides verbal cues or touching/steadying assistance as  patient completes activity.   Lying to Sitting on Side of Bed: [place] provides verbal cues or  touching/steadying assistance as patient completes activity.   Sit to Stand: [place] provides verbal cues or touching/steadying assistance as  patient completes activity.   Sit to Stand: [place] provides verbal cues or touching/steadying assistance as  patient completes activity.   Chair/Bed to Chair Transfer: [place] provides verbal cues or touching/steadying  assistance as patient completes activity.   Toilet Transfer [place] provides verbal cues or touching/steadying assistance as  patient completes activity.   Car Transfer: Not attempted due to medical or safety concerns.    Patient Walks:  Yes   Walk 10 Feet: [place] provides verbal cues or touching/steadying assistance as  patient completes activity.   Walk 50 Feet With 2 Turns: [place] provides verbal cues or touching/steadying  assistance as patient completes activity.   Walk 150 Feet: Not attempted due to medical or safety concerns.   Walking 10 Feet on Uneven Surfaces: Not attempted due to medical or safety  concerns.   1 Step Over Curb or Up/Down Stair: Not attempted due to medical or safety  concerns.   4 Steps Up and Down, With/Without Rail: Not attempted due to medical or safety  concerns.   12 Steps Up and Down, With/Without Rail: Not attempted due to medical or safety  concerns.   Picking up an Object: Not attempted due to medical or safety concerns.     Uses Wheelchair/Scooter: No    Section J. Health Conditions Discharge      Section M. Skin Conditions Discharge      . Current Number of Unhealed Pressure Ulcers      .  Worsening in Pressure Ulcer Status Since Admission      . Healed Pressure Ulcer(s)  (Voluntary)      Section O. Special Treatments, Procedures, and Programs Discharge  . Influenza Vaccine - Discharge      Date Influenza Vaccine Received (enter date ONLY if received in this unit;  otherwise, skip this entry)      OPTIONAL BRANCH FOR UNPLANNED DISCHARGES  Unplanned Discharge:    Signed by: Kortney Bower PTA

## 2017-08-04 NOTE — PROGRESS NOTES
Inpatient Rehabilitation Functional Measures Assessment and Plan of Care    Plan of Care    Mobility    Walk (Active)  Current Status (7/21/2017 2:00:00 PM): risk for falls  Weekly Goal: no falls this shift  Discharge Goal: no falls this stay    Safety    Potential for Elopement (Active)  Current Status (7/24/2017 9:30:00 AM): no falls  Weekly Goal: no falls this admission  Discharge Goal: no falls this admission    Body Systems    Integumentary (Active)  Current Status (7/21/2017 2:00:00 PM): risk for skin breakdown  Weekly Goal:  no breakdown thiss shift  Discharge Goal: no breakdown this stay  Functional Measures  CATHY Eating:  CATHY Grooming:  CATHY Bathing:  CATHY Upper Body Dressing:  CATHY Lower Body Dressing:  CATHY Toileting:    CATHY Bladder Management  Level of Assistance:  Frequency/Number of Accidents this Shift:    CATHY Bowel Management  Level of Assistance:  Frequency/Number of Accidents this Shift:    CATHY Bed/Chair/Wheelchair Transfer:  CATHY Toilet Transfer:  CATHY Tub/Shower Transfer:    Previously Documented Mode of Locomotion at Discharge:  CATHY Expected Mode of Locomotion at Discharge:  CATHY Walk/Wheelchair:  CATHY Stairs:    CATHY Comprehension:  Auditory comprehension is the usual mode. Comprehension  Score = 6, Modified [place].  Patient comprehends complex/abstract  information in their primary language, requiring: Additional time.  CATHY Expression:  Vocal expression is the usual mode. Expression Score = 6,  Modified Independent.  Patient expresses complex/abstract information in their  primary language, requiring: Additional time.  CATHY Social Interaction:  Social Interaction Score = 6, Modified Independent.  Patient is modified independent for social interaction, requiring: Requires  additional time.  CATHY Problem Solving:  Problem Solving Score = 6, Modified [place].  Patient  makes appropriate decisions in order to solve complex problems, but requires  extra time.  CATHY Memory:  Memory Score = 6, Modified  [place].  Patient is modified  independent for memory, requiring: Requires additional time.    Therapy Mode Minutes  Occupational Therapy:  Physical Therapy:  Speech Language Pathology:    Discharge Functional Goals:    Signed by: Lisa Barbosa RN

## 2017-08-04 NOTE — SIGNIFICANT NOTE
08/04/17 1104   Case Management/Social Work Plan   Plan SS received call from Gabby with [facility] who states pt is suppose to get 3 cans of Osmolite 1.5 in one liter bag and they owe pt 8 cans which will be provided before end of month.  SS reviewed this information with pt, spouse and daughter who voiced understanding.  HH will assist with getting tube feeding started at home this evening.

## 2017-08-04 NOTE — PROGRESS NOTES
Patient Assessment Instrument  Quality Indicators - Discharge    Section GG. Self-Care Performance      Section GG. Mobility Performance      Section J. Health Conditions Discharge  Fall(s) Since Admission:  No    Section M. Skin Conditions Discharge  Unhealed Pressure Ulcer(s) at Stage 1 or Higher:  No    . Current Number of Unhealed Pressure Ulcers      . Worsening in Pressure Ulcer Status Since Admission      . Healed Pressure Ulcer(s)  (Voluntary)    Number of Healed Stage 1: 0  Number of Healed Stage 2: 0  Number of Healed Stage 3: 0  Number of Healed Stage 4: 0    Section O. Special Treatments, Procedures, and Programs Discharge  . Influenza Vaccine - Discharge  Received in this facility for this year's influenza vaccination season:  No.  Influenza Vaccine Not Received Due To: Received outside of this facility.    Date Influenza Vaccine Received (enter date ONLY if received in this unit;  otherwise, skip this entry)      OPTIONAL BRANCH FOR UNPLANNED DISCHARGES  Unplanned Discharge:    Signed by: Lisa Barbosa RN

## 2017-08-04 NOTE — SIGNIFICANT NOTE
08/04/17 1431   PT Discharge Summary   Reason for Discharge Discharge from facility   Outcomes Achieved Able to achieve all goals within established timeline   Discharge Destination Home with assist;Home with home health

## 2017-08-04 NOTE — PLAN OF CARE
Problem: Inpatient Occupational Therapy  Goal: Transfer Training Goal 1 LTG- OT  Outcome: Outcome(s) achieved Date Met:  08/04/17 08/04/17 1039   Transfer Training OT LTG   Transfer Training OT LTG, Outcome goal met       Goal: Patient Education Goal LTG- OT  Outcome: Outcome(s) achieved Date Met:  08/04/17 08/04/17 1039   Patient Education OT LTG   Patient Education OT LTG Outcome goal met       Goal: Bathing Goal LTG- OT  Outcome: Outcome(s) achieved Date Met:  08/04/17 08/04/17 1039   Bathing OT LTG   Bathing Goal OT LTG, Outcome goal met       Goal: Toileting Goal LTG- OT  Outcome: Outcome(s) achieved Date Met:  08/04/17 08/04/17 1039   Toileting OT LTG   Toileting Goal OT LTG, Outcome goal met       Goal: LB Dressing Goal LTG- OT  Outcome: Outcome(s) achieved Date Met:  08/04/17 08/04/17 1039   LB Dressing OT LTG   LB Dressing Goal OT LTG, Outcome goal met       Goal: Toileting Goal STG- OT  Outcome: Outcome(s) achieved Date Met:  08/04/17 08/04/17 1039   Toileting OT STG   Toileting Goal OT STG, Outcome goal met       Goal: LB Dressing Goal STG- OT  Outcome: Outcome(s) achieved Date Met:  08/04/17 08/04/17 1039   LB Dressing OT STG   LB Dressing Goal OT STG, Outcome goal met

## 2017-08-04 NOTE — SIGNIFICANT NOTE
08/04/17 1053   Case Management/Social Work Plan   Plan SS contacted [facility]  [phone] per Theron about discharge today.  HH will start care today and request family contact them after pt arrives home.  Faxed discharge summary to  [phone].  Spoke to pt, spouse and daughter Kami about discharge and to contact HH when they arrive home.  Daughter states tube feeding was delivered to their home and pt has a two week supply.  Contacted [facility] [phone] per Gabby who states she will call back.

## 2017-08-04 NOTE — PROGRESS NOTES
Rehabilitation Nursing  Inpatient Rehabilitation Functional Measures Assessment and Plan of Care    Plan of Care/Interventions  Mobility    Walk (Active)  Current Status (7/21/2017 2:00:00 PM): risk for falls  Weekly Goal: no falls this shift  Discharge Goal: no falls this stay    Safety    Potential for Elopement (Active)  Current Status (7/24/2017 9:30:00 AM): no falls  Weekly Goal: no falls this admission  Discharge Goal: no falls this admission    Body Systems    Integumentary (Active)  Current Status (7/21/2017 2:00:00 PM): risk for skin breakdown  Weekly Goal:  no breakdown thiss shift  Discharge Goal: no breakdown this stay  Functional Measures  CATHY Eating:  CATHY Grooming:  CATHY Bathing:  CATHY Upper Body Dressing:  CATHY Lower Body Dressing:  CATHY Toileting:    CATHY Bladder Management  Level of Assistance:  Frequency/Number of Accidents this Shift:    CATHY Bowel Management  Level of Assistance:  Frequency/Number of Accidents this Shift:    CATHY Bed/Chair/Wheelchair Transfer:  CATHY Toilet Transfer:  CATHY Tub/Shower Transfer:    Previously Documented Mode of Locomotion at Discharge:  CATHY Expected Mode of Locomotion at Discharge:  CATHY Walk/Wheelchair:  CATHY Stairs:    CATHY Comprehension:  Both ( auditory and visual) modes of comprehension are used  equally. Comprehension Score = 6, Modified [place].  Patient comprehends  complex/abstract information in their primary language, requiring: Additional  time.  CATHY Expression:  Both ( vocal and non-vocal) modes of expression are used  equally. Expression Score = 6,  Modified Independent. Patient expresses  complex/abstract information in their primary language, requiring: Additional  time.  CATHY Social Interaction:  Social Interaction Score = 6, Modified Independent.  Patient is modified independent for social interaction, requiring: Requires  additional time.  CATHY Problem Solving:  Problem Solving Score = 6, Modified [place].  Patient  makes appropriate decisions in  order to solve complex problems, but requires  extra time.  CATHY Memory:  Memory Score = 6, Modified [place].  Patient is modified  independent for memory, requiring:    Signed by: Toña Alves Nurse

## 2017-08-04 NOTE — THERAPY DISCHARGE NOTE
Inpatient Rehabilitation - Occupational Therapy Treatment Note/Discharge   [place]     Patient Name: Tamie Rodriguez  : 1940  MRN: [id number]  Today's Date: 2017  Onset of Illness/Injury or Date of Surgery Date: 17  Date of Referral to OT: 17  Referring Physician: Dr. Segovia      Admit Date: 2017    Visit Dx:   No diagnosis found.  Patient Active Problem List   Diagnosis   • Dehydration   • Debility             Adult Rehabilitation Note       17 1223 17 1601 17 1522    Rehab Assessment/Intervention    Discipline occupational therapist  -TM occupational therapist  -BF physical therapy assistant  -LL    Document Type discharge summary  -TM therapy note (daily note)  -BF therapy note (daily note)  -LL    Subjective Information no complaints  -TM agree to therapy;no complaints  -BF agree to therapy;no complaints  -LL    Patient Effort, Rehab Treatment  good  -BF good  -LL    Precautions/Limitations fall precautions;other (see comments)   PEG, abdominal binder, soft diet with thin liquids, [place]  -TM fall precautions;other (see comments)   PEG, abd binder; soft diet w/ thin liquids; [place]  -BF fall precautions;other (see comments)   PEG, abdominal binder, soft diet with thing liquids, [place]  -LL    Patient Response to Treatment Pt's daughter and  present for pt/family training/educ with 24 hour assistance and supervision recommended and CGA/supervision/SBA recommended with all ADLs and transfers and sponge bathing for safety with pt/family verb understanding.   -TM  Patient did well with therapy with adequate rest breaks  -LL    Recorded by [TM] Jennifer Lazcano OT [BF] Fadia Rubio OT [LL] Tiara Alcala PTA    Pain Assessment    Pain Assessment   Huffman-Baker FACES  -LL    Huffman-Baker FACES Pain Rating   4  -LL    Pain Type   Chronic pain  -LL    Pain Location   Back  -LL    Pain Intervention(s)   Repositioned;Rest  -LL    Recorded by   [LL] Tiara Alcala PTA     Vision Assessment/Intervention    Visual Impairment --   glasses   -TM      Recorded by [TM] Jennifer Lazcano OT      Cognitive Assessment/Intervention    Current Cognitive/Communication Assessment  functional  -BF functional  -LL    Orientation Status oriented to;place;person;situation  -TM oriented to;person;place  -BF oriented to;person;place  -LL    Follows Commands/Answers Questions able to follow single-step instructions;100% of the time  -% of the time;able to follow single-step instructions  -% of the time;able to follow single-step instructions  -LL    Personal Safety decreased awareness, need for assist;decreased awareness, need for safety  -TM  decreased awareness, need for assist;decreased awareness, need for safety  -LL    Personal Safety Interventions   fall prevention program maintained;gait belt;nonskid shoes/slippers when out of bed  -LL    Recorded by [TM] Jennifer Lazcano OT [BF] Fadia Rubio, OT [LL] Tiara Alcala PTA    Communication Assessment/Intervention    Communication Assessment [place]  -TM  [place]  -LL    Recorded by [TM] Jennifer Lazcano OT  [LL] Tiara Alcala PTA    ROM (Range of Motion)    General ROM Detail BUE AROM WFL   -TM      Recorded by [TM] Jennifer Lazcano OT      Bed Mobility, Assessment/Treatment    Bed Mobility, Assistive Device   bed rails  -LL    Bed Mobility, Scoot/Bridge, [place]   supervision required;verbal cues required;nonverbal cues required (demo/gesture)  -LL    Bed Mob, Supine to Sit, [place]   contact guard assist  -LL    Bed Mob, Sit to Supine, [place]   contact guard assist  -LL    Bed Mobility, Safety Issues   decreased use of arms for pushing/pulling;decreased use of legs for bridging/pushing  -LL    Bed Mobility, Impairments   strength decreased;impaired balance;pain  -LL    Recorded by   [LL] Tiara Alcala PTA    Transfer Assessment/Treatment    Transfers, Bed-Chair [place]  contact guard  assist;verbal cues required  -BF verbal cues required;nonverbal cues required (demo/gesture);contact guard assist   CGA in AM; Min A in PM  -LL    Transfers, Chair-Bed [place]  contact guard assist;verbal cues required  -BF verbal cues required;nonverbal cues required (demo/gesture);contact guard assist  -LL    Transfers, Bed-Chair-Bed, Assist Device  other (see comments)   w/c arm rests  -BF rolling walker  -LL    Transfers, Sit-Stand [place]   verbal cues required;nonverbal cues required (demo/gesture);contact guard assist  -LL    Transfers, Stand-Sit [place]   verbal cues required;nonverbal cues required (demo/gesture);contact guard assist  -LL    Transfers, Sit-Stand-Sit, Assist Device   rolling walker  -LL    Toilet Transfer, [place]  contact guard assist;verbal cues required  -BF     Toilet Transfer, Assistive Device  elevated toilet seat;other (see comments)   grab bars  -BF     Transfer, Safety Issues   balance decreased during turns;step length decreased;knees buckling  -LL    Transfer, Impairments   strength decreased;impaired balance;coordination impaired  -LL    Transfer, Comment CGA   -TM      Recorded by [TM] Jennifer Lazcano, OT [BF] Fadia Rubio, OT [LL] Tiara Alcala PTA    Gait Assessment/Treatment    Gait, [place] Level   verbal cues required;nonverbal cues required (demo/gesture);minimum assist (75% patient effort)  -LL    Gait, Assistive Device   rolling walker  -LL    Gait, Distance (Feet)   100   x 2  -LL    Gait, Gait Pattern Analysis   swing-to gait  -LL    Gait, Gait Deviations   andrae decreased;decreased heel strike;step length decreased  -LL    Gait, Safety Issues   balance decreased during turns;step length decreased  -LL    Gait, Impairments   strength decreased;impaired balance;coordination impaired  -LL    Recorded by   [LL] Tiara Alcala PTA    Upper Body Bathing Assessment/Training    UB Bathing Assess/Train, Position sitting  -TM      UB  Bathing Assess/Train, [place] Level set up required;supervision required;stand by assist;verbal cues required  -TM      Recorded by [TM] Jennifer Lazcano OT      Lower Body Bathing Assessment/Training    LB Bathing Assess/Train, Position sitting  -TM sitting  -BF     LB Bathing Assess/Train, [place] Level set up required;stand by assist;supervision required;verbal cues required  -TM stand by assist;verbal cues required  -BF     LB Bathing Assess/Train, Comment  TB bathing: SBA  -BF     Recorded by [TM] Jennifer Lazcano, OT [BF] Fadia Rubio OT     Upper Body Dressing Assessment/Training    UB Dressing Assess/Train, Position sitting  -TM sitting  -BF     UB Dressing Assess/Train, [place] set up required;supervision required;verbal cues required  -TM set up required  -BF     UB Dressing Assess/Train, Comment  Set-up  -BF     Recorded by [TM] Jennifer Lazcano, OT [BF] Fadia Rubio OT     Lower Body Dressing Assessment/Training    LB Dressing Assess/Train, Position sitting  -TM sitting  -BF     LB Dressing Assess/Train, [place] set up required;supervision required;verbal cues required  -TM set up required;supervision required;verbal cues required   SBA  -BF     LB Dressing Assess/Train, Comment  SBA  -BF     Recorded by [TM] Jennifer Lazcano, OT [BF] Fadia Rubio OT     Toileting Assessment/Training    Toileting Assess/Train, Assistive Device  raised toilet seat;grab bars  -BF     Toileting Assess/Train, [place] Level set up required;supervision required;verbal cues required  -TM set up required;supervision required;verbal cues required  -BF     Toileting Assess/Train, Comment  SBA  -BF     Recorded by [TM] Jennifer Lazcano, OT [BF] Fadia Rubio, OT     Grooming Assessment/Training    Grooming Assess/Train, Position sitting  -TM sitting  -BF     Grooming Assess/Train, [place] Level supervision required;set up required;verbal cues required   -TM set up required;supervision required  -BF     Grooming Assess/Train, Comment  Set-up/supervision  -BF     Recorded by [TM] Jennifer Lazcano OT [BF] Fadia Rubio OT     Self-Feeding Assessment/Training    Self-Feeding Assess/Train, Comment PEG/tube feeding   -TM      Recorded by [TM] Jennifer Lazcano OT      Balance Skills Training    Sitting-Level of Assistance   Close supervision  -LL    Sitting-Balance Support   Feet supported  -LL    Sitting-Balance Activities   Reaching for objects;Reaching across midline   Sitting balloon tap  -LL    Standing-Level of Assistance   Contact guard  -LL    Static Standing Balance Support   assistive device  -LL    Gait Balance-Level of Assistance   Minimum assistance  -LL    Gait Balance Support   assistive device  -LL    Recorded by   [LL] Tiara Alcala PTA    Therapy Exercises    Bilateral Lower Extremities   AROM:;sitting;standing;supine  -LL    BLE Resistance   theraband  -LL    Bilateral Upper Extremity  AROM:;sitting   BUE GMC/FMC ther act; reaching; light strengthening  -BF     BUE Resistance  other (comment)   Arm bike 2 mins X2; Handgripper 20 reps  -BF     Recorded by  [BF] Fadia Rubio OT [LL] Tiara Alcala PTA    Positioning and Restraints    Pre-Treatment Position   in bed  -LL    Post Treatment Position   bed  -LL    In Bed  supine;call light within reach;encouraged to call for assist  -BF supine;call light within reach;encouraged to call for assist;side rails up x2;heels elevated  -LL    Recorded by  [BF] Fadia Rubio OT [LL] Tiara Alcala PTA      08/02/17 1544 08/02/17 1516 08/01/17 1449    Rehab Assessment/Intervention    Discipline physical therapy assistant  -LL occupational therapist  -BF physical therapy assistant  -    Document Type therapy note (daily note)  -LL therapy note (daily note)  -BF therapy note (daily note)  -LL    Subjective Information no complaints;agree to therapy  -LL agree to therapy;no  complaints  -BF agree to therapy;no complaints  -LL    Patient Effort, Rehab Treatment good  -LL good  -BF good  -LL    Precautions/Limitations fall precautions;other (see comments)   PEG, abdominal binder, soft diet with thin liquids, [place]  -LL fall precautions;other (see comments)   PEG, abd binder; soft diet w/ thin liquids; [place]  -BF fall precautions;other (see comments)   PEG, abdominal binder, soft diet with thin liquids, [place]  -LL    Patient Response to Treatment Patient did well with therapy with adequate rest breaks  -LL  Patient did well with PT with adequate rest breaks  -LL    Recorded by [LL] Tiara Alcala PTA [BF] Fadia Rubio, OT [LL] Tiara Alcala PTA    Pain Assessment    Pain Assessment Huffman-Butt FACES  -LL  Huffman-Butt FACES  -LL    Huffman-Baker FACES Pain Rating 4  -LL  4  -LL    Pain Type Chronic pain  -LL  Chronic pain  -LL    Pain Location Back  -LL  Back  -LL    Pain Intervention(s) Repositioned;Rest  -LL  Repositioned;Rest  -LL    Recorded by [LL] Tiara Alcala PTA  [LL] Tiara Alcala PTA    Cognitive Assessment/Intervention    Current Cognitive/Communication Assessment functional  -LL functional  -BF functional  -LL    Orientation Status oriented to;person;place  -LL oriented to;person;place;situation  -BF oriented to;person;place  -LL    Follows Commands/Answers Questions 100% of the time;able to follow single-step instructions  -% of the time;able to follow single-step instructions  -% of the time;able to follow single-step instructions  -LL    Personal Safety decreased awareness, need for assist;decreased awareness, need for safety  -LL  decreased awareness, need for assist;decreased awareness, need for safety  -LL    Personal Safety Interventions fall prevention program maintained;gait belt;nonskid shoes/slippers when out of bed  -LL  fall prevention program maintained;gait belt;nonskid shoes/slippers when out of bed  -LL    Recorded by [LL] Tiara Alcala PTA  [BF] Fadia Rubio, OT [LL] Tiara Alcala PTA    Communication Assessment/Intervention    Communication Assessment [place]  -LL  [place]  -LL    Recorded by [LL] Tiara Alcala PTA  [LL] Tiara Alcala PTA    Bed Mobility, Assessment/Treatment    Bed Mobility, Assistive Device bed rails  -LL  bed rails  -LL    Bed Mobility, Scoot/Bridge, [place] supervision required;verbal cues required;nonverbal cues required (demo/gesture)  -LL  supervision required;verbal cues required;nonverbal cues required (demo/gesture)  -LL    Bed Mob, Supine to Sit, [place] contact guard assist  -LL  minimum assist (75% patient effort);contact guard assist  -LL    Bed Mob, Sit to Supine, [place] contact guard assist  -LL  minimum assist (75% patient effort);contact guard assist  -LL    Bed Mobility, Safety Issues decreased use of arms for pushing/pulling;decreased use of legs for bridging/pushing  -LL  decreased use of arms for pushing/pulling;decreased use of legs for bridging/pushing  -LL    Bed Mobility, Impairments strength decreased;impaired balance;pain  -LL  strength decreased;impaired balance;pain  -LL    Recorded by [LL] Tiara Alcala PTA  [LL] Tiara Alcala PTA    Transfer Assessment/Treatment    Transfers, Bed-Chair [place] verbal cues required;nonverbal cues required (demo/gesture);contact guard assist   CGA in AM; Min A in PM  -LL contact guard assist;verbal cues required  -BF verbal cues required;nonverbal cues required (demo/gesture);minimum assist (75% patient effort);contact guard assist   CGA in AM; Min A in PM  -LL    Transfers, Chair-Bed [place] verbal cues required;nonverbal cues required (demo/gesture);contact guard assist  -LL contact guard assist;verbal cues required  -BF verbal cues required;nonverbal cues required (demo/gesture);minimum assist (75% patient effort);contact guard assist  -LL    Transfers, Bed-Chair-Bed, Assist Device rolling walker  -LL bed rails;other (see comments)    w/c arm rests  -BF rolling walker  -LL    Transfers, Sit-Stand [place] verbal cues required;nonverbal cues required (demo/gesture);contact guard assist  -LL  verbal cues required;nonverbal cues required (demo/gesture);minimum assist (75% patient effort);contact guard assist  -LL    Transfers, Stand-Sit [place] verbal cues required;nonverbal cues required (demo/gesture);contact guard assist  -LL  verbal cues required;nonverbal cues required (demo/gesture);minimum assist (75% patient effort);contact guard assist  -LL    Transfers, Sit-Stand-Sit, Assist Device rolling walker  -LL  rolling walker  -LL    Transfer, Safety Issues balance decreased during turns;step length decreased;knees buckling  -LL  balance decreased during turns;step length decreased;knees buckling  -LL    Transfer, Impairments strength decreased;impaired balance;coordination impaired  -LL  strength decreased;impaired balance;coordination impaired  -LL    Recorded by [LL] Tiara Alcala PTA [BF] Fadia Rubio OT [LL] Tiara Alcala PTA    Gait Assessment/Treatment    Gait, [place] Level verbal cues required;nonverbal cues required (demo/gesture);minimum assist (75% patient effort)  -LL  verbal cues required;nonverbal cues required (demo/gesture);minimum assist (75% patient effort);2 person assist required  -LL    Gait, Assistive Device rolling walker  -LL  rolling walker  -LL    Gait, Distance (Feet) 90   80, 60 then 40  -LL  80   x 3 then 60  -LL    Gait, Gait Pattern Analysis swing-to gait  -LL  swing-to gait  -LL    Gait, Gait Deviations andrae decreased;forward flexed posture;step length decreased  -LL  andrae decreased;forward flexed posture;step length decreased  -LL    Gait, Safety Issues balance decreased during turns;step length decreased  -LL  balance decreased during turns;step length decreased  -LL    Gait, Impairments strength decreased;impaired balance;coordination impaired  -LL  strength decreased;impaired  balance;coordination impaired  -LL    Recorded by [LL] Tiara Alcala PTA  [LL] Tiara Alcala PTA    Balance Skills Training    Sitting-Level of Assistance Close supervision  -LL  Close supervision  -LL    Sitting-Balance Support Feet supported  -LL  Feet supported  -LL    Sitting-Balance Activities Reaching for objects;Reaching across midline   Sitting balloon tap  -LL  Reaching for objects;Reaching across midline   Sitting balloon tap  -LL    Standing-Level of Assistance Contact guard  -LL  Minimum assistance;Contact guard  -LL    Static Standing Balance Support assistive device  -LL  assistive device  -LL    Gait Balance-Level of Assistance Minimum assistance  -LL  Minimum assistance  -LL    Gait Balance Support assistive device  -LL  assistive device  -LL    Recorded by [LL] Tiara Alcala PTA  [LL] Tiara Alcala PTA    Therapy Exercises    Bilateral Lower Extremities AROM:;sitting;standing;supine  -LL  AROM:;sitting;standing;supine  -LL    BLE Resistance theraband  -LL  theraband  -LL    Bilateral Upper Extremity  AROM:;sitting   BUE GMC/FMC ther act; reaching; light strengthening  -BF     BUE Resistance  theraputty  -BF     Recorded by [LL] Tiara Alcala PTA [BF] Fadia Rubio, OT [LL] Tiara Alcala PTA    Positioning and Restraints    Pre-Treatment Position in bed  -LL  --   WC with OT  -    Post Treatment Position bed  -LL bed  -BF bed  -LL    In Bed supine;call light within reach;encouraged to call for assist;heels elevated  -LL supine;call light within reach;encouraged to call for assist   After both sessions  -BF supine;call light within reach;encouraged to call for assist;side rails up x2;heels elevated  -LL    Recorded by [LL] Tiara Alcala PTA [BF] Fadia Rubio OT [LL] Tiara Alcala PTA      08/01/17 1417          Rehab Assessment/Intervention    Discipline occupational therapist  -BF      Document Type therapy note (daily note)  -BF      Subjective Information agree  to therapy;no complaints  -BF      Patient Effort, Rehab Treatment good  -BF      Precautions/Limitations fall precautions;other (see comments)   PEG, abd binder; soft diet w/ thin liquids; [place]  -BF      Recorded by [BF] Fadia Rubio OT      Cognitive Assessment/Intervention    Current Cognitive/Communication Assessment functional  -BF      Orientation Status oriented x 4  -BF      Follows Commands/Answers Questions 100% of the time;able to follow single-step instructions  -BF      Recorded by [BF] Fadia Rubio OT      Transfer Assessment/Treatment    Transfers, Bed-Chair [place] minimum assist (75% patient effort);contact guard assist;verbal cues required  -BF      Transfers, Chair-Bed [place] minimum assist (75% patient effort);contact guard assist;verbal cues required  -BF      Transfers, Bed-Chair-Bed, Assist Device other (see comments)   w/c arm rests  -BF      Recorded by [BF] Fadia Rubio OT      Therapy Exercises    Bilateral Upper Extremity AROM:;sitting   BUE GMC/FMC ther act; reaching; light strengthening  -BF      BUE Resistance theraputty   Wrist rolls X1  -BF      Recorded by [BF] Fadia Rubio OT      Positioning and Restraints    In Bed supine;call light within reach;encouraged to call for assist   In PM  -BF      In Wheelchair sitting;with PT   In AM  -BF      Recorded by [BF] Fadia Rubio OT        User Key  (r) = Recorded By, (t) = Taken By, (c) = Cosigned By    Initials Name Effective Dates    BF Fadia Rubio, OT 03/14/16 -     TM Jennifer Lazcano, OT 03/14/16 -     LL Tiara Alcala, PTA 05/02/16 -                 OT Goals       08/04/17 1039 07/28/17 1424 07/28/17 1422    Transfer Training OT STG    Transfer Training OT STG, Outcome   goal met  -CJ    Transfer Training OT LTG    Transfer Training OT LTG, Outcome goal met  -TM      Patient Education OT LTG    Patient Education OT LTG Outcome goal met  -TM      Bathing OT LTG     Bathing Goal OT LTG, Outcome goal met  -TM      Toileting OT STG    Toileting Goal OT STG, Date Established  07/28/17  -     Toileting Goal OT STG, Time to Achieve  1 wk  -     Toileting Goal OT STG, [place] Level  minimum assist (75% patient effort)  -     Toileting Goal OT STG, Outcome goal met  -TM  goal met  -    Toileting OT LTG    Toileting Goal OT LTG, Outcome goal met  -TM      LB Dressing OT STG    LB Dressing Goal OT STG, Date Established  07/28/17  -     LB Dressing Goal OT STG, Time to Achieve  1 wk  -CJ     LB Dressing Goal OT STG, [place] Level  contact guard assist;minimum assist (75% patient effort)  -     LB Dressing Goal OT STG, Outcome goal met  -TM  goal met  -    LB Dressing OT LTG    LB Dressing Goal OT LTG, Outcome goal met  -TM        07/22/17 1224          Transfer Training OT STG    Transfer Training OT STG, Date Established 07/22/17  [facility]      Transfer Training OT STG, Time to Achieve 1 wk  -      Transfer Training OT STG, [place] Level moderate assist (50% patient effort)  -      Transfer Training OT LTG    Transfer Training OT LTG, Date Established 07/22/17  [facility]      Transfer Training OT LTG, Time to Achieve by discharge  -      Transfer Training OT LTG, [place] Level minimum assist (75% patient effort)  -      Patient Education OT LTG    Patient Education OT LTG, Date Established 07/22/17  [facility]      Patient Education OT LTG, Time to Achieve by discharge  -      Patient Education OT LTG, Education Type written program;home safety  -      Bathing OT LTG    Bathing Goal OT LTG, Date Established 07/22/17  [facility]      Bathing Goal OT LTG, Time to Achieve by discharge  -      Bathing Goal OT LTG, [place] Level moderate assist (50% patient effort)  -      Toileting OT STG    Toileting Goal OT STG, Date Established 07/22/17  [facility]      Toileting Goal OT STG, Time to Achieve 1 wk  -      Toileting Goal OT STG, [place] Level maximum assist  (25% patient effort)  -      Toileting OT LTG    Toileting Goal OT LTG, Date Established 07/22/17  -      Toileting Goal OT LTG, Time to Achieve by discharge  -      Toileting Goal OT LTG, [place] Level moderate assist (50% patient effort)  -      LB Dressing OT STG    LB Dressing Goal OT STG, Date Established 07/22/17  -      LB Dressing Goal OT STG, Time to Achieve 1 wk  -      LB Dressing Goal OT STG, [place] Level maximum assist (25% patient effort)  -      LB Dressing OT LTG    LB Dressing Goal OT LTG, Date Established 07/22/17  -      LB Dressing Goal OT LTG, Time to Achieve by discharge  -      LB Dressing Goal OT LTG, [place] Level moderate assist (50% patient effort)  -        User Key  (r) = Recorded By, (t) = Taken By, (c) = Cosigned By    Initials Name Provider Type    FABY Hoyt, OT Occupational Therapist    KATHY Lazcano, OT Occupational Therapist          Occupational Therapy Education     Title: PT OT SLP Therapies (Done)     Topic: Occupational Therapy (Resolved)     Point: ADL training (Resolved)    Description: Instruct learner(s) on proper safety adaptation and remediation techniques during self care or transfers.   Instruct in proper use of assistive devices.    Learning Progress Summary    Learner Readiness Method Response Comment Documented by Status   Patient Acceptance E,D VU,NR   08/04/17 1222 Done    Acceptance E DU,NR   08/03/17 1607 Done    Acceptance E VU,NR   08/02/17 1520 Done    Acceptance E,D NR   08/01/17 1420 Active    Acceptance E NR  BC 07/31/17 1622 Active    Acceptance E,D VU,NR   07/28/17 1421 Done    Acceptance E,D NR,VU   07/27/17 1442 Done    Acceptance E,D NR,VU   07/26/17 1233 Done    Acceptance E,D NR   07/25/17 1511 Active    Acceptance E,D NR,VU  TM 07/25/17 1224 Done    Acceptance E,D NR  AB 07/24/17 1541 Active    Acceptance E,D NR   07/24/17 1501 Active    Acceptance E NR  RT 07/22/17 1408  Active    Acceptance E,D NR   07/22/17 1223 Active   Family Acceptance E,D VU,NR   08/04/17 1222 Done               Point: Home exercise program (Resolved)    Description: Instruct learner(s) on appropriate technique for monitoring, assisting and/or progressing therapeutic exercises/activities.    Learning Progress Summary    Learner Readiness Method Response Comment Documented by Status   Patient Acceptance E,D VU,NR   08/04/17 1222 Done    Acceptance E NR  RT 07/22/17 1408 Active   Family Acceptance E,D VU,NR   08/04/17 1222 Done               Point: Precautions (Resolved)    Description: Instruct learner(s) on prescribed precautions during self-care and functional transfers.    Learning Progress Summary    Learner Readiness Method Response Comment Documented by Status   Patient Acceptance E,D VU,NR   08/04/17 1222 Done    Acceptance E DU,NR   08/03/17 1607 Done    Acceptance E VU,NR   08/02/17 1520 Done    Acceptance E,D VU,NR   07/28/17 1421 Done    Acceptance E,D NR,VU   07/27/17 1442 Done    Acceptance E,D NR,VU   07/26/17 1233 Done    Acceptance E,D NR   07/25/17 1511 Active    Acceptance E,D NR,VU   07/25/17 1224 Done    Acceptance E,D NR  AB 07/24/17 1541 Active    Acceptance E,D NR   07/24/17 1501 Active    Acceptance E NR  RT 07/22/17 1408 Active    Acceptance E,D NR   07/22/17 1223 Active   Family Acceptance E,D VU,NR   08/04/17 1222 Done                      User Key     Initials Effective Dates Name Provider Type Discipline    AB 02/04/16 -  Zarina Dean, OT Occupational Therapist OT    BF 03/14/16 -  Fadia Rubio, OT Occupational Therapist OT     03/14/16 -  Hyacinth Hoyt, OT Occupational Therapist OT    TM 03/14/16 -  Jennifer Lazcano, OT Occupational Therapist OT    RT 03/14/16 -  Shant Jim, PT Physical Therapist PT    BC 01/21/17 -  Anyi Sands, OT Occupational Therapist OT                OT Recommendation and Plan  Anticipated  Equipment Needs At Discharge:  (TBD)  Anticipated Discharge Disposition: home with 24/7 care  Planned Therapy Interventions: activity intolerance, adaptive equipment training, ADL retraining, IADL retraining, fine motor coordination training, home exercise program, motor coordination training, ROM (Range of Motion), strengthening, transfer training, other (see comments) (therapeutic group activities as deemed beneficial)  Therapy Frequency: 3-5 times/wk            Time Calculation:          Time Calculation- OT       08/04/17 1223          Time Calculation- OT    OT Start Time 1045  -TM      OT Stop Time 1100  -TM      OT Time Calculation (min) 15 min  -TM      Total Timed Code Minutes- OT 15 minute(s)  -TM      OT Non-Billable Time (min) 30 min  -TM        User Key  (r) = Recorded By, (t) = Taken By, (c) = Cosigned By    Initials Name Provider Type    TM Jennifer Lazcano OT Occupational Therapist          Therapy Charges for Today     Code Description Service Date Service Provider Modifiers Qty    [id number] HC OT SELF CARE/MGMT/TRAIN EA 15 MIN 8/4/2017 Jennifer Lazcano OT GO 1               OT Discharge Summary  Anticipated Discharge Disposition: home with 24/7 care  Reason for Discharge: Discharge from facility  Outcomes Achieved: Refer to plan of care for updates on goals achieved  Discharge Destination: Home with assist    Jennifer Lazcano OT  8/4/2017

## 2017-08-04 NOTE — PROGRESS NOTES
Inpatient Rehabilitation Functional Measures Assessment    Functional Measures  CATHY Eating:  Activity was not observed. PEG tube; refer to nsg  CATHY Grooming: Grooming Score = 5. Patient is supervision/set-up for grooming,  requiring: Verbal cuing, prompting, or instructing. No assistive devices were  required.  CATHY Bathing:  Patient took sponge bath. Bathing Score = 5.  Patient is  supervision/set-up for bathing, requiring: Verbal cuing, prompting, or  instructing. No assistive devices were required.  CATHY Upper Body Dressing:  Upper Body Dressing Score = 5. Patient is supervision  for upper body dressing, requiring: Verbal cuing, prompting, or instructing. No  assistive devices were required.  CATHY Lower Body Dressing:  Lower Body Dressing Score = 5. Patient is  supervision/set-up for lower body dressing, requiring: Verbal cuing, prompting,  or instructing. No assistive devices were required.  CATHY Toileting:  Toileting Score = 5.  Patient is supervision/set-up for  toileting, requiring: Verbal cuing, prompting, or instructing. No assistive  devices were required.    CATHY Bladder Management  Level of Assistance:  Frequency/Number of Accidents this Shift:    CATHY Bowel Management  Level of Assistance:  Frequency/Number of Accidents this Shift:    CATHY Bed/Chair/Wheelchair Transfer:  CATHY Toilet Transfer:  Toilet Transfer Score = 5.  Patient is supervision/set-up  for transferring to and from the toilet/commode, requiring: Verbal cuing,  prompting, or instructing. No assistive devices were required.  CATHY Tub/Shower Transfer:  Shower Transfer did not occur because activity was  contraindicated for patient.    Previously Documented Mode of Locomotion at Discharge:  [facility] Expected Mode of Locomotion at Discharge:  CATHY Walk/Wheelchair:  CATHY Stairs:    CATHY Comprehension:  CATHY Expression:  CATHY Social Interaction:  CATHY Problem Solving:  CATHY Memory:    Therapy Mode Minutes  Occupational Therapy: Individual: 15 minutes.  Physical  Therapy:  Speech Language Pathology:    Discharge Functional Goals:    Signed by: Jennifer Lazcano, Occupational Therapist

## 2017-08-04 NOTE — PROGRESS NOTES
Patient Assessment Instrument  Quality Indicators - Discharge    Section GG. Self-Care Performance      Section GG. Mobility Performance      Section J. Health Conditions Discharge  Fall(s) Since Admission:  No    Section M. Skin Conditions Discharge  Unhealed Pressure Ulcer(s) at Stage 1 or Higher:  No    . Current Number of Unhealed Pressure Ulcers    Number of Unhealed Stage 1: 0  Number of Unhealed Stage 2: 0  Number of Unhealed Stage 3: 0  Number of Unhealed Stage 4: 0  Number of Unhealed Unstageable Due to Non-removable Dressin  Number of Unhealed Unstageable Due to Slough/Eschar: 0  Number of Unhealed Unstageable Due to Suspected Deep Tissue Injury: 0    . Worsening in Pressure Ulcer Status Since Admission    Number of Worsening Stage 2: 0  Number of Worsening Stage 3: 0  Number of Worsening Stage 4: 0  Number of Worsening Unstageable Due to Non-removable Dressin  Number of Worsening Unstageable Due to Slough/Eschar: 0  Number of Worsening Unstageable Due to Suspected Deep Tissue Injury: 0    . Healed Pressure Ulcer(s)  (Voluntary)    Number of Healed Stage 1: 0  Number of Healed Stage 2: 0  Number of Healed Stage 3: 0  Number of Healed Stage 4: 0    Section O. Special Treatments, Procedures, and Programs Discharge  . Influenza Vaccine - Discharge  Received in this facility for this year's influenza vaccination season:  No.  Influenza Vaccine Not Received Due To: Received outside of this facility.    Date Influenza Vaccine Received (enter date ONLY if received in this unit;  otherwise, skip this entry)      OPTIONAL BRANCH FOR UNPLANNED DISCHARGES  Unplanned Discharge: No    Signed by: Najma Chinchilla, Supervisor

## 2017-08-04 NOTE — PROGRESS NOTES
Patient Assessment Instrument  Quality Indicators - Discharge    Section GG. Self-Care Performance     Eating: Not attempted due to medical or safety concerns.   Oral Hygiene: [place] provides verbal cues or touching/steadying assistance as  patient completes activity.   Toileting Hygiene: : [place] provides verbal cues or touching/steadying  assistance as patient completes activity.   Shower/Bathe Self: [place] provides verbal cues or touching/steadying assistance  as patient completes activity.   Upper Body Dressing: [place] sets up or cleans up; patient completes activity.  [place] assists only prior to or following the activity.   Lower Body Dressing: [place] provides verbal cues or touching/steadying  assistance as patient completes activity.   Putting On/Taking Off Footwear: [place] provides verbal cues or  touching/steadying assistance as patient completes activity.    Section GG. Mobility Performance      Section J. Health Conditions Discharge      Section M. Skin Conditions Discharge      . Current Number of Unhealed Pressure Ulcers      . Worsening in Pressure Ulcer Status Since Admission      . Healed Pressure Ulcer(s)  (Voluntary)      Section O. Special Treatments, Procedures, and Programs Discharge  . Influenza Vaccine - Discharge      Date Influenza Vaccine Received (enter date ONLY if received in this unit;  otherwise, skip this entry)      OPTIONAL BRANCH FOR UNPLANNED DISCHARGES  Unplanned Discharge:    Signed by: Jennifer Lazcano, Occupational Therapist

## 2017-08-04 NOTE — THERAPY DISCHARGE NOTE
Inpatient Rehabilitation - Physical Therapy Treatment Note/Discharge   [place]     Patient Name: Tamie Rodriguez  : 1940  MRN: [id number]  Today's Date: 2017  Onset of Illness/Injury or Date of Surgery Date: 17  Date of Referral to PT: 17  Referring Physician: Dr. Segovia    Admit Date: 2017    Visit Dx:  No diagnosis found.  Patient Active Problem List   Diagnosis   • Dehydration   • Debility       Physical Therapy Education     Title: PT OT SLP Therapies (Resolved)     Topic: Physical Therapy (Resolved)     Point: Mobility training (Resolved)    Learning Progress Summary    Learner Readiness Method Response Comment Documented by Status   Patient Acceptance E,D,H VU  RF 17 1428 Done    Acceptance E,D VU,NR   17 1528 Done    Acceptance E,D VU,NR   17 1549 Done    Acceptance E,D VU,NR  LL 17 1453 Done    Acceptance E,D VU,NR   17 1523 Done    Acceptance E,D VU,NR  LL 17 1542 Done    Acceptance E,D VU,NR  LL 17 1705 Done    Acceptance E,D NR,VU  AG 17 1749 Done    Acceptance E NR   17 2055 Active    Acceptance E VU,NR  LB 17 1745 Done    Acceptance D,E NR  AG 17 1706 Active   Family Acceptance E,D,H VU  RF 17 1428 Done               Point: Home exercise program (Resolved)    Learning Progress Summary    Learner Readiness Method Response Comment Documented by Status   Patient Acceptance E,D,H VU  RF 17 1428 Done    Acceptance E,D VU,NR  LL 17 1528 Done    Acceptance E,D VU,NR  LL 17 1549 Done    Acceptance E,D VU,NR  LL 17 1453 Done    Acceptance E,D VU,NR  LL 17 1523 Done    Acceptance E,D VU,NR  LL 17 1542 Done    Acceptance E,D VU,NR  LL 17 1705 Done    Acceptance E,D NR,VU  AG 17 1749 Done    Acceptance E NR   17 2055 Active    Acceptance E VU,NR  LB 17 1745 Done    Acceptance D,E NR  AG 17 1706 Active   Family Acceptance TULIO VUH OSVALDO   17  1428 Done               Point: Body mechanics (Resolved)    Learning Progress Summary    Learner Readiness Method Response Comment Documented by Status   Patient Acceptance E,D,H VU  RF 08/04/17 1428 Done    Acceptance E,D VU,NR  LL 08/03/17 1528 Done    Acceptance E,D VU,NR  LL 08/02/17 1549 Done    Acceptance E,D VU,NR  LL 08/01/17 1453 Done    Acceptance E,D VU,NR  LL 07/31/17 1523 Done    Acceptance E,D VU,NR  LL 07/28/17 1542 Done    Acceptance E,D VU,NR  LL 07/27/17 1705 Done    Acceptance E,D NR,VU  AG 07/26/17 1749 Done    Acceptance E NR   07/25/17 2055 Active    Acceptance E VU,NR  LB 07/25/17 1745 Done    Acceptance D,E NR  AG 07/24/17 1706 Active   Family Acceptance E,D,H VU  RF 08/04/17 1428 Done               Point: Precautions (Resolved)    Learning Progress Summary    Learner Readiness Method Response Comment Documented by Status   Patient Acceptance E,D,H VU  RF 08/04/17 1428 Done    Acceptance E,D VU,NR  LL 08/03/17 1528 Done    Acceptance E,D VU,NR  LL 08/02/17 1549 Done    Acceptance E,D VU,NR  LL 08/01/17 1453 Done    Acceptance E,D VU,NR   07/31/17 1523 Done    Acceptance E,D VU,NR   07/28/17 1542 Done    Acceptance E,D VU,NR   07/27/17 1705 Done    Acceptance E,D NR,VU  AG 07/26/17 1749 Done    Acceptance E NR   07/25/17 2055 Active    Acceptance E VU,NR   07/25/17 1745 Done    Acceptance D,E NR  AG 07/24/17 1706 Active   Family Acceptance E,D,H VU  RF 08/04/17 1428 Done                      User Key     Initials Effective Dates Name Provider Type Discipline     06/16/16 -  Shahrzad Varela, RN Registered Nurse Nurse     03/14/16 -  Chrystal Galvan, PT Physical Therapist PT     03/14/16 -  Herminia Estrada, PT Physical Therapist PT     05/02/16 -  Tiara Alcala, PTA Physical Therapy Assistant PT     07/19/17 -  Kortney Bower, PTA Physical Therapy Assistant PT                    IP PT Goals       08/04/17 1429 07/28/17 1543 07/22/17 1413    Bed Mobility PT STG     Bed Mobility PT STG, Date Established   07/22/17  -RT    Bed Mobility PT STG, Time to Achieve   1 wk  -RT    Bed Mobility PT STG, Activity Type   all bed mobility  -RT    Bed Mobility PT STG, [place] Level   minimum assist (75% patient effort);moderate assist (50% patient effort)  -RT    Bed Mobility PT STG, Date Goal Reviewed  07/28/17  -LL     Bed Mobility PT STG, Outcome  goal met  -LL     Bed Mobility PT LTG    Bed Mobility PT LTG, Date Established   07/22/17  -RT    Bed Mobility PT LTG, Time to Achieve   2 wks  -RT    Bed Mobility PT LTG, Activity Type   all bed mobility  -RT    Bed Mobility PT LTG, [place] Level contact guard assist  -RF  contact guard assist  -RT    Bed Mobility PT LTG, Date Goal Reviewed  07/28/17  -LL     Bed Mobility PT LTG, Outcome goal met  -RF goal ongoing  -LL     Bed Mobility PT LTG, Reason Goal Not Met discharged from facility  -RF      Transfer Training PT STG    Transfer Training PT STG, Date Established   07/22/17  -RT    Transfer Training PT STG, Time to Achieve   1 wk  -RT    Transfer Training PT STG, Activity Type   all transfers  -RT    Transfer Training PT STG, [place] Level   minimum assist (75% patient effort);moderate assist (50% patient effort)  -RT    Transfer Training PT STG, Assist Device   walker, rolling  -RT    Transfer Training PT STG, Date Goal Reviewed  07/28/17  -LL     Transfer Training PT STG, Outcome  goal met  -LL     Transfer Training PT LTG    Transfer Training PT LTG, Date Established   07/22/17  -RT    Transfer Training PT LTG, Time to Achieve   2 wks  -RT    Transfer Training PT LTG, Activity Type   all transfers  -RT    Transfer Training PT LTG, [place] Level contact guard assist  -RF  contact guard assist  -RT    Transfer Training PT LTG, Assist Device   walker, rolling  -RT    Transfer Training PT  LTG, Date Goal Reviewed  07/28/17  -LL     Transfer Training PT LTG, Outcome goal met  -RF goal ongoing  -LL     Transfer Training PT  LTG, Reason Goal Not Met discharged from facility  -RF      Gait Training PT STG    Gait Training Goal PT STG, Date Established   07/22/17  -RT    Gait Training Goal PT STG, Time to Achieve   1 wk  -RT    Gait Training Goal PT STG, [place] Level   moderate assist (50% patient effort);2 person assist required;minimum assist (75% patient effort)  -RT    Gait Training Goal PT STG, Assist Device   walker, rolling  -RT    Gait Training Goal PT STG, Date Goal Reviewed  07/28/17  -LL     Gait Training Goal PT STG, Outcome  goal met  -LL     Gait Training PT LTG    Gait Training Goal PT LTG, Date Established   07/22/17  -RT    Gait Training Goal PT LTG, Time to Achieve   2 wks  -RT    Gait Training Goal PT LTG, [place] Level   contact guard assist  -RT    Gait Training Goal PT LTG, Assist Device   walker, rolling  -RT    Gait Training Goal PT LTG, Distance to Achieve   50  -RT    Gait Training Goal PT LTG, Date Goal Reviewed  07/28/17  -LL     Gait Training Goal PT LTG, Outcome goal met  -RF goal ongoing  -LL     Gait Training Goal PT LTG, Reason Goal Not Met discharged from facility  -RF        User Key  (r) = Recorded By, (t) = Taken By, (c) = Cosigned By    Initials Name Provider Type    LL Tiara Alcala, HERNAN Physical Therapy Assistant    RT Shant Jim, PT Physical Therapist    RF Kortney Bower PTA Physical Therapy Assistant              Adult Rehabilitation Note       08/04/17 1400 08/04/17 1223 08/03/17 1601    Rehab Assessment/Intervention    Discipline physical therapy assistant  -RF occupational therapist  -TM occupational therapist  -BF    Document Type discharge summary  -RF discharge summary  -TM therapy note (daily note)  -BF    Subjective Information agree to therapy;no complaints  -RF no complaints  -TM agree to therapy;no complaints  -BF    Patient Effort, Rehab Treatment   good  -BF    Precautions/Limitations fall precautions;other (see comments)   PEG, ABD binder, soft diet thin liquids,  [place]  -RF fall precautions;other (see comments)   PEG, abdominal binder, soft diet with thin liquids, [place]  -TM fall precautions;other (see comments)   PEG, abd binder; soft diet w/ thin liquids; [place]  -BF    Patient Response to Treatment Education provided to Pt, daughter and spouse on home safety, exercise program and proper transfer techniques, 24 hr assistance. Written materails issued; no questions or concerns voiced.   -RF Pt's daughter and  present for pt/family training/educ with 24 hour assistance and supervision recommended and CGA/supervision/SBA recommended with all ADLs and transfers and sponge bathing for safety with pt/family verb understanding.   -TM     Recorded by [RF] Kortney Bower PTA [TM] Jennifer Lazcano OT [BF] Fadia Rubio OT    Vision Assessment/Intervention    Visual Impairment  --   glasses   -TM     Recorded by  [TM] Jennifer Lazcano, ELIDIA     Cognitive Assessment/Intervention    Current Cognitive/Communication Assessment functional  -RF  functional  -BF    Orientation Status oriented to;person;place  -RF oriented to;place;person;situation  -TM oriented to;person;place  -BF    Follows Commands/Answers Questions able to follow single-step instructions;100% of the time  -RF able to follow single-step instructions;100% of the time  -% of the time;able to follow single-step instructions  -BF    Personal Safety decreased awareness, need for assist;decreased awareness, need for safety  -RF decreased awareness, need for assist;decreased awareness, need for safety  -TM     Recorded by [RF] Kortney Bower PTA [TM] Jennifer Lazcano OT [BF] Fadia Rubio OT    Communication Assessment/Intervention    Communication Assessment  [place]  -TM     Recorded by  [TM] Jennifer Lazcano, OT     ROM (Range of Motion)    General ROM Detail  BUE AROM WFL   -TM     Recorded by  [TM] Jennifer Lazcano, ELIDIA     Transfer Assessment/Treatment    Transfers, Bed-Chair  [place] contact guard assist;verbal cues required  -RF  contact guard assist;verbal cues required  -BF    Transfers, Chair-Bed [place] contact guard assist;verbal cues required  -RF  contact guard assist;verbal cues required  -BF    Transfers, Bed-Chair-Bed, Assist Device   other (see comments)   w/c arm rests  -BF    Toilet Transfer, [place]   contact guard assist;verbal cues required  -BF    Toilet Transfer, Assistive Device   elevated toilet seat;other (see comments)   grab bars  -BF    Transfer, Comment  CGA   -TM     Recorded by [RF] Kortney Bower, PTA [TM] Jennifer Lazcano OT [BF] Fadia Rubio OT    Upper Body Bathing Assessment/Training    UB Bathing Assess/Train, Position  sitting  -TM     UB Bathing Assess/Train, [place] Level  set up required;supervision required;stand by assist;verbal cues required  -TM     Recorded by  [TM] Jennifer Lazcano OT     Lower Body Bathing Assessment/Training    LB Bathing Assess/Train, Position  sitting  -TM sitting  -BF    LB Bathing Assess/Train, [place] Level  set up required;stand by assist;supervision required;verbal cues required  -TM stand by assist;verbal cues required  -BF    LB Bathing Assess/Train, Comment   TB bathing: SBA  -BF    Recorded by  [TM] Jennifer Lazcano OT [BF] Fadia Rubio OT    Upper Body Dressing Assessment/Training    UB Dressing Assess/Train, Position  sitting  -TM sitting  -BF    UB Dressing Assess/Train, [place]  set up required;supervision required;verbal cues required  -TM set up required  -BF    UB Dressing Assess/Train, Comment   Set-up  -BF    Recorded by  [TM] Jennifer Lazcano OT [BF] Fadia Rubio OT    Lower Body Dressing Assessment/Training    LB Dressing Assess/Train, Position  sitting  -TM sitting  -BF    LB Dressing Assess/Train, [place]  set up required;supervision required;verbal cues required  -TM set up required;supervision required;verbal cues  required   SBA  -BF    LB Dressing Assess/Train, Comment   SBA  -BF    Recorded by  [TM] Jennifer Lazcano OT [BF] Fadia Rubio OT    Toileting Assessment/Training    Toileting Assess/Train, Assistive Device   raised toilet seat;grab bars  -BF    Toileting Assess/Train, [place] Level  set up required;supervision required;verbal cues required  -TM set up required;supervision required;verbal cues required  -BF    Toileting Assess/Train, Comment   SBA  -BF    Recorded by  [TM] Jennifer Lazcano OT [BF] Fadia Rubio OT    Grooming Assessment/Training    Grooming Assess/Train, Position  sitting  -TM sitting  -BF    Grooming Assess/Train, [place] Level  supervision required;set up required;verbal cues required  -TM set up required;supervision required  -BF    Grooming Assess/Train, Comment   Set-up/supervision  -BF    Recorded by  [TM] Jennifer Lazcano, ELIDIA [BF] Fadia Rubio OT    Self-Feeding Assessment/Training    Self-Feeding Assess/Train, Comment  PEG/tube feeding   -TM     Recorded by  [TM] Jennifer Lazcano OT     Therapy Exercises    Bilateral Lower Extremities --   HEP reviewed and written materials issued. Red tband issued.  -RF      Bilateral Upper Extremity   AROM:;sitting   BUE GMC/FMC ther act; reaching; light strengthening  -BF    BUE Resistance   other (comment)   Arm bike 2 mins X2; Handgripper 20 reps  -BF    Recorded by [RF] Kortney Bower PTA  [BF] Fadia Rubio OT    Positioning and Restraints    Pre-Treatment Position sitting in chair/recliner  -RF      In Bed   supine;call light within reach;encouraged to call for assist  -BF    In Wheelchair call light within reach   daughter and spouse present,   -RF      Recorded by [RF] Kortney Bower PTA  [BF] Fadia Rubio OT      08/03/17 1522 08/02/17 1544 08/02/17 1516    Rehab Assessment/Intervention    Discipline physical therapy assistant  -LL physical therapy assistant  -LL occupational  therapist  -BF    Document Type therapy note (daily note)  -LL therapy note (daily note)  -LL therapy note (daily note)  -BF    Subjective Information agree to therapy;no complaints  -LL no complaints;agree to therapy  -LL agree to therapy;no complaints  -BF    Patient Effort, Rehab Treatment good  -LL good  -LL good  -BF    Precautions/Limitations fall precautions;other (see comments)   PEG, abdominal binder, soft diet with thing liquids, [place]  -LL fall precautions;other (see comments)   PEG, abdominal binder, soft diet with thin liquids, [place]  -LL fall precautions;other (see comments)   PEG, abd binder; soft diet w/ thin liquids; [place]  -BF    Patient Response to Treatment Patient did well with therapy with adequate rest breaks  -LL Patient did well with therapy with adequate rest breaks  -LL     Recorded by [LL] Tiara Alcala PTA [LL] Tiara Alcala PTA [BF] Fadia Rubio, OT    Pain Assessment    Pain Assessment Huffman-Butt FACES  -LL Huffman-Butt FACES  -LL     Huffman-Baker FACES Pain Rating 4  -LL 4  -LL     Pain Type Chronic pain  -LL Chronic pain  -LL     Pain Location Back  -LL Back  -LL     Pain Intervention(s) Repositioned;Rest  -LL Repositioned;Rest  -LL     Recorded by [LL] Tiara Alcala PTA [LL] Tiara Alcala PTA     Cognitive Assessment/Intervention    Current Cognitive/Communication Assessment functional  -LL functional  -LL functional  -BF    Orientation Status oriented to;person;place  -LL oriented to;person;place  -LL oriented to;person;place;situation  -BF    Follows Commands/Answers Questions 100% of the time;able to follow single-step instructions  -% of the time;able to follow single-step instructions  -% of the time;able to follow single-step instructions  -BF    Personal Safety decreased awareness, need for assist;decreased awareness, need for safety  -LL decreased awareness, need for assist;decreased awareness, need for safety  -LL     Personal Safety Interventions fall  prevention program maintained;gait belt;nonskid shoes/slippers when out of bed  -LL fall prevention program maintained;gait belt;nonskid shoes/slippers when out of bed  -LL     Recorded by [LL] Tiara Alcala PTA [LL] Tiara Alcala PTA [BF] Fadia Rubio, OT    Communication Assessment/Intervention    Communication Assessment [place]  -LL [place]  -LL     Recorded by [LL] Tiara Alcala PTA [LL] Tiara Alcala PTA     Bed Mobility, Assessment/Treatment    Bed Mobility, Assistive Device bed rails  -LL bed rails  -LL     Bed Mobility, [place]/Bridge, [place] supervision required;verbal cues required;nonverbal cues required (demo/gesture)  -LL supervision required;verbal cues required;nonverbal cues required (demo/gesture)  -LL     Bed Mob, Supine to Sit, [place] contact guard assist  -LL contact guard assist  -LL     Bed Mob, Sit to Supine, [place] contact guard assist  -LL contact guard assist  -LL     Bed Mobility, Safety Issues decreased use of arms for pushing/pulling;decreased use of legs for bridging/pushing  -LL decreased use of arms for pushing/pulling;decreased use of legs for bridging/pushing  -LL     Bed Mobility, Impairments strength decreased;impaired balance;pain  -LL strength decreased;impaired balance;pain  -LL     Recorded by [LL] Tiara Alcala PTA [LL] Tiara Alcala PTA     Transfer Assessment/Treatment    Transfers, Bed-Chair [place] verbal cues required;nonverbal cues required (demo/gesture);contact guard assist   CGA in AM; Min A in PM  -LL verbal cues required;nonverbal cues required (demo/gesture);contact guard assist   CGA in AM; Min A in PM  -LL contact guard assist;verbal cues required  -BF    Transfers, Chair-Bed [place] verbal cues required;nonverbal cues required (demo/gesture);contact guard assist  -LL verbal cues required;nonverbal cues required (demo/gesture);contact guard assist  -LL contact guard assist;verbal cues required  -BF    Transfers,  Bed-Chair-Bed, Assist Device rolling walker  -LL rolling walker  -LL bed rails;other (see comments)   w/c arm rests  -BF    Transfers, Sit-Stand [place] verbal cues required;nonverbal cues required (demo/gesture);contact guard assist  -LL verbal cues required;nonverbal cues required (demo/gesture);contact guard assist  -LL     Transfers, Stand-Sit [place] verbal cues required;nonverbal cues required (demo/gesture);contact guard assist  -LL verbal cues required;nonverbal cues required (demo/gesture);contact guard assist  -LL     Transfers, Sit-Stand-Sit, Assist Device rolling walker  -LL rolling walker  -LL     Transfer, Safety Issues balance decreased during turns;step length decreased;knees buckling  -LL balance decreased during turns;step length decreased;knees buckling  -LL     Transfer, Impairments strength decreased;impaired balance;coordination impaired  -LL strength decreased;impaired balance;coordination impaired  -LL     Recorded by [LL] Tiara Alcala PTA [LL] Tiara Alcala PTA [BF] Fadia Rubio OT    Gait Assessment/Treatment    Gait, [place] Level verbal cues required;nonverbal cues required (demo/gesture);minimum assist (75% patient effort)  -LL verbal cues required;nonverbal cues required (demo/gesture);minimum assist (75% patient effort)  -LL     Gait, Assistive Device rolling walker  -LL rolling walker  -LL     Gait, Distance (Feet) 100   x 2  -LL 90   80, 60 then 40  -LL     Gait, Gait Pattern Analysis swing-to gait  -LL swing-to gait  -LL     Gait, Gait Deviations andrae decreased;decreased heel strike;step length decreased  -LL andrae decreased;forward flexed posture;step length decreased  -LL     Gait, Safety Issues balance decreased during turns;step length decreased  -LL balance decreased during turns;step length decreased  -LL     Gait, Impairments strength decreased;impaired balance;coordination impaired  -LL strength decreased;impaired balance;coordination  impaired  -LL     Recorded by [LL] Tiara Alcala PTA [LL] Tiara Alcala PTA     Balance Skills Training    Sitting-Level of Assistance Close supervision  -LL Close supervision  -LL     Sitting-Balance Support Feet supported  -LL Feet supported  -LL     Sitting-Balance Activities Reaching for objects;Reaching across midline   Sitting balloon tap  -LL Reaching for objects;Reaching across midline   Sitting balloon tap  -LL     Standing-Level of Assistance Contact guard  -LL Contact guard  -LL     Static Standing Balance Support assistive device  -LL assistive device  -LL     Gait Balance-Level of Assistance Minimum assistance  -LL Minimum assistance  -LL     Gait Balance Support assistive device  -LL assistive device  -LL     Recorded by [LL] Tiara Alcala PTA [LL] Tiara Alcala PTA     Therapy Exercises    Bilateral Lower Extremities AROM:;sitting;standing;supine  -LL AROM:;sitting;standing;supine  -LL     BLE Resistance theraband  -LL theraband  -LL     Bilateral Upper Extremity   AROM:;sitting   BUE GMC/FMC ther act; reaching; light strengthening  -BF    BUE Resistance   theraputty  -BF    Recorded by [LL] Tiara Alcala PTA [LL] Tiara Alcala PTA [BF] Fadia Rubio, ELIDIA    Positioning and Restraints    Pre-Treatment Position in bed  -LL in bed  -LL     Post Treatment Position bed  -LL bed  -LL bed  -BF    In Bed supine;call light within reach;encouraged to call for assist;side rails up x2;heels elevated  -LL supine;call light within reach;encouraged to call for assist;heels elevated  -LL supine;call light within reach;encouraged to call for assist   After both sessions  -BF    Recorded by [LL] Tiara Alcala PTA [LL] Tiara Alcala PTA [BF] Fadia Rubio, ELIDIA      08/01/17 1449          Rehab Assessment/Intervention    Discipline physical therapy assistant  -      Document Type therapy note (daily note)  -LL      Subjective Information agree to therapy;no complaints  -LL      Patient  Effort, Rehab Treatment good  -LL      Precautions/Limitations fall precautions;other (see comments)   PEG, abdominal binder, soft diet with thin liquids, [place]  -LL      Patient Response to Treatment Patient did well with PT with adequate rest breaks  -LL      Recorded by [LL] Tiara Alcala PTA      Pain Assessment    Pain Assessment Huffman-Butt FACES  -LL      Huffman-Baker FACES Pain Rating 4  -LL      Pain Type Chronic pain  -LL      Pain Location Back  -LL      Pain Intervention(s) Repositioned;Rest  -LL      Recorded by [LL] Tiara Alcala PTA      Cognitive Assessment/Intervention    Current Cognitive/Communication Assessment functional  -LL      Orientation Status oriented to;person;place  -LL      Follows Commands/Answers Questions 100% of the time;able to follow single-step instructions  -LL      Personal Safety decreased awareness, need for assist;decreased awareness, need for safety  -LL      Personal Safety Interventions fall prevention program maintained;gait belt;nonskid shoes/slippers when out of bed  -LL      Recorded by [LL] Tiara Alcala PTA      Communication Assessment/Intervention    Communication Assessment [place]  -LL      Recorded by [LL] Tiara Alcala PTA      Bed Mobility, Assessment/Treatment    Bed Mobility, Assistive Device bed rails  -LL      Bed Mobility, [place]/Bridge, [place] supervision required;verbal cues required;nonverbal cues required (demo/gesture)  -LL      Bed Mob, Supine to Sit, [place] minimum assist (75% patient effort);contact guard assist  -LL      Bed Mob, Sit to Supine, [place] minimum assist (75% patient effort);contact guard assist  -LL      Bed Mobility, Safety Issues decreased use of arms for pushing/pulling;decreased use of legs for bridging/pushing  -LL      Bed Mobility, Impairments strength decreased;impaired balance;pain  -LL      Recorded by [LL] Tiara Alcala PTA      Transfer Assessment/Treatment    Transfers, Bed-Chair [place] verbal  cues required;nonverbal cues required (demo/gesture);minimum assist (75% patient effort);contact guard assist   CGA in AM; Min A in PM  -LL      Transfers, Chair-Bed [place] verbal cues required;nonverbal cues required (demo/gesture);minimum assist (75% patient effort);contact guard assist  -LL      Transfers, Bed-Chair-Bed, Assist Device rolling walker  -LL      Transfers, Sit-Stand [place] verbal cues required;nonverbal cues required (demo/gesture);minimum assist (75% patient effort);contact guard assist  -LL      Transfers, Stand-Sit [place] verbal cues required;nonverbal cues required (demo/gesture);minimum assist (75% patient effort);contact guard assist  -LL      Transfers, Sit-Stand-Sit, Assist Device rolling walker  -LL      Transfer, Safety Issues balance decreased during turns;step length decreased;knees buckling  -LL      Transfer, Impairments strength decreased;impaired balance;coordination impaired  -LL      Recorded by [LL] Tiara Alcala PTA      Gait Assessment/Treatment    Gait, [place] Level verbal cues required;nonverbal cues required (demo/gesture);minimum assist (75% patient effort);2 person assist required  -LL      Gait, Assistive Device rolling walker  -LL      Gait, Distance (Feet) 80   x 3 then 60  -LL      Gait, Gait Pattern Analysis swing-to gait  -LL      Gait, Gait Deviations andrae decreased;forward flexed posture;step length decreased  -LL      Gait, Safety Issues balance decreased during turns;step length decreased  -LL      Gait, Impairments strength decreased;impaired balance;coordination impaired  -LL      Recorded by [LL] Tiara Alcala PTA      Balance Skills Training    Sitting-Level of Assistance Close supervision  -LL      Sitting-Balance Support Feet supported  -LL      Sitting-Balance Activities Reaching for objects;Reaching across midline   Sitting balloon tap  -LL      Standing-Level of Assistance Minimum assistance;Contact guard  -LL      Static  Standing Balance Support assistive device  -LL      Gait Balance-Level of Assistance Minimum assistance  -LL      Gait Balance Support assistive device  -LL      Recorded by [LL] Tiara Alcala PTA      Therapy Exercises    Bilateral Lower Extremities AROM:;sitting;standing;supine  -LL      BLE Resistance theraband  -LL      Recorded by [LL] Tiara Alcala PTA      Positioning and Restraints    Pre-Treatment Position --   WC with OT  -LL      Post Treatment Position bed  -LL      In Bed supine;call light within reach;encouraged to call for assist;side rails up x2;heels elevated  -LL      Recorded by [LL] Tiara Alcala PTA        User Key  (r) = Recorded By, (t) = Taken By, (c) = Cosigned By    Initials Name Effective Dates    BF Fadia Rubio, OT 03/14/16 -     TM Jennifercherise Lazcano, OT 03/14/16 -     LL Tiara Alcala, PTA 05/02/16 -     RF Kortney Bower, \[facility]\"" 07/19/17 -           PT Recommendation and Plan  Planned Therapy Interventions: balance training, bed mobility training, gait training, home exercise program, lumbar stabilization, manual therapy techniques, neuromuscular re-education, patient/family education, postural re-education, ROM (Range of Motion), stair training, wheelchair management/propulsion training  PT Frequency: 5 times/wk, per priority policy            Time Calculation:         PT Charges       08/04/17 1433          Time Calculation    Start Time 1000  -RF      Stop Time 1025  -RF      Time Calculation (min) 25 min  -RF        User Key  (r) = Recorded By, (t) = Taken By, (c) = Cosigned By    Initials Name Provider Type    RF Kortney Bower PTA Physical Therapy Assistant          Therapy Charges for Today     Code Description Service Date Service Provider Modifiers Qty    83137156700 HC PT THERAPEUTIC ACT EA 15 MIN 8/4/2017 Kortney Bower PTA GP 1    76749344246 HC PT THER PROC EA 15 MIN 8/4/2017 Kortney Bower PTA GP 1               PT Discharge Summary  Reason for  Discharge: Discharge from facility  Outcomes Achieved: Able to achieve all goals within established timeline  Discharge Destination: Home with assist, Home with home health    Kortney Bower, PTA  8/4/2017

## 2017-08-04 NOTE — DISCHARGE SUMMARY
Date of Discharge:  8/4/2017    Discharge Diagnosis:   Cachexia with malnutrition  Aspiration Pneumonia  Debility from chronic malnutrition    Problem List: Active Problems:    Debility      Hospital Course  Patient is a 76 y.o. female presented with weight loss. The patient has lost from around 130 pounds down to 73 pounds.  She basically quit eating.  She states that she is gotten weaker and weaker to the point where she couldn't even walk to her home.  She has a Rollator at home with the family places her in that it pushes her through the house.  She has had muscle loss.  Doesn't have much energy.  Always tired.  States she just doesn't want to eat and she feels sick all the time.  She was admitted to acute care and had a PEG placed.  She's tolerating tube feeds.  She had a swallowing assessment and has an intact swallowing mechanism.  She states that she has basically become dependent with all activities at home.  She can't even dress herself, bath herself or propulse herself through her house.  Completed course of antibiotics for aspiration pneumonia during hospital stay. She continues to have poor appetite and improving nutrition with tube feeds, the patient agrees to continue with this and will follow. Continue PT with home health and otherise will follow. Anemia stable and will continue iron replacemnt.     Procedures Performed         Consults:   Consults     Date and Time Order Name Status Description    7/20/2017 0787 Inpatient Consult to Pulmonology Completed     7/16/2017 1041 Inpatient Consult to Psychiatrist Completed           Pertinent Test Results:   Lab Results (last 24 hours)     ** No results found for the last 24 hours. **                                                  Condition on Discharge:  Stable    Physical Exam:     General Appearance:    Alert, cooperative, in no acute distress   Head:    Normocephalic, without obvious abnormality, atraumatic   Eyes:            Lids and lashes  normal, conjunctivae and sclerae normal, no   icterus, no pallor, corneas clear, PERRLA   Ears:    Ears appear intact with no abnormalities noted   Throat:   No oral lesions, no thrush, oral mucosa moist   Neck:   No adenopathy, supple, trachea midline, no thyromegaly, no   carotid bruit, no JVD   Back:     No kyphosis present, no scoliosis present, no skin lesions,      erythema or scars, no tenderness to percussion or                   palpation,   range of motion normal   Lungs:     Clear to auscultation,respirations regular, even and                  unlabored    Heart:    Regular rhythm and normal rate, normal S1 and S2, 1= Systolic murmur, no gallop, no rub, no click   Chest Wall:    No abnormalities observed   Abdomen:     PEG in place and CDI.   Rectal:     Deferred   Extremities:   Moves all extremities well, no edema, no cyanosis, no             redness   Pulses:   Pulses palpable and equal bilaterally   Skin:   No bleeding, bruising or rash   Lymph nodes:   No palpable adenopathy   Neurologic:   Cranial nerves 2 - 12 grossly intact, sensation intact, DTR       present and equal bilaterally       [place] [facility] [place]   Tamie Rodriguez   Home Medication Instructions GARRETT:[id number]    Printed on:08/04/17 1007   Medication Information                      acetaminophen (TYLENOL) 500 MG tablet  Take 1,000 mg by mouth Every 6 (Six) Hours As Needed for Mild Pain (1-3).             cyproheptadine (PERIACTIN) 4 MG tablet  Take 4 mg by mouth 3 (Three) Times a Day.             ferrous sulfate 325 (65 FE) MG tablet  Take 1 tablet by mouth 2 Times a Day With Meals.             gabapentin (NEURONTIN) 100 MG capsule  Take 1 capsule by mouth Every 12 (Twelve) Hours.             multivitamin (DAILY BERNIE) tablet tablet  Take 1 tablet by mouth Daily.             Nutritional Supplements (OSMOLITE 1.5 TALIA) liquid  55 mL/hr by Per PEG Tube route Continuous. 6pm until 6am              pantoprazole (PROTONIX) 40 MG EC tablet  Take 1 tablet by mouth Daily.             povidone-iodine (BETADINE) 10 % ointment  Apply  topically Daily.                 Discharge Diet:    Diet Orders (active)     Start     Ordered    08/02/17 1800  Dietary Nutrition Supplements Ensure Enlive  2 Times Daily     Comments:  Chocolate ensure at  Lunch and dinner    08/02/17 1026    07/31/17 1504  Osmolite 1.5; Tube Feeding Type: Cyclic / Intermittent; Feeding Start Time: 6:00 PM; Feeding End Time: 6:00 AM; Cyclic Feeding Start Rate (mL/hr): 55; To Goal Rate of (mL/hr): 55; Supplemental Bolus Feeding: No; Yes; Soft Texture; Ground; Thin  Diet Effective Now      07/31/17 1506          Follow-up Appointments  Future Appointments  Date Time Provider Department Center   8/17/2017 2:00 PM JW Pacheco [facility] None         Test Results Pending at Discharge  None     Shant Rubio MD  08/04/17  10:07 AM

## 2017-08-04 NOTE — PLAN OF CARE
Problem: Patient Care Overview (Adult)  Goal: Plan of Care Review  Outcome: Ongoing (interventions implemented as appropriate)    Problem: Fall Risk (Adult)  Goal: Absence of Falls  Outcome: Ongoing (interventions implemented as appropriate)    Problem: Skin Integrity Impairment, Risk/Actual (Adult)  Goal: Skin Integrity/Wound Healing  Outcome: Ongoing (interventions implemented as appropriate)    Problem: Pressure Ulcer Risk (Victor Hugo Scale) (Adult,Obstetrics,Pediatric)  Goal: Skin Integrity  Outcome: Ongoing (interventions implemented as appropriate)

## 2017-08-17 NOTE — PROGRESS NOTES
PPS CMG Coordinator  Inpatient Rehabilitation Discharge    Mode of Locomotion: Walking.    Discharge Against Medical Advice:  No.  Discharge Information  Patient Discharged Alive:  Yes  Discharge Destination/Living Setting: Home with Home Health Services  Diagnosis for Interruption/Death:    Impairment Group: 16 Debility (non-cardiac, non-pulmonary)    Comorbidities:    Complications:      CATHY Bladder Accidents: 0  - Accidents.  Patient used medications/device this  shift.  8/4/2017 2:20:00 AM  Bladder Score = 6. Patient has not had an accident, but uses a  device/medication.  CATHY Bowel Accident: 0  - Accidents.  Patient used medications/device this shift.   8/3/2017 8:12:00 AM  Bowel Score = 6. Patient has no accidents, but uses a device/medications.    Signed by: Kassy Cid, Supervisor

## 2017-08-22 ENCOUNTER — OFFICE VISIT (OUTPATIENT)
Dept: PULMONOLOGY | Facility: CLINIC | Age: 77
End: 2017-08-22

## 2017-08-22 VITALS
DIASTOLIC BLOOD PRESSURE: 56 MMHG | TEMPERATURE: 97.9 F | WEIGHT: 90 LBS | SYSTOLIC BLOOD PRESSURE: 112 MMHG | BODY MASS INDEX: 15.36 KG/M2 | HEIGHT: 64 IN | OXYGEN SATURATION: 92 % | HEART RATE: 94 BPM

## 2017-08-22 DIAGNOSIS — R53.81 DEBILITY: ICD-10-CM

## 2017-08-22 DIAGNOSIS — J18.9 PNEUMONIA OF BOTH LUNGS DUE TO INFECTIOUS ORGANISM, UNSPECIFIED PART OF LUNG: Primary | ICD-10-CM

## 2017-08-22 DIAGNOSIS — R64 CACHEXIA (HCC): ICD-10-CM

## 2017-08-22 PROCEDURE — 99213 OFFICE O/P EST LOW 20 MIN: CPT | Performed by: NURSE PRACTITIONER

## 2017-08-22 NOTE — PROGRESS NOTES
Interval history since last visit: None    Recent hospitalizations: Yes    Investigations (imaging, PFT's, labs, sleep study, record requests, etc.) None    Have you had the Influenza Vaccine? yes    Would you like to receive this Vaccine today? no    Have you had the Pneumonia Vaccine?  yes   Would you like to receive this Vaccine today? no    Subjective    Tamie Michael presents for the following Hospital Follow Up      History of Present Illness     Ms. Rodriguez is here today for hospital follow-up.  She was recently hospitalized due to a decreased appetite and significant weight loss.  She had not been eating for several months.  While in the hospital and was noted that patient had aspiration pneumonia and was treated with antibiotics.  A G-tube was placed for additional nutrition.  Patient continued to aspirate despite G-tube being placed.  Since discharge patient states that she is feeling much better and has gained 10 pounds.  She states that she is doing well with oral intake and does not cough or choke when she is eating.  She states that she is also still doing tube feedings.    Review of Systems   Constitutional: Negative for activity change, fatigue and unexpected weight change.   HENT: Negative for congestion, postnasal drip and rhinorrhea.    Respiratory: Negative for apnea, cough, chest tightness, shortness of breath and wheezing.    Cardiovascular: Negative for chest pain and palpitations.   Gastrointestinal: Negative for nausea.   Allergic/Immunologic: Negative for environmental allergies.   Psychiatric/Behavioral: Negative for agitation and confusion.       Active Problems:  Problem List Items Addressed This Visit     Debility    Cachexia      Other Visit Diagnoses     Pneumonia of both lungs due to infectious organism, unspecified part of lung    -  Primary    Relevant Orders    XR Chest 2 View          Past Medical History:  Past Medical History:   Diagnosis Date   • Arthritis    • Meniere's disease  "       Family History:  Family History   Problem Relation Age of Onset   • Cancer Mother    • Cancer Father        Social History:  Social History   Substance Use Topics   • Smoking status: Never Smoker   • Smokeless tobacco: Not on file   • Alcohol use No       Current Medications:  Current Outpatient Prescriptions   Medication Sig Dispense Refill   • acetaminophen (TYLENOL) 500 MG tablet Take 1,000 mg by mouth Every 6 (Six) Hours As Needed for Mild Pain (1-3).     • cyproheptadine (PERIACTIN) 4 MG tablet Take 4 mg by mouth 3 (Three) Times a Day.     • ferrous sulfate 325 (65 FE) MG tablet Take 1 tablet by mouth 2 Times a Day With Meals. 60 tablet 0   • gabapentin (NEURONTIN) 100 MG capsule Take 1 capsule by mouth Every 12 (Twelve) Hours. 60 capsule 0   • multivitamin (DAILY BERNIE) tablet tablet Take 1 tablet by mouth Daily. 30 tablet 0   • pantoprazole (PROTONIX) 40 MG EC tablet Take 1 tablet by mouth Daily. 30 tablet 0   • [DISCONTINUED] Nutritional Supplements (OSMOLITE 1.5 TALIA) liquid 55 mL/hr by Per PEG Tube route Continuous. 6pm until 6am 20 bottle 0     No current facility-administered medications for this visit.        Allergies:  Allergies   Allergen Reactions   • Darvon [Propoxyphene] Nausea And Vomiting   • Tramadol Nausea And Vomiting       Vitals:  /56 (BP Location: Left arm, Patient Position: Sitting, Cuff Size: Adult)  Pulse 94  Temp 97.9 °F (36.6 °C) (Oral)   Ht 64\" (162.6 cm)  Wt 90 lb (40.8 kg)  SpO2 92%  BMI 15.45 kg/m2    Imaging:    Imaging Results (most recent)     None          Pulmonary Functions Testing Results:    No results found for: FEV1, FVC, [id number], TLC, DLCO    Results for orders placed or performed during the hospital encounter of 07/21/17   Blood Culture   Result Value Ref Range    Blood Culture No growth at 5 days    Blood Culture   Result Value Ref Range    Blood Culture No growth at 5 days    Urinalysis With / Culture If Indicated   Result Value Ref Range    Color, " UA Yellow Yellow, Straw    Appearance, UA Clear Clear    pH, UA 8.0 5.0 - 8.0    Specific Gravity, UA 1.010 1.005 - 1.030    Glucose, UA Negative Negative    Ketones, UA Negative Negative    Bilirubin, UA Negative Negative    Blood, UA Negative Negative    Protein, UA Negative Negative    Leuk Esterase, UA Negative Negative    Nitrite, UA Negative Negative    Urobilinogen, UA 0.2 E.U./dL 0.2 - 1.0 E.U./dL   Magnesium   Result Value Ref Range    Magnesium 2.0 1.7 - 2.6 mg/dL   Phosphorus   Result Value Ref Range    Phosphorus 3.8 2.7 - 4.5 mg/dL   Comprehensive Metabolic Panel   Result Value Ref Range    Glucose 134 (H) 70 - 110 mg/dL    BUN 18 7 - 21 mg/dL    Creatinine 0.55 0.43 - 1.29 mg/dL    Sodium 137 135 - 153 mmol/L    Potassium 4.3 3.5 - 5.3 mmol/L    Chloride 104 99 - 112 mmol/L    CO2 31.1 24.3 - 31.9 mmol/L    Calcium 9.4 7.7 - 10.0 mg/dL    Total Protein 7.0 6.0 - 8.0 g/dL    Albumin 3.40 3.40 - 4.80 g/dL    ALT (SGPT) 14 10 - 36 U/L    AST (SGOT) 28 10 - 30 U/L    Alkaline Phosphatase 91 35 - 104 U/L    Total Bilirubin 0.2 0.2 - 1.8 mg/dL    eGFR Non African Amer 107 >60 mL/min/1.73    Globulin 3.6 gm/dL    A/G Ratio 0.9 (L) 1.5 - 2.5 g/dL    BUN/Creatinine Ratio 32.7 (H) 7.0 - 25.0    Anion Gap 1.9 (L) 3.6 - 11.2 mmol/L   C-reactive Protein   Result Value Ref Range    C-Reactive Protein 11.71 (H) 0.00 - 0.99 mg/dL   CBC Auto Differential   Result Value Ref Range    WBC 8.27 4.50 - 12.50 10*3/mm3    RBC 3.05 (L) 4.20 - 5.40 10*6/mm3    Hemoglobin 8.2 (L) 12.0 - 16.0 g/dL    Hematocrit 29.2 (L) 37.0 - 47.0 %    MCV 95.7 (H) 80.0 - 94.0 fL    MCH 26.9 (L) 27.0 - 33.0 pg    MCHC 28.1 (L) 33.0 - 37.0 g/dL    RDW 14.9 (H) 11.5 - 14.5 %    RDW-SD 48.9 37.0 - 54.0 fl    MPV 10.3 (H) 6.0 - 10.0 fL    Platelets 275 130 - 400 10*3/mm3    Neutrophil % 72.1 40.0 - 75.0 %    Lymphocyte % 12.0 (L) 16.0 - 46.0 %    Monocyte % 14.0 (H) 0.0 - 12.0 %    Eosinophil % 1.6 0.0 - 7.0 %    Basophil % 0.2 0.0 - 2.0 %     Immature Grans % 0.1 0.0 - 0.5 %    Neutrophils, Absolute 5.96 1.40 - 6.50 10*3/mm3    Lymphocytes, Absolute 0.99 (L) 1.00 - 3.00 10*3/mm3    Monocytes, Absolute 1.16 (H) 0.10 - 0.90 10*3/mm3    Eosinophils, Absolute 0.13 0.00 - 0.70 10*3/mm3    Basophils, Absolute 0.02 0.00 - 0.30 10*3/mm3    Immature Grans, Absolute 0.01 0.00 - 0.03 10*3/mm3   Osmolality, Calculated   Result Value Ref Range    Osmolality Calc 277.7 273.0 - 305.0 mOsm/kg   Scan Slide   Result Value Ref Range    Hypochromia Slight/1+ None Seen    Macrocytes Slight/1+ None Seen    Platelet Morphology Normal Normal   Comprehensive Metabolic Panel   Result Value Ref Range    Glucose 112 (H) 70 - 110 mg/dL    BUN 20 7 - 21 mg/dL    Creatinine 0.53 0.43 - 1.29 mg/dL    Sodium 138 135 - 153 mmol/L    Potassium 4.5 3.5 - 5.3 mmol/L    Chloride 105 99 - 112 mmol/L    CO2 31.7 24.3 - 31.9 mmol/L    Calcium 9.3 7.7 - 10.0 mg/dL    Total Protein 6.9 6.0 - 8.0 g/dL    Albumin 3.40 3.40 - 4.80 g/dL    ALT (SGPT) 25 10 - 36 U/L    AST (SGOT) 43 (H) 10 - 30 U/L    Alkaline Phosphatase 89 35 - 104 U/L    Total Bilirubin 0.2 0.2 - 1.8 mg/dL    eGFR Non African Amer 112 >60 mL/min/1.73    Globulin 3.5 gm/dL    A/G Ratio 1.0 (L) 1.5 - 2.5 g/dL    BUN/Creatinine Ratio 37.7 (H) 7.0 - 25.0    Anion Gap 1.3 (L) 3.6 - 11.2 mmol/L   Magnesium   Result Value Ref Range    Magnesium 2.1 1.7 - 2.6 mg/dL   CBC Auto Differential   Result Value Ref Range    WBC 7.47 4.50 - 12.50 10*3/mm3    RBC 2.87 (L) 4.20 - 5.40 10*6/mm3    Hemoglobin 7.9 (L) 12.0 - 16.0 g/dL    Hematocrit 26.6 (L) 37.0 - 47.0 %    MCV 92.7 80.0 - 94.0 fL    MCH 27.5 27.0 - 33.0 pg    MCHC 29.7 (L) 33.0 - 37.0 g/dL    RDW 15.1 (H) 11.5 - 14.5 %    RDW-SD 51.7 37.0 - 54.0 fl    MPV 10.3 (H) 6.0 - 10.0 fL    Platelets 276 130 - 400 10*3/mm3    Neutrophil % 74.0 40.0 - 75.0 %    Lymphocyte % 9.4 (L) 16.0 - 46.0 %    Monocyte % 13.0 (H) 0.0 - 12.0 %    Eosinophil % 3.1 0.0 - 7.0 %    Basophil % 0.4 0.0 - 2.0 %     Immature Grans % 0.1 0.0 - 0.5 %    Neutrophils, Absolute 5.53 1.40 - 6.50 10*3/mm3    Lymphocytes, Absolute 0.70 (L) 1.00 - 3.00 10*3/mm3    Monocytes, Absolute 0.97 (H) 0.10 - 0.90 10*3/mm3    Eosinophils, Absolute 0.23 0.00 - 0.70 10*3/mm3    Basophils, Absolute 0.03 0.00 - 0.30 10*3/mm3    Immature Grans, Absolute 0.01 0.00 - 0.03 10*3/mm3   Osmolality, Calculated   Result Value Ref Range    Osmolality Calc 279.0 273.0 - 305.0 mOsm/kg   Comprehensive Metabolic Panel   Result Value Ref Range    Glucose 120 (H) 70 - 110 mg/dL    BUN 20 7 - 21 mg/dL    Creatinine 0.55 0.43 - 1.29 mg/dL    Sodium 138 135 - 153 mmol/L    Potassium 4.3 3.5 - 5.3 mmol/L    Chloride 102 99 - 112 mmol/L    CO2 32.1 (H) 24.3 - 31.9 mmol/L    Calcium 9.7 7.7 - 10.0 mg/dL    Total Protein 7.3 6.0 - 8.0 g/dL    Albumin 3.50 3.40 - 4.80 g/dL    ALT (SGPT) 29 10 - 36 U/L    AST (SGOT) 40 (H) 10 - 30 U/L    Alkaline Phosphatase 96 35 - 104 U/L    Total Bilirubin 0.2 0.2 - 1.8 mg/dL    eGFR Non African Amer 107 >60 mL/min/1.73    Globulin 3.8 gm/dL    A/G Ratio 0.9 (L) 1.5 - 2.5 g/dL    BUN/Creatinine Ratio 36.4 (H) 7.0 - 25.0    Anion Gap 3.9 3.6 - 11.2 mmol/L   CBC Auto Differential   Result Value Ref Range    WBC 7.05 4.50 - 12.50 10*3/mm3    RBC 3.01 (L) 4.20 - 5.40 10*6/mm3    Hemoglobin 8.1 (L) 12.0 - 16.0 g/dL    Hematocrit 27.4 (L) 37.0 - 47.0 %    MCV 91.0 80.0 - 94.0 fL    MCH 26.9 (L) 27.0 - 33.0 pg    MCHC 29.6 (L) 33.0 - 37.0 g/dL    RDW 14.7 (H) 11.5 - 14.5 %    RDW-SD 49.2 37.0 - 54.0 fl    MPV 10.5 (H) 6.0 - 10.0 fL    Platelets 323 130 - 400 10*3/mm3    Neutrophil % 67.2 40.0 - 75.0 %    Lymphocyte % 14.9 (L) 16.0 - 46.0 %    Monocyte % 13.8 (H) 0.0 - 12.0 %    Eosinophil % 3.7 0.0 - 7.0 %    Basophil % 0.3 0.0 - 2.0 %    Immature Grans % 0.1 0.0 - 0.5 %    Neutrophils, Absolute 4.74 1.40 - 6.50 10*3/mm3    Lymphocytes, Absolute 1.05 1.00 - 3.00 10*3/mm3    Monocytes, Absolute 0.97 (H) 0.10 - 0.90 10*3/mm3    Eosinophils,  Absolute 0.26 0.00 - 0.70 10*3/mm3    Basophils, Absolute 0.02 0.00 - 0.30 10*3/mm3    Immature Grans, Absolute 0.01 0.00 - 0.03 10*3/mm3    nRBC 0.0 0.0 - 0.0 /100 WBC   Osmolality, Calculated   Result Value Ref Range    Osmolality Calc 279.5 273.0 - 305.0 mOsm/kg   Troponin   Result Value Ref Range    Troponin I <0.006 <=0.040 ng/mL   Troponin   Result Value Ref Range    Troponin I <0.006 <=0.040 ng/mL   CK   Result Value Ref Range    Creatine Kinase 22 (L) 24 - 173 U/L   CK-MB   Result Value Ref Range    CKMB <0.18 0.00 - 5.00 ng/mL   Basic Metabolic Panel   Result Value Ref Range    Glucose 98 70 - 110 mg/dL    BUN 26 (H) 7 - 21 mg/dL    Creatinine 0.54 0.43 - 1.29 mg/dL    Sodium 139 135 - 153 mmol/L    Potassium 4.4 3.5 - 5.3 mmol/L    Chloride 101 99 - 112 mmol/L    CO2 30.8 24.3 - 31.9 mmol/L    Calcium 9.4 7.7 - 10.0 mg/dL    eGFR Non African Amer 109 >60 mL/min/1.73    BUN/Creatinine Ratio 48.1 (H) 7.0 - 25.0    Anion Gap 7.2 3.6 - 11.2 mmol/L   CBC Auto Differential   Result Value Ref Range    WBC 14.04 (H) 4.50 - 12.50 10*3/mm3    RBC 3.18 (L) 4.20 - 5.40 10*6/mm3    Hemoglobin 8.7 (L) 12.0 - 16.0 g/dL    Hematocrit 29.3 (L) 37.0 - 47.0 %    MCV 92.1 80.0 - 94.0 fL    MCH 27.4 27.0 - 33.0 pg    MCHC 29.7 (L) 33.0 - 37.0 g/dL    RDW 14.5 11.5 - 14.5 %    RDW-SD 47.2 37.0 - 54.0 fl    MPV 9.9 6.0 - 10.0 fL    Platelets 371 130 - 400 10*3/mm3    Neutrophil % 86.4 (H) 40.0 - 75.0 %    Lymphocyte % 6.4 (L) 16.0 - 46.0 %    Monocyte % 6.3 0.0 - 12.0 %    Eosinophil % 0.7 0.0 - 7.0 %    Basophil % 0.1 0.0 - 2.0 %    Immature Grans % 0.1 0.0 - 0.5 %    Neutrophils, Absolute 12.11 (H) 1.40 - 6.50 10*3/mm3    Lymphocytes, Absolute 0.90 (L) 1.00 - 3.00 10*3/mm3    Monocytes, Absolute 0.89 0.10 - 0.90 10*3/mm3    Eosinophils, Absolute 0.10 0.00 - 0.70 10*3/mm3    Basophils, Absolute 0.02 0.00 - 0.30 10*3/mm3    Immature Grans, Absolute 0.02 0.00 - 0.03 10*3/mm3   Urinalysis With / Culture If Indicated   Result Value  Ref Range    Color, UA Yellow Yellow, Straw    Appearance, UA Clear Clear    pH, UA 7.5 5.0 - 8.0    Specific Gravity, UA 1.020 1.005 - 1.030    Glucose, UA Negative Negative    Ketones, UA Negative Negative    Bilirubin, UA Negative Negative    Blood, UA Trace (A) Negative    Protein, UA Trace (A) Negative    Leuk Esterase, UA Negative Negative    Nitrite, UA Negative Negative    Urobilinogen, UA 1.0 E.U./dL 0.2 - 1.0 E.U./dL   Urinalysis, Microscopic Only   Result Value Ref Range    RBC, UA Too Numerous to Count (A) None Seen /HPF    WBC, UA 0-2 (A) None Seen /HPF    Bacteria, UA None Seen None Seen /HPF    Squamous Epithelial Cells, UA 0-2 None Seen, 0-2 /HPF    Hyaline Casts, UA None Seen None Seen /LPF    Methodology Automated Microscopy    Osmolality, Calculated   Result Value Ref Range    Osmolality Calc 282.3 273.0 - 305.0 mOsm/kg   CK-MB Index   Result Value Ref Range    CK-MB Index  0.0 - 3.0 %   Troponin   Result Value Ref Range    Troponin I <0.006 <=0.040 ng/mL   Basic Metabolic Panel   Result Value Ref Range    Glucose 103 70 - 110 mg/dL    BUN 21 7 - 21 mg/dL    Creatinine 0.57 0.43 - 1.29 mg/dL    Sodium 139 135 - 153 mmol/L    Potassium 4.5 3.5 - 5.3 mmol/L    Chloride 106 99 - 112 mmol/L    CO2 28.9 24.3 - 31.9 mmol/L    Calcium 8.7 7.7 - 10.0 mg/dL    eGFR Non African Amer 103 >60 mL/min/1.73    BUN/Creatinine Ratio 36.8 (H) 7.0 - 25.0    Anion Gap 4.1 3.6 - 11.2 mmol/L   C-reactive Protein   Result Value Ref Range    C-Reactive Protein 3.88 (H) 0.00 - 0.99 mg/dL   CBC Auto Differential   Result Value Ref Range    WBC 6.07 4.50 - 12.50 10*3/mm3    RBC 2.68 (L) 4.20 - 5.40 10*6/mm3    Hemoglobin 7.2 (L) 12.0 - 16.0 g/dL    Hematocrit 24.1 (L) 37.0 - 47.0 %    MCV 89.9 80.0 - 94.0 fL    MCH 26.9 (L) 27.0 - 33.0 pg    MCHC 29.9 (L) 33.0 - 37.0 g/dL    RDW 14.8 (H) 11.5 - 14.5 %    RDW-SD 48.6 37.0 - 54.0 fl    MPV 10.0 6.0 - 10.0 fL    Platelets 370 130 - 400 10*3/mm3    Neutrophil % 64.0 40.0 - 75.0  %    Lymphocyte % 15.3 (L) 16.0 - 46.0 %    Monocyte % 14.8 (H) 0.0 - 12.0 %    Eosinophil % 5.1 0.0 - 7.0 %    Basophil % 0.5 0.0 - 2.0 %    Immature Grans % 0.3 0.0 - 0.5 %    Neutrophils, Absolute 3.88 1.40 - 6.50 10*3/mm3    Lymphocytes, Absolute 0.93 (L) 1.00 - 3.00 10*3/mm3    Monocytes, Absolute 0.90 0.10 - 0.90 10*3/mm3    Eosinophils, Absolute 0.31 0.00 - 0.70 10*3/mm3    Basophils, Absolute 0.03 0.00 - 0.30 10*3/mm3    Immature Grans, Absolute 0.02 0.00 - 0.03 10*3/mm3    nRBC 0.0 0.0 - 0.0 /100 WBC   Osmolality, Calculated   Result Value Ref Range    Osmolality Calc 280.8 273.0 - 305.0 mOsm/kg   Magnesium   Result Value Ref Range    Magnesium 2.1 1.7 - 2.6 mg/dL   Potassium   Result Value Ref Range    Potassium 4.1 3.5 - 5.3 mmol/L   Vancomycin, Trough   Result Value Ref Range    Vancomycin Trough 8.40 5.00 - 15.00 mcg/mL   Magnesium   Result Value Ref Range    Magnesium 2.2 1.7 - 2.6 mg/dL   Potassium   Result Value Ref Range    Potassium 4.4 3.5 - 5.3 mmol/L   CBC Auto Differential   Result Value Ref Range    WBC 5.98 4.50 - 12.50 10*3/mm3    RBC 2.65 (L) 4.20 - 5.40 10*6/mm3    Hemoglobin 7.2 (L) 12.0 - 16.0 g/dL    Hematocrit 24.8 (L) 37.0 - 47.0 %    MCV 93.6 80.0 - 94.0 fL    MCH 27.2 27.0 - 33.0 pg    MCHC 29.0 (L) 33.0 - 37.0 g/dL    RDW 14.8 (H) 11.5 - 14.5 %    RDW-SD 48.2 37.0 - 54.0 fl    MPV 10.7 (H) 6.0 - 10.0 fL    Platelets 325 130 - 400 10*3/mm3    Neutrophil % 51.1 40.0 - 75.0 %    Lymphocyte % 30.3 16.0 - 46.0 %    Monocyte % 11.4 0.0 - 12.0 %    Eosinophil % 6.7 0.0 - 7.0 %    Basophil % 0.3 0.0 - 2.0 %    Immature Grans % 0.2 0.0 - 0.5 %    Neutrophils, Absolute 3.06 1.40 - 6.50 10*3/mm3    Lymphocytes, Absolute 1.81 1.00 - 3.00 10*3/mm3    Monocytes, Absolute 0.68 0.10 - 0.90 10*3/mm3    Eosinophils, Absolute 0.40 0.00 - 0.70 10*3/mm3    Basophils, Absolute 0.02 0.00 - 0.30 10*3/mm3    Immature Grans, Absolute 0.01 0.00 - 0.03 10*3/mm3   Basic Metabolic Panel   Result Value Ref Range     Glucose 113 (H) 70 - 110 mg/dL    BUN 16 7 - 21 mg/dL    Creatinine 0.53 0.43 - 1.29 mg/dL    Sodium 139 135 - 153 mmol/L    Potassium 4.5 3.5 - 5.3 mmol/L    Chloride 109 99 - 112 mmol/L    CO2 28.4 24.3 - 31.9 mmol/L    Calcium 9.1 7.7 - 10.0 mg/dL    eGFR Non African Amer 112 >60 mL/min/1.73    BUN/Creatinine Ratio 30.2 (H) 7.0 - 25.0    Anion Gap 1.6 (L) 3.6 - 11.2 mmol/L   Magnesium   Result Value Ref Range    Magnesium 2.1 1.7 - 2.6 mg/dL   Osmolality, Calculated   Result Value Ref Range    Osmolality Calc 279.5 273.0 - 305.0 mOsm/kg   Magnesium   Result Value Ref Range    Magnesium 2.1 1.7 - 2.6 mg/dL   Potassium   Result Value Ref Range    Potassium 4.0 3.5 - 5.3 mmol/L   Vancomycin, Trough   Result Value Ref Range    Vancomycin Trough 15.40 (H) 5.00 - 15.00 mcg/mL   Magnesium   Result Value Ref Range    Magnesium 2.1 1.7 - 2.6 mg/dL   Potassium   Result Value Ref Range    Potassium 4.3 3.5 - 5.3 mmol/L       Objective   Physical Exam     GENERAL APPEARANCE: Well developed, manourished alert and cooperative, and appears to be in no acute distress.    HEAD: normocephalic.    EYES: PERRL, EOMI. Fundi normal, vision is grossly intact.    THROAT: Oral cavity and pharynx normal. No inflammation, swelling, exudate, or lesions.     NECK: Neck supple.     CARDIAC: Normal S1 and S2. No S3, S4 or murmurs. Rhythm is regular. There is no peripheral edema, cyanosis or pallor. Extremities are warm and well perfused. Capillary refill is less than 2 seconds. No carotid bruits.    RESPIRATORY: Clear to auscultation without rales, rhonchi, wheezing or diminished breath sounds.    GI: Positive bowel sounds. Soft, nondistended, nontender.     MUSCULOSKELETAL: No significant deformity or joint abnormality. No edema. Peripheral pulses intact. No varicosities.    NEUROLOGICAL: Strength and sensation symmetric and intact throughout.     PSYCHIATRIC: The mental examination revealed the patient was oriented to person, place, and  time.       Assessment/Plan      Aspiration pneumonia: Patient states she is feeling better since her hospitalization.  She is observing aspiration precautions when eating.  She states that she is doing much better.  By mouth intake and has no coughing or choking while she eats.    Will obtain chest x-ray in about 2 weeks to assess resolution of pneumonia.    Cachexia: Patient states that she has gained about 10 pounds since her hospitalization.  G-tube was placed for additional nutrition while in the hospital.  Continue tube feedings and oral intake.    Deconditioning: Continue with at-home physical therapy.        ICD-10-CM ICD-9-CM   1. Pneumonia of both lungs due to infectious organism, unspecified part of lung J18.9 483.8   2. Cachexia R64 799.4   3. Debility R53.81 799.3       Return in about 3 months (around 11/22/2017).

## 2017-09-05 ENCOUNTER — HOSPITAL ENCOUNTER (OUTPATIENT)
Dept: GENERAL RADIOLOGY | Facility: HOSPITAL | Age: 77
Discharge: HOME | End: 2017-09-05
Admitting: NURSE PRACTITIONER

## 2017-09-05 DIAGNOSIS — J18.9 PNEUMONIA OF BOTH LUNGS DUE TO INFECTIOUS ORGANISM, UNSPECIFIED PART OF LUNG: ICD-10-CM

## 2017-09-05 PROCEDURE — 71020 HC CHEST PA AND LATERAL: CPT

## 2017-09-05 PROCEDURE — 71020 XR CHEST 2 VW: CPT | Performed by: RADIOLOGY

## 2017-09-15 ENCOUNTER — EPISODE CHANGES (OUTPATIENT)
Dept: CASE MANAGEMENT | Facility: OTHER | Age: 77
End: 2017-09-15

## 2017-10-16 ENCOUNTER — HOSPITAL ENCOUNTER (OUTPATIENT)
Dept: INFUSION THERAPY | Facility: HOSPITAL | Age: 77
Discharge: HOME | End: 2017-10-16
Attending: INTERNAL MEDICINE | Admitting: INTERNAL MEDICINE

## 2017-10-16 ENCOUNTER — TRANSCRIBE ORDERS (OUTPATIENT)
Dept: INFUSION THERAPY | Facility: HOSPITAL | Age: 77
End: 2017-10-16

## 2017-10-16 VITALS
TEMPERATURE: 98.4 F | HEIGHT: 64 IN | DIASTOLIC BLOOD PRESSURE: 85 MMHG | RESPIRATION RATE: 16 BRPM | BODY MASS INDEX: 17.93 KG/M2 | SYSTOLIC BLOOD PRESSURE: 138 MMHG | WEIGHT: 105 LBS | HEART RATE: 89 BPM

## 2017-10-16 DIAGNOSIS — R63.4 WEIGHT LOSS: ICD-10-CM

## 2017-10-16 DIAGNOSIS — E46 MALNUTRITION, UNSPECIFIED TYPE (HCC): ICD-10-CM

## 2017-10-16 DIAGNOSIS — R53.1 WEAKNESS: Primary | ICD-10-CM

## 2017-10-16 PROCEDURE — G0463 HOSPITAL OUTPT CLINIC VISIT: HCPCS

## 2017-10-16 RX ORDER — ASPIRIN 325 MG
325 TABLET ORAL DAILY
COMMUNITY
End: 2018-01-24

## 2017-11-22 ENCOUNTER — OFFICE VISIT (OUTPATIENT)
Dept: PULMONOLOGY | Facility: CLINIC | Age: 77
End: 2017-11-22

## 2017-11-22 VITALS
HEIGHT: 64 IN | DIASTOLIC BLOOD PRESSURE: 72 MMHG | SYSTOLIC BLOOD PRESSURE: 130 MMHG | BODY MASS INDEX: 17.93 KG/M2 | TEMPERATURE: 98.1 F | WEIGHT: 105 LBS | OXYGEN SATURATION: 94 % | HEART RATE: 94 BPM

## 2017-11-22 DIAGNOSIS — J69.0 ASPIRATION PNEUMONIA OF BOTH LUNGS, UNSPECIFIED ASPIRATION PNEUMONIA TYPE, UNSPECIFIED PART OF LUNG (HCC): Primary | ICD-10-CM

## 2017-11-22 DIAGNOSIS — R64 CACHEXIA (HCC): ICD-10-CM

## 2017-11-22 DIAGNOSIS — R53.81 DEBILITY: ICD-10-CM

## 2017-11-22 PROCEDURE — 99213 OFFICE O/P EST LOW 20 MIN: CPT | Performed by: NURSE PRACTITIONER

## 2017-11-22 NOTE — PROGRESS NOTES
Interval history since last visit: None    Recent hospitalizations: None    Investigations (imaging, PFT's, labs, sleep study, record requests, etc.) None    Have you had the Influenza Vaccine? yes    Would you like to receive this Vaccine today? no    Have you had the Pneumonia Vaccine?  yes   Would you like to receive this Vaccine today? no    Subjective    Tamie Michael presents for the following Pneumonia      History of Present Illness     Ms. Rodriguez is here today to follow up on aspiration pneumonia.  She states that she has been feeling well since her last visit.  She has no complaints of shortness of breath and stats that she has had no illnesses or shortness of breath.    Review of Systems   Constitutional: Negative for activity change, fatigue and unexpected weight change.   HENT: Negative for congestion, postnasal drip and rhinorrhea.    Respiratory: Negative for apnea, cough, chest tightness, shortness of breath and wheezing.    Cardiovascular: Negative for chest pain and palpitations.   Gastrointestinal: Negative for nausea.   Allergic/Immunologic: Negative for environmental allergies.   Psychiatric/Behavioral: Negative for agitation and confusion.       Active Problems:  Problem List Items Addressed This Visit     Debility    Cachexia    Aspiration pneumonia - Primary          Past Medical History:  Past Medical History:   Diagnosis Date   • Arthritis    • Meniere's disease        Family History:  Family History   Problem Relation Age of Onset   • Cancer Mother    • Cancer Father        Social History:  Social History   Substance Use Topics   • Smoking status: Never Smoker   • Smokeless tobacco: Not on file   • Alcohol use No       Current Medications:  Current Outpatient Prescriptions   Medication Sig Dispense Refill   • aspirin 325 MG tablet Take 325 mg by mouth Daily.     • ferrous sulfate 325 (65 FE) MG tablet Take 1 tablet by mouth 2 Times a Day With Meals. 60 tablet 0   • gabapentin (NEURONTIN)  "100 MG capsule Take 1 capsule by mouth Every 12 (Twelve) Hours. 60 capsule 0   • pantoprazole (PROTONIX) 40 MG EC tablet Take 1 tablet by mouth Daily. 30 tablet 0   • [DISCONTINUED] Nutritional Supplements (OSMOLITE 1.5 TALIA) liquid 55 mL/hr by Per PEG Tube route Continuous. 6pm until 6am 20 bottle 0     No current facility-administered medications for this visit.        Allergies:  Allergies   Allergen Reactions   • Darvon [Propoxyphene] Nausea And Vomiting   • Tramadol Nausea And Vomiting       Vitals:  /72  Pulse 94  Temp 98.1 °F (36.7 °C) (Oral)   Ht 64\" (162.6 cm)  Wt 105 lb (47.6 kg)  SpO2 94%  BMI 18.02 kg/m2    Imaging:    Imaging Results (most recent)     None          Pulmonary Functions Testing Results:    No results found for: FEV1, FVC, [id number], TLC, DLCO    Results for orders placed or performed during the hospital encounter of 07/21/17   Blood Culture   Result Value Ref Range    Blood Culture No growth at 5 days    Blood Culture   Result Value Ref Range    Blood Culture No growth at 5 days    Urinalysis With / Culture If Indicated   Result Value Ref Range    Color, UA Yellow Yellow, Straw    Appearance, UA Clear Clear    pH, UA 8.0 5.0 - 8.0    Specific Gravity, UA 1.010 1.005 - 1.030    Glucose, UA Negative Negative    Ketones, UA Negative Negative    Bilirubin, UA Negative Negative    Blood, UA Negative Negative    Protein, UA Negative Negative    Leuk Esterase, UA Negative Negative    Nitrite, UA Negative Negative    Urobilinogen, UA 0.2 E.U./dL 0.2 - 1.0 E.U./dL   Magnesium   Result Value Ref Range    Magnesium 2.0 1.7 - 2.6 mg/dL   Phosphorus   Result Value Ref Range    Phosphorus 3.8 2.7 - 4.5 mg/dL   Comprehensive Metabolic Panel   Result Value Ref Range    Glucose 134 (H) 70 - 110 mg/dL    BUN 18 7 - 21 mg/dL    Creatinine 0.55 0.43 - 1.29 mg/dL    Sodium 137 135 - 153 mmol/L    Potassium 4.3 3.5 - 5.3 mmol/L    Chloride 104 99 - 112 mmol/L    CO2 31.1 24.3 - 31.9 mmol/L    Calcium " 9.4 7.7 - 10.0 mg/dL    Total Protein 7.0 6.0 - 8.0 g/dL    Albumin 3.40 3.40 - 4.80 g/dL    ALT (SGPT) 14 10 - 36 U/L    AST (SGOT) 28 10 - 30 U/L    Alkaline Phosphatase 91 35 - 104 U/L    Total Bilirubin 0.2 0.2 - 1.8 mg/dL    eGFR Non African Amer 107 >60 mL/min/1.73    Globulin 3.6 gm/dL    A/G Ratio 0.9 (L) 1.5 - 2.5 g/dL    BUN/Creatinine Ratio 32.7 (H) 7.0 - 25.0    Anion Gap 1.9 (L) 3.6 - 11.2 mmol/L   C-reactive Protein   Result Value Ref Range    C-Reactive Protein 11.71 (H) 0.00 - 0.99 mg/dL   CBC Auto Differential   Result Value Ref Range    WBC 8.27 4.50 - 12.50 10*3/mm3    RBC 3.05 (L) 4.20 - 5.40 10*6/mm3    Hemoglobin 8.2 (L) 12.0 - 16.0 g/dL    Hematocrit 29.2 (L) 37.0 - 47.0 %    MCV 95.7 (H) 80.0 - 94.0 fL    MCH 26.9 (L) 27.0 - 33.0 pg    MCHC 28.1 (L) 33.0 - 37.0 g/dL    RDW 14.9 (H) 11.5 - 14.5 %    RDW-SD 48.9 37.0 - 54.0 fl    MPV 10.3 (H) 6.0 - 10.0 fL    Platelets 275 130 - 400 10*3/mm3    Neutrophil % 72.1 40.0 - 75.0 %    Lymphocyte % 12.0 (L) 16.0 - 46.0 %    Monocyte % 14.0 (H) 0.0 - 12.0 %    Eosinophil % 1.6 0.0 - 7.0 %    Basophil % 0.2 0.0 - 2.0 %    Immature Grans % 0.1 0.0 - 0.5 %    Neutrophils, Absolute 5.96 1.40 - 6.50 10*3/mm3    Lymphocytes, Absolute 0.99 (L) 1.00 - 3.00 10*3/mm3    Monocytes, Absolute 1.16 (H) 0.10 - 0.90 10*3/mm3    Eosinophils, Absolute 0.13 0.00 - 0.70 10*3/mm3    Basophils, Absolute 0.02 0.00 - 0.30 10*3/mm3    Immature Grans, Absolute 0.01 0.00 - 0.03 10*3/mm3   Osmolality, Calculated   Result Value Ref Range    Osmolality Calc 277.7 273.0 - 305.0 mOsm/kg   Scan Slide   Result Value Ref Range    Hypochromia Slight/1+ None Seen    Macrocytes Slight/1+ None Seen    Platelet Morphology Normal Normal   Comprehensive Metabolic Panel   Result Value Ref Range    Glucose 112 (H) 70 - 110 mg/dL    BUN 20 7 - 21 mg/dL    Creatinine 0.53 0.43 - 1.29 mg/dL    Sodium 138 135 - 153 mmol/L    Potassium 4.5 3.5 - 5.3 mmol/L    Chloride 105 99 - 112 mmol/L    CO2 31.7  24.3 - 31.9 mmol/L    Calcium 9.3 7.7 - 10.0 mg/dL    Total Protein 6.9 6.0 - 8.0 g/dL    Albumin 3.40 3.40 - 4.80 g/dL    ALT (SGPT) 25 10 - 36 U/L    AST (SGOT) 43 (H) 10 - 30 U/L    Alkaline Phosphatase 89 35 - 104 U/L    Total Bilirubin 0.2 0.2 - 1.8 mg/dL    eGFR Non African Amer 112 >60 mL/min/1.73    Globulin 3.5 gm/dL    A/G Ratio 1.0 (L) 1.5 - 2.5 g/dL    BUN/Creatinine Ratio 37.7 (H) 7.0 - 25.0    Anion Gap 1.3 (L) 3.6 - 11.2 mmol/L   Magnesium   Result Value Ref Range    Magnesium 2.1 1.7 - 2.6 mg/dL   CBC Auto Differential   Result Value Ref Range    WBC 7.47 4.50 - 12.50 10*3/mm3    RBC 2.87 (L) 4.20 - 5.40 10*6/mm3    Hemoglobin 7.9 (L) 12.0 - 16.0 g/dL    Hematocrit 26.6 (L) 37.0 - 47.0 %    MCV 92.7 80.0 - 94.0 fL    MCH 27.5 27.0 - 33.0 pg    MCHC 29.7 (L) 33.0 - 37.0 g/dL    RDW 15.1 (H) 11.5 - 14.5 %    RDW-SD 51.7 37.0 - 54.0 fl    MPV 10.3 (H) 6.0 - 10.0 fL    Platelets 276 130 - 400 10*3/mm3    Neutrophil % 74.0 40.0 - 75.0 %    Lymphocyte % 9.4 (L) 16.0 - 46.0 %    Monocyte % 13.0 (H) 0.0 - 12.0 %    Eosinophil % 3.1 0.0 - 7.0 %    Basophil % 0.4 0.0 - 2.0 %    Immature Grans % 0.1 0.0 - 0.5 %    Neutrophils, Absolute 5.53 1.40 - 6.50 10*3/mm3    Lymphocytes, Absolute 0.70 (L) 1.00 - 3.00 10*3/mm3    Monocytes, Absolute 0.97 (H) 0.10 - 0.90 10*3/mm3    Eosinophils, Absolute 0.23 0.00 - 0.70 10*3/mm3    Basophils, Absolute 0.03 0.00 - 0.30 10*3/mm3    Immature Grans, Absolute 0.01 0.00 - 0.03 10*3/mm3   Osmolality, Calculated   Result Value Ref Range    Osmolality Calc 279.0 273.0 - 305.0 mOsm/kg   Comprehensive Metabolic Panel   Result Value Ref Range    Glucose 120 (H) 70 - 110 mg/dL    BUN 20 7 - 21 mg/dL    Creatinine 0.55 0.43 - 1.29 mg/dL    Sodium 138 135 - 153 mmol/L    Potassium 4.3 3.5 - 5.3 mmol/L    Chloride 102 99 - 112 mmol/L    CO2 32.1 (H) 24.3 - 31.9 mmol/L    Calcium 9.7 7.7 - 10.0 mg/dL    Total Protein 7.3 6.0 - 8.0 g/dL    Albumin 3.50 3.40 - 4.80 g/dL    ALT (SGPT) 29 10 -  36 U/L    AST (SGOT) 40 (H) 10 - 30 U/L    Alkaline Phosphatase 96 35 - 104 U/L    Total Bilirubin 0.2 0.2 - 1.8 mg/dL    eGFR Non African Amer 107 >60 mL/min/1.73    Globulin 3.8 gm/dL    A/G Ratio 0.9 (L) 1.5 - 2.5 g/dL    BUN/Creatinine Ratio 36.4 (H) 7.0 - 25.0    Anion Gap 3.9 3.6 - 11.2 mmol/L   CBC Auto Differential   Result Value Ref Range    WBC 7.05 4.50 - 12.50 10*3/mm3    RBC 3.01 (L) 4.20 - 5.40 10*6/mm3    Hemoglobin 8.1 (L) 12.0 - 16.0 g/dL    Hematocrit 27.4 (L) 37.0 - 47.0 %    MCV 91.0 80.0 - 94.0 fL    MCH 26.9 (L) 27.0 - 33.0 pg    MCHC 29.6 (L) 33.0 - 37.0 g/dL    RDW 14.7 (H) 11.5 - 14.5 %    RDW-SD 49.2 37.0 - 54.0 fl    MPV 10.5 (H) 6.0 - 10.0 fL    Platelets 323 130 - 400 10*3/mm3    Neutrophil % 67.2 40.0 - 75.0 %    Lymphocyte % 14.9 (L) 16.0 - 46.0 %    Monocyte % 13.8 (H) 0.0 - 12.0 %    Eosinophil % 3.7 0.0 - 7.0 %    Basophil % 0.3 0.0 - 2.0 %    Immature Grans % 0.1 0.0 - 0.5 %    Neutrophils, Absolute 4.74 1.40 - 6.50 10*3/mm3    Lymphocytes, Absolute 1.05 1.00 - 3.00 10*3/mm3    Monocytes, Absolute 0.97 (H) 0.10 - 0.90 10*3/mm3    Eosinophils, Absolute 0.26 0.00 - 0.70 10*3/mm3    Basophils, Absolute 0.02 0.00 - 0.30 10*3/mm3    Immature Grans, Absolute 0.01 0.00 - 0.03 10*3/mm3    nRBC 0.0 0.0 - 0.0 /100 WBC   Osmolality, Calculated   Result Value Ref Range    Osmolality Calc 279.5 273.0 - 305.0 mOsm/kg   Troponin   Result Value Ref Range    Troponin I <0.006 <=0.040 ng/mL   Troponin   Result Value Ref Range    Troponin I <0.006 <=0.040 ng/mL   CK   Result Value Ref Range    Creatine Kinase 22 (L) 24 - 173 U/L   CK-MB   Result Value Ref Range    CKMB <0.18 0.00 - 5.00 ng/mL   Basic Metabolic Panel   Result Value Ref Range    Glucose 98 70 - 110 mg/dL    BUN 26 (H) 7 - 21 mg/dL    Creatinine 0.54 0.43 - 1.29 mg/dL    Sodium 139 135 - 153 mmol/L    Potassium 4.4 3.5 - 5.3 mmol/L    Chloride 101 99 - 112 mmol/L    CO2 30.8 24.3 - 31.9 mmol/L    Calcium 9.4 7.7 - 10.0 mg/dL    eGFR Non   Amer 109 >60 mL/min/1.73    BUN/Creatinine Ratio 48.1 (H) 7.0 - 25.0    Anion Gap 7.2 3.6 - 11.2 mmol/L   CBC Auto Differential   Result Value Ref Range    WBC 14.04 (H) 4.50 - 12.50 10*3/mm3    RBC 3.18 (L) 4.20 - 5.40 10*6/mm3    Hemoglobin 8.7 (L) 12.0 - 16.0 g/dL    Hematocrit 29.3 (L) 37.0 - 47.0 %    MCV 92.1 80.0 - 94.0 fL    MCH 27.4 27.0 - 33.0 pg    MCHC 29.7 (L) 33.0 - 37.0 g/dL    RDW 14.5 11.5 - 14.5 %    RDW-SD 47.2 37.0 - 54.0 fl    MPV 9.9 6.0 - 10.0 fL    Platelets 371 130 - 400 10*3/mm3    Neutrophil % 86.4 (H) 40.0 - 75.0 %    Lymphocyte % 6.4 (L) 16.0 - 46.0 %    Monocyte % 6.3 0.0 - 12.0 %    Eosinophil % 0.7 0.0 - 7.0 %    Basophil % 0.1 0.0 - 2.0 %    Immature Grans % 0.1 0.0 - 0.5 %    Neutrophils, Absolute 12.11 (H) 1.40 - 6.50 10*3/mm3    Lymphocytes, Absolute 0.90 (L) 1.00 - 3.00 10*3/mm3    Monocytes, Absolute 0.89 0.10 - 0.90 10*3/mm3    Eosinophils, Absolute 0.10 0.00 - 0.70 10*3/mm3    Basophils, Absolute 0.02 0.00 - 0.30 10*3/mm3    Immature Grans, Absolute 0.02 0.00 - 0.03 10*3/mm3   Urinalysis With / Culture If Indicated   Result Value Ref Range    Color, UA Yellow Yellow, Straw    Appearance, UA Clear Clear    pH, UA 7.5 5.0 - 8.0    Specific Gravity, UA 1.020 1.005 - 1.030    Glucose, UA Negative Negative    Ketones, UA Negative Negative    Bilirubin, UA Negative Negative    Blood, UA Trace (A) Negative    Protein, UA Trace (A) Negative    Leuk Esterase, UA Negative Negative    Nitrite, UA Negative Negative    Urobilinogen, UA 1.0 E.U./dL 0.2 - 1.0 E.U./dL   Urinalysis, Microscopic Only   Result Value Ref Range    RBC, UA Too Numerous to Count (A) None Seen /HPF    WBC, UA 0-2 (A) None Seen /HPF    Bacteria, UA None Seen None Seen /HPF    Squamous Epithelial Cells, UA 0-2 None Seen, 0-2 /HPF    Hyaline Casts, UA None Seen None Seen /LPF    Methodology Automated Microscopy    Osmolality, Calculated   Result Value Ref Range    Osmolality Calc 282.3 273.0 - 305.0 mOsm/kg    CK-MB Index   Result Value Ref Range    CK-MB Index  0.0 - 3.0 %   Troponin   Result Value Ref Range    Troponin I <0.006 <=0.040 ng/mL   Basic Metabolic Panel   Result Value Ref Range    Glucose 103 70 - 110 mg/dL    BUN 21 7 - 21 mg/dL    Creatinine 0.57 0.43 - 1.29 mg/dL    Sodium 139 135 - 153 mmol/L    Potassium 4.5 3.5 - 5.3 mmol/L    Chloride 106 99 - 112 mmol/L    CO2 28.9 24.3 - 31.9 mmol/L    Calcium 8.7 7.7 - 10.0 mg/dL    eGFR Non African Amer 103 >60 mL/min/1.73    BUN/Creatinine Ratio 36.8 (H) 7.0 - 25.0    Anion Gap 4.1 3.6 - 11.2 mmol/L   C-reactive Protein   Result Value Ref Range    C-Reactive Protein 3.88 (H) 0.00 - 0.99 mg/dL   CBC Auto Differential   Result Value Ref Range    WBC 6.07 4.50 - 12.50 10*3/mm3    RBC 2.68 (L) 4.20 - 5.40 10*6/mm3    Hemoglobin 7.2 (L) 12.0 - 16.0 g/dL    Hematocrit 24.1 (L) 37.0 - 47.0 %    MCV 89.9 80.0 - 94.0 fL    MCH 26.9 (L) 27.0 - 33.0 pg    MCHC 29.9 (L) 33.0 - 37.0 g/dL    RDW 14.8 (H) 11.5 - 14.5 %    RDW-SD 48.6 37.0 - 54.0 fl    MPV 10.0 6.0 - 10.0 fL    Platelets 370 130 - 400 10*3/mm3    Neutrophil % 64.0 40.0 - 75.0 %    Lymphocyte % 15.3 (L) 16.0 - 46.0 %    Monocyte % 14.8 (H) 0.0 - 12.0 %    Eosinophil % 5.1 0.0 - 7.0 %    Basophil % 0.5 0.0 - 2.0 %    Immature Grans % 0.3 0.0 - 0.5 %    Neutrophils, Absolute 3.88 1.40 - 6.50 10*3/mm3    Lymphocytes, Absolute 0.93 (L) 1.00 - 3.00 10*3/mm3    Monocytes, Absolute 0.90 0.10 - 0.90 10*3/mm3    Eosinophils, Absolute 0.31 0.00 - 0.70 10*3/mm3    Basophils, Absolute 0.03 0.00 - 0.30 10*3/mm3    Immature Grans, Absolute 0.02 0.00 - 0.03 10*3/mm3    nRBC 0.0 0.0 - 0.0 /100 WBC   Osmolality, Calculated   Result Value Ref Range    Osmolality Calc 280.8 273.0 - 305.0 mOsm/kg   Magnesium   Result Value Ref Range    Magnesium 2.1 1.7 - 2.6 mg/dL   Potassium   Result Value Ref Range    Potassium 4.1 3.5 - 5.3 mmol/L   Vancomycin, Trough   Result Value Ref Range    Vancomycin Trough 8.40 5.00 - 15.00 mcg/mL    Magnesium   Result Value Ref Range    Magnesium 2.2 1.7 - 2.6 mg/dL   Potassium   Result Value Ref Range    Potassium 4.4 3.5 - 5.3 mmol/L   CBC Auto Differential   Result Value Ref Range    WBC 5.98 4.50 - 12.50 10*3/mm3    RBC 2.65 (L) 4.20 - 5.40 10*6/mm3    Hemoglobin 7.2 (L) 12.0 - 16.0 g/dL    Hematocrit 24.8 (L) 37.0 - 47.0 %    MCV 93.6 80.0 - 94.0 fL    MCH 27.2 27.0 - 33.0 pg    MCHC 29.0 (L) 33.0 - 37.0 g/dL    RDW 14.8 (H) 11.5 - 14.5 %    RDW-SD 48.2 37.0 - 54.0 fl    MPV 10.7 (H) 6.0 - 10.0 fL    Platelets 325 130 - 400 10*3/mm3    Neutrophil % 51.1 40.0 - 75.0 %    Lymphocyte % 30.3 16.0 - 46.0 %    Monocyte % 11.4 0.0 - 12.0 %    Eosinophil % 6.7 0.0 - 7.0 %    Basophil % 0.3 0.0 - 2.0 %    Immature Grans % 0.2 0.0 - 0.5 %    Neutrophils, Absolute 3.06 1.40 - 6.50 10*3/mm3    Lymphocytes, Absolute 1.81 1.00 - 3.00 10*3/mm3    Monocytes, Absolute 0.68 0.10 - 0.90 10*3/mm3    Eosinophils, Absolute 0.40 0.00 - 0.70 10*3/mm3    Basophils, Absolute 0.02 0.00 - 0.30 10*3/mm3    Immature Grans, Absolute 0.01 0.00 - 0.03 10*3/mm3   Basic Metabolic Panel   Result Value Ref Range    Glucose 113 (H) 70 - 110 mg/dL    BUN 16 7 - 21 mg/dL    Creatinine 0.53 0.43 - 1.29 mg/dL    Sodium 139 135 - 153 mmol/L    Potassium 4.5 3.5 - 5.3 mmol/L    Chloride 109 99 - 112 mmol/L    CO2 28.4 24.3 - 31.9 mmol/L    Calcium 9.1 7.7 - 10.0 mg/dL    eGFR Non African Amer 112 >60 mL/min/1.73    BUN/Creatinine Ratio 30.2 (H) 7.0 - 25.0    Anion Gap 1.6 (L) 3.6 - 11.2 mmol/L   Magnesium   Result Value Ref Range    Magnesium 2.1 1.7 - 2.6 mg/dL   Osmolality, Calculated   Result Value Ref Range    Osmolality Calc 279.5 273.0 - 305.0 mOsm/kg   Magnesium   Result Value Ref Range    Magnesium 2.1 1.7 - 2.6 mg/dL   Potassium   Result Value Ref Range    Potassium 4.0 3.5 - 5.3 mmol/L   Vancomycin, Trough   Result Value Ref Range    Vancomycin Trough 15.40 (H) 5.00 - 15.00 mcg/mL   Magnesium   Result Value Ref Range    Magnesium 2.1 1.7  - 2.6 mg/dL   Potassium   Result Value Ref Range    Potassium 4.3 3.5 - 5.3 mmol/L       Objective   Physical Exam     GENERAL APPEARANCE: Well developed, well nourished, alert and cooperative, and appears to be in no acute distress.    HEAD: normocephalic.    EYES: PERRL, EOMI. Fundi normal, vision is grossly intact.    THROAT: Oral cavity and pharynx normal. No inflammation, swelling, exudate, or lesions.     NECK: Neck supple.     CARDIAC: Normal S1 and S2. No S3, S4 or murmurs. Rhythm is regular. There is no peripheral edema, cyanosis or pallor. Extremities are warm and well perfused. Capillary refill is less than 2 seconds. No carotid bruits.    RESPIRATORY: Clear to auscultation without rales, rhonchi, wheezing or diminished breath sounds.    GI: Positive bowel sounds. Soft, nondistended, nontender.     MUSCULOSKELETAL: No significant deformity or joint abnormality. No edema. Peripheral pulses intact. No varicosities.    NEUROLOGICAL: Strength and sensation symmetric and intact throughout.     PSYCHIATRIC: The mental examination revealed the patient was oriented to person, place, and time.     Assessment/Plan      Aspiration pneumonia: Patient reports that she has been feeling well since her last visit.  Continue aspiration precautions.  Chest xray shows complete resolution of pneumonia.    Cachexia:  Weight is stable.  She is doing tube feedings through her peg tube during the night and PO intake during the day.    Deconditioning: Completed at home physical therapy.  States that she is able to do more and is doing some of the exercises on her own.      ICD-10-CM ICD-9-CM   1. Aspiration pneumonia of both lungs, unspecified aspiration pneumonia type, unspecified part of lung J69.0 507.0   2. Debility R53.81 799.3   3. Cachexia R64 799.4       Return in about 6 months (around 5/22/2018).

## 2018-01-06 ENCOUNTER — TRANSCRIBE ORDERS (OUTPATIENT)
Dept: ADMINISTRATIVE | Facility: HOSPITAL | Age: 78
End: 2018-01-06

## 2018-01-06 ENCOUNTER — LAB (OUTPATIENT)
Dept: LAB | Facility: HOSPITAL | Age: 78
End: 2018-01-06
Attending: INTERNAL MEDICINE

## 2018-01-06 DIAGNOSIS — I10 BENIGN HYPERTENSION: ICD-10-CM

## 2018-01-06 DIAGNOSIS — R53.83 MALAISE AND FATIGUE: ICD-10-CM

## 2018-01-06 DIAGNOSIS — R53.81 MALAISE AND FATIGUE: ICD-10-CM

## 2018-01-06 DIAGNOSIS — E78.5 HYPERLIPIDEMIA, UNSPECIFIED HYPERLIPIDEMIA TYPE: Primary | ICD-10-CM

## 2018-01-06 DIAGNOSIS — E78.5 HYPERLIPIDEMIA, UNSPECIFIED HYPERLIPIDEMIA TYPE: ICD-10-CM

## 2018-01-06 DIAGNOSIS — D64.9 ANEMIA, UNSPECIFIED TYPE: ICD-10-CM

## 2018-01-06 LAB
ALBUMIN SERPL-MCNC: 4 G/DL (ref 3.4–4.8)
ALBUMIN/GLOB SERPL: 1.2 G/DL (ref 1.5–2.5)
ALP SERPL-CCNC: 113 U/L (ref 35–104)
ALT SERPL W P-5'-P-CCNC: 10 U/L (ref 10–36)
ANION GAP SERPL CALCULATED.3IONS-SCNC: 1.7 MMOL/L (ref 3.6–11.2)
AST SERPL-CCNC: 20 U/L (ref 10–30)
BASOPHILS # BLD AUTO: 0.02 10*3/MM3 (ref 0–0.3)
BASOPHILS NFR BLD AUTO: 0.2 % (ref 0–2)
BILIRUB SERPL-MCNC: 0.2 MG/DL (ref 0.2–1.8)
BUN BLD-MCNC: 24 MG/DL (ref 7–21)
BUN/CREAT SERPL: 34.3 (ref 7–25)
CALCIUM SPEC-SCNC: 9.5 MG/DL (ref 7.7–10)
CHLORIDE SERPL-SCNC: 102 MMOL/L (ref 99–112)
CHOLEST SERPL-MCNC: 163 MG/DL (ref 0–200)
CO2 SERPL-SCNC: 35.3 MMOL/L (ref 24.3–31.9)
CREAT BLD-MCNC: 0.7 MG/DL (ref 0.43–1.29)
DEPRECATED RDW RBC AUTO: 48.1 FL (ref 37–54)
EOSINOPHIL # BLD AUTO: 0.4 10*3/MM3 (ref 0–0.7)
EOSINOPHIL NFR BLD AUTO: 4.9 % (ref 0–7)
ERYTHROCYTE [DISTWIDTH] IN BLOOD BY AUTOMATED COUNT: 14.8 % (ref 11.5–14.5)
FOLATE SERPL-MCNC: 2.46 NG/ML (ref 5.4–20)
GFR SERPL CREATININE-BSD FRML MDRD: 81 ML/MIN/1.73
GLOBULIN UR ELPH-MCNC: 3.3 GM/DL
GLUCOSE BLD-MCNC: 95 MG/DL (ref 70–110)
HCT VFR BLD AUTO: 35.5 % (ref 37–47)
HDLC SERPL-MCNC: 52 MG/DL (ref 60–100)
HGB BLD-MCNC: 10.6 G/DL (ref 12–16)
IMM GRANULOCYTES # BLD: 0.02 10*3/MM3 (ref 0–0.03)
IMM GRANULOCYTES NFR BLD: 0.2 % (ref 0–0.5)
LDLC SERPL CALC-MCNC: 81 MG/DL (ref 0–100)
LDLC/HDLC SERPL: 1.55 {RATIO}
LYMPHOCYTES # BLD AUTO: 1.43 10*3/MM3 (ref 1–3)
LYMPHOCYTES NFR BLD AUTO: 17.5 % (ref 16–46)
MCH RBC QN AUTO: 26.8 PG (ref 27–33)
MCHC RBC AUTO-ENTMCNC: 29.9 G/DL (ref 33–37)
MCV RBC AUTO: 89.9 FL (ref 80–94)
MONOCYTES # BLD AUTO: 0.79 10*3/MM3 (ref 0.1–0.9)
MONOCYTES NFR BLD AUTO: 9.7 % (ref 0–12)
NEUTROPHILS # BLD AUTO: 5.52 10*3/MM3 (ref 1.4–6.5)
NEUTROPHILS NFR BLD AUTO: 67.5 % (ref 40–75)
OSMOLALITY SERPL CALC.SUM OF ELEC: 281.4 MOSM/KG (ref 273–305)
PLATELET # BLD AUTO: 242 10*3/MM3 (ref 130–400)
PMV BLD AUTO: 10.7 FL (ref 6–10)
POTASSIUM BLD-SCNC: 4.2 MMOL/L (ref 3.5–5.3)
PROT SERPL-MCNC: 7.3 G/DL (ref 6–8)
RBC # BLD AUTO: 3.95 10*6/MM3 (ref 4.2–5.4)
SODIUM BLD-SCNC: 139 MMOL/L (ref 135–153)
TRIGL SERPL-MCNC: 152 MG/DL (ref 0–150)
TSH SERPL DL<=0.05 MIU/L-ACNC: 2.06 MIU/ML (ref 0.55–4.78)
VIT B12 BLD-MCNC: 554 PG/ML (ref 211–911)
VLDLC SERPL-MCNC: 30.4 MG/DL
WBC NRBC COR # BLD: 8.18 10*3/MM3 (ref 4.5–12.5)

## 2018-01-06 PROCEDURE — 80053 COMPREHEN METABOLIC PANEL: CPT

## 2018-01-06 PROCEDURE — 36415 COLL VENOUS BLD VENIPUNCTURE: CPT

## 2018-01-06 PROCEDURE — 82746 ASSAY OF FOLIC ACID SERUM: CPT

## 2018-01-06 PROCEDURE — 84443 ASSAY THYROID STIM HORMONE: CPT

## 2018-01-06 PROCEDURE — 82607 VITAMIN B-12: CPT

## 2018-01-06 PROCEDURE — 85025 COMPLETE CBC W/AUTO DIFF WBC: CPT

## 2018-01-06 PROCEDURE — 80061 LIPID PANEL: CPT

## 2018-01-24 ENCOUNTER — APPOINTMENT (OUTPATIENT)
Dept: GENERAL RADIOLOGY | Facility: HOSPITAL | Age: 78
End: 2018-01-24

## 2018-01-24 ENCOUNTER — HOSPITAL ENCOUNTER (OUTPATIENT)
Facility: HOSPITAL | Age: 78
Discharge: HOME | End: 2018-01-26
Attending: FAMILY MEDICINE | Admitting: INTERNAL MEDICINE

## 2018-01-24 DIAGNOSIS — T85.528A DISLODGED GASTROSTOMY TUBE: Primary | ICD-10-CM

## 2018-01-24 DIAGNOSIS — K94.23 PEG TUBE MALFUNCTION (HCC): ICD-10-CM

## 2018-01-24 LAB
ALBUMIN SERPL-MCNC: 3.9 G/DL (ref 3.4–4.8)
ALBUMIN/GLOB SERPL: 1.1 G/DL (ref 1.5–2.5)
ALP SERPL-CCNC: 107 U/L (ref 35–104)
ALT SERPL W P-5'-P-CCNC: 19 U/L (ref 10–36)
ANION GAP SERPL CALCULATED.3IONS-SCNC: 11.4 MMOL/L (ref 3.6–11.2)
AST SERPL-CCNC: 27 U/L (ref 10–30)
BASOPHILS # BLD AUTO: 0.02 10*3/MM3 (ref 0–0.3)
BASOPHILS NFR BLD AUTO: 0.3 % (ref 0–2)
BILIRUB SERPL-MCNC: 0.5 MG/DL (ref 0.2–1.8)
BUN BLD-MCNC: 16 MG/DL (ref 7–21)
BUN/CREAT SERPL: 23.9 (ref 7–25)
CALCIUM SPEC-SCNC: 9.7 MG/DL (ref 7.7–10)
CHLORIDE SERPL-SCNC: 110 MMOL/L (ref 99–112)
CO2 SERPL-SCNC: 20.6 MMOL/L (ref 24.3–31.9)
CREAT BLD-MCNC: 0.67 MG/DL (ref 0.43–1.29)
DEPRECATED RDW RBC AUTO: 49.8 FL (ref 37–54)
EOSINOPHIL # BLD AUTO: 0.11 10*3/MM3 (ref 0–0.7)
EOSINOPHIL NFR BLD AUTO: 1.4 % (ref 0–7)
ERYTHROCYTE [DISTWIDTH] IN BLOOD BY AUTOMATED COUNT: 15.4 % (ref 11.5–14.5)
GFR SERPL CREATININE-BSD FRML MDRD: 85 ML/MIN/1.73
GLOBULIN UR ELPH-MCNC: 3.5 GM/DL
GLUCOSE BLD-MCNC: 73 MG/DL (ref 70–110)
HCT VFR BLD AUTO: 40.6 % (ref 37–47)
HGB BLD-MCNC: 12.4 G/DL (ref 12–16)
IMM GRANULOCYTES # BLD: 0.01 10*3/MM3 (ref 0–0.03)
IMM GRANULOCYTES NFR BLD: 0.1 % (ref 0–0.5)
LYMPHOCYTES # BLD AUTO: 1.23 10*3/MM3 (ref 1–3)
LYMPHOCYTES NFR BLD AUTO: 16.1 % (ref 16–46)
MCH RBC QN AUTO: 27.4 PG (ref 27–33)
MCHC RBC AUTO-ENTMCNC: 30.5 G/DL (ref 33–37)
MCV RBC AUTO: 89.6 FL (ref 80–94)
MONOCYTES # BLD AUTO: 0.64 10*3/MM3 (ref 0.1–0.9)
MONOCYTES NFR BLD AUTO: 8.4 % (ref 0–12)
NEUTROPHILS # BLD AUTO: 5.64 10*3/MM3 (ref 1.4–6.5)
NEUTROPHILS NFR BLD AUTO: 73.7 % (ref 40–75)
OSMOLALITY SERPL CALC.SUM OF ELEC: 282.9 MOSM/KG (ref 273–305)
PLATELET # BLD AUTO: 192 10*3/MM3 (ref 130–400)
PMV BLD AUTO: 11.7 FL (ref 6–10)
POTASSIUM BLD-SCNC: 4 MMOL/L (ref 3.5–5.3)
PROT SERPL-MCNC: 7.4 G/DL (ref 6–8)
RBC # BLD AUTO: 4.53 10*6/MM3 (ref 4.2–5.4)
SODIUM BLD-SCNC: 142 MMOL/L (ref 135–153)
TROPONIN I SERPL-MCNC: <0.006 NG/ML
WBC NRBC COR # BLD: 7.65 10*3/MM3 (ref 4.5–12.5)

## 2018-01-24 PROCEDURE — 96360 HYDRATION IV INFUSION INIT: CPT

## 2018-01-24 PROCEDURE — 85025 COMPLETE CBC W/AUTO DIFF WBC: CPT | Performed by: FAMILY MEDICINE

## 2018-01-24 PROCEDURE — 74018 RADEX ABDOMEN 1 VIEW: CPT

## 2018-01-24 PROCEDURE — 96372 THER/PROPH/DIAG INJ SC/IM: CPT

## 2018-01-24 PROCEDURE — 99283 EMERGENCY DEPT VISIT LOW MDM: CPT

## 2018-01-24 PROCEDURE — 93010 ELECTROCARDIOGRAM REPORT: CPT | Performed by: INTERNAL MEDICINE

## 2018-01-24 PROCEDURE — 96361 HYDRATE IV INFUSION ADD-ON: CPT

## 2018-01-24 PROCEDURE — 99219 PR INITIAL OBSERVATION CARE/DAY 50 MINUTES: CPT | Performed by: INTERNAL MEDICINE

## 2018-01-24 PROCEDURE — G0378 HOSPITAL OBSERVATION PER HR: HCPCS

## 2018-01-24 PROCEDURE — 93005 ELECTROCARDIOGRAM TRACING: CPT | Performed by: FAMILY MEDICINE

## 2018-01-24 PROCEDURE — 74018 RADEX ABDOMEN 1 VIEW: CPT | Performed by: RADIOLOGY

## 2018-01-24 PROCEDURE — 25010000002 HEPARIN (PORCINE) PER 1000 UNITS: Performed by: PHYSICIAN ASSISTANT

## 2018-01-24 PROCEDURE — 84484 ASSAY OF TROPONIN QUANT: CPT | Performed by: FAMILY MEDICINE

## 2018-01-24 PROCEDURE — 80053 COMPREHEN METABOLIC PANEL: CPT | Performed by: FAMILY MEDICINE

## 2018-01-24 RX ORDER — SODIUM CHLORIDE 0.9 % (FLUSH) 0.9 %
1-10 SYRINGE (ML) INJECTION AS NEEDED
Status: DISCONTINUED | OUTPATIENT
Start: 2018-01-24 | End: 2018-01-26 | Stop reason: HOSPADM

## 2018-01-24 RX ORDER — SODIUM CHLORIDE 9 MG/ML
100 INJECTION, SOLUTION INTRAVENOUS CONTINUOUS
Status: DISCONTINUED | OUTPATIENT
Start: 2018-01-24 | End: 2018-01-26 | Stop reason: HOSPADM

## 2018-01-24 RX ORDER — NAPROXEN 250 MG/1
250 TABLET ORAL 2 TIMES DAILY PRN
Status: DISCONTINUED | OUTPATIENT
Start: 2018-01-24 | End: 2018-01-26 | Stop reason: HOSPADM

## 2018-01-24 RX ORDER — GABAPENTIN 100 MG/1
100 CAPSULE ORAL 2 TIMES DAILY
COMMUNITY
End: 2019-06-12

## 2018-01-24 RX ORDER — NAPROXEN SODIUM 220 MG
220 TABLET ORAL 2 TIMES DAILY PRN
COMMUNITY
End: 2019-06-12

## 2018-01-24 RX ORDER — SODIUM CHLORIDE 9 MG/ML
125 INJECTION, SOLUTION INTRAVENOUS CONTINUOUS
Status: DISCONTINUED | OUTPATIENT
Start: 2018-01-24 | End: 2018-01-24

## 2018-01-24 RX ORDER — GABAPENTIN 100 MG/1
100 CAPSULE ORAL 2 TIMES DAILY
Status: DISCONTINUED | OUTPATIENT
Start: 2018-01-24 | End: 2018-01-26 | Stop reason: HOSPADM

## 2018-01-24 RX ORDER — FERROUS GLUCONATE 324(37.5)
324 TABLET ORAL 2 TIMES DAILY WITH MEALS
Status: DISCONTINUED | OUTPATIENT
Start: 2018-01-24 | End: 2018-01-26 | Stop reason: HOSPADM

## 2018-01-24 RX ORDER — HEPARIN SODIUM 5000 [USP'U]/ML
5000 INJECTION, SOLUTION INTRAVENOUS; SUBCUTANEOUS EVERY 12 HOURS SCHEDULED
Status: DISCONTINUED | OUTPATIENT
Start: 2018-01-24 | End: 2018-01-26 | Stop reason: HOSPADM

## 2018-01-24 RX ORDER — FOLIC ACID 1 MG/1
1 TABLET ORAL DAILY
Status: DISCONTINUED | OUTPATIENT
Start: 2018-01-24 | End: 2018-01-26 | Stop reason: HOSPADM

## 2018-01-24 RX ORDER — DOXYCYCLINE HYCLATE 50 MG/1
324 CAPSULE, GELATIN COATED ORAL 2 TIMES DAILY
COMMUNITY
End: 2019-06-12

## 2018-01-24 RX ORDER — FOLIC ACID 1 MG/1
1 TABLET ORAL DAILY
COMMUNITY
End: 2019-06-12

## 2018-01-24 RX ORDER — MAGNESIUM HYDROXIDE 1200 MG/15ML
LIQUID ORAL
Status: DISPENSED
Start: 2018-01-24 | End: 2018-01-25

## 2018-01-24 RX ADMIN — FERROUS GLUCONATE TAB 324 MG (37.5 MG ELEMENTAL IRON) 324 MG: 324 (37.5 FE) TAB at 21:10

## 2018-01-24 RX ADMIN — SODIUM CHLORIDE 125 ML/HR: 9 INJECTION, SOLUTION INTRAVENOUS at 17:26

## 2018-01-24 RX ADMIN — SODIUM CHLORIDE 100 ML/HR: 9 INJECTION, SOLUTION INTRAVENOUS at 18:12

## 2018-01-24 RX ADMIN — FOLIC ACID 1 MG: 1 TABLET ORAL at 21:11

## 2018-01-24 RX ADMIN — HEPARIN SODIUM 5000 UNITS: 5000 INJECTION, SOLUTION INTRAVENOUS; SUBCUTANEOUS at 21:11

## 2018-01-24 RX ADMIN — GABAPENTIN 100 MG: 100 CAPSULE ORAL at 21:11

## 2018-01-25 ENCOUNTER — ANESTHESIA (OUTPATIENT)
Dept: PERIOP | Facility: HOSPITAL | Age: 78
End: 2018-01-25

## 2018-01-25 ENCOUNTER — ANESTHESIA EVENT (OUTPATIENT)
Dept: PERIOP | Facility: HOSPITAL | Age: 78
End: 2018-01-25

## 2018-01-25 PROBLEM — T85.528A DISLODGED GASTROSTOMY TUBE: Status: ACTIVE | Noted: 2018-01-24

## 2018-01-25 LAB
ANION GAP SERPL CALCULATED.3IONS-SCNC: 7.3 MMOL/L (ref 3.6–11.2)
BASOPHILS # BLD AUTO: 0.02 10*3/MM3 (ref 0–0.3)
BASOPHILS NFR BLD AUTO: 0.3 % (ref 0–2)
BUN BLD-MCNC: 17 MG/DL (ref 7–21)
BUN/CREAT SERPL: 29.3 (ref 7–25)
CALCIUM SPEC-SCNC: 8.8 MG/DL (ref 7.7–10)
CHLORIDE SERPL-SCNC: 110 MMOL/L (ref 99–112)
CO2 SERPL-SCNC: 23.7 MMOL/L (ref 24.3–31.9)
CREAT BLD-MCNC: 0.58 MG/DL (ref 0.43–1.29)
DEPRECATED RDW RBC AUTO: 48.2 FL (ref 37–54)
EOSINOPHIL # BLD AUTO: 0.18 10*3/MM3 (ref 0–0.7)
EOSINOPHIL NFR BLD AUTO: 2.4 % (ref 0–7)
ERYTHROCYTE [DISTWIDTH] IN BLOOD BY AUTOMATED COUNT: 15 % (ref 11.5–14.5)
GFR SERPL CREATININE-BSD FRML MDRD: 101 ML/MIN/1.73
GLUCOSE BLD-MCNC: 78 MG/DL (ref 70–110)
HCT VFR BLD AUTO: 34.9 % (ref 37–47)
HGB BLD-MCNC: 10.6 G/DL (ref 12–16)
IMM GRANULOCYTES # BLD: 0.01 10*3/MM3 (ref 0–0.03)
IMM GRANULOCYTES NFR BLD: 0.1 % (ref 0–0.5)
INR PPP: 1.08 (ref 0.9–1.1)
LYMPHOCYTES # BLD AUTO: 1.35 10*3/MM3 (ref 1–3)
LYMPHOCYTES NFR BLD AUTO: 17.6 % (ref 16–46)
MCH RBC QN AUTO: 27.2 PG (ref 27–33)
MCHC RBC AUTO-ENTMCNC: 30.4 G/DL (ref 33–37)
MCV RBC AUTO: 89.5 FL (ref 80–94)
MONOCYTES # BLD AUTO: 0.69 10*3/MM3 (ref 0.1–0.9)
MONOCYTES NFR BLD AUTO: 9 % (ref 0–12)
NEUTROPHILS # BLD AUTO: 5.4 10*3/MM3 (ref 1.4–6.5)
NEUTROPHILS NFR BLD AUTO: 70.6 % (ref 40–75)
OSMOLALITY SERPL CALC.SUM OF ELEC: 281.7 MOSM/KG (ref 273–305)
PLATELET # BLD AUTO: 186 10*3/MM3 (ref 130–400)
PMV BLD AUTO: 11.7 FL (ref 6–10)
POTASSIUM BLD-SCNC: 4.1 MMOL/L (ref 3.5–5.3)
PROTHROMBIN TIME: 14.1 SECONDS (ref 11–15.4)
RBC # BLD AUTO: 3.9 10*6/MM3 (ref 4.2–5.4)
SODIUM BLD-SCNC: 141 MMOL/L (ref 135–153)
WBC NRBC COR # BLD: 7.65 10*3/MM3 (ref 4.5–12.5)

## 2018-01-25 PROCEDURE — 85610 PROTHROMBIN TIME: CPT | Performed by: INTERNAL MEDICINE

## 2018-01-25 PROCEDURE — A9270 NON-COVERED ITEM OR SERVICE: HCPCS | Performed by: INTERNAL MEDICINE

## 2018-01-25 PROCEDURE — G0378 HOSPITAL OBSERVATION PER HR: HCPCS

## 2018-01-25 PROCEDURE — 25010000002 FENTANYL CITRATE (PF) 100 MCG/2ML SOLUTION: Performed by: NURSE ANESTHETIST, CERTIFIED REGISTERED

## 2018-01-25 PROCEDURE — 25010000002 PROPOFOL 10 MG/ML EMULSION: Performed by: NURSE ANESTHETIST, CERTIFIED REGISTERED

## 2018-01-25 PROCEDURE — 99225 PR SBSQ OBSERVATION CARE/DAY 25 MINUTES: CPT | Performed by: INTERNAL MEDICINE

## 2018-01-25 PROCEDURE — 80048 BASIC METABOLIC PNL TOTAL CA: CPT | Performed by: PHYSICIAN ASSISTANT

## 2018-01-25 PROCEDURE — 25010000002 ONDANSETRON PER 1 MG: Performed by: INTERNAL MEDICINE

## 2018-01-25 PROCEDURE — 25010000002 HEPARIN (PORCINE) PER 1000 UNITS: Performed by: PHYSICIAN ASSISTANT

## 2018-01-25 PROCEDURE — 96361 HYDRATE IV INFUSION ADD-ON: CPT

## 2018-01-25 PROCEDURE — 94799 UNLISTED PULMONARY SVC/PX: CPT

## 2018-01-25 PROCEDURE — 63710000001 FERROUS GLUCONATE 324 (37.5 FE) MG TABLET: Performed by: INTERNAL MEDICINE

## 2018-01-25 PROCEDURE — 63710000001 NAPROXEN 250 MG TABLET: Performed by: INTERNAL MEDICINE

## 2018-01-25 PROCEDURE — 63710000001 GABAPENTIN 100 MG CAPSULE: Performed by: INTERNAL MEDICINE

## 2018-01-25 PROCEDURE — 85025 COMPLETE CBC W/AUTO DIFF WBC: CPT | Performed by: PHYSICIAN ASSISTANT

## 2018-01-25 PROCEDURE — 43246 EGD PLACE GASTROSTOMY TUBE: CPT | Performed by: INTERNAL MEDICINE

## 2018-01-25 PROCEDURE — 99219 PR INITIAL OBSERVATION CARE/DAY 50 MINUTES: CPT | Performed by: PHYSICIAN ASSISTANT

## 2018-01-25 RX ORDER — ONDANSETRON 2 MG/ML
4 INJECTION INTRAMUSCULAR; INTRAVENOUS ONCE AS NEEDED
Status: DISCONTINUED | OUTPATIENT
Start: 2018-01-25 | End: 2018-01-25 | Stop reason: HOSPADM

## 2018-01-25 RX ORDER — IPRATROPIUM BROMIDE AND ALBUTEROL SULFATE 2.5; .5 MG/3ML; MG/3ML
3 SOLUTION RESPIRATORY (INHALATION) ONCE AS NEEDED
Status: DISCONTINUED | OUTPATIENT
Start: 2018-01-25 | End: 2018-01-25 | Stop reason: HOSPADM

## 2018-01-25 RX ORDER — OXYCODONE HYDROCHLORIDE AND ACETAMINOPHEN 5; 325 MG/1; MG/1
1 TABLET ORAL ONCE AS NEEDED
Status: DISCONTINUED | OUTPATIENT
Start: 2018-01-25 | End: 2018-01-25 | Stop reason: HOSPADM

## 2018-01-25 RX ORDER — ONDANSETRON 2 MG/ML
4 INJECTION INTRAMUSCULAR; INTRAVENOUS EVERY 6 HOURS PRN
Status: DISCONTINUED | OUTPATIENT
Start: 2018-01-25 | End: 2018-01-26 | Stop reason: HOSPADM

## 2018-01-25 RX ORDER — SODIUM CHLORIDE, SODIUM LACTATE, POTASSIUM CHLORIDE, CALCIUM CHLORIDE 600; 310; 30; 20 MG/100ML; MG/100ML; MG/100ML; MG/100ML
125 INJECTION, SOLUTION INTRAVENOUS CONTINUOUS
Status: DISCONTINUED | OUTPATIENT
Start: 2018-01-25 | End: 2018-01-25

## 2018-01-25 RX ORDER — LIDOCAINE HYDROCHLORIDE 20 MG/ML
INJECTION, SOLUTION INFILTRATION; PERINEURAL AS NEEDED
Status: DISCONTINUED | OUTPATIENT
Start: 2018-01-25 | End: 2018-01-25 | Stop reason: SURG

## 2018-01-25 RX ORDER — PROPOFOL 10 MG/ML
VIAL (ML) INTRAVENOUS AS NEEDED
Status: DISCONTINUED | OUTPATIENT
Start: 2018-01-25 | End: 2018-01-25 | Stop reason: SURG

## 2018-01-25 RX ORDER — MEPERIDINE HYDROCHLORIDE 25 MG/ML
12.5 INJECTION INTRAMUSCULAR; INTRAVENOUS; SUBCUTANEOUS
Status: DISCONTINUED | OUTPATIENT
Start: 2018-01-25 | End: 2018-01-25 | Stop reason: HOSPADM

## 2018-01-25 RX ORDER — FENTANYL CITRATE 50 UG/ML
50 INJECTION, SOLUTION INTRAMUSCULAR; INTRAVENOUS
Status: DISCONTINUED | OUTPATIENT
Start: 2018-01-25 | End: 2018-01-25 | Stop reason: HOSPADM

## 2018-01-25 RX ORDER — SODIUM CHLORIDE 0.9 % (FLUSH) 0.9 %
1-10 SYRINGE (ML) INJECTION AS NEEDED
Status: DISCONTINUED | OUTPATIENT
Start: 2018-01-25 | End: 2018-01-25 | Stop reason: HOSPADM

## 2018-01-25 RX ADMIN — LIDOCAINE HYDROCHLORIDE 60 MG: 20 INJECTION, SOLUTION INFILTRATION; PERINEURAL at 15:14

## 2018-01-25 RX ADMIN — PROPOFOL 40 MG: 10 INJECTION, EMULSION INTRAVENOUS at 15:23

## 2018-01-25 RX ADMIN — PROPOFOL 20 MG: 10 INJECTION, EMULSION INTRAVENOUS at 15:14

## 2018-01-25 RX ADMIN — PROPOFOL 30 MG: 10 INJECTION, EMULSION INTRAVENOUS at 15:18

## 2018-01-25 RX ADMIN — NAPROXEN 250 MG: 250 TABLET ORAL at 23:38

## 2018-01-25 RX ADMIN — CEFAZOLIN 1 G: 1 INJECTION, POWDER, FOR SOLUTION INTRAMUSCULAR; INTRAVENOUS; PARENTERAL at 15:15

## 2018-01-25 RX ADMIN — ONDANSETRON 4 MG: 2 INJECTION, SOLUTION INTRAMUSCULAR; INTRAVENOUS at 20:53

## 2018-01-25 RX ADMIN — HEPARIN SODIUM 5000 UNITS: 5000 INJECTION, SOLUTION INTRAVENOUS; SUBCUTANEOUS at 20:53

## 2018-01-25 RX ADMIN — FENTANYL CITRATE 50 MCG: 50 INJECTION INTRAMUSCULAR; INTRAVENOUS at 15:41

## 2018-01-25 RX ADMIN — GABAPENTIN 100 MG: 100 CAPSULE ORAL at 20:53

## 2018-01-25 RX ADMIN — FERROUS GLUCONATE TAB 324 MG (37.5 MG ELEMENTAL IRON) 324 MG: 324 (37.5 FE) TAB at 17:15

## 2018-01-25 RX ADMIN — SODIUM CHLORIDE 100 ML/HR: 9 INJECTION, SOLUTION INTRAVENOUS at 03:40

## 2018-01-25 NOTE — ANESTHESIA PREPROCEDURE EVALUATION
Anesthesia Evaluation     Patient summary reviewed and Nursing notes reviewed   no history of anesthetic complications:  NPO Solid Status: > 8 hours  NPO Liquid Status: > 8 hours     Airway   Mallampati: II  TM distance: >3 FB  Neck ROM: full  no difficulty expected  Dental - normal exam   (+) poor dentition    Pulmonary - normal exam   (+) pneumonia (2017) resolved , rhonchi, decreased breath sounds,   (-) asthma, not a smoker  Cardiovascular - negative cardio ROS and normal exam  Exercise tolerance: good (4-7 METS)    NYHA Classification: II    (-) hypertension, past MI, dysrhythmias, angina, hyperlipidemia      Neuro/Psych  (+) 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   Substance History - negative use     OB/GYN negative ob/gyn ROS         Other   (+) arthritis     ROS/Med Hx Other: Hard of hearing  Chronic bronchitis      Phys Exam Other: Poor inspiratory effort                                        Anesthesia Plan    ASA 2     general     intravenous induction   Anesthetic plan and risks discussed with patient.  Use of blood products discussed with patient  Consented to blood products.

## 2018-01-25 NOTE — ANESTHESIA POSTPROCEDURE EVALUATION
Patient: Tamie Rodriguez    Procedure Summary     Date Anesthesia Start Anesthesia Stop Room / Location    01/25/18 1451 1527 [facility] 10 / [facility]       Procedure Diagnosis Surgeon Provider    ESOPHAGOGASTRODUODENOSCOPY WITH PERCUTANEOUS ENDOSCOPIC GASTROSTOMY TUBE INSERTION (N/A Esophagus) Dislodged gastrostomy tube  (Dislodged gastrostomy tube [Z43.1]) MD Stanley Vega III, MD          Anesthesia Type: general  Last vitals  BP   146/77 (01/25/18 1611)   Temp   98.8 °F (37.1 °C) (01/25/18 1529)   Pulse   95 (01/25/18 1611)   Resp   18 (01/25/18 1611)     SpO2   97 % (01/25/18 1611)     Post Anesthesia Care and Evaluation    Patient location during evaluation: PACU  Patient participation: complete - patient participated  Level of consciousness: awake and alert  Pain score: 1  Pain management: adequate  Airway patency: patent  Anesthetic complications: No anesthetic complications  PONV Status: controlled  Cardiovascular status: acceptable  Respiratory status: acceptable  Hydration status: acceptable

## 2018-01-26 ENCOUNTER — APPOINTMENT (OUTPATIENT)
Dept: GENERAL RADIOLOGY | Facility: HOSPITAL | Age: 78
End: 2018-01-26

## 2018-01-26 VITALS
DIASTOLIC BLOOD PRESSURE: 57 MMHG | HEART RATE: 73 BPM | RESPIRATION RATE: 20 BRPM | SYSTOLIC BLOOD PRESSURE: 137 MMHG | WEIGHT: 121 LBS | HEIGHT: 64 IN | TEMPERATURE: 98.9 F | OXYGEN SATURATION: 96 % | BODY MASS INDEX: 20.66 KG/M2

## 2018-01-26 LAB
ANION GAP SERPL CALCULATED.3IONS-SCNC: 12.2 MMOL/L (ref 3.6–11.2)
BACTERIA UR QL AUTO: ABNORMAL /HPF
BASOPHILS # BLD AUTO: 0 10*3/MM3 (ref 0–0.3)
BASOPHILS # BLD AUTO: 0.01 10*3/MM3 (ref 0–0.3)
BASOPHILS NFR BLD AUTO: 0 % (ref 0–2)
BASOPHILS NFR BLD AUTO: 0 % (ref 0–2)
BILIRUB UR QL STRIP: NEGATIVE
BUN BLD-MCNC: 17 MG/DL (ref 7–21)
BUN/CREAT SERPL: 25.8 (ref 7–25)
CALCIUM SPEC-SCNC: 9 MG/DL (ref 7.7–10)
CHLORIDE SERPL-SCNC: 106 MMOL/L (ref 99–112)
CLARITY UR: CLEAR
CO2 SERPL-SCNC: 19.8 MMOL/L (ref 24.3–31.9)
COLOR UR: YELLOW
CREAT BLD-MCNC: 0.66 MG/DL (ref 0.43–1.29)
D-LACTATE SERPL-SCNC: 0.6 MMOL/L (ref 0.5–2)
DEPRECATED RDW RBC AUTO: 47.5 FL (ref 37–54)
DEPRECATED RDW RBC AUTO: 48.4 FL (ref 37–54)
EOSINOPHIL # BLD AUTO: 0 10*3/MM3 (ref 0–0.7)
EOSINOPHIL # BLD AUTO: 0.06 10*3/MM3 (ref 0–0.7)
EOSINOPHIL NFR BLD AUTO: 0 % (ref 0–7)
EOSINOPHIL NFR BLD AUTO: 0.5 % (ref 0–7)
ERYTHROCYTE [DISTWIDTH] IN BLOOD BY AUTOMATED COUNT: 14.9 % (ref 11.5–14.5)
ERYTHROCYTE [DISTWIDTH] IN BLOOD BY AUTOMATED COUNT: 15.1 % (ref 11.5–14.5)
GFR SERPL CREATININE-BSD FRML MDRD: 87 ML/MIN/1.73
GLUCOSE BLD-MCNC: 84 MG/DL (ref 70–110)
GLUCOSE UR STRIP-MCNC: NEGATIVE MG/DL
HCT VFR BLD AUTO: 32 % (ref 37–47)
HCT VFR BLD AUTO: 37.6 % (ref 37–47)
HGB BLD-MCNC: 10 G/DL (ref 12–16)
HGB BLD-MCNC: 11.6 G/DL (ref 12–16)
HGB UR QL STRIP.AUTO: ABNORMAL
HYALINE CASTS UR QL AUTO: ABNORMAL /LPF
IMM GRANULOCYTES # BLD: 0.03 10*3/MM3 (ref 0–0.03)
IMM GRANULOCYTES # BLD: 0.07 10*3/MM3 (ref 0–0.03)
IMM GRANULOCYTES NFR BLD: 0.3 % (ref 0–0.5)
IMM GRANULOCYTES NFR BLD: 0.3 % (ref 0–0.5)
KETONES UR QL STRIP: ABNORMAL
LEUKOCYTE ESTERASE UR QL STRIP.AUTO: NEGATIVE
LYMPHOCYTES # BLD AUTO: 0.57 10*3/MM3 (ref 1–3)
LYMPHOCYTES # BLD AUTO: 1.26 10*3/MM3 (ref 1–3)
LYMPHOCYTES NFR BLD AUTO: 11.3 % (ref 16–46)
LYMPHOCYTES NFR BLD AUTO: 2.6 % (ref 16–46)
MCH RBC QN AUTO: 27.3 PG (ref 27–33)
MCH RBC QN AUTO: 27.7 PG (ref 27–33)
MCHC RBC AUTO-ENTMCNC: 30.9 G/DL (ref 33–37)
MCHC RBC AUTO-ENTMCNC: 31.3 G/DL (ref 33–37)
MCV RBC AUTO: 87.4 FL (ref 80–94)
MCV RBC AUTO: 89.7 FL (ref 80–94)
MONOCYTES # BLD AUTO: 0.52 10*3/MM3 (ref 0.1–0.9)
MONOCYTES # BLD AUTO: 1.13 10*3/MM3 (ref 0.1–0.9)
MONOCYTES NFR BLD AUTO: 10.2 % (ref 0–12)
MONOCYTES NFR BLD AUTO: 2.3 % (ref 0–12)
NEUTROPHILS # BLD AUTO: 20.96 10*3/MM3 (ref 1.4–6.5)
NEUTROPHILS # BLD AUTO: 8.65 10*3/MM3 (ref 1.4–6.5)
NEUTROPHILS NFR BLD AUTO: 77.7 % (ref 40–75)
NEUTROPHILS NFR BLD AUTO: 94.8 % (ref 40–75)
NITRITE UR QL STRIP: NEGATIVE
OSMOLALITY SERPL CALC.SUM OF ELEC: 276.4 MOSM/KG (ref 273–305)
PH UR STRIP.AUTO: <=5 [PH] (ref 5–8)
PLATELET # BLD AUTO: 184 10*3/MM3 (ref 130–400)
PLATELET # BLD AUTO: 184 10*3/MM3 (ref 130–400)
PMV BLD AUTO: 11.5 FL (ref 6–10)
PMV BLD AUTO: 11.9 FL (ref 6–10)
POTASSIUM BLD-SCNC: 4.2 MMOL/L (ref 3.5–5.3)
PROT UR QL STRIP: NEGATIVE
RBC # BLD AUTO: 3.66 10*6/MM3 (ref 4.2–5.4)
RBC # BLD AUTO: 4.19 10*6/MM3 (ref 4.2–5.4)
RBC # UR: ABNORMAL /HPF
REF LAB TEST METHOD: ABNORMAL
SODIUM BLD-SCNC: 138 MMOL/L (ref 135–153)
SP GR UR STRIP: 1.02 (ref 1–1.03)
SQUAMOUS #/AREA URNS HPF: ABNORMAL /HPF
UROBILINOGEN UR QL STRIP: ABNORMAL
WBC NRBC COR # BLD: 11.13 10*3/MM3 (ref 4.5–12.5)
WBC NRBC COR # BLD: 22.13 10*3/MM3 (ref 4.5–12.5)
WBC UR QL AUTO: ABNORMAL /HPF

## 2018-01-26 PROCEDURE — G8998 SWALLOW D/C STATUS: HCPCS

## 2018-01-26 PROCEDURE — G8996 SWALLOW CURRENT STATUS: HCPCS

## 2018-01-26 PROCEDURE — 71045 X-RAY EXAM CHEST 1 VIEW: CPT | Performed by: RADIOLOGY

## 2018-01-26 PROCEDURE — 99224 PR SBSQ OBSERVATION CARE/DAY 15 MINUTES: CPT | Performed by: PHYSICIAN ASSISTANT

## 2018-01-26 PROCEDURE — 63710000001 FERROUS GLUCONATE 324 (37.5 FE) MG TABLET: Performed by: INTERNAL MEDICINE

## 2018-01-26 PROCEDURE — 81001 URINALYSIS AUTO W/SCOPE: CPT | Performed by: INTERNAL MEDICINE

## 2018-01-26 PROCEDURE — 80048 BASIC METABOLIC PNL TOTAL CA: CPT | Performed by: INTERNAL MEDICINE

## 2018-01-26 PROCEDURE — G8997 SWALLOW GOAL STATUS: HCPCS

## 2018-01-26 PROCEDURE — A9270 NON-COVERED ITEM OR SERVICE: HCPCS | Performed by: INTERNAL MEDICINE

## 2018-01-26 PROCEDURE — 94799 UNLISTED PULMONARY SVC/PX: CPT

## 2018-01-26 PROCEDURE — 71045 X-RAY EXAM CHEST 1 VIEW: CPT

## 2018-01-26 PROCEDURE — 63710000001 GABAPENTIN 100 MG CAPSULE: Performed by: INTERNAL MEDICINE

## 2018-01-26 PROCEDURE — 25010000002 HEPARIN (PORCINE) PER 1000 UNITS: Performed by: PHYSICIAN ASSISTANT

## 2018-01-26 PROCEDURE — G0378 HOSPITAL OBSERVATION PER HR: HCPCS

## 2018-01-26 PROCEDURE — 63710000001 FOLIC ACID 1 MG TABLET: Performed by: INTERNAL MEDICINE

## 2018-01-26 PROCEDURE — 99217 PR OBSERVATION CARE DISCHARGE MANAGEMENT: CPT | Performed by: INTERNAL MEDICINE

## 2018-01-26 PROCEDURE — 83605 ASSAY OF LACTIC ACID: CPT | Performed by: INTERNAL MEDICINE

## 2018-01-26 PROCEDURE — 85025 COMPLETE CBC W/AUTO DIFF WBC: CPT | Performed by: INTERNAL MEDICINE

## 2018-01-26 PROCEDURE — 92610 EVALUATE SWALLOWING FUNCTION: CPT

## 2018-01-26 RX ORDER — CEFPODOXIME PROXETIL 200 MG/1
200 TABLET, FILM COATED ORAL EVERY 12 HOURS
Qty: 14 TABLET | Refills: 0 | Status: SHIPPED | OUTPATIENT
Start: 2018-01-26 | End: 2018-02-02

## 2018-01-26 RX ORDER — DOXYCYCLINE HYCLATE 100 MG
100 TABLET ORAL 2 TIMES DAILY
Qty: 14 TABLET | Refills: 0 | Status: SHIPPED | OUTPATIENT
Start: 2018-01-26 | End: 2018-02-02

## 2018-01-26 RX ADMIN — HEPARIN SODIUM 5000 UNITS: 5000 INJECTION, SOLUTION INTRAVENOUS; SUBCUTANEOUS at 08:26

## 2018-01-26 RX ADMIN — FOLIC ACID 1 MG: 1 TABLET ORAL at 08:26

## 2018-01-26 RX ADMIN — SODIUM CHLORIDE 100 ML/HR: 9 INJECTION, SOLUTION INTRAVENOUS at 04:17

## 2018-01-26 RX ADMIN — SODIUM CHLORIDE 100 ML/HR: 9 INJECTION, SOLUTION INTRAVENOUS at 13:39

## 2018-01-26 RX ADMIN — GABAPENTIN 100 MG: 100 CAPSULE ORAL at 08:26

## 2018-01-26 RX ADMIN — FERROUS GLUCONATE TAB 324 MG (37.5 MG ELEMENTAL IRON) 324 MG: 324 (37.5 FE) TAB at 08:26

## 2018-11-28 ENCOUNTER — HOSPITAL ENCOUNTER (EMERGENCY)
Facility: HOSPITAL | Age: 78
Discharge: HOME | End: 2018-11-28
Attending: EMERGENCY MEDICINE | Admitting: EMERGENCY MEDICINE

## 2018-11-28 VITALS
RESPIRATION RATE: 18 BRPM | SYSTOLIC BLOOD PRESSURE: 151 MMHG | WEIGHT: 120 LBS | DIASTOLIC BLOOD PRESSURE: 74 MMHG | BODY MASS INDEX: 20.49 KG/M2 | HEART RATE: 88 BPM | HEIGHT: 64 IN | OXYGEN SATURATION: 98 % | TEMPERATURE: 98.6 F

## 2018-11-28 DIAGNOSIS — N61.1 LEFT BREAST ABSCESS: Primary | ICD-10-CM

## 2018-11-28 LAB
ANION GAP SERPL CALCULATED.3IONS-SCNC: 8.9 MMOL/L (ref 3.6–11.2)
BASOPHILS # BLD AUTO: 0.02 10*3/MM3 (ref 0–0.3)
BASOPHILS NFR BLD AUTO: 0.3 % (ref 0–2)
BUN BLD-MCNC: 11 MG/DL (ref 7–21)
BUN/CREAT SERPL: 15.1 (ref 7–25)
CALCIUM SPEC-SCNC: 9.5 MG/DL (ref 7.7–10)
CHLORIDE SERPL-SCNC: 106 MMOL/L (ref 99–112)
CO2 SERPL-SCNC: 26.1 MMOL/L (ref 24.3–31.9)
CREAT BLD-MCNC: 0.73 MG/DL (ref 0.43–1.29)
CRP SERPL-MCNC: 1.92 MG/DL (ref 0–0.99)
DEPRECATED RDW RBC AUTO: 46.4 FL (ref 37–54)
EOSINOPHIL # BLD AUTO: 0.04 10*3/MM3 (ref 0–0.7)
EOSINOPHIL NFR BLD AUTO: 0.5 % (ref 0–7)
ERYTHROCYTE [DISTWIDTH] IN BLOOD BY AUTOMATED COUNT: 14.2 % (ref 11.5–14.5)
GFR SERPL CREATININE-BSD FRML MDRD: 77 ML/MIN/1.73
GLUCOSE BLD-MCNC: 76 MG/DL (ref 70–110)
HCT VFR BLD AUTO: 42.5 % (ref 37–47)
HGB BLD-MCNC: 13.1 G/DL (ref 12–16)
IMM GRANULOCYTES # BLD: 0.02 10*3/MM3 (ref 0–0.03)
IMM GRANULOCYTES NFR BLD: 0.3 % (ref 0–0.5)
LYMPHOCYTES # BLD AUTO: 0.7 10*3/MM3 (ref 1–3)
LYMPHOCYTES NFR BLD AUTO: 9.5 % (ref 16–46)
MCH RBC QN AUTO: 28.5 PG (ref 27–33)
MCHC RBC AUTO-ENTMCNC: 30.8 G/DL (ref 33–37)
MCV RBC AUTO: 92.6 FL (ref 80–94)
MONOCYTES # BLD AUTO: 0.43 10*3/MM3 (ref 0.1–0.9)
MONOCYTES NFR BLD AUTO: 5.9 % (ref 0–12)
NEUTROPHILS # BLD AUTO: 6.14 10*3/MM3 (ref 1.4–6.5)
NEUTROPHILS NFR BLD AUTO: 83.5 % (ref 40–75)
OSMOLALITY SERPL CALC.SUM OF ELEC: 279.4 MOSM/KG (ref 273–305)
PLATELET # BLD AUTO: 195 10*3/MM3 (ref 130–400)
PMV BLD AUTO: 11.1 FL (ref 6–10)
POTASSIUM BLD-SCNC: 3.8 MMOL/L (ref 3.5–5.3)
RBC # BLD AUTO: 4.59 10*6/MM3 (ref 4.2–5.4)
SODIUM BLD-SCNC: 141 MMOL/L (ref 135–153)
WBC NRBC COR # BLD: 7.35 10*3/MM3 (ref 4.5–12.5)

## 2018-11-28 PROCEDURE — 80048 BASIC METABOLIC PNL TOTAL CA: CPT | Performed by: PHYSICIAN ASSISTANT

## 2018-11-28 PROCEDURE — 36415 COLL VENOUS BLD VENIPUNCTURE: CPT

## 2018-11-28 PROCEDURE — 86140 C-REACTIVE PROTEIN: CPT | Performed by: PHYSICIAN ASSISTANT

## 2018-11-28 PROCEDURE — 99283 EMERGENCY DEPT VISIT LOW MDM: CPT

## 2018-11-28 PROCEDURE — 96372 THER/PROPH/DIAG INJ SC/IM: CPT

## 2018-11-28 PROCEDURE — 87040 BLOOD CULTURE FOR BACTERIA: CPT | Performed by: PHYSICIAN ASSISTANT

## 2018-11-28 PROCEDURE — 85025 COMPLETE CBC W/AUTO DIFF WBC: CPT | Performed by: PHYSICIAN ASSISTANT

## 2018-11-28 RX ORDER — SODIUM CHLORIDE 0.9 % (FLUSH) 0.9 %
10 SYRINGE (ML) INJECTION AS NEEDED
Status: DISCONTINUED | OUTPATIENT
Start: 2018-11-28 | End: 2018-11-28

## 2018-11-28 RX ORDER — CLINDAMYCIN PHOSPHATE 150 MG/ML
600 INJECTION, SOLUTION INTRAVENOUS ONCE
Status: COMPLETED | OUTPATIENT
Start: 2018-11-28 | End: 2018-11-28

## 2018-11-28 RX ORDER — CLINDAMYCIN HYDROCHLORIDE 300 MG/1
300 CAPSULE ORAL 4 TIMES DAILY
Qty: 40 CAPSULE | Refills: 0 | Status: SHIPPED | OUTPATIENT
Start: 2018-11-28 | End: 2018-12-08

## 2018-11-28 RX ADMIN — CLINDAMYCIN PHOSPHATE 600 MG: 150 INJECTION, SOLUTION INTRAMUSCULAR; INTRAVENOUS at 17:32

## 2018-12-03 ENCOUNTER — OFFICE VISIT (OUTPATIENT)
Dept: SURGERY | Facility: CLINIC | Age: 78
End: 2018-12-03

## 2018-12-03 VITALS — HEIGHT: 64 IN | WEIGHT: 120 LBS | BODY MASS INDEX: 20.49 KG/M2

## 2018-12-03 DIAGNOSIS — N61.1 BREAST ABSCESS: Primary | ICD-10-CM

## 2018-12-03 LAB
BACTERIA SPEC AEROBE CULT: NORMAL
BACTERIA SPEC AEROBE CULT: NORMAL

## 2018-12-03 PROCEDURE — 99212 OFFICE O/P EST SF 10 MIN: CPT | Performed by: SURGERY

## 2018-12-04 PROBLEM — N61.1 BREAST ABSCESS: Status: ACTIVE | Noted: 2018-12-04

## 2018-12-04 NOTE — PROGRESS NOTES
Subjective   Tamie Rodriguez is a 77 y.o. female  is here today for follow-up.         Tamie Rodriguez is a 77 y.o. female here for follow up for left breast abscess.  The patient is doing well and she has excellent granulation tissue.  No infection or cellulitis noted.      Patient left breast with 2cm open wound of lower breast with improved cellulitis and no active infection.            Assessment     Tamie was seen today for wound check.    Diagnoses and all orders for this visit:    Breast abscess      Tamie Rodriguez is a 77 y.o. female with healing left breast abscess.  The patient will continue current wound care and follow up in 2 weeks.

## 2018-12-17 ENCOUNTER — OFFICE VISIT (OUTPATIENT)
Dept: SURGERY | Facility: CLINIC | Age: 78
End: 2018-12-17

## 2018-12-17 VITALS — WEIGHT: 120 LBS | HEIGHT: 64 IN | BODY MASS INDEX: 20.49 KG/M2

## 2018-12-17 DIAGNOSIS — N61.1 BREAST ABSCESS: Primary | ICD-10-CM

## 2018-12-17 PROCEDURE — 99212 OFFICE O/P EST SF 10 MIN: CPT | Performed by: SURGERY

## 2018-12-18 NOTE — PROGRESS NOTES
Subjective   Tamie Rodriguez is a 77 y.o. female  is here today for follow-up.         Tamie Rodriguez is a 77 y.o. female here for follow up for left breast abscess.  The patient is doing well and she has excellent granulation tissue.  No infection or cellulitis noted.      Patient left breast with 2cm open wound of lower breast with improved cellulitis and no active infection.            Assessment     Tamie was seen today for breast abscess.    Diagnoses and all orders for this visit:    Breast abscess      Tamie Rodriguez is a 77 y.o. female with healing left breast abscess.  The patient will continue current wound care and follow up in 1 month.

## 2019-04-28 NOTE — PROGRESS NOTES
Inpatient Rehabilitation Functional Measures Assessment    Functional Measures  CATHY Eating:  Eating Score = 5. Patient is supervision/set-up for eating,  requiring: Opening containers. No assistive devices were required.  CATHY Grooming:  CATHY Bathing:  CATHY Upper Body Dressing:  CATHY Lower Body Dressing:  CATHY Toileting:  Patient requires moderate assistance for adjusting clothing  before using a toilet, commode, bedpan, or urinal. Patient requires moderate  assistance for hygiene. Patient requires moderate assistance for adjusting  clothing after using a toilet, commode, bedpan, or urinal. Patient performs 0 -  24% of toileting tasks.  Toileting Score = 1, Total Assistance. Patient requires  the following assistive device(s): Grab bar. Adaptive device to maintain  balance.    CATHY Bladder Management  Level of Assistance:  Bladder Score = 5.  Patient is supervision/set-up for  bladder management, requiring: Setting out equipment. Emptying equipment.  Patient requires the following assistive device(s):  Bedside commode.  Frequency/Number of Accidents this Shift:  Bladder accidents this shift:  0 .  Patient has not had an accident, but used a device/medication this shift  requiring: bedside .    CATHY Bowel Management  Level of Assistance: Bowel Score = 3. Patient performs 50-74% of tasks and  requires moderate assistance for bowel management requiring assistive  device/method: bedside .  Frequency/Number of Accidents this Shift: Bowel accidents this shift: 0 .  Patient has not had an accident, but used a device/medication this shift  requiring: bedside .    CATHY Bed/Chair/Wheelchair Transfer:  Bed/chair/wheelchair Transfer Score = 4.  Patient performs 75% or more of effort and minimal assistance (little/incidental  help/lifting of one limb/steadying) for transferring to and from the  bed/chair/wheelchair, requiring: Steadying. Contact guard. Patient requires the  following assistive device(s): Walker. Bed rails. Arm  patient states since yesterday and pain with swallowing and getting worse and states that pain radiates to back rest.  CATHY Toilet Transfer:  Toilet Transfer Score = 3.  Patient performs 50-74% of  effort and requires moderate assistance (some lifting) for transferring to and  from the toilet/commode. Patient requires the following assistive device(s):  Safety frame/over the toilet. Grab bars.  CATHY Tub/Shower Transfer:    Previously Documented Mode of Locomotion at Discharge:  [facility] Expected Mode of Locomotion at Discharge:  [facility] Walk/Wheelchair:  [facility] Stairs:    [facility] Comprehension:  [facility] Expression:  [facility] Social Interaction:  [facility] Problem Solving:  [facility] Memory:    Therapy Mode Minutes  Occupational Therapy:  Physical Therapy:  Speech Language Pathology:    Discharge Functional Goals:    Signed by: BABAK Mathias

## 2019-06-12 ENCOUNTER — APPOINTMENT (OUTPATIENT)
Dept: CT IMAGING | Facility: HOSPITAL | Age: 79
End: 2019-06-12

## 2019-06-12 ENCOUNTER — APPOINTMENT (OUTPATIENT)
Dept: GENERAL RADIOLOGY | Facility: HOSPITAL | Age: 79
End: 2019-06-12

## 2019-06-12 ENCOUNTER — HOSPITAL ENCOUNTER (INPATIENT)
Facility: HOSPITAL | Age: 79
LOS: 16 days | Discharge: NURSING, REHAB OR LONG-TERM CARE | End: 2019-06-28
Attending: EMERGENCY MEDICINE | Admitting: HOSPITALIST

## 2019-06-12 DIAGNOSIS — R00.0 SINUS TACHYCARDIA: ICD-10-CM

## 2019-06-12 DIAGNOSIS — F03.90 DEMENTIA WITHOUT BEHAVIORAL DISTURBANCE, UNSPECIFIED DEMENTIA TYPE: ICD-10-CM

## 2019-06-12 DIAGNOSIS — T50.902A POLYSUBSTANCE OVERDOSE, INTENTIONAL SELF-HARM, INITIAL ENCOUNTER (HCC): Primary | ICD-10-CM

## 2019-06-12 PROBLEM — T50.901A POLYSUBSTANCE OVERDOSE: Status: ACTIVE | Noted: 2019-06-12

## 2019-06-12 LAB
6-ACETYL MORPHINE: NEGATIVE
A-A DO2: 128 MMHG (ref 0–300)
A-A DO2: 131.5 MMHG (ref 0–300)
A-A DO2: 99.3 MMHG (ref 0–300)
ALBUMIN SERPL-MCNC: 3.6 G/DL (ref 3.5–5.2)
ALBUMIN/GLOB SERPL: 0.9 G/DL
ALP SERPL-CCNC: 91 U/L (ref 39–117)
ALT SERPL W P-5'-P-CCNC: 8 U/L (ref 1–33)
AMPHET+METHAMPHET UR QL: NEGATIVE
ANION GAP SERPL CALCULATED.3IONS-SCNC: 17.9 MMOL/L
APAP SERPL-MCNC: <5 MCG/ML (ref 10–30)
ARTERIAL PATENCY WRIST A: ABNORMAL
AST SERPL-CCNC: 17 U/L (ref 1–32)
ATMOSPHERIC PRESS: 728 MMHG
BACTERIA UR QL AUTO: ABNORMAL /HPF
BARBITURATES UR QL SCN: NEGATIVE
BASE EXCESS BLDA CALC-SCNC: -2.1 MMOL/L
BASE EXCESS BLDA CALC-SCNC: -2.3 MMOL/L
BASE EXCESS BLDA CALC-SCNC: -4.8 MMOL/L
BASOPHILS # BLD AUTO: 0.01 10*3/MM3 (ref 0–0.2)
BASOPHILS NFR BLD AUTO: 0.2 % (ref 0–1.5)
BDY SITE: ABNORMAL
BENZODIAZ UR QL SCN: NEGATIVE
BILIRUB SERPL-MCNC: 0.5 MG/DL (ref 0.2–1.2)
BILIRUB UR QL STRIP: NEGATIVE
BODY TEMPERATURE: 98.6 C
BUN BLD-MCNC: 22 MG/DL (ref 8–23)
BUN/CREAT SERPL: 31 (ref 7–25)
BUPRENORPHINE SERPL-MCNC: NEGATIVE NG/ML
CALCIUM SPEC-SCNC: 9.6 MG/DL (ref 8.6–10.5)
CANNABINOIDS SERPL QL: NEGATIVE
CHLORIDE SERPL-SCNC: 102 MMOL/L (ref 98–107)
CLARITY UR: CLEAR
CO2 SERPL-SCNC: 23.1 MMOL/L (ref 22–29)
COCAINE UR QL: NEGATIVE
COHGB MFR BLD: 0.3 % (ref 0–5)
COHGB MFR BLD: 1.2 % (ref 0–5)
COHGB MFR BLD: 1.3 % (ref 0–5)
COLOR UR: YELLOW
CREAT BLD-MCNC: 0.71 MG/DL (ref 0.57–1)
CRP SERPL-MCNC: 3.82 MG/DL (ref 0–0.5)
D-LACTATE SERPL-SCNC: 1.1 MMOL/L (ref 0.5–2)
DEPRECATED RDW RBC AUTO: 49.4 FL (ref 37–54)
EOSINOPHIL # BLD AUTO: 0.06 10*3/MM3 (ref 0–0.4)
EOSINOPHIL NFR BLD AUTO: 1 % (ref 0.3–6.2)
ERYTHROCYTE [DISTWIDTH] IN BLOOD BY AUTOMATED COUNT: 14.6 % (ref 12.3–15.4)
ETHANOL BLD-MCNC: <10 MG/DL (ref 0–10)
ETHANOL UR QL: <0.01 %
GFR SERPL CREATININE-BSD FRML MDRD: 79 ML/MIN/1.73
GLOBULIN UR ELPH-MCNC: 4.2 GM/DL
GLUCOSE BLD-MCNC: 118 MG/DL (ref 65–99)
GLUCOSE UR STRIP-MCNC: NEGATIVE MG/DL
HCO3 BLDA-SCNC: 18.7 MMOL/L (ref 22–26)
HCO3 BLDA-SCNC: 24 MMOL/L (ref 22–26)
HCO3 BLDA-SCNC: 25.2 MMOL/L (ref 22–26)
HCT VFR BLD AUTO: 39.6 % (ref 34–46.6)
HCT VFR BLD CALC: 32 % (ref 37–47)
HCT VFR BLD CALC: 37 % (ref 37–47)
HCT VFR BLD CALC: 37 % (ref 37–47)
HGB BLD-MCNC: 11.8 G/DL (ref 12–15.9)
HGB BLDA-MCNC: 11 G/DL (ref 12–16)
HGB BLDA-MCNC: 12.6 G/DL (ref 12–16)
HGB BLDA-MCNC: 12.7 G/DL (ref 12–16)
HGB UR QL STRIP.AUTO: ABNORMAL
HOROWITZ INDEX BLD+IHG-RTO: 40 %
HYALINE CASTS UR QL AUTO: ABNORMAL /LPF
IMM GRANULOCYTES # BLD AUTO: 0.01 10*3/MM3 (ref 0–0.05)
IMM GRANULOCYTES NFR BLD AUTO: 0.2 % (ref 0–0.5)
KETONES UR QL STRIP: ABNORMAL
L PNEUMO1 AG UR QL IA: NEGATIVE
LEUKOCYTE ESTERASE UR QL STRIP.AUTO: NEGATIVE
LYMPHOCYTES # BLD AUTO: 1.07 10*3/MM3 (ref 0.7–3.1)
LYMPHOCYTES NFR BLD AUTO: 18.6 % (ref 19.6–45.3)
MCH RBC QN AUTO: 28.9 PG (ref 26.6–33)
MCHC RBC AUTO-ENTMCNC: 29.8 G/DL (ref 31.5–35.7)
MCV RBC AUTO: 96.8 FL (ref 79–97)
METHADONE UR QL SCN: NEGATIVE
METHGB BLD QL: 0.3 % (ref 0–3)
METHGB BLD QL: 0.4 % (ref 0–3)
METHGB BLD QL: 0.4 % (ref 0–3)
MODALITY: ABNORMAL
MONOCYTES # BLD AUTO: 0.6 10*3/MM3 (ref 0.1–0.9)
MONOCYTES NFR BLD AUTO: 10.4 % (ref 5–12)
NEUTROPHILS # BLD AUTO: 4 10*3/MM3 (ref 1.7–7)
NEUTROPHILS NFR BLD AUTO: 69.6 % (ref 42.7–76)
NITRITE UR QL STRIP: NEGATIVE
OPIATES UR QL: NEGATIVE
OXYCODONE UR QL SCN: NEGATIVE
OXYHGB MFR BLDV: 93 % (ref 85–100)
OXYHGB MFR BLDV: 94.5 % (ref 85–100)
OXYHGB MFR BLDV: 98.4 % (ref 85–100)
PCO2 BLDA: 29.3 MM HG (ref 35–45)
PCO2 BLDA: 46.5 MM HG (ref 35–45)
PCO2 BLDA: 55.6 MM HG (ref 35–45)
PCP UR QL SCN: NEGATIVE
PEEP RESPIRATORY: <3 CM[H2O]
PH BLDA: 7.27 PH UNITS (ref 7.35–7.45)
PH BLDA: 7.33 PH UNITS (ref 7.35–7.45)
PH BLDA: 7.42 PH UNITS (ref 7.35–7.45)
PH UR STRIP.AUTO: 5.5 [PH] (ref 5–8)
PLATELET # BLD AUTO: 129 10*3/MM3 (ref 140–450)
PMV BLD AUTO: 12.8 FL (ref 6–12)
PO2 BLDA: 139.4 MM HG (ref 80–100)
PO2 BLDA: 80.5 MM HG (ref 80–100)
PO2 BLDA: 87.4 MM HG (ref 80–100)
POTASSIUM BLD-SCNC: 3.6 MMOL/L (ref 3.5–5.2)
PROT SERPL-MCNC: 7.8 G/DL (ref 6–8.5)
PROT UR QL STRIP: ABNORMAL
PSV: 8 CMH2O
PSV: 8 CMH2O
RBC # BLD AUTO: 4.09 10*6/MM3 (ref 3.77–5.28)
RBC # UR: ABNORMAL /HPF
REF LAB TEST METHOD: ABNORMAL
SALICYLATES SERPL-MCNC: <0.3 MG/DL
SAO2 % BLDCOA: 94.5 % (ref 90–100)
SAO2 % BLDCOA: 96.1 % (ref 90–100)
SAO2 % BLDCOA: 99 % (ref 90–100)
SET MECH RESP RATE: 16
SET MECH RESP RATE: 20
SET MECH RESP RATE: 22
SODIUM BLD-SCNC: 143 MMOL/L (ref 136–145)
SP GR UR STRIP: 1.02 (ref 1–1.03)
SQUAMOUS #/AREA URNS HPF: ABNORMAL /HPF
TROPONIN T SERPL-MCNC: <0.01 NG/ML (ref 0–0.03)
TROPONIN T SERPL-MCNC: <0.01 NG/ML (ref 0–0.03)
UROBILINOGEN UR QL STRIP: ABNORMAL
VENTILATOR MODE: ABNORMAL
VT ON VENT VENT: 300 ML
WBC NRBC COR # BLD: 5.75 10*3/MM3 (ref 3.4–10.8)
WBC UR QL AUTO: ABNORMAL /HPF

## 2019-06-12 PROCEDURE — 80307 DRUG TEST PRSMV CHEM ANLYZR: CPT | Performed by: EMERGENCY MEDICINE

## 2019-06-12 PROCEDURE — 31500 INSERT EMERGENCY AIRWAY: CPT

## 2019-06-12 PROCEDURE — 94799 UNLISTED PULMONARY SVC/PX: CPT

## 2019-06-12 PROCEDURE — 85025 COMPLETE CBC W/AUTO DIFF WBC: CPT | Performed by: EMERGENCY MEDICINE

## 2019-06-12 PROCEDURE — 93010 ELECTROCARDIOGRAM REPORT: CPT | Performed by: INTERNAL MEDICINE

## 2019-06-12 PROCEDURE — 83605 ASSAY OF LACTIC ACID: CPT | Performed by: INTERNAL MEDICINE

## 2019-06-12 PROCEDURE — 82805 BLOOD GASES W/O2 SATURATION: CPT | Performed by: INTERNAL MEDICINE

## 2019-06-12 PROCEDURE — 81001 URINALYSIS AUTO W/SCOPE: CPT | Performed by: EMERGENCY MEDICINE

## 2019-06-12 PROCEDURE — 86140 C-REACTIVE PROTEIN: CPT | Performed by: INTERNAL MEDICINE

## 2019-06-12 PROCEDURE — 70450 CT HEAD/BRAIN W/O DYE: CPT

## 2019-06-12 PROCEDURE — 36600 WITHDRAWAL OF ARTERIAL BLOOD: CPT | Performed by: INTERNAL MEDICINE

## 2019-06-12 PROCEDURE — 82805 BLOOD GASES W/O2 SATURATION: CPT | Performed by: EMERGENCY MEDICINE

## 2019-06-12 PROCEDURE — 25010000002 AZITHROMYCIN: Performed by: EMERGENCY MEDICINE

## 2019-06-12 PROCEDURE — 80053 COMPREHEN METABOLIC PANEL: CPT | Performed by: EMERGENCY MEDICINE

## 2019-06-12 PROCEDURE — 71045 X-RAY EXAM CHEST 1 VIEW: CPT | Performed by: RADIOLOGY

## 2019-06-12 PROCEDURE — 36600 WITHDRAWAL OF ARTERIAL BLOOD: CPT | Performed by: EMERGENCY MEDICINE

## 2019-06-12 PROCEDURE — 25010000003 POTASSIUM CHLORIDE 10 MEQ/100ML SOLUTION: Performed by: INTERNAL MEDICINE

## 2019-06-12 PROCEDURE — 71045 X-RAY EXAM CHEST 1 VIEW: CPT

## 2019-06-12 PROCEDURE — 5A1955Z RESPIRATORY VENTILATION, GREATER THAN 96 CONSECUTIVE HOURS: ICD-10-PCS | Performed by: INTERNAL MEDICINE

## 2019-06-12 PROCEDURE — 94003 VENT MGMT INPAT SUBQ DAY: CPT

## 2019-06-12 PROCEDURE — 82375 ASSAY CARBOXYHB QUANT: CPT | Performed by: INTERNAL MEDICINE

## 2019-06-12 PROCEDURE — 51702 INSERT TEMP BLADDER CATH: CPT

## 2019-06-12 PROCEDURE — 25010000002 SUCCINYLCHOLINE PER 20 MG: Performed by: EMERGENCY MEDICINE

## 2019-06-12 PROCEDURE — 83050 HGB METHEMOGLOBIN QUAN: CPT | Performed by: EMERGENCY MEDICINE

## 2019-06-12 PROCEDURE — 99292 CRITICAL CARE ADDL 30 MIN: CPT

## 2019-06-12 PROCEDURE — 0BH17EZ INSERTION OF ENDOTRACHEAL AIRWAY INTO TRACHEA, VIA NATURAL OR ARTIFICIAL OPENING: ICD-10-PCS | Performed by: EMERGENCY MEDICINE

## 2019-06-12 PROCEDURE — 87040 BLOOD CULTURE FOR BACTERIA: CPT | Performed by: EMERGENCY MEDICINE

## 2019-06-12 PROCEDURE — 84484 ASSAY OF TROPONIN QUANT: CPT | Performed by: INTERNAL MEDICINE

## 2019-06-12 PROCEDURE — 83050 HGB METHEMOGLOBIN QUAN: CPT | Performed by: INTERNAL MEDICINE

## 2019-06-12 PROCEDURE — 70450 CT HEAD/BRAIN W/O DYE: CPT | Performed by: RADIOLOGY

## 2019-06-12 PROCEDURE — 82375 ASSAY CARBOXYHB QUANT: CPT | Performed by: EMERGENCY MEDICINE

## 2019-06-12 PROCEDURE — 25010000002 HEPARIN (PORCINE) PER 1000 UNITS: Performed by: INTERNAL MEDICINE

## 2019-06-12 PROCEDURE — 99291 CRITICAL CARE FIRST HOUR: CPT

## 2019-06-12 PROCEDURE — 99223 1ST HOSP IP/OBS HIGH 75: CPT | Performed by: INTERNAL MEDICINE

## 2019-06-12 PROCEDURE — 25010000002 CEFTRIAXONE: Performed by: EMERGENCY MEDICINE

## 2019-06-12 PROCEDURE — 93005 ELECTROCARDIOGRAM TRACING: CPT | Performed by: EMERGENCY MEDICINE

## 2019-06-12 PROCEDURE — 87899 AGENT NOS ASSAY W/OPTIC: CPT | Performed by: INTERNAL MEDICINE

## 2019-06-12 PROCEDURE — 94002 VENT MGMT INPAT INIT DAY: CPT

## 2019-06-12 PROCEDURE — 84484 ASSAY OF TROPONIN QUANT: CPT | Performed by: EMERGENCY MEDICINE

## 2019-06-12 RX ORDER — PANTOPRAZOLE SODIUM 40 MG/10ML
40 INJECTION, POWDER, LYOPHILIZED, FOR SOLUTION INTRAVENOUS
Status: DISCONTINUED | OUTPATIENT
Start: 2019-06-13 | End: 2019-06-21 | Stop reason: CLARIF

## 2019-06-12 RX ORDER — SODIUM CHLORIDE 9 MG/ML
125 INJECTION, SOLUTION INTRAVENOUS CONTINUOUS
Status: DISCONTINUED | OUTPATIENT
Start: 2019-06-12 | End: 2019-06-14

## 2019-06-12 RX ORDER — MAGNESIUM SULFATE HEPTAHYDRATE 40 MG/ML
4 INJECTION, SOLUTION INTRAVENOUS AS NEEDED
Status: DISCONTINUED | OUTPATIENT
Start: 2019-06-12 | End: 2019-06-24

## 2019-06-12 RX ORDER — QUETIAPINE FUMARATE 25 MG/1
25 TABLET, FILM COATED ORAL 2 TIMES DAILY PRN
COMMUNITY
End: 2019-06-28 | Stop reason: HOSPADM

## 2019-06-12 RX ORDER — HEPARIN SODIUM 5000 [USP'U]/ML
5000 INJECTION, SOLUTION INTRAVENOUS; SUBCUTANEOUS EVERY 12 HOURS SCHEDULED
Status: DISCONTINUED | OUTPATIENT
Start: 2019-06-12 | End: 2019-06-13

## 2019-06-12 RX ORDER — SODIUM CHLORIDE 0.9 % (FLUSH) 0.9 %
3-10 SYRINGE (ML) INJECTION AS NEEDED
Status: DISCONTINUED | OUTPATIENT
Start: 2019-06-12 | End: 2019-06-28 | Stop reason: HOSPADM

## 2019-06-12 RX ORDER — LORAZEPAM 0.5 MG/1
0.5 TABLET ORAL 2 TIMES DAILY PRN
COMMUNITY
End: 2019-06-28 | Stop reason: HOSPADM

## 2019-06-12 RX ORDER — MAGNESIUM SULFATE HEPTAHYDRATE 40 MG/ML
2 INJECTION, SOLUTION INTRAVENOUS AS NEEDED
Status: DISCONTINUED | OUTPATIENT
Start: 2019-06-12 | End: 2019-06-24

## 2019-06-12 RX ORDER — SODIUM CHLORIDE 0.9 % (FLUSH) 0.9 %
3 SYRINGE (ML) INJECTION EVERY 12 HOURS SCHEDULED
Status: DISCONTINUED | OUTPATIENT
Start: 2019-06-12 | End: 2019-06-19

## 2019-06-12 RX ORDER — POTASSIUM CHLORIDE 7.45 MG/ML
10 INJECTION INTRAVENOUS
Status: DISCONTINUED | OUTPATIENT
Start: 2019-06-12 | End: 2019-06-22 | Stop reason: SDUPTHER

## 2019-06-12 RX ORDER — POTASSIUM CHLORIDE 7.45 MG/ML
10 INJECTION INTRAVENOUS
Status: COMPLETED | OUTPATIENT
Start: 2019-06-12 | End: 2019-06-13

## 2019-06-12 RX ORDER — SODIUM CHLORIDE 9 MG/ML
INJECTION, SOLUTION INTRAVENOUS
Status: DISPENSED
Start: 2019-06-12 | End: 2019-06-13

## 2019-06-12 RX ORDER — POTASSIUM CHLORIDE 1.5 G/1.77G
40 POWDER, FOR SOLUTION ORAL AS NEEDED
Status: DISCONTINUED | OUTPATIENT
Start: 2019-06-12 | End: 2019-06-22 | Stop reason: SDUPTHER

## 2019-06-12 RX ORDER — SUCCINYLCHOLINE CHLORIDE 20 MG/ML
40 INJECTION INTRAMUSCULAR; INTRAVENOUS ONCE
Status: COMPLETED | OUTPATIENT
Start: 2019-06-12 | End: 2019-06-12

## 2019-06-12 RX ORDER — QUETIAPINE FUMARATE 25 MG/1
25 TABLET, FILM COATED ORAL 2 TIMES DAILY PRN
Status: CANCELLED | OUTPATIENT
Start: 2019-06-12

## 2019-06-12 RX ORDER — SODIUM CHLORIDE 0.9 % (FLUSH) 0.9 %
10 SYRINGE (ML) INJECTION AS NEEDED
Status: DISCONTINUED | OUTPATIENT
Start: 2019-06-12 | End: 2019-06-28 | Stop reason: HOSPADM

## 2019-06-12 RX ORDER — POTASSIUM CHLORIDE 750 MG/1
40 CAPSULE, EXTENDED RELEASE ORAL AS NEEDED
Status: DISCONTINUED | OUTPATIENT
Start: 2019-06-12 | End: 2019-06-22 | Stop reason: SDUPTHER

## 2019-06-12 RX ADMIN — POTASSIUM CHLORIDE 10 MEQ: 10 INJECTION, SOLUTION INTRAVENOUS at 21:28

## 2019-06-12 RX ADMIN — AZITHROMYCIN MONOHYDRATE 500 MG: 500 INJECTION, POWDER, LYOPHILIZED, FOR SOLUTION INTRAVENOUS at 17:19

## 2019-06-12 RX ADMIN — HEPARIN SODIUM 5000 UNITS: 5000 INJECTION INTRAVENOUS; SUBCUTANEOUS at 21:27

## 2019-06-12 RX ADMIN — POTASSIUM CHLORIDE 10 MEQ: 10 INJECTION, SOLUTION INTRAVENOUS at 22:37

## 2019-06-12 RX ADMIN — SODIUM CHLORIDE, PRESERVATIVE FREE 3 ML: 5 INJECTION INTRAVENOUS at 21:29

## 2019-06-12 RX ADMIN — SODIUM CHLORIDE 1000 ML: 9 INJECTION, SOLUTION INTRAVENOUS at 18:00

## 2019-06-12 RX ADMIN — ASCORBIC ACID, VITAMIN A PALMITATE, CHOLECALCIFEROL, THIAMINE HYDROCHLORIDE, RIBOFLAVIN-5 PHOSPHATE SODIUM, PYRIDOXINE HYDROCHLORIDE, NIACINAMIDE, DEXPANTHENOL, ALPHA-TOCOPHEROL ACETATE, VITAMIN K1, FOLIC ACID, BIOTIN, CYANOCOBALAMIN: 200; 3300; 200; 6; 3.6; 6; 40; 15; 10; 150; 600; 60; 5 INJECTION, SOLUTION INTRAVENOUS at 21:28

## 2019-06-12 RX ADMIN — SUCCINYLCHOLINE CHLORIDE 40 MG: 20 INJECTION, SOLUTION INTRAMUSCULAR; INTRAVENOUS at 14:16

## 2019-06-12 RX ADMIN — SODIUM CHLORIDE 125 ML/HR: 9 INJECTION, SOLUTION INTRAVENOUS at 16:32

## 2019-06-12 RX ADMIN — CEFTRIAXONE 1 G: 1 INJECTION, POWDER, FOR SOLUTION INTRAMUSCULAR; INTRAVENOUS at 16:31

## 2019-06-12 RX ADMIN — POTASSIUM CHLORIDE 10 MEQ: 10 INJECTION, SOLUTION INTRAVENOUS at 23:39

## 2019-06-12 RX ADMIN — METRONIDAZOLE 500 MG: 500 INJECTION, SOLUTION INTRAVENOUS at 21:28

## 2019-06-12 RX ADMIN — DOXYCYCLINE 100 MG: 100 INJECTION, POWDER, LYOPHILIZED, FOR SOLUTION INTRAVENOUS at 23:39

## 2019-06-12 RX ADMIN — SODIUM CHLORIDE 500 ML: 9 INJECTION, SOLUTION INTRAVENOUS at 16:31

## 2019-06-13 ENCOUNTER — APPOINTMENT (OUTPATIENT)
Dept: CARDIOLOGY | Facility: HOSPITAL | Age: 79
End: 2019-06-13

## 2019-06-13 ENCOUNTER — APPOINTMENT (OUTPATIENT)
Dept: INFUSION THERAPY | Facility: HOSPITAL | Age: 79
End: 2019-06-13

## 2019-06-13 LAB
A-A DO2: 104.6 MMHG (ref 0–300)
A-A DO2: 127.4 MMHG (ref 0–300)
ACETONE BLD QL: ABNORMAL
ALBUMIN SERPL-MCNC: 2.5 G/DL (ref 3.5–5.2)
ALBUMIN/GLOB SERPL: 0.8 G/DL
ALP SERPL-CCNC: 65 U/L (ref 39–117)
ALT SERPL W P-5'-P-CCNC: 6 U/L (ref 1–33)
AMMONIA BLD-SCNC: 22 UMOL/L (ref 11–51)
AMYLASE SERPL-CCNC: 31 U/L (ref 28–100)
ANION GAP SERPL CALCULATED.3IONS-SCNC: 10 MMOL/L
ARTERIAL PATENCY WRIST A: ABNORMAL
ARTERIAL PATENCY WRIST A: POSITIVE
AST SERPL-CCNC: 13 U/L (ref 1–32)
ATMOSPHERIC PRESS: 724 MMHG
ATMOSPHERIC PRESS: 728 MMHG
BASE EXCESS BLDA CALC-SCNC: -3.5 MMOL/L
BASE EXCESS BLDA CALC-SCNC: -6.8 MMOL/L
BASOPHILS # BLD AUTO: 0.01 10*3/MM3 (ref 0–0.2)
BASOPHILS NFR BLD AUTO: 0.2 % (ref 0–1.5)
BDY SITE: ABNORMAL
BDY SITE: ABNORMAL
BH CV ECHO MEAS - ACS: 1.6 CM
BH CV ECHO MEAS - AO MAX PG: 3.2 MMHG
BH CV ECHO MEAS - AO MEAN PG: 2 MMHG
BH CV ECHO MEAS - AO ROOT AREA (BSA CORRECTED): 2.4
BH CV ECHO MEAS - AO ROOT AREA: 8.6 CM^2
BH CV ECHO MEAS - AO ROOT DIAM: 3.3 CM
BH CV ECHO MEAS - AO V2 MAX: 88.8 CM/SEC
BH CV ECHO MEAS - AO V2 MEAN: 59.1 CM/SEC
BH CV ECHO MEAS - AO V2 VTI: 13.9 CM
BH CV ECHO MEAS - BSA(HAYCOCK): 1.3 M^2
BH CV ECHO MEAS - BSA: 1.4 M^2
BH CV ECHO MEAS - BZI_BMI: 16.3 KILOGRAMS/M^2
BH CV ECHO MEAS - BZI_METRIC_HEIGHT: 160 CM
BH CV ECHO MEAS - BZI_METRIC_WEIGHT: 41.7 KG
BH CV ECHO MEAS - EDV(CUBED): 35.3 ML
BH CV ECHO MEAS - EDV(MOD-SP2): 37 ML
BH CV ECHO MEAS - EDV(MOD-SP4): 33 ML
BH CV ECHO MEAS - EDV(TEICH): 43.5 ML
BH CV ECHO MEAS - EF(CUBED): 66.9 %
BH CV ECHO MEAS - EF(MOD-SP2): 62.2 %
BH CV ECHO MEAS - EF(MOD-SP4): 54.5 %
BH CV ECHO MEAS - EF(TEICH): 59.7 %
BH CV ECHO MEAS - ESV(CUBED): 11.7 ML
BH CV ECHO MEAS - ESV(MOD-SP2): 14 ML
BH CV ECHO MEAS - ESV(MOD-SP4): 15 ML
BH CV ECHO MEAS - ESV(TEICH): 17.5 ML
BH CV ECHO MEAS - FS: 30.8 %
BH CV ECHO MEAS - IVS/LVPW: 0.92
BH CV ECHO MEAS - IVSD: 0.51 CM
BH CV ECHO MEAS - LA DIMENSION: 2.4 CM
BH CV ECHO MEAS - LA/AO: 0.73
BH CV ECHO MEAS - LV DIASTOLIC VOL/BSA (35-75): 23.7 ML/M^2
BH CV ECHO MEAS - LV MASS(C)D: 39.9 GRAMS
BH CV ECHO MEAS - LV MASS(C)DI: 28.7 GRAMS/M^2
BH CV ECHO MEAS - LV SYSTOLIC VOL/BSA (12-30): 10.8 ML/M^2
BH CV ECHO MEAS - LVIDD: 3.3 CM
BH CV ECHO MEAS - LVIDS: 2.3 CM
BH CV ECHO MEAS - LVLD AP2: 6.5 CM
BH CV ECHO MEAS - LVLD AP4: 6 CM
BH CV ECHO MEAS - LVLS AP2: 5.7 CM
BH CV ECHO MEAS - LVLS AP4: 5.2 CM
BH CV ECHO MEAS - LVOT AREA (M): 2.8 CM^2
BH CV ECHO MEAS - LVOT AREA: 2.8 CM^2
BH CV ECHO MEAS - LVOT DIAM: 1.9 CM
BH CV ECHO MEAS - LVPWD: 0.56 CM
BH CV ECHO MEAS - MR MAX PG: 19 MMHG
BH CV ECHO MEAS - MR MAX VEL: 218 CM/SEC
BH CV ECHO MEAS - MV A MAX VEL: 83.4 CM/SEC
BH CV ECHO MEAS - MV DEC SLOPE: 337 CM/SEC^2
BH CV ECHO MEAS - MV DEC TIME: 0.2 SEC
BH CV ECHO MEAS - MV E MAX VEL: 77.5 CM/SEC
BH CV ECHO MEAS - MV E/A: 0.93
BH CV ECHO MEAS - MV MAX PG: 2.8 MMHG
BH CV ECHO MEAS - MV MEAN PG: 2 MMHG
BH CV ECHO MEAS - MV P1/2T MAX VEL: 77.5 CM/SEC
BH CV ECHO MEAS - MV P1/2T: 67.4 MSEC
BH CV ECHO MEAS - MV V2 MAX: 84.2 CM/SEC
BH CV ECHO MEAS - MV V2 MEAN: 58.5 CM/SEC
BH CV ECHO MEAS - MV V2 VTI: 15.7 CM
BH CV ECHO MEAS - MVA P1/2T LCG: 2.8 CM^2
BH CV ECHO MEAS - MVA(P1/2T): 3.3 CM^2
BH CV ECHO MEAS - PA ACC SLOPE: 280 CM/SEC^2
BH CV ECHO MEAS - PA ACC TIME: 0.14 SEC
BH CV ECHO MEAS - PA MAX PG: 1.3 MMHG
BH CV ECHO MEAS - PA MEAN PG: 1 MMHG
BH CV ECHO MEAS - PA PR(ACCEL): 14.2 MMHG
BH CV ECHO MEAS - PA V2 MAX: 56.8 CM/SEC
BH CV ECHO MEAS - PA V2 MEAN: 44.8 CM/SEC
BH CV ECHO MEAS - PA V2 VTI: 9.9 CM
BH CV ECHO MEAS - PULM A REVS DUR: 0.11 SEC
BH CV ECHO MEAS - PULM A REVS VEL: 29.1 CM/SEC
BH CV ECHO MEAS - PULM DIAS VEL: 38.5 CM/SEC
BH CV ECHO MEAS - PULM S/D: 1
BH CV ECHO MEAS - PULM SYS VEL: 38.5 CM/SEC
BH CV ECHO MEAS - RAP SYSTOLE: 10 MMHG
BH CV ECHO MEAS - RVDD: 2.3 CM
BH CV ECHO MEAS - RVSP: 37.7 MMHG
BH CV ECHO MEAS - SI(AO): 85.2 ML/M^2
BH CV ECHO MEAS - SI(CUBED): 17 ML/M^2
BH CV ECHO MEAS - SI(MOD-SP2): 16.5 ML/M^2
BH CV ECHO MEAS - SI(MOD-SP4): 12.9 ML/M^2
BH CV ECHO MEAS - SI(TEICH): 18.7 ML/M^2
BH CV ECHO MEAS - SV(AO): 118.5 ML
BH CV ECHO MEAS - SV(CUBED): 23.6 ML
BH CV ECHO MEAS - SV(MOD-SP2): 23 ML
BH CV ECHO MEAS - SV(MOD-SP4): 18 ML
BH CV ECHO MEAS - SV(TEICH): 26 ML
BH CV ECHO MEAS - TR MAX VEL: 263 CM/SEC
BILIRUB SERPL-MCNC: 0.6 MG/DL (ref 0.2–1.2)
BODY TEMPERATURE: 98.6 C
BODY TEMPERATURE: 98.6 C
BUN BLD-MCNC: 15 MG/DL (ref 8–23)
BUN/CREAT SERPL: 29.4 (ref 7–25)
CALCIUM SPEC-SCNC: 8 MG/DL (ref 8.6–10.5)
CHLORIDE SERPL-SCNC: 109 MMOL/L (ref 98–107)
CO2 SERPL-SCNC: 19 MMOL/L (ref 22–29)
COHGB MFR BLD: 0.5 % (ref 0–5)
COHGB MFR BLD: 0.7 % (ref 0–5)
CORTIS SERPL-MCNC: 21.37 MCG/DL
CORTIS SERPL-MCNC: 24.76 MCG/DL
CORTIS SERPL-MCNC: 8.88 MCG/DL
CREAT BLD-MCNC: 0.51 MG/DL (ref 0.57–1)
CRP SERPL-MCNC: 3.45 MG/DL (ref 0–0.5)
D-LACTATE SERPL-SCNC: 0.4 MMOL/L (ref 0.5–2)
DEPRECATED RDW RBC AUTO: 46.7 FL (ref 37–54)
EOSINOPHIL # BLD AUTO: 0.06 10*3/MM3 (ref 0–0.4)
EOSINOPHIL NFR BLD AUTO: 0.9 % (ref 0.3–6.2)
ERYTHROCYTE [DISTWIDTH] IN BLOOD BY AUTOMATED COUNT: 14.3 % (ref 12.3–15.4)
GFR SERPL CREATININE-BSD FRML MDRD: 116 ML/MIN/1.73
GLOBULIN UR ELPH-MCNC: 3.2 GM/DL
GLUCOSE BLD-MCNC: 186 MG/DL (ref 65–99)
HCO3 BLDA-SCNC: 18.1 MMOL/L (ref 22–26)
HCO3 BLDA-SCNC: 19.5 MMOL/L (ref 22–26)
HCT VFR BLD AUTO: 31.8 % (ref 34–46.6)
HCT VFR BLD CALC: 36 % (ref 37–47)
HCT VFR BLD CALC: 36 % (ref 37–47)
HGB BLD-MCNC: 9.5 G/DL (ref 12–15.9)
HGB BLDA-MCNC: 12.2 G/DL (ref 12–16)
HGB BLDA-MCNC: 12.3 G/DL (ref 12–16)
HOROWITZ INDEX BLD+IHG-RTO: 35 %
HOROWITZ INDEX BLD+IHG-RTO: 40 %
IMM GRANULOCYTES # BLD AUTO: 0.01 10*3/MM3 (ref 0–0.05)
IMM GRANULOCYTES NFR BLD AUTO: 0.2 % (ref 0–0.5)
INR PPP: 1.18 (ref 0.9–1.1)
LIPASE SERPL-CCNC: 8 U/L (ref 13–60)
LV EF 2D ECHO EST: 66 %
LYMPHOCYTES # BLD AUTO: 0.69 10*3/MM3 (ref 0.7–3.1)
LYMPHOCYTES NFR BLD AUTO: 10.5 % (ref 19.6–45.3)
M PNEUMO IGM SER QL: NEGATIVE
MAXIMAL PREDICTED HEART RATE: 141 BPM
MCH RBC QN AUTO: 28.6 PG (ref 26.6–33)
MCHC RBC AUTO-ENTMCNC: 29.9 G/DL (ref 31.5–35.7)
MCV RBC AUTO: 95.8 FL (ref 79–97)
METHGB BLD QL: 0.2 % (ref 0–3)
METHGB BLD QL: 0.3 % (ref 0–3)
MODALITY: ABNORMAL
MODALITY: ABNORMAL
MONOCYTES # BLD AUTO: 0.43 10*3/MM3 (ref 0.1–0.9)
MONOCYTES NFR BLD AUTO: 6.5 % (ref 5–12)
NEUTROPHILS # BLD AUTO: 5.37 10*3/MM3 (ref 1.7–7)
NEUTROPHILS NFR BLD AUTO: 81.7 % (ref 42.7–76)
OXYHGB MFR BLDV: 95.8 % (ref 85–100)
OXYHGB MFR BLDV: 97.4 % (ref 85–100)
PCO2 BLDA: 29.5 MM HG (ref 35–45)
PCO2 BLDA: 34.2 MM HG (ref 35–45)
PEEP RESPIRATORY: 5 CM[H2O]
PH BLDA: 7.34 PH UNITS (ref 7.35–7.45)
PH BLDA: 7.44 PH UNITS (ref 7.35–7.45)
PHOSPHATE SERPL-MCNC: 1.5 MG/DL (ref 2.5–4.5)
PHOSPHATE SERPL-MCNC: 2.9 MG/DL (ref 2.5–4.5)
PLATELET # BLD AUTO: 115 10*3/MM3 (ref 140–450)
PMV BLD AUTO: 11.8 FL (ref 6–12)
PO2 BLDA: 111.1 MM HG (ref 80–100)
PO2 BLDA: 92.6 MM HG (ref 80–100)
POTASSIUM BLD-SCNC: 4 MMOL/L (ref 3.5–5.2)
PROCALCITONIN SERPL-MCNC: 0.07 NG/ML (ref 0.1–0.25)
PROT SERPL-MCNC: 5.7 G/DL (ref 6–8.5)
PROTHROMBIN TIME: 15.6 SECONDS (ref 11–15.4)
RBC # BLD AUTO: 3.32 10*6/MM3 (ref 3.77–5.28)
S PNEUM AG SPEC QL LA: NEGATIVE
SAO2 % BLDCOA: 96.5 % (ref 90–100)
SAO2 % BLDCOA: 98.4 % (ref 90–100)
SET MECH RESP RATE: 16
SET MECH RESP RATE: 22
SODIUM BLD-SCNC: 138 MMOL/L (ref 136–145)
STRESS TARGET HR: 120 BPM
TROPONIN T SERPL-MCNC: <0.01 NG/ML (ref 0–0.03)
TROPONIN T SERPL-MCNC: <0.01 NG/ML (ref 0–0.03)
VENTILATOR MODE: ABNORMAL
VENTILATOR MODE: AC
VT ON VENT VENT: 300 ML
VT ON VENT VENT: 300 ML
WBC NRBC COR # BLD: 6.57 10*3/MM3 (ref 3.4–10.8)

## 2019-06-13 PROCEDURE — 25010000002 HYDROCORTISONE SODIUM SUCCINATE 100 MG RECONSTITUTED SOLUTION: Performed by: INTERNAL MEDICINE

## 2019-06-13 PROCEDURE — 94640 AIRWAY INHALATION TREATMENT: CPT

## 2019-06-13 PROCEDURE — 86140 C-REACTIVE PROTEIN: CPT | Performed by: INTERNAL MEDICINE

## 2019-06-13 PROCEDURE — 93005 ELECTROCARDIOGRAM TRACING: CPT | Performed by: INTERNAL MEDICINE

## 2019-06-13 PROCEDURE — 94799 UNLISTED PULMONARY SVC/PX: CPT

## 2019-06-13 PROCEDURE — 94003 VENT MGMT INPAT SUBQ DAY: CPT

## 2019-06-13 PROCEDURE — 93010 ELECTROCARDIOGRAM REPORT: CPT | Performed by: INTERNAL MEDICINE

## 2019-06-13 PROCEDURE — 82150 ASSAY OF AMYLASE: CPT | Performed by: PHYSICIAN ASSISTANT

## 2019-06-13 PROCEDURE — 93308 TTE F-UP OR LMTD: CPT | Performed by: INTERNAL MEDICINE

## 2019-06-13 PROCEDURE — 83605 ASSAY OF LACTIC ACID: CPT | Performed by: PHYSICIAN ASSISTANT

## 2019-06-13 PROCEDURE — 36600 WITHDRAWAL OF ARTERIAL BLOOD: CPT | Performed by: INTERNAL MEDICINE

## 2019-06-13 PROCEDURE — 36600 WITHDRAWAL OF ARTERIAL BLOOD: CPT | Performed by: PHYSICIAN ASSISTANT

## 2019-06-13 PROCEDURE — 99233 SBSQ HOSP IP/OBS HIGH 50: CPT | Performed by: INTERNAL MEDICINE

## 2019-06-13 PROCEDURE — 83690 ASSAY OF LIPASE: CPT | Performed by: PHYSICIAN ASSISTANT

## 2019-06-13 PROCEDURE — 82009 KETONE BODYS QUAL: CPT | Performed by: PHYSICIAN ASSISTANT

## 2019-06-13 PROCEDURE — 82375 ASSAY CARBOXYHB QUANT: CPT | Performed by: INTERNAL MEDICINE

## 2019-06-13 PROCEDURE — 85025 COMPLETE CBC W/AUTO DIFF WBC: CPT | Performed by: INTERNAL MEDICINE

## 2019-06-13 PROCEDURE — 84100 ASSAY OF PHOSPHORUS: CPT | Performed by: INTERNAL MEDICINE

## 2019-06-13 PROCEDURE — 82375 ASSAY CARBOXYHB QUANT: CPT | Performed by: PHYSICIAN ASSISTANT

## 2019-06-13 PROCEDURE — 84145 PROCALCITONIN (PCT): CPT | Performed by: INTERNAL MEDICINE

## 2019-06-13 PROCEDURE — 82805 BLOOD GASES W/O2 SATURATION: CPT | Performed by: INTERNAL MEDICINE

## 2019-06-13 PROCEDURE — 87798 DETECT AGENT NOS DNA AMP: CPT | Performed by: PHYSICIAN ASSISTANT

## 2019-06-13 PROCEDURE — 87633 RESP VIRUS 12-25 TARGETS: CPT | Performed by: PHYSICIAN ASSISTANT

## 2019-06-13 PROCEDURE — 83050 HGB METHEMOGLOBIN QUAN: CPT | Performed by: PHYSICIAN ASSISTANT

## 2019-06-13 PROCEDURE — 84484 ASSAY OF TROPONIN QUANT: CPT | Performed by: INTERNAL MEDICINE

## 2019-06-13 PROCEDURE — 83050 HGB METHEMOGLOBIN QUAN: CPT | Performed by: INTERNAL MEDICINE

## 2019-06-13 PROCEDURE — 93005 ELECTROCARDIOGRAM TRACING: CPT | Performed by: PHYSICIAN ASSISTANT

## 2019-06-13 PROCEDURE — 85610 PROTHROMBIN TIME: CPT | Performed by: PHYSICIAN ASSISTANT

## 2019-06-13 PROCEDURE — 87581 M.PNEUMON DNA AMP PROBE: CPT | Performed by: PHYSICIAN ASSISTANT

## 2019-06-13 PROCEDURE — 86738 MYCOPLASMA ANTIBODY: CPT | Performed by: INTERNAL MEDICINE

## 2019-06-13 PROCEDURE — 25010000002 COSYNTROPIN PER 0.25 MG: Performed by: INTERNAL MEDICINE

## 2019-06-13 PROCEDURE — 25010000002 THIAMINE PER 100 MG: Performed by: INTERNAL MEDICINE

## 2019-06-13 PROCEDURE — 95819 EEG AWAKE AND ASLEEP: CPT

## 2019-06-13 PROCEDURE — 87486 CHLMYD PNEUM DNA AMP PROBE: CPT | Performed by: PHYSICIAN ASSISTANT

## 2019-06-13 PROCEDURE — 82533 TOTAL CORTISOL: CPT | Performed by: INTERNAL MEDICINE

## 2019-06-13 PROCEDURE — 99291 CRITICAL CARE FIRST HOUR: CPT | Performed by: INTERNAL MEDICINE

## 2019-06-13 PROCEDURE — 82805 BLOOD GASES W/O2 SATURATION: CPT | Performed by: PHYSICIAN ASSISTANT

## 2019-06-13 PROCEDURE — 93306 TTE W/DOPPLER COMPLETE: CPT

## 2019-06-13 PROCEDURE — 25010000002 CEFTRIAXONE: Performed by: INTERNAL MEDICINE

## 2019-06-13 PROCEDURE — 80053 COMPREHEN METABOLIC PANEL: CPT | Performed by: INTERNAL MEDICINE

## 2019-06-13 PROCEDURE — 84100 ASSAY OF PHOSPHORUS: CPT | Performed by: HOSPITALIST

## 2019-06-13 PROCEDURE — 25010000003 POTASSIUM CHLORIDE 10 MEQ/100ML SOLUTION: Performed by: INTERNAL MEDICINE

## 2019-06-13 PROCEDURE — 82140 ASSAY OF AMMONIA: CPT | Performed by: PHYSICIAN ASSISTANT

## 2019-06-13 PROCEDURE — 87899 AGENT NOS ASSAY W/OPTIC: CPT | Performed by: PHYSICIAN ASSISTANT

## 2019-06-13 RX ORDER — CHLORHEXIDINE GLUCONATE 0.12 MG/ML
15 RINSE ORAL EVERY 12 HOURS SCHEDULED
Status: DISCONTINUED | OUTPATIENT
Start: 2019-06-13 | End: 2019-06-24

## 2019-06-13 RX ORDER — SODIUM CHLORIDE 0.9 % (FLUSH) 0.9 %
3-10 SYRINGE (ML) INJECTION AS NEEDED
Status: DISCONTINUED | OUTPATIENT
Start: 2019-06-13 | End: 2019-06-28 | Stop reason: HOSPADM

## 2019-06-13 RX ORDER — COSYNTROPIN 0.25 MG/ML
0.25 INJECTION, POWDER, FOR SOLUTION INTRAMUSCULAR; INTRAVENOUS ONCE
Status: COMPLETED | OUTPATIENT
Start: 2019-06-13 | End: 2019-06-13

## 2019-06-13 RX ORDER — ACETYLCYSTEINE 200 MG/ML
3 SOLUTION ORAL; RESPIRATORY (INHALATION) EVERY 12 HOURS SCHEDULED
Status: DISCONTINUED | OUTPATIENT
Start: 2019-06-13 | End: 2019-06-24

## 2019-06-13 RX ORDER — SODIUM CHLORIDE 0.9 % (FLUSH) 0.9 %
3 SYRINGE (ML) INJECTION EVERY 12 HOURS SCHEDULED
Status: DISCONTINUED | OUTPATIENT
Start: 2019-06-13 | End: 2019-06-19

## 2019-06-13 RX ORDER — QUETIAPINE FUMARATE 25 MG/1
25 TABLET, FILM COATED ORAL 2 TIMES DAILY PRN
Status: CANCELLED | OUTPATIENT
Start: 2019-06-13

## 2019-06-13 RX ORDER — L.ACID,PARA/B.BIFIDUM/S.THERM 8B CELL
1 CAPSULE ORAL DAILY
Status: DISCONTINUED | OUTPATIENT
Start: 2019-06-13 | End: 2019-06-28 | Stop reason: HOSPADM

## 2019-06-13 RX ORDER — IPRATROPIUM BROMIDE AND ALBUTEROL SULFATE 2.5; .5 MG/3ML; MG/3ML
3 SOLUTION RESPIRATORY (INHALATION)
Status: DISCONTINUED | OUTPATIENT
Start: 2019-06-13 | End: 2019-06-24

## 2019-06-13 RX ORDER — BUDESONIDE 0.5 MG/2ML
0.5 INHALANT ORAL
Status: DISCONTINUED | OUTPATIENT
Start: 2019-06-13 | End: 2019-06-24

## 2019-06-13 RX ADMIN — SODIUM CHLORIDE 1000 ML: 9 INJECTION, SOLUTION INTRAVENOUS at 10:44

## 2019-06-13 RX ADMIN — HYDROCORTISONE SODIUM SUCCINATE 50 MG: 100 INJECTION, POWDER, FOR SOLUTION INTRAMUSCULAR; INTRAVENOUS at 13:13

## 2019-06-13 RX ADMIN — SODIUM CHLORIDE 125 ML/HR: 9 INJECTION, SOLUTION INTRAVENOUS at 11:47

## 2019-06-13 RX ADMIN — COSYNTROPIN 0.25 MG: 0.25 INJECTION, POWDER, LYOPHILIZED, FOR SOLUTION INTRAMUSCULAR; INTRAVENOUS at 11:03

## 2019-06-13 RX ADMIN — THIAMINE HYDROCHLORIDE 250 MG: 100 INJECTION, SOLUTION INTRAMUSCULAR; INTRAVENOUS at 13:14

## 2019-06-13 RX ADMIN — POTASSIUM CHLORIDE 10 MEQ: 10 INJECTION, SOLUTION INTRAVENOUS at 01:05

## 2019-06-13 RX ADMIN — CEFTRIAXONE 1 G: 1 INJECTION, POWDER, FOR SOLUTION INTRAMUSCULAR; INTRAVENOUS at 15:44

## 2019-06-13 RX ADMIN — SODIUM CHLORIDE 125 ML/HR: 9 INJECTION, SOLUTION INTRAVENOUS at 19:20

## 2019-06-13 RX ADMIN — PANTOPRAZOLE SODIUM 40 MG: 40 INJECTION, POWDER, FOR SOLUTION INTRAVENOUS at 05:14

## 2019-06-13 RX ADMIN — IPRATROPIUM BROMIDE AND ALBUTEROL SULFATE 3 ML: .5; 3 SOLUTION RESPIRATORY (INHALATION) at 13:17

## 2019-06-13 RX ADMIN — POTASSIUM PHOSPHATE, MONOBASIC AND POTASSIUM PHOSPHATE, DIBASIC 15 MMOL: 224; 236 INJECTION, SOLUTION INTRAVENOUS at 03:30

## 2019-06-13 RX ADMIN — METRONIDAZOLE 500 MG: 500 INJECTION, SOLUTION INTRAVENOUS at 20:28

## 2019-06-13 RX ADMIN — SODIUM CHLORIDE, PRESERVATIVE FREE 3 ML: 5 INJECTION INTRAVENOUS at 20:27

## 2019-06-13 RX ADMIN — BUDESONIDE 0.5 MG: 0.5 INHALANT RESPIRATORY (INHALATION) at 13:17

## 2019-06-13 RX ADMIN — SODIUM CHLORIDE 125 ML/HR: 9 INJECTION, SOLUTION INTRAVENOUS at 00:31

## 2019-06-13 RX ADMIN — SODIUM CHLORIDE 125 ML/HR: 9 INJECTION, SOLUTION INTRAVENOUS at 07:10

## 2019-06-13 RX ADMIN — SODIUM CHLORIDE, PRESERVATIVE FREE 3 ML: 5 INJECTION INTRAVENOUS at 20:28

## 2019-06-13 RX ADMIN — SODIUM CHLORIDE 500 ML: 9 INJECTION, SOLUTION INTRAVENOUS at 04:40

## 2019-06-13 RX ADMIN — METRONIDAZOLE 500 MG: 500 INJECTION, SOLUTION INTRAVENOUS at 13:14

## 2019-06-13 RX ADMIN — ACETYLCYSTEINE 3 ML: 200 SOLUTION ORAL; RESPIRATORY (INHALATION) at 13:17

## 2019-06-13 RX ADMIN — METRONIDAZOLE 500 MG: 500 INJECTION, SOLUTION INTRAVENOUS at 05:14

## 2019-06-13 RX ADMIN — Medication 1 CAPSULE: at 13:13

## 2019-06-13 RX ADMIN — CHLORHEXIDINE GLUCONATE 15 ML: 1.2 RINSE ORAL at 20:27

## 2019-06-13 RX ADMIN — IPRATROPIUM BROMIDE AND ALBUTEROL SULFATE 3 ML: .5; 3 SOLUTION RESPIRATORY (INHALATION) at 18:48

## 2019-06-13 RX ADMIN — HYDROCORTISONE SODIUM SUCCINATE 50 MG: 100 INJECTION, POWDER, FOR SOLUTION INTRAMUSCULAR; INTRAVENOUS at 17:16

## 2019-06-14 ENCOUNTER — APPOINTMENT (OUTPATIENT)
Dept: CT IMAGING | Facility: HOSPITAL | Age: 79
End: 2019-06-14

## 2019-06-14 ENCOUNTER — APPOINTMENT (OUTPATIENT)
Dept: GENERAL RADIOLOGY | Facility: HOSPITAL | Age: 79
End: 2019-06-14

## 2019-06-14 LAB
A-A DO2: 70.3 MMHG (ref 0–300)
ALBUMIN SERPL-MCNC: 2.6 G/DL (ref 3.5–5.2)
ALBUMIN/GLOB SERPL: 0.8 G/DL
ALP SERPL-CCNC: 70 U/L (ref 39–117)
ALT SERPL W P-5'-P-CCNC: 7 U/L (ref 1–33)
ANION GAP SERPL CALCULATED.3IONS-SCNC: 10.2 MMOL/L
ARTERIAL PATENCY WRIST A: ABNORMAL
AST SERPL-CCNC: 17 U/L (ref 1–32)
ATMOSPHERIC PRESS: 724 MMHG
B PERT DNA SPEC QL NAA+PROBE: NOT DETECTED
BASE EXCESS BLDA CALC-SCNC: -7.8 MMOL/L
BASOPHILS # BLD AUTO: 0 10*3/MM3 (ref 0–0.2)
BASOPHILS NFR BLD AUTO: 0 % (ref 0–1.5)
BDY SITE: ABNORMAL
BILIRUB SERPL-MCNC: 0.2 MG/DL (ref 0.2–1.2)
BODY TEMPERATURE: 98.6 C
BUN BLD-MCNC: 11 MG/DL (ref 8–23)
BUN/CREAT SERPL: 20 (ref 7–25)
C PNEUM DNA NPH QL NAA+NON-PROBE: NOT DETECTED
CALCIUM SPEC-SCNC: 8.2 MG/DL (ref 8.6–10.5)
CHLORIDE SERPL-SCNC: 113 MMOL/L (ref 98–107)
CK SERPL-CCNC: 233 U/L (ref 20–180)
CO2 SERPL-SCNC: 17.8 MMOL/L (ref 22–29)
COHGB MFR BLD: 0.7 % (ref 0–5)
CREAT BLD-MCNC: 0.55 MG/DL (ref 0.57–1)
CRP SERPL-MCNC: 6.41 MG/DL (ref 0–0.5)
DEPRECATED RDW RBC AUTO: 47.6 FL (ref 37–54)
EOSINOPHIL # BLD AUTO: 0 10*3/MM3 (ref 0–0.4)
EOSINOPHIL NFR BLD AUTO: 0 % (ref 0.3–6.2)
ERYTHROCYTE [DISTWIDTH] IN BLOOD BY AUTOMATED COUNT: 14.7 % (ref 12.3–15.4)
FLUAV H1 2009 PAND RNA NPH QL NAA+PROBE: NOT DETECTED
FLUAV H1 HA GENE NPH QL NAA+PROBE: NOT DETECTED
FLUAV H3 RNA NPH QL NAA+PROBE: NOT DETECTED
FLUAV SUBTYP SPEC NAA+PROBE: NOT DETECTED
FLUBV RNA ISLT QL NAA+PROBE: NOT DETECTED
FOLATE SERPL-MCNC: 18.9 NG/ML (ref 4.78–24.2)
GFR SERPL CREATININE-BSD FRML MDRD: 107 ML/MIN/1.73
GLOBULIN UR ELPH-MCNC: 3.2 GM/DL
GLUCOSE BLD-MCNC: 184 MG/DL (ref 65–99)
HADV DNA SPEC NAA+PROBE: NOT DETECTED
HCO3 BLDA-SCNC: 16.9 MMOL/L (ref 22–26)
HCOV 229E RNA SPEC QL NAA+PROBE: NOT DETECTED
HCOV HKU1 RNA SPEC QL NAA+PROBE: NOT DETECTED
HCOV NL63 RNA SPEC QL NAA+PROBE: NOT DETECTED
HCOV OC43 RNA SPEC QL NAA+PROBE: NOT DETECTED
HCT VFR BLD AUTO: 34.5 % (ref 34–46.6)
HCT VFR BLD CALC: 42 % (ref 37–47)
HGB BLD-MCNC: 10.3 G/DL (ref 12–15.9)
HGB BLDA-MCNC: 14.3 G/DL (ref 12–16)
HMPV RNA NPH QL NAA+NON-PROBE: NOT DETECTED
HOROWITZ INDEX BLD+IHG-RTO: 35 %
HPIV1 RNA SPEC QL NAA+PROBE: NOT DETECTED
HPIV2 RNA SPEC QL NAA+PROBE: NOT DETECTED
HPIV3 RNA NPH QL NAA+PROBE: NOT DETECTED
HPIV4 P GENE NPH QL NAA+PROBE: NOT DETECTED
IMM GRANULOCYTES # BLD AUTO: 0.01 10*3/MM3 (ref 0–0.05)
IMM GRANULOCYTES NFR BLD AUTO: 0.2 % (ref 0–0.5)
LYMPHOCYTES # BLD AUTO: 0.19 10*3/MM3 (ref 0.7–3.1)
LYMPHOCYTES NFR BLD AUTO: 3 % (ref 19.6–45.3)
M PNEUMO IGG SER IA-ACNC: NOT DETECTED
MAGNESIUM SERPL-MCNC: 1.3 MG/DL (ref 1.6–2.4)
MCH RBC QN AUTO: 28.6 PG (ref 26.6–33)
MCHC RBC AUTO-ENTMCNC: 29.9 G/DL (ref 31.5–35.7)
MCV RBC AUTO: 95.8 FL (ref 79–97)
METHGB BLD QL: 0.4 % (ref 0–3)
MODALITY: ABNORMAL
MONOCYTES # BLD AUTO: 0.05 10*3/MM3 (ref 0.1–0.9)
MONOCYTES NFR BLD AUTO: 0.8 % (ref 5–12)
NEUTROPHILS # BLD AUTO: 6.06 10*3/MM3 (ref 1.7–7)
NEUTROPHILS NFR BLD AUTO: 96 % (ref 42.7–76)
OXYHGB MFR BLDV: 97.6 % (ref 85–100)
PCO2 BLDA: 32.7 MM HG (ref 35–45)
PEEP RESPIRATORY: 5 CM[H2O]
PH BLDA: 7.33 PH UNITS (ref 7.35–7.45)
PHOSPHATE SERPL-MCNC: 3.9 MG/DL (ref 2.5–4.5)
PLATELET # BLD AUTO: 117 10*3/MM3 (ref 140–450)
PMV BLD AUTO: 12.5 FL (ref 6–12)
PO2 BLDA: 128.6 MM HG (ref 80–100)
POTASSIUM BLD-SCNC: 3.6 MMOL/L (ref 3.5–5.2)
PROT SERPL-MCNC: 5.8 G/DL (ref 6–8.5)
RBC # BLD AUTO: 3.6 10*6/MM3 (ref 3.77–5.28)
RHINOVIRUS RNA SPEC NAA+PROBE: NOT DETECTED
RSV RNA NPH QL NAA+NON-PROBE: NOT DETECTED
SAO2 % BLDCOA: 98.7 % (ref 90–100)
SET MECH RESP RATE: 18
SODIUM BLD-SCNC: 141 MMOL/L (ref 136–145)
VENTILATOR MODE: ABNORMAL
VIT B12 BLD-MCNC: 1343 PG/ML (ref 211–946)
VT ON VENT VENT: 300 ML
WBC NRBC COR # BLD: 6.31 10*3/MM3 (ref 3.4–10.8)

## 2019-06-14 PROCEDURE — 82746 ASSAY OF FOLIC ACID SERUM: CPT | Performed by: INTERNAL MEDICINE

## 2019-06-14 PROCEDURE — 99291 CRITICAL CARE FIRST HOUR: CPT | Performed by: INTERNAL MEDICINE

## 2019-06-14 PROCEDURE — 71045 X-RAY EXAM CHEST 1 VIEW: CPT | Performed by: RADIOLOGY

## 2019-06-14 PROCEDURE — 71045 X-RAY EXAM CHEST 1 VIEW: CPT

## 2019-06-14 PROCEDURE — 70450 CT HEAD/BRAIN W/O DYE: CPT

## 2019-06-14 PROCEDURE — 36600 WITHDRAWAL OF ARTERIAL BLOOD: CPT | Performed by: PHYSICIAN ASSISTANT

## 2019-06-14 PROCEDURE — 25010000002 THIAMINE PER 100 MG: Performed by: INTERNAL MEDICINE

## 2019-06-14 PROCEDURE — 82607 VITAMIN B-12: CPT | Performed by: INTERNAL MEDICINE

## 2019-06-14 PROCEDURE — 94003 VENT MGMT INPAT SUBQ DAY: CPT

## 2019-06-14 PROCEDURE — 84100 ASSAY OF PHOSPHORUS: CPT | Performed by: INTERNAL MEDICINE

## 2019-06-14 PROCEDURE — 94799 UNLISTED PULMONARY SVC/PX: CPT

## 2019-06-14 PROCEDURE — 85025 COMPLETE CBC W/AUTO DIFF WBC: CPT | Performed by: INTERNAL MEDICINE

## 2019-06-14 PROCEDURE — 86140 C-REACTIVE PROTEIN: CPT | Performed by: INTERNAL MEDICINE

## 2019-06-14 PROCEDURE — 93005 ELECTROCARDIOGRAM TRACING: CPT | Performed by: PHYSICIAN ASSISTANT

## 2019-06-14 PROCEDURE — 25010000002 HYDROCORTISONE SODIUM SUCCINATE 100 MG RECONSTITUTED SOLUTION: Performed by: INTERNAL MEDICINE

## 2019-06-14 PROCEDURE — 82805 BLOOD GASES W/O2 SATURATION: CPT | Performed by: PHYSICIAN ASSISTANT

## 2019-06-14 PROCEDURE — 93010 ELECTROCARDIOGRAM REPORT: CPT | Performed by: INTERNAL MEDICINE

## 2019-06-14 PROCEDURE — 25010000002 VANCOMYCIN 5 G RECONSTITUTED SOLUTION 5,000 MG VIAL: Performed by: INTERNAL MEDICINE

## 2019-06-14 PROCEDURE — 83735 ASSAY OF MAGNESIUM: CPT | Performed by: INTERNAL MEDICINE

## 2019-06-14 PROCEDURE — 82550 ASSAY OF CK (CPK): CPT | Performed by: INTERNAL MEDICINE

## 2019-06-14 PROCEDURE — 82375 ASSAY CARBOXYHB QUANT: CPT | Performed by: PHYSICIAN ASSISTANT

## 2019-06-14 PROCEDURE — 83050 HGB METHEMOGLOBIN QUAN: CPT | Performed by: PHYSICIAN ASSISTANT

## 2019-06-14 PROCEDURE — 80053 COMPREHEN METABOLIC PANEL: CPT | Performed by: INTERNAL MEDICINE

## 2019-06-14 PROCEDURE — 70450 CT HEAD/BRAIN W/O DYE: CPT | Performed by: RADIOLOGY

## 2019-06-14 PROCEDURE — 25010000002 MAGNESIUM SULFATE 2 GM/50ML SOLUTION: Performed by: INTERNAL MEDICINE

## 2019-06-14 PROCEDURE — 25010000002 CEFEPIME 2 G/NS 100 ML SOLUTION: Performed by: INTERNAL MEDICINE

## 2019-06-14 RX ORDER — SODIUM CHLORIDE, SODIUM LACTATE, POTASSIUM CHLORIDE, CALCIUM CHLORIDE 600; 310; 30; 20 MG/100ML; MG/100ML; MG/100ML; MG/100ML
75 INJECTION, SOLUTION INTRAVENOUS CONTINUOUS
Status: DISCONTINUED | OUTPATIENT
Start: 2019-06-14 | End: 2019-06-16

## 2019-06-14 RX ORDER — POTASSIUM CHLORIDE 1.5 G/1.77G
40 POWDER, FOR SOLUTION ORAL EVERY 4 HOURS
Status: COMPLETED | OUTPATIENT
Start: 2019-06-14 | End: 2019-06-14

## 2019-06-14 RX ORDER — BUMETANIDE 0.25 MG/ML
0.5 INJECTION INTRAMUSCULAR; INTRAVENOUS ONCE
Status: COMPLETED | OUTPATIENT
Start: 2019-06-14 | End: 2019-06-14

## 2019-06-14 RX ORDER — SODIUM BICARBONATE 650 MG/1
650 TABLET ORAL 3 TIMES DAILY
Status: DISCONTINUED | OUTPATIENT
Start: 2019-06-14 | End: 2019-06-24

## 2019-06-14 RX ORDER — MAGNESIUM SULFATE HEPTAHYDRATE 40 MG/ML
2 INJECTION, SOLUTION INTRAVENOUS
Status: COMPLETED | OUTPATIENT
Start: 2019-06-14 | End: 2019-06-14

## 2019-06-14 RX ADMIN — CHLORHEXIDINE GLUCONATE 15 ML: 1.2 RINSE ORAL at 20:36

## 2019-06-14 RX ADMIN — IPRATROPIUM BROMIDE AND ALBUTEROL SULFATE 3 ML: .5; 3 SOLUTION RESPIRATORY (INHALATION) at 00:29

## 2019-06-14 RX ADMIN — HYDROCORTISONE SODIUM SUCCINATE 50 MG: 100 INJECTION, POWDER, FOR SOLUTION INTRAMUSCULAR; INTRAVENOUS at 11:52

## 2019-06-14 RX ADMIN — BUMETANIDE 0.5 MG: 0.25 INJECTION INTRAMUSCULAR; INTRAVENOUS at 11:43

## 2019-06-14 RX ADMIN — SODIUM CHLORIDE, PRESERVATIVE FREE 3 ML: 5 INJECTION INTRAVENOUS at 09:00

## 2019-06-14 RX ADMIN — SODIUM BICARBONATE TAB 650 MG 650 MG: 650 TAB at 15:52

## 2019-06-14 RX ADMIN — METRONIDAZOLE 500 MG: 500 INJECTION, SOLUTION INTRAVENOUS at 14:00

## 2019-06-14 RX ADMIN — VANCOMYCIN HYDROCHLORIDE 1000 MG: 5 INJECTION, POWDER, LYOPHILIZED, FOR SOLUTION INTRAVENOUS at 10:30

## 2019-06-14 RX ADMIN — CEFEPIME 2 G: 2 INJECTION, POWDER, FOR SOLUTION INTRAVENOUS at 11:41

## 2019-06-14 RX ADMIN — SODIUM BICARBONATE TAB 650 MG 650 MG: 650 TAB at 20:36

## 2019-06-14 RX ADMIN — SODIUM CHLORIDE, PRESERVATIVE FREE 3 ML: 5 INJECTION INTRAVENOUS at 20:36

## 2019-06-14 RX ADMIN — HYDROCORTISONE SODIUM SUCCINATE 50 MG: 100 INJECTION, POWDER, FOR SOLUTION INTRAMUSCULAR; INTRAVENOUS at 06:27

## 2019-06-14 RX ADMIN — MAGNESIUM SULFATE IN WATER 2 G: 40 INJECTION, SOLUTION INTRAVENOUS at 06:26

## 2019-06-14 RX ADMIN — Medication 1 CAPSULE: at 09:00

## 2019-06-14 RX ADMIN — IPRATROPIUM BROMIDE AND ALBUTEROL SULFATE 3 ML: .5; 3 SOLUTION RESPIRATORY (INHALATION) at 19:42

## 2019-06-14 RX ADMIN — MAGNESIUM SULFATE IN WATER 2 G: 40 INJECTION, SOLUTION INTRAVENOUS at 10:05

## 2019-06-14 RX ADMIN — IPRATROPIUM BROMIDE AND ALBUTEROL SULFATE 3 ML: .5; 3 SOLUTION RESPIRATORY (INHALATION) at 12:38

## 2019-06-14 RX ADMIN — IPRATROPIUM BROMIDE AND ALBUTEROL SULFATE 3 ML: .5; 3 SOLUTION RESPIRATORY (INHALATION) at 06:47

## 2019-06-14 RX ADMIN — SODIUM CHLORIDE 125 ML/HR: 9 INJECTION, SOLUTION INTRAVENOUS at 07:01

## 2019-06-14 RX ADMIN — SODIUM BICARBONATE TAB 650 MG 650 MG: 650 TAB at 11:00

## 2019-06-14 RX ADMIN — ACETYLCYSTEINE 3 ML: 200 SOLUTION ORAL; RESPIRATORY (INHALATION) at 06:47

## 2019-06-14 RX ADMIN — CHLORHEXIDINE GLUCONATE 15 ML: 1.2 RINSE ORAL at 09:01

## 2019-06-14 RX ADMIN — THIAMINE HYDROCHLORIDE 250 MG: 100 INJECTION, SOLUTION INTRAMUSCULAR; INTRAVENOUS at 11:00

## 2019-06-14 RX ADMIN — HYDROCORTISONE SODIUM SUCCINATE 50 MG: 100 INJECTION, POWDER, FOR SOLUTION INTRAMUSCULAR; INTRAVENOUS at 18:18

## 2019-06-14 RX ADMIN — BUDESONIDE 0.5 MG: 0.5 INHALANT RESPIRATORY (INHALATION) at 06:47

## 2019-06-14 RX ADMIN — POTASSIUM CHLORIDE 40 MEQ: 1.5 POWDER, FOR SOLUTION ORAL at 09:00

## 2019-06-14 RX ADMIN — MAGNESIUM SULFATE IN WATER 2 G: 40 INJECTION, SOLUTION INTRAVENOUS at 08:05

## 2019-06-14 RX ADMIN — SODIUM CHLORIDE, PRESERVATIVE FREE 3 ML: 5 INJECTION INTRAVENOUS at 09:01

## 2019-06-14 RX ADMIN — PANTOPRAZOLE SODIUM 40 MG: 40 INJECTION, POWDER, FOR SOLUTION INTRAVENOUS at 06:27

## 2019-06-14 RX ADMIN — SODIUM CHLORIDE, POTASSIUM CHLORIDE, SODIUM LACTATE AND CALCIUM CHLORIDE 75 ML/HR: 600; 310; 30; 20 INJECTION, SOLUTION INTRAVENOUS at 08:59

## 2019-06-14 RX ADMIN — HYDROCORTISONE SODIUM SUCCINATE 50 MG: 100 INJECTION, POWDER, FOR SOLUTION INTRAMUSCULAR; INTRAVENOUS at 00:08

## 2019-06-14 RX ADMIN — METRONIDAZOLE 500 MG: 500 INJECTION, SOLUTION INTRAVENOUS at 06:26

## 2019-06-14 RX ADMIN — ASCORBIC ACID, VITAMIN A PALMITATE, CHOLECALCIFEROL, THIAMINE HYDROCHLORIDE, RIBOFLAVIN-5 PHOSPHATE SODIUM, PYRIDOXINE HYDROCHLORIDE, NIACINAMIDE, DEXPANTHENOL, ALPHA-TOCOPHEROL ACETATE, VITAMIN K1, FOLIC ACID, BIOTIN, CYANOCOBALAMIN: 200; 3300; 200; 6; 3.6; 6; 40; 15; 10; 150; 600; 60; 5 INJECTION, SOLUTION INTRAVENOUS at 11:00

## 2019-06-14 RX ADMIN — METRONIDAZOLE 500 MG: 500 INJECTION, SOLUTION INTRAVENOUS at 20:36

## 2019-06-14 RX ADMIN — BUDESONIDE 0.5 MG: 0.5 INHALANT RESPIRATORY (INHALATION) at 19:42

## 2019-06-14 RX ADMIN — POTASSIUM CHLORIDE 40 MEQ: 1.5 POWDER, FOR SOLUTION ORAL at 06:27

## 2019-06-14 RX ADMIN — ACETYLCYSTEINE 3 ML: 200 SOLUTION ORAL; RESPIRATORY (INHALATION) at 19:42

## 2019-06-15 LAB
A-A DO2: 70.6 MMHG (ref 0–300)
ALBUMIN SERPL-MCNC: 2.63 G/DL (ref 3.5–5.2)
ALBUMIN/GLOB SERPL: 0.8 G/DL
ALP SERPL-CCNC: 69 U/L (ref 39–117)
ALT SERPL W P-5'-P-CCNC: 6 U/L (ref 1–33)
ANION GAP SERPL CALCULATED.3IONS-SCNC: 10.3 MMOL/L
ARTERIAL PATENCY WRIST A: POSITIVE
AST SERPL-CCNC: 13 U/L (ref 1–32)
ATMOSPHERIC PRESS: 732 MMHG
BASE EXCESS BLDA CALC-SCNC: -0.7 MMOL/L
BASOPHILS # BLD AUTO: 0 10*3/MM3 (ref 0–0.2)
BASOPHILS NFR BLD AUTO: 0 % (ref 0–1.5)
BDY SITE: ABNORMAL
BILIRUB SERPL-MCNC: 0.2 MG/DL (ref 0.2–1.2)
BODY TEMPERATURE: 98.6 C
BUN BLD-MCNC: 13 MG/DL (ref 8–23)
BUN/CREAT SERPL: 21.7 (ref 7–25)
CALCIUM SPEC-SCNC: 8.2 MG/DL (ref 8.6–10.5)
CHLORIDE SERPL-SCNC: 106 MMOL/L (ref 98–107)
CO2 SERPL-SCNC: 20.7 MMOL/L (ref 22–29)
COHGB MFR BLD: 0.4 % (ref 0–5)
CREAT BLD-MCNC: 0.6 MG/DL (ref 0.57–1)
CRP SERPL-MCNC: 3.95 MG/DL (ref 0–0.5)
DEPRECATED RDW RBC AUTO: 47 FL (ref 37–54)
EOSINOPHIL # BLD AUTO: 0 10*3/MM3 (ref 0–0.4)
EOSINOPHIL NFR BLD AUTO: 0 % (ref 0.3–6.2)
ERYTHROCYTE [DISTWIDTH] IN BLOOD BY AUTOMATED COUNT: 14.5 % (ref 12.3–15.4)
GFR SERPL CREATININE-BSD FRML MDRD: 96 ML/MIN/1.73
GLOBULIN UR ELPH-MCNC: 3.2 GM/DL
GLUCOSE BLD-MCNC: 182 MG/DL (ref 65–99)
HCO3 BLDA-SCNC: 23 MMOL/L (ref 22–26)
HCT VFR BLD AUTO: 33.9 % (ref 34–46.6)
HCT VFR BLD CALC: 34 % (ref 37–47)
HGB BLD-MCNC: 10.4 G/DL (ref 12–15.9)
HGB BLDA-MCNC: 11.7 G/DL (ref 12–16)
HOROWITZ INDEX BLD+IHG-RTO: 35 %
IMM GRANULOCYTES # BLD AUTO: 0.01 10*3/MM3 (ref 0–0.05)
IMM GRANULOCYTES NFR BLD AUTO: 0.1 % (ref 0–0.5)
LYMPHOCYTES # BLD AUTO: 0.4 10*3/MM3 (ref 0.7–3.1)
LYMPHOCYTES NFR BLD AUTO: 3.2 % (ref 19.6–45.3)
MAGNESIUM SERPL-MCNC: 2.2 MG/DL (ref 1.6–2.4)
MCH RBC QN AUTO: 28.2 PG (ref 26.6–33)
MCHC RBC AUTO-ENTMCNC: 30.7 G/DL (ref 31.5–35.7)
MCV RBC AUTO: 91.9 FL (ref 79–97)
METHGB BLD QL: 0.3 % (ref 0–3)
MODALITY: ABNORMAL
MONOCYTES # BLD AUTO: 0.34 10*3/MM3 (ref 0.1–0.9)
MONOCYTES NFR BLD AUTO: 2.7 % (ref 5–12)
NEUTROPHILS # BLD AUTO: 11.86 10*3/MM3 (ref 1.7–7)
NEUTROPHILS NFR BLD AUTO: 94 % (ref 42.7–76)
OXYHGB MFR BLDV: 98.1 % (ref 85–100)
PCO2 BLDA: 34.7 MM HG (ref 35–45)
PEEP RESPIRATORY: 5 CM[H2O]
PH BLDA: 7.44 PH UNITS (ref 7.35–7.45)
PHOSPHATE SERPL-MCNC: 2.1 MG/DL (ref 2.5–4.5)
PLATELET # BLD AUTO: 142 10*3/MM3 (ref 140–450)
PMV BLD AUTO: 11.9 FL (ref 6–12)
PO2 BLDA: 128.8 MM HG (ref 80–100)
POTASSIUM BLD-SCNC: 3.6 MMOL/L (ref 3.5–5.2)
POTASSIUM BLD-SCNC: 4.1 MMOL/L (ref 3.5–5.2)
PROT SERPL-MCNC: 5.8 G/DL (ref 6–8.5)
RBC # BLD AUTO: 3.69 10*6/MM3 (ref 3.77–5.28)
SAO2 % BLDCOA: 98.8 % (ref 90–100)
SET MECH RESP RATE: 18
SODIUM BLD-SCNC: 137 MMOL/L (ref 136–145)
VENTILATOR MODE: ABNORMAL
VT ON VENT VENT: 300 ML
WBC NRBC COR # BLD: 12.61 10*3/MM3 (ref 3.4–10.8)

## 2019-06-15 PROCEDURE — 25010000002 THIAMINE PER 100 MG: Performed by: INTERNAL MEDICINE

## 2019-06-15 PROCEDURE — 86140 C-REACTIVE PROTEIN: CPT | Performed by: INTERNAL MEDICINE

## 2019-06-15 PROCEDURE — 93010 ELECTROCARDIOGRAM REPORT: CPT | Performed by: INTERNAL MEDICINE

## 2019-06-15 PROCEDURE — 25010000002 HEPARIN (PORCINE) PER 1000 UNITS: Performed by: INTERNAL MEDICINE

## 2019-06-15 PROCEDURE — 94799 UNLISTED PULMONARY SVC/PX: CPT

## 2019-06-15 PROCEDURE — 83050 HGB METHEMOGLOBIN QUAN: CPT | Performed by: INTERNAL MEDICINE

## 2019-06-15 PROCEDURE — 80053 COMPREHEN METABOLIC PANEL: CPT | Performed by: INTERNAL MEDICINE

## 2019-06-15 PROCEDURE — 82805 BLOOD GASES W/O2 SATURATION: CPT | Performed by: INTERNAL MEDICINE

## 2019-06-15 PROCEDURE — 36600 WITHDRAWAL OF ARTERIAL BLOOD: CPT | Performed by: INTERNAL MEDICINE

## 2019-06-15 PROCEDURE — 99233 SBSQ HOSP IP/OBS HIGH 50: CPT | Performed by: INTERNAL MEDICINE

## 2019-06-15 PROCEDURE — 85025 COMPLETE CBC W/AUTO DIFF WBC: CPT | Performed by: INTERNAL MEDICINE

## 2019-06-15 PROCEDURE — 82375 ASSAY CARBOXYHB QUANT: CPT | Performed by: INTERNAL MEDICINE

## 2019-06-15 PROCEDURE — 94003 VENT MGMT INPAT SUBQ DAY: CPT

## 2019-06-15 PROCEDURE — 25010000002 CEFEPIME 2 G/NS 100 ML SOLUTION: Performed by: INTERNAL MEDICINE

## 2019-06-15 PROCEDURE — 84132 ASSAY OF SERUM POTASSIUM: CPT | Performed by: INTERNAL MEDICINE

## 2019-06-15 PROCEDURE — 84100 ASSAY OF PHOSPHORUS: CPT | Performed by: INTERNAL MEDICINE

## 2019-06-15 PROCEDURE — 25010000002 VANCOMYCIN 5 G RECONSTITUTED SOLUTION 5,000 MG VIAL: Performed by: INTERNAL MEDICINE

## 2019-06-15 PROCEDURE — 93005 ELECTROCARDIOGRAM TRACING: CPT | Performed by: PHYSICIAN ASSISTANT

## 2019-06-15 PROCEDURE — 83735 ASSAY OF MAGNESIUM: CPT | Performed by: INTERNAL MEDICINE

## 2019-06-15 PROCEDURE — 25010000002 HYDROCORTISONE SODIUM SUCCINATE 100 MG RECONSTITUTED SOLUTION: Performed by: INTERNAL MEDICINE

## 2019-06-15 RX ORDER — POTASSIUM CHLORIDE 1.5 G/1.77G
40 POWDER, FOR SOLUTION ORAL EVERY 4 HOURS
Status: COMPLETED | OUTPATIENT
Start: 2019-06-15 | End: 2019-06-15

## 2019-06-15 RX ORDER — HEPARIN SODIUM 5000 [USP'U]/ML
5000 INJECTION, SOLUTION INTRAVENOUS; SUBCUTANEOUS EVERY 8 HOURS SCHEDULED
Status: DISCONTINUED | OUTPATIENT
Start: 2019-06-15 | End: 2019-06-28 | Stop reason: HOSPADM

## 2019-06-15 RX ADMIN — HYDROCORTISONE SODIUM SUCCINATE 50 MG: 100 INJECTION, POWDER, FOR SOLUTION INTRAMUSCULAR; INTRAVENOUS at 17:45

## 2019-06-15 RX ADMIN — CHLORHEXIDINE GLUCONATE 15 ML: 1.2 RINSE ORAL at 08:35

## 2019-06-15 RX ADMIN — HEPARIN SODIUM 5000 UNITS: 5000 INJECTION INTRAVENOUS; SUBCUTANEOUS at 21:08

## 2019-06-15 RX ADMIN — VANCOMYCIN HYDROCHLORIDE 750 MG: 5 INJECTION, POWDER, LYOPHILIZED, FOR SOLUTION INTRAVENOUS at 10:39

## 2019-06-15 RX ADMIN — SODIUM CHLORIDE, PRESERVATIVE FREE 3 ML: 5 INJECTION INTRAVENOUS at 08:38

## 2019-06-15 RX ADMIN — SODIUM BICARBONATE TAB 650 MG 650 MG: 650 TAB at 21:08

## 2019-06-15 RX ADMIN — PANTOPRAZOLE SODIUM 40 MG: 40 INJECTION, POWDER, FOR SOLUTION INTRAVENOUS at 05:27

## 2019-06-15 RX ADMIN — ACETYLCYSTEINE 3 ML: 200 SOLUTION ORAL; RESPIRATORY (INHALATION) at 18:47

## 2019-06-15 RX ADMIN — METRONIDAZOLE 500 MG: 500 INJECTION, SOLUTION INTRAVENOUS at 05:26

## 2019-06-15 RX ADMIN — BUDESONIDE 0.5 MG: 0.5 INHALANT RESPIRATORY (INHALATION) at 07:32

## 2019-06-15 RX ADMIN — HYDROCORTISONE SODIUM SUCCINATE 50 MG: 100 INJECTION, POWDER, FOR SOLUTION INTRAMUSCULAR; INTRAVENOUS at 13:26

## 2019-06-15 RX ADMIN — SODIUM CHLORIDE, PRESERVATIVE FREE 3 ML: 5 INJECTION INTRAVENOUS at 21:08

## 2019-06-15 RX ADMIN — POTASSIUM CHLORIDE 40 MEQ: 1.5 POWDER, FOR SOLUTION ORAL at 03:57

## 2019-06-15 RX ADMIN — BUDESONIDE 0.5 MG: 0.5 INHALANT RESPIRATORY (INHALATION) at 18:47

## 2019-06-15 RX ADMIN — THIAMINE HYDROCHLORIDE 250 MG: 100 INJECTION, SOLUTION INTRAMUSCULAR; INTRAVENOUS at 08:36

## 2019-06-15 RX ADMIN — IPRATROPIUM BROMIDE AND ALBUTEROL SULFATE 3 ML: .5; 3 SOLUTION RESPIRATORY (INHALATION) at 12:30

## 2019-06-15 RX ADMIN — CHLORHEXIDINE GLUCONATE 15 ML: 1.2 RINSE ORAL at 21:09

## 2019-06-15 RX ADMIN — CEFEPIME 2 G: 2 INJECTION, POWDER, FOR SOLUTION INTRAVENOUS at 10:39

## 2019-06-15 RX ADMIN — SODIUM BICARBONATE TAB 650 MG 650 MG: 650 TAB at 08:36

## 2019-06-15 RX ADMIN — ACETYLCYSTEINE 3 ML: 200 SOLUTION ORAL; RESPIRATORY (INHALATION) at 07:32

## 2019-06-15 RX ADMIN — ASCORBIC ACID, VITAMIN A PALMITATE, CHOLECALCIFEROL, THIAMINE HYDROCHLORIDE, RIBOFLAVIN-5 PHOSPHATE SODIUM, PYRIDOXINE HYDROCHLORIDE, NIACINAMIDE, DEXPANTHENOL, ALPHA-TOCOPHEROL ACETATE, VITAMIN K1, FOLIC ACID, BIOTIN, CYANOCOBALAMIN: 200; 3300; 200; 6; 3.6; 6; 40; 15; 10; 150; 600; 60; 5 INJECTION, SOLUTION INTRAVENOUS at 08:36

## 2019-06-15 RX ADMIN — POTASSIUM CHLORIDE 40 MEQ: 1.5 POWDER, FOR SOLUTION ORAL at 06:36

## 2019-06-15 RX ADMIN — IPRATROPIUM BROMIDE AND ALBUTEROL SULFATE 3 ML: .5; 3 SOLUTION RESPIRATORY (INHALATION) at 18:47

## 2019-06-15 RX ADMIN — IPRATROPIUM BROMIDE AND ALBUTEROL SULFATE 3 ML: .5; 3 SOLUTION RESPIRATORY (INHALATION) at 00:55

## 2019-06-15 RX ADMIN — HYDROCORTISONE SODIUM SUCCINATE 50 MG: 100 INJECTION, POWDER, FOR SOLUTION INTRAMUSCULAR; INTRAVENOUS at 23:53

## 2019-06-15 RX ADMIN — IPRATROPIUM BROMIDE AND ALBUTEROL SULFATE 3 ML: .5; 3 SOLUTION RESPIRATORY (INHALATION) at 07:32

## 2019-06-15 RX ADMIN — SODIUM CHLORIDE, POTASSIUM CHLORIDE, SODIUM LACTATE AND CALCIUM CHLORIDE 75 ML/HR: 600; 310; 30; 20 INJECTION, SOLUTION INTRAVENOUS at 10:50

## 2019-06-15 RX ADMIN — CEFEPIME 2 G: 2 INJECTION, POWDER, FOR SOLUTION INTRAVENOUS at 00:00

## 2019-06-15 RX ADMIN — Medication 1 CAPSULE: at 08:36

## 2019-06-15 RX ADMIN — METRONIDAZOLE 500 MG: 500 INJECTION, SOLUTION INTRAVENOUS at 13:26

## 2019-06-15 RX ADMIN — HYDROCORTISONE SODIUM SUCCINATE 50 MG: 100 INJECTION, POWDER, FOR SOLUTION INTRAMUSCULAR; INTRAVENOUS at 00:00

## 2019-06-15 RX ADMIN — SODIUM BICARBONATE TAB 650 MG 650 MG: 650 TAB at 16:01

## 2019-06-15 RX ADMIN — CEFEPIME 2 G: 2 INJECTION, POWDER, FOR SOLUTION INTRAVENOUS at 23:53

## 2019-06-15 RX ADMIN — METRONIDAZOLE 500 MG: 500 INJECTION, SOLUTION INTRAVENOUS at 21:08

## 2019-06-15 RX ADMIN — HYDROCORTISONE SODIUM SUCCINATE 50 MG: 100 INJECTION, POWDER, FOR SOLUTION INTRAMUSCULAR; INTRAVENOUS at 05:27

## 2019-06-15 RX ADMIN — DEXMEDETOMIDINE HYDROCHLORIDE 0.2 MCG/KG/HR: 100 INJECTION, SOLUTION INTRAVENOUS at 20:04

## 2019-06-16 LAB
A-A DO2: 69.6 MMHG (ref 0–300)
ALBUMIN SERPL-MCNC: 2.54 G/DL (ref 3.5–5.2)
ALBUMIN/GLOB SERPL: 0.8 G/DL
ALP SERPL-CCNC: 60 U/L (ref 39–117)
ALT SERPL W P-5'-P-CCNC: 7 U/L (ref 1–33)
ANION GAP SERPL CALCULATED.3IONS-SCNC: 11.5 MMOL/L
ARTERIAL PATENCY WRIST A: POSITIVE
AST SERPL-CCNC: 15 U/L (ref 1–32)
ATMOSPHERIC PRESS: 732 MMHG
BASE EXCESS BLDA CALC-SCNC: 4.7 MMOL/L
BASOPHILS # BLD AUTO: 0 10*3/MM3 (ref 0–0.2)
BASOPHILS NFR BLD AUTO: 0 % (ref 0–1.5)
BDY SITE: ABNORMAL
BILIRUB SERPL-MCNC: 0.3 MG/DL (ref 0.2–1.2)
BODY TEMPERATURE: 98.6 C
BUN BLD-MCNC: 15 MG/DL (ref 8–23)
BUN/CREAT SERPL: 28.8 (ref 7–25)
CALCIUM SPEC-SCNC: 8.4 MG/DL (ref 8.6–10.5)
CHLORIDE SERPL-SCNC: 106 MMOL/L (ref 98–107)
CO2 SERPL-SCNC: 23.5 MMOL/L (ref 22–29)
COHGB MFR BLD: 0.6 % (ref 0–5)
CREAT BLD-MCNC: 0.52 MG/DL (ref 0.57–1)
DEPRECATED RDW RBC AUTO: 47.2 FL (ref 37–54)
EOSINOPHIL # BLD AUTO: 0 10*3/MM3 (ref 0–0.4)
EOSINOPHIL NFR BLD AUTO: 0 % (ref 0.3–6.2)
ERYTHROCYTE [DISTWIDTH] IN BLOOD BY AUTOMATED COUNT: 14.6 % (ref 12.3–15.4)
GFR SERPL CREATININE-BSD FRML MDRD: 114 ML/MIN/1.73
GLOBULIN UR ELPH-MCNC: 3.3 GM/DL
GLUCOSE BLD-MCNC: 172 MG/DL (ref 65–99)
HCO3 BLDA-SCNC: 29 MMOL/L (ref 22–26)
HCT VFR BLD AUTO: 34.5 % (ref 34–46.6)
HCT VFR BLD CALC: 35 % (ref 37–47)
HGB BLD-MCNC: 10.5 G/DL (ref 12–15.9)
HGB BLDA-MCNC: 11.9 G/DL (ref 12–16)
HOROWITZ INDEX BLD+IHG-RTO: 35 %
IMM GRANULOCYTES # BLD AUTO: 0.01 10*3/MM3 (ref 0–0.05)
IMM GRANULOCYTES NFR BLD AUTO: 0.1 % (ref 0–0.5)
LYMPHOCYTES # BLD AUTO: 0.41 10*3/MM3 (ref 0.7–3.1)
LYMPHOCYTES NFR BLD AUTO: 4.9 % (ref 19.6–45.3)
MCH RBC QN AUTO: 28.2 PG (ref 26.6–33)
MCHC RBC AUTO-ENTMCNC: 30.4 G/DL (ref 31.5–35.7)
MCV RBC AUTO: 92.7 FL (ref 79–97)
METHGB BLD QL: 0.2 % (ref 0–3)
MODALITY: ABNORMAL
MONOCYTES # BLD AUTO: 0.51 10*3/MM3 (ref 0.1–0.9)
MONOCYTES NFR BLD AUTO: 6.1 % (ref 5–12)
NEUTROPHILS # BLD AUTO: 7.48 10*3/MM3 (ref 1.7–7)
NEUTROPHILS NFR BLD AUTO: 88.9 % (ref 42.7–76)
OXYHGB MFR BLDV: 97.8 % (ref 85–100)
PCO2 BLDA: 41.7 MM HG (ref 35–45)
PEEP RESPIRATORY: 5 CM[H2O]
PH BLDA: 7.46 PH UNITS (ref 7.35–7.45)
PLATELET # BLD AUTO: 131 10*3/MM3 (ref 140–450)
PMV BLD AUTO: 12.6 FL (ref 6–12)
PO2 BLDA: 121.7 MM HG (ref 80–100)
POTASSIUM BLD-SCNC: 3.8 MMOL/L (ref 3.5–5.2)
PROT SERPL-MCNC: 5.8 G/DL (ref 6–8.5)
RBC # BLD AUTO: 3.72 10*6/MM3 (ref 3.77–5.28)
SAO2 % BLDCOA: 98.6 % (ref 90–100)
SET MECH RESP RATE: 18
SODIUM BLD-SCNC: 141 MMOL/L (ref 136–145)
VENTILATOR MODE: ABNORMAL
VT ON VENT VENT: 300 ML
WBC NRBC COR # BLD: 8.41 10*3/MM3 (ref 3.4–10.8)

## 2019-06-16 PROCEDURE — 80053 COMPREHEN METABOLIC PANEL: CPT | Performed by: HOSPITALIST

## 2019-06-16 PROCEDURE — 82805 BLOOD GASES W/O2 SATURATION: CPT | Performed by: HOSPITALIST

## 2019-06-16 PROCEDURE — 25010000002 HEPARIN (PORCINE) PER 1000 UNITS: Performed by: INTERNAL MEDICINE

## 2019-06-16 PROCEDURE — 94799 UNLISTED PULMONARY SVC/PX: CPT

## 2019-06-16 PROCEDURE — 99233 SBSQ HOSP IP/OBS HIGH 50: CPT | Performed by: INTERNAL MEDICINE

## 2019-06-16 PROCEDURE — 85025 COMPLETE CBC W/AUTO DIFF WBC: CPT | Performed by: HOSPITALIST

## 2019-06-16 PROCEDURE — 25010000002 CEFEPIME 2 G/NS 100 ML SOLUTION: Performed by: INTERNAL MEDICINE

## 2019-06-16 PROCEDURE — 83050 HGB METHEMOGLOBIN QUAN: CPT | Performed by: HOSPITALIST

## 2019-06-16 PROCEDURE — 25010000002 THIAMINE PER 100 MG: Performed by: INTERNAL MEDICINE

## 2019-06-16 PROCEDURE — 25010000002 HYDROCORTISONE SODIUM SUCCINATE 100 MG RECONSTITUTED SOLUTION: Performed by: INTERNAL MEDICINE

## 2019-06-16 PROCEDURE — 36600 WITHDRAWAL OF ARTERIAL BLOOD: CPT | Performed by: HOSPITALIST

## 2019-06-16 PROCEDURE — 25010000002 VANCOMYCIN 5 G RECONSTITUTED SOLUTION 5,000 MG VIAL: Performed by: INTERNAL MEDICINE

## 2019-06-16 PROCEDURE — 82375 ASSAY CARBOXYHB QUANT: CPT | Performed by: HOSPITALIST

## 2019-06-16 RX ADMIN — HYDROCORTISONE SODIUM SUCCINATE 50 MG: 100 INJECTION, POWDER, FOR SOLUTION INTRAMUSCULAR; INTRAVENOUS at 23:48

## 2019-06-16 RX ADMIN — SODIUM BICARBONATE TAB 650 MG 650 MG: 650 TAB at 20:55

## 2019-06-16 RX ADMIN — HEPARIN SODIUM 5000 UNITS: 5000 INJECTION INTRAVENOUS; SUBCUTANEOUS at 05:54

## 2019-06-16 RX ADMIN — IPRATROPIUM BROMIDE AND ALBUTEROL SULFATE 3 ML: .5; 3 SOLUTION RESPIRATORY (INHALATION) at 18:45

## 2019-06-16 RX ADMIN — CEFEPIME 2 G: 2 INJECTION, POWDER, FOR SOLUTION INTRAVENOUS at 23:48

## 2019-06-16 RX ADMIN — HYDROCORTISONE SODIUM SUCCINATE 50 MG: 100 INJECTION, POWDER, FOR SOLUTION INTRAMUSCULAR; INTRAVENOUS at 18:15

## 2019-06-16 RX ADMIN — ACETYLCYSTEINE 3 ML: 200 SOLUTION ORAL; RESPIRATORY (INHALATION) at 18:45

## 2019-06-16 RX ADMIN — SODIUM CHLORIDE, PRESERVATIVE FREE 3 ML: 5 INJECTION INTRAVENOUS at 08:45

## 2019-06-16 RX ADMIN — CHLORHEXIDINE GLUCONATE 15 ML: 1.2 RINSE ORAL at 08:45

## 2019-06-16 RX ADMIN — SODIUM CHLORIDE, PRESERVATIVE FREE 3 ML: 5 INJECTION INTRAVENOUS at 20:56

## 2019-06-16 RX ADMIN — IPRATROPIUM BROMIDE AND ALBUTEROL SULFATE 3 ML: .5; 3 SOLUTION RESPIRATORY (INHALATION) at 13:47

## 2019-06-16 RX ADMIN — SODIUM CHLORIDE, PRESERVATIVE FREE: 5 INJECTION INTRAVENOUS at 08:44

## 2019-06-16 RX ADMIN — HEPARIN SODIUM 5000 UNITS: 5000 INJECTION INTRAVENOUS; SUBCUTANEOUS at 21:02

## 2019-06-16 RX ADMIN — METRONIDAZOLE 500 MG: 500 INJECTION, SOLUTION INTRAVENOUS at 20:55

## 2019-06-16 RX ADMIN — ASCORBIC ACID, VITAMIN A PALMITATE, CHOLECALCIFEROL, THIAMINE HYDROCHLORIDE, RIBOFLAVIN-5 PHOSPHATE SODIUM, PYRIDOXINE HYDROCHLORIDE, NIACINAMIDE, DEXPANTHENOL, ALPHA-TOCOPHEROL ACETATE, VITAMIN K1, FOLIC ACID, BIOTIN, CYANOCOBALAMIN: 200; 3300; 200; 6; 3.6; 6; 40; 15; 10; 150; 600; 60; 5 INJECTION, SOLUTION INTRAVENOUS at 08:44

## 2019-06-16 RX ADMIN — DEXMEDETOMIDINE HYDROCHLORIDE 0.6 MCG/KG/HR: 100 INJECTION, SOLUTION INTRAVENOUS at 23:49

## 2019-06-16 RX ADMIN — PANTOPRAZOLE SODIUM 40 MG: 40 INJECTION, POWDER, FOR SOLUTION INTRAVENOUS at 05:54

## 2019-06-16 RX ADMIN — DEXMEDETOMIDINE HYDROCHLORIDE 0.8 MCG/KG/HR: 100 INJECTION, SOLUTION INTRAVENOUS at 13:38

## 2019-06-16 RX ADMIN — METRONIDAZOLE 500 MG: 500 INJECTION, SOLUTION INTRAVENOUS at 05:55

## 2019-06-16 RX ADMIN — SODIUM CHLORIDE, POTASSIUM CHLORIDE, SODIUM LACTATE AND CALCIUM CHLORIDE 75 ML/HR: 600; 310; 30; 20 INJECTION, SOLUTION INTRAVENOUS at 00:04

## 2019-06-16 RX ADMIN — CEFEPIME 2 G: 2 INJECTION, POWDER, FOR SOLUTION INTRAVENOUS at 10:09

## 2019-06-16 RX ADMIN — ACETYLCYSTEINE 3 ML: 200 SOLUTION ORAL; RESPIRATORY (INHALATION) at 06:34

## 2019-06-16 RX ADMIN — Medication 1 CAPSULE: at 08:43

## 2019-06-16 RX ADMIN — CHLORHEXIDINE GLUCONATE 15 ML: 1.2 RINSE ORAL at 20:56

## 2019-06-16 RX ADMIN — IPRATROPIUM BROMIDE AND ALBUTEROL SULFATE 3 ML: .5; 3 SOLUTION RESPIRATORY (INHALATION) at 00:30

## 2019-06-16 RX ADMIN — METRONIDAZOLE 500 MG: 500 INJECTION, SOLUTION INTRAVENOUS at 13:25

## 2019-06-16 RX ADMIN — BUDESONIDE 0.5 MG: 0.5 INHALANT RESPIRATORY (INHALATION) at 18:45

## 2019-06-16 RX ADMIN — HYDROCORTISONE SODIUM SUCCINATE 50 MG: 100 INJECTION, POWDER, FOR SOLUTION INTRAMUSCULAR; INTRAVENOUS at 05:55

## 2019-06-16 RX ADMIN — HYDROCORTISONE SODIUM SUCCINATE 50 MG: 100 INJECTION, POWDER, FOR SOLUTION INTRAMUSCULAR; INTRAVENOUS at 11:42

## 2019-06-16 RX ADMIN — SODIUM BICARBONATE TAB 650 MG 650 MG: 650 TAB at 08:43

## 2019-06-16 RX ADMIN — THIAMINE HYDROCHLORIDE 250 MG: 100 INJECTION, SOLUTION INTRAMUSCULAR; INTRAVENOUS at 08:43

## 2019-06-16 RX ADMIN — VANCOMYCIN HYDROCHLORIDE 750 MG: 5 INJECTION, POWDER, LYOPHILIZED, FOR SOLUTION INTRAVENOUS at 10:08

## 2019-06-16 RX ADMIN — SODIUM BICARBONATE TAB 650 MG 650 MG: 650 TAB at 16:17

## 2019-06-16 RX ADMIN — IPRATROPIUM BROMIDE AND ALBUTEROL SULFATE 3 ML: .5; 3 SOLUTION RESPIRATORY (INHALATION) at 06:34

## 2019-06-16 RX ADMIN — HEPARIN SODIUM 5000 UNITS: 5000 INJECTION INTRAVENOUS; SUBCUTANEOUS at 13:25

## 2019-06-16 RX ADMIN — BUDESONIDE 0.5 MG: 0.5 INHALANT RESPIRATORY (INHALATION) at 06:34

## 2019-06-17 LAB
A-A DO2: 63.5 MMHG (ref 0–300)
ARTERIAL PATENCY WRIST A: ABNORMAL
ATMOSPHERIC PRESS: 727 MMHG
BACTERIA SPEC AEROBE CULT: NORMAL
BACTERIA SPEC AEROBE CULT: NORMAL
BASE EXCESS BLDA CALC-SCNC: 5.5 MMOL/L
BDY SITE: ABNORMAL
BODY TEMPERATURE: 98.6 C
COHGB MFR BLD: 0.9 % (ref 0–5)
HCO3 BLDA-SCNC: 28.6 MMOL/L (ref 22–26)
HCT VFR BLD CALC: 37 % (ref 37–47)
HGB BLDA-MCNC: 12.6 G/DL (ref 12–16)
HOROWITZ INDEX BLD+IHG-RTO: 30 %
METHGB BLD QL: 0.3 % (ref 0–3)
MODALITY: ABNORMAL
OXYHGB MFR BLDV: 96.8 % (ref 85–100)
PCO2 BLDA: 36.3 MM HG (ref 35–45)
PEEP RESPIRATORY: 5 CM[H2O]
PH BLDA: 7.51 PH UNITS (ref 7.35–7.45)
PO2 BLDA: 97.9 MM HG (ref 80–100)
PSV: 8 CMH2O
SAO2 % BLDCOA: 98 % (ref 90–100)
VANCOMYCIN TROUGH SERPL-MCNC: 6.8 MCG/ML (ref 5–20)
VENTILATOR MODE: ABNORMAL

## 2019-06-17 PROCEDURE — 25010000002 CEFEPIME 2 G/NS 100 ML SOLUTION: Performed by: INTERNAL MEDICINE

## 2019-06-17 PROCEDURE — 94799 UNLISTED PULMONARY SVC/PX: CPT

## 2019-06-17 PROCEDURE — 36410 VNPNXR 3YR/> PHY/QHP DX/THER: CPT

## 2019-06-17 PROCEDURE — 25010000002 THIAMINE PER 100 MG: Performed by: INTERNAL MEDICINE

## 2019-06-17 PROCEDURE — 94003 VENT MGMT INPAT SUBQ DAY: CPT

## 2019-06-17 PROCEDURE — 36600 WITHDRAWAL OF ARTERIAL BLOOD: CPT | Performed by: INTERNAL MEDICINE

## 2019-06-17 PROCEDURE — C1751 CATH, INF, PER/CENT/MIDLINE: HCPCS

## 2019-06-17 PROCEDURE — 83050 HGB METHEMOGLOBIN QUAN: CPT | Performed by: INTERNAL MEDICINE

## 2019-06-17 PROCEDURE — 25010000002 HYDROCORTISONE SODIUM SUCCINATE 100 MG RECONSTITUTED SOLUTION: Performed by: INTERNAL MEDICINE

## 2019-06-17 PROCEDURE — 25010000002 HEPARIN (PORCINE) PER 1000 UNITS: Performed by: INTERNAL MEDICINE

## 2019-06-17 PROCEDURE — 99233 SBSQ HOSP IP/OBS HIGH 50: CPT | Performed by: INTERNAL MEDICINE

## 2019-06-17 PROCEDURE — 82805 BLOOD GASES W/O2 SATURATION: CPT | Performed by: INTERNAL MEDICINE

## 2019-06-17 PROCEDURE — 80202 ASSAY OF VANCOMYCIN: CPT

## 2019-06-17 PROCEDURE — 25010000002 MIDAZOLAM PER 1 MG: Performed by: HOSPITALIST

## 2019-06-17 PROCEDURE — 82375 ASSAY CARBOXYHB QUANT: CPT | Performed by: INTERNAL MEDICINE

## 2019-06-17 PROCEDURE — 25010000002 VANCOMYCIN 5 G RECONSTITUTED SOLUTION 5,000 MG VIAL: Performed by: INTERNAL MEDICINE

## 2019-06-17 RX ORDER — SODIUM CHLORIDE 0.9 % (FLUSH) 0.9 %
10 SYRINGE (ML) INJECTION EVERY 12 HOURS SCHEDULED
Status: DISCONTINUED | OUTPATIENT
Start: 2019-06-17 | End: 2019-06-28 | Stop reason: HOSPADM

## 2019-06-17 RX ORDER — SODIUM CHLORIDE 0.9 % (FLUSH) 0.9 %
10 SYRINGE (ML) INJECTION AS NEEDED
Status: DISCONTINUED | OUTPATIENT
Start: 2019-06-17 | End: 2019-06-28 | Stop reason: HOSPADM

## 2019-06-17 RX ORDER — MIDAZOLAM HYDROCHLORIDE 1 MG/ML
1 INJECTION INTRAMUSCULAR; INTRAVENOUS ONCE
Status: DISCONTINUED | OUTPATIENT
Start: 2019-06-17 | End: 2019-06-17

## 2019-06-17 RX ADMIN — CEFEPIME 2 G: 2 INJECTION, POWDER, FOR SOLUTION INTRAVENOUS at 22:24

## 2019-06-17 RX ADMIN — METRONIDAZOLE 500 MG: 500 INJECTION, SOLUTION INTRAVENOUS at 05:28

## 2019-06-17 RX ADMIN — CHLORHEXIDINE GLUCONATE 15 ML: 1.2 RINSE ORAL at 08:06

## 2019-06-17 RX ADMIN — ASCORBIC ACID, VITAMIN A PALMITATE, CHOLECALCIFEROL, THIAMINE HYDROCHLORIDE, RIBOFLAVIN-5 PHOSPHATE SODIUM, PYRIDOXINE HYDROCHLORIDE, NIACINAMIDE, DEXPANTHENOL, ALPHA-TOCOPHEROL ACETATE, VITAMIN K1, FOLIC ACID, BIOTIN, CYANOCOBALAMIN: 200; 3300; 200; 6; 3.6; 6; 40; 15; 10; 150; 600; 60; 5 INJECTION, SOLUTION INTRAVENOUS at 08:26

## 2019-06-17 RX ADMIN — IPRATROPIUM BROMIDE AND ALBUTEROL SULFATE 3 ML: .5; 3 SOLUTION RESPIRATORY (INHALATION) at 00:18

## 2019-06-17 RX ADMIN — BUDESONIDE 0.5 MG: 0.5 INHALANT RESPIRATORY (INHALATION) at 18:45

## 2019-06-17 RX ADMIN — IPRATROPIUM BROMIDE AND ALBUTEROL SULFATE 3 ML: .5; 3 SOLUTION RESPIRATORY (INHALATION) at 13:19

## 2019-06-17 RX ADMIN — CHLORHEXIDINE GLUCONATE 15 ML: 1.2 RINSE ORAL at 21:33

## 2019-06-17 RX ADMIN — SODIUM BICARBONATE TAB 650 MG 650 MG: 650 TAB at 15:24

## 2019-06-17 RX ADMIN — SODIUM CHLORIDE, PRESERVATIVE FREE 10 ML: 5 INJECTION INTRAVENOUS at 13:33

## 2019-06-17 RX ADMIN — BUDESONIDE 0.5 MG: 0.5 INHALANT RESPIRATORY (INHALATION) at 06:54

## 2019-06-17 RX ADMIN — ACETYLCYSTEINE 3 ML: 200 SOLUTION ORAL; RESPIRATORY (INHALATION) at 18:45

## 2019-06-17 RX ADMIN — HYDROCORTISONE SODIUM SUCCINATE 50 MG: 100 INJECTION, POWDER, FOR SOLUTION INTRAMUSCULAR; INTRAVENOUS at 23:29

## 2019-06-17 RX ADMIN — ACETYLCYSTEINE 3 ML: 200 SOLUTION ORAL; RESPIRATORY (INHALATION) at 06:41

## 2019-06-17 RX ADMIN — CEFEPIME 2 G: 2 INJECTION, POWDER, FOR SOLUTION INTRAVENOUS at 13:37

## 2019-06-17 RX ADMIN — SODIUM CHLORIDE, PRESERVATIVE FREE 3 ML: 5 INJECTION INTRAVENOUS at 21:35

## 2019-06-17 RX ADMIN — HYDROCORTISONE SODIUM SUCCINATE 50 MG: 100 INJECTION, POWDER, FOR SOLUTION INTRAMUSCULAR; INTRAVENOUS at 05:33

## 2019-06-17 RX ADMIN — IPRATROPIUM BROMIDE AND ALBUTEROL SULFATE 3 ML: .5; 3 SOLUTION RESPIRATORY (INHALATION) at 18:45

## 2019-06-17 RX ADMIN — PANTOPRAZOLE SODIUM 40 MG: 40 INJECTION, POWDER, FOR SOLUTION INTRAVENOUS at 05:33

## 2019-06-17 RX ADMIN — Medication 1 CAPSULE: at 08:25

## 2019-06-17 RX ADMIN — SODIUM CHLORIDE, PRESERVATIVE FREE 10 ML: 5 INJECTION INTRAVENOUS at 21:34

## 2019-06-17 RX ADMIN — HEPARIN SODIUM 5000 UNITS: 5000 INJECTION INTRAVENOUS; SUBCUTANEOUS at 21:34

## 2019-06-17 RX ADMIN — METRONIDAZOLE 500 MG: 500 INJECTION, SOLUTION INTRAVENOUS at 13:36

## 2019-06-17 RX ADMIN — HEPARIN SODIUM 5000 UNITS: 5000 INJECTION INTRAVENOUS; SUBCUTANEOUS at 05:30

## 2019-06-17 RX ADMIN — HYDROCORTISONE SODIUM SUCCINATE 50 MG: 100 INJECTION, POWDER, FOR SOLUTION INTRAMUSCULAR; INTRAVENOUS at 17:00

## 2019-06-17 RX ADMIN — IPRATROPIUM BROMIDE AND ALBUTEROL SULFATE 3 ML: .5; 3 SOLUTION RESPIRATORY (INHALATION) at 06:43

## 2019-06-17 RX ADMIN — METRONIDAZOLE 500 MG: 500 INJECTION, SOLUTION INTRAVENOUS at 21:33

## 2019-06-17 RX ADMIN — SODIUM CHLORIDE, PRESERVATIVE FREE 10 ML: 5 INJECTION INTRAVENOUS at 13:32

## 2019-06-17 RX ADMIN — HYDROCORTISONE SODIUM SUCCINATE 50 MG: 100 INJECTION, POWDER, FOR SOLUTION INTRAMUSCULAR; INTRAVENOUS at 13:29

## 2019-06-17 RX ADMIN — THIAMINE HYDROCHLORIDE 250 MG: 100 INJECTION, SOLUTION INTRAMUSCULAR; INTRAVENOUS at 08:26

## 2019-06-17 RX ADMIN — VANCOMYCIN HYDROCHLORIDE 750 MG: 5 INJECTION, POWDER, LYOPHILIZED, FOR SOLUTION INTRAVENOUS at 13:27

## 2019-06-17 RX ADMIN — HEPARIN SODIUM 5000 UNITS: 5000 INJECTION INTRAVENOUS; SUBCUTANEOUS at 13:37

## 2019-06-17 RX ADMIN — SODIUM BICARBONATE TAB 650 MG 650 MG: 650 TAB at 21:33

## 2019-06-17 RX ADMIN — SODIUM CHLORIDE, PRESERVATIVE FREE 10 ML: 5 INJECTION INTRAVENOUS at 21:33

## 2019-06-17 RX ADMIN — SODIUM BICARBONATE TAB 650 MG 650 MG: 650 TAB at 08:25

## 2019-06-17 RX ADMIN — SODIUM CHLORIDE, PRESERVATIVE FREE 3 ML: 5 INJECTION INTRAVENOUS at 08:06

## 2019-06-18 ENCOUNTER — ANCILLARY PROCEDURE (OUTPATIENT)
Dept: SPEECH THERAPY | Facility: HOSPITAL | Age: 79
End: 2019-06-18

## 2019-06-18 LAB
ANION GAP SERPL CALCULATED.3IONS-SCNC: 13.6 MMOL/L
BASOPHILS # BLD AUTO: 0 10*3/MM3 (ref 0–0.2)
BASOPHILS NFR BLD AUTO: 0 % (ref 0–1.5)
BUN BLD-MCNC: 12 MG/DL (ref 8–23)
BUN/CREAT SERPL: 21.1 (ref 7–25)
CALCIUM SPEC-SCNC: 7.9 MG/DL (ref 8.6–10.5)
CHLORIDE SERPL-SCNC: 90 MMOL/L (ref 98–107)
CO2 SERPL-SCNC: 30.4 MMOL/L (ref 22–29)
CREAT BLD-MCNC: 0.57 MG/DL (ref 0.57–1)
CRP SERPL-MCNC: 0.64 MG/DL (ref 0–0.5)
DEPRECATED RDW RBC AUTO: 46.5 FL (ref 37–54)
EOSINOPHIL # BLD AUTO: 0 10*3/MM3 (ref 0–0.4)
EOSINOPHIL NFR BLD AUTO: 0 % (ref 0.3–6.2)
ERYTHROCYTE [DISTWIDTH] IN BLOOD BY AUTOMATED COUNT: 14.2 % (ref 12.3–15.4)
GFR SERPL CREATININE-BSD FRML MDRD: 102 ML/MIN/1.73
GLUCOSE BLD-MCNC: 299 MG/DL (ref 65–99)
HCT VFR BLD AUTO: 32.6 % (ref 34–46.6)
HGB BLD-MCNC: 10.6 G/DL (ref 12–15.9)
IMM GRANULOCYTES # BLD AUTO: 0.02 10*3/MM3 (ref 0–0.05)
IMM GRANULOCYTES NFR BLD AUTO: 0.3 % (ref 0–0.5)
LACTOFERRIN STL QL LA: NEGATIVE
LYMPHOCYTES # BLD AUTO: 0.88 10*3/MM3 (ref 0.7–3.1)
LYMPHOCYTES NFR BLD AUTO: 12.5 % (ref 19.6–45.3)
MAGNESIUM SERPL-MCNC: 1.6 MG/DL (ref 1.6–2.4)
MCH RBC QN AUTO: 29 PG (ref 26.6–33)
MCHC RBC AUTO-ENTMCNC: 32.5 G/DL (ref 31.5–35.7)
MCV RBC AUTO: 89.1 FL (ref 79–97)
MONOCYTES # BLD AUTO: 0.29 10*3/MM3 (ref 0.1–0.9)
MONOCYTES NFR BLD AUTO: 4.1 % (ref 5–12)
NEUTROPHILS # BLD AUTO: 5.87 10*3/MM3 (ref 1.7–7)
NEUTROPHILS NFR BLD AUTO: 83.1 % (ref 42.7–76)
PLATELET # BLD AUTO: 162 10*3/MM3 (ref 140–450)
PMV BLD AUTO: 12.5 FL (ref 6–12)
POTASSIUM BLD-SCNC: 2 MMOL/L (ref 3.5–5.2)
RBC # BLD AUTO: 3.66 10*6/MM3 (ref 3.77–5.28)
SODIUM BLD-SCNC: 134 MMOL/L (ref 136–145)
WBC NRBC COR # BLD: 7.06 10*3/MM3 (ref 3.4–10.8)

## 2019-06-18 PROCEDURE — 94799 UNLISTED PULMONARY SVC/PX: CPT

## 2019-06-18 PROCEDURE — 25010000002 HYDROCORTISONE SODIUM SUCCINATE 100 MG RECONSTITUTED SOLUTION: Performed by: INTERNAL MEDICINE

## 2019-06-18 PROCEDURE — 25010000002 VANCOMYCIN 5 G RECONSTITUTED SOLUTION 5,000 MG VIAL: Performed by: INTERNAL MEDICINE

## 2019-06-18 PROCEDURE — 87177 OVA AND PARASITES SMEARS: CPT | Performed by: INTERNAL MEDICINE

## 2019-06-18 PROCEDURE — 85025 COMPLETE CBC W/AUTO DIFF WBC: CPT | Performed by: INTERNAL MEDICINE

## 2019-06-18 PROCEDURE — 25010000002 THIAMINE PER 100 MG: Performed by: INTERNAL MEDICINE

## 2019-06-18 PROCEDURE — 87045 FECES CULTURE AEROBIC BACT: CPT | Performed by: INTERNAL MEDICINE

## 2019-06-18 PROCEDURE — 86140 C-REACTIVE PROTEIN: CPT | Performed by: INTERNAL MEDICINE

## 2019-06-18 PROCEDURE — 99232 SBSQ HOSP IP/OBS MODERATE 35: CPT | Performed by: INTERNAL MEDICINE

## 2019-06-18 PROCEDURE — 87209 SMEAR COMPLEX STAIN: CPT | Performed by: INTERNAL MEDICINE

## 2019-06-18 PROCEDURE — 25010000002 HEPARIN (PORCINE) PER 1000 UNITS: Performed by: INTERNAL MEDICINE

## 2019-06-18 PROCEDURE — 83735 ASSAY OF MAGNESIUM: CPT | Performed by: INTERNAL MEDICINE

## 2019-06-18 PROCEDURE — 83631 LACTOFERRIN FECAL (QUANT): CPT | Performed by: INTERNAL MEDICINE

## 2019-06-18 PROCEDURE — 80048 BASIC METABOLIC PNL TOTAL CA: CPT | Performed by: INTERNAL MEDICINE

## 2019-06-18 PROCEDURE — 87046 STOOL CULTR AEROBIC BACT EA: CPT | Performed by: INTERNAL MEDICINE

## 2019-06-18 PROCEDURE — 25010000002 CEFEPIME 2 G/NS 100 ML SOLUTION: Performed by: INTERNAL MEDICINE

## 2019-06-18 PROCEDURE — 99221 1ST HOSP IP/OBS SF/LOW 40: CPT | Performed by: PSYCHIATRY & NEUROLOGY

## 2019-06-18 RX ORDER — POTASSIUM CHLORIDE 1.5 G/1.77G
40 POWDER, FOR SOLUTION ORAL EVERY 4 HOURS
Status: COMPLETED | OUTPATIENT
Start: 2019-06-18 | End: 2019-06-18

## 2019-06-18 RX ADMIN — THIAMINE HYDROCHLORIDE 250 MG: 100 INJECTION, SOLUTION INTRAMUSCULAR; INTRAVENOUS at 08:59

## 2019-06-18 RX ADMIN — ACETYLCYSTEINE 3 ML: 200 SOLUTION ORAL; RESPIRATORY (INHALATION) at 06:31

## 2019-06-18 RX ADMIN — POTASSIUM CHLORIDE 40 MEQ: 1.5 POWDER, FOR SOLUTION ORAL at 19:37

## 2019-06-18 RX ADMIN — CEFEPIME 2 G: 2 INJECTION, POWDER, FOR SOLUTION INTRAVENOUS at 22:01

## 2019-06-18 RX ADMIN — IPRATROPIUM BROMIDE AND ALBUTEROL SULFATE 3 ML: .5; 3 SOLUTION RESPIRATORY (INHALATION) at 18:36

## 2019-06-18 RX ADMIN — POTASSIUM CHLORIDE 40 MEQ: 1.5 POWDER, FOR SOLUTION ORAL at 15:22

## 2019-06-18 RX ADMIN — HYDROCORTISONE SODIUM SUCCINATE 50 MG: 100 INJECTION, POWDER, FOR SOLUTION INTRAMUSCULAR; INTRAVENOUS at 17:30

## 2019-06-18 RX ADMIN — SODIUM BICARBONATE TAB 650 MG 650 MG: 650 TAB at 15:22

## 2019-06-18 RX ADMIN — HYDROCORTISONE SODIUM SUCCINATE 50 MG: 100 INJECTION, POWDER, FOR SOLUTION INTRAMUSCULAR; INTRAVENOUS at 05:04

## 2019-06-18 RX ADMIN — HYDROCORTISONE SODIUM SUCCINATE 50 MG: 100 INJECTION, POWDER, FOR SOLUTION INTRAMUSCULAR; INTRAVENOUS at 11:52

## 2019-06-18 RX ADMIN — CEFEPIME 2 G: 2 INJECTION, POWDER, FOR SOLUTION INTRAVENOUS at 11:51

## 2019-06-18 RX ADMIN — SODIUM CHLORIDE, PRESERVATIVE FREE 10 ML: 5 INJECTION INTRAVENOUS at 09:00

## 2019-06-18 RX ADMIN — VANCOMYCIN HYDROCHLORIDE 750 MG: 5 INJECTION, POWDER, LYOPHILIZED, FOR SOLUTION INTRAVENOUS at 05:04

## 2019-06-18 RX ADMIN — Medication 1 CAPSULE: at 08:58

## 2019-06-18 RX ADMIN — MUPIROCIN: 20 OINTMENT TOPICAL at 21:02

## 2019-06-18 RX ADMIN — ACETYLCYSTEINE 3 ML: 200 SOLUTION ORAL; RESPIRATORY (INHALATION) at 18:36

## 2019-06-18 RX ADMIN — IPRATROPIUM BROMIDE AND ALBUTEROL SULFATE 3 ML: .5; 3 SOLUTION RESPIRATORY (INHALATION) at 12:56

## 2019-06-18 RX ADMIN — HEPARIN SODIUM 5000 UNITS: 5000 INJECTION INTRAVENOUS; SUBCUTANEOUS at 21:02

## 2019-06-18 RX ADMIN — IPRATROPIUM BROMIDE AND ALBUTEROL SULFATE 3 ML: .5; 3 SOLUTION RESPIRATORY (INHALATION) at 00:23

## 2019-06-18 RX ADMIN — HEPARIN SODIUM 5000 UNITS: 5000 INJECTION INTRAVENOUS; SUBCUTANEOUS at 06:36

## 2019-06-18 RX ADMIN — MUPIROCIN: 20 OINTMENT TOPICAL at 13:56

## 2019-06-18 RX ADMIN — HEPARIN SODIUM 5000 UNITS: 5000 INJECTION INTRAVENOUS; SUBCUTANEOUS at 13:56

## 2019-06-18 RX ADMIN — CHLORHEXIDINE GLUCONATE 15 ML: 1.2 RINSE ORAL at 08:58

## 2019-06-18 RX ADMIN — BUDESONIDE 0.5 MG: 0.5 INHALANT RESPIRATORY (INHALATION) at 06:49

## 2019-06-18 RX ADMIN — VANCOMYCIN HYDROCHLORIDE 750 MG: 5 INJECTION, POWDER, LYOPHILIZED, FOR SOLUTION INTRAVENOUS at 22:00

## 2019-06-18 RX ADMIN — POTASSIUM CHLORIDE 40 MEQ: 1.5 POWDER, FOR SOLUTION ORAL at 22:01

## 2019-06-18 RX ADMIN — SODIUM CHLORIDE, PRESERVATIVE FREE 10 ML: 5 INJECTION INTRAVENOUS at 21:02

## 2019-06-18 RX ADMIN — SODIUM CHLORIDE, PRESERVATIVE FREE 3 ML: 5 INJECTION INTRAVENOUS at 09:04

## 2019-06-18 RX ADMIN — SODIUM BICARBONATE TAB 650 MG 650 MG: 650 TAB at 08:58

## 2019-06-18 RX ADMIN — METRONIDAZOLE 500 MG: 500 INJECTION, SOLUTION INTRAVENOUS at 21:03

## 2019-06-18 RX ADMIN — BUDESONIDE 0.5 MG: 0.5 INHALANT RESPIRATORY (INHALATION) at 18:36

## 2019-06-18 RX ADMIN — SODIUM BICARBONATE TAB 650 MG 650 MG: 650 TAB at 21:05

## 2019-06-18 RX ADMIN — METRONIDAZOLE 500 MG: 500 INJECTION, SOLUTION INTRAVENOUS at 05:04

## 2019-06-18 RX ADMIN — METRONIDAZOLE 500 MG: 500 INJECTION, SOLUTION INTRAVENOUS at 13:56

## 2019-06-18 RX ADMIN — SODIUM CHLORIDE, PRESERVATIVE FREE 10 ML: 5 INJECTION INTRAVENOUS at 08:59

## 2019-06-18 RX ADMIN — ASCORBIC ACID, VITAMIN A PALMITATE, CHOLECALCIFEROL, THIAMINE HYDROCHLORIDE, RIBOFLAVIN-5 PHOSPHATE SODIUM, PYRIDOXINE HYDROCHLORIDE, NIACINAMIDE, DEXPANTHENOL, ALPHA-TOCOPHEROL ACETATE, VITAMIN K1, FOLIC ACID, BIOTIN, CYANOCOBALAMIN: 200; 3300; 200; 6; 3.6; 6; 40; 15; 10; 150; 600; 60; 5 INJECTION, SOLUTION INTRAVENOUS at 08:57

## 2019-06-18 RX ADMIN — SODIUM CHLORIDE, PRESERVATIVE FREE 10 ML: 5 INJECTION INTRAVENOUS at 21:03

## 2019-06-18 RX ADMIN — PANTOPRAZOLE SODIUM 40 MG: 40 INJECTION, POWDER, FOR SOLUTION INTRAVENOUS at 06:36

## 2019-06-18 RX ADMIN — IPRATROPIUM BROMIDE AND ALBUTEROL SULFATE 3 ML: .5; 3 SOLUTION RESPIRATORY (INHALATION) at 06:30

## 2019-06-18 RX ADMIN — SODIUM CHLORIDE, PRESERVATIVE FREE 3 ML: 5 INJECTION INTRAVENOUS at 09:05

## 2019-06-19 LAB
ANION GAP SERPL CALCULATED.3IONS-SCNC: 5.4 MMOL/L
BASOPHILS # BLD AUTO: 0 10*3/MM3 (ref 0–0.2)
BASOPHILS NFR BLD AUTO: 0 % (ref 0–1.5)
BUN BLD-MCNC: 10 MG/DL (ref 8–23)
BUN/CREAT SERPL: 23.3 (ref 7–25)
CALCIUM SPEC-SCNC: 8.3 MG/DL (ref 8.6–10.5)
CHLORIDE SERPL-SCNC: 99 MMOL/L (ref 98–107)
CO2 SERPL-SCNC: 35.6 MMOL/L (ref 22–29)
CREAT BLD-MCNC: 0.43 MG/DL (ref 0.57–1)
DEPRECATED RDW RBC AUTO: 45.1 FL (ref 37–54)
EOSINOPHIL # BLD AUTO: 0 10*3/MM3 (ref 0–0.4)
EOSINOPHIL NFR BLD AUTO: 0 % (ref 0.3–6.2)
ERYTHROCYTE [DISTWIDTH] IN BLOOD BY AUTOMATED COUNT: 14.1 % (ref 12.3–15.4)
GFR SERPL CREATININE-BSD FRML MDRD: 142 ML/MIN/1.73
GLUCOSE BLD-MCNC: 119 MG/DL (ref 65–99)
HCT VFR BLD AUTO: 33.2 % (ref 34–46.6)
HGB BLD-MCNC: 10.4 G/DL (ref 12–15.9)
IMM GRANULOCYTES # BLD AUTO: 0.04 10*3/MM3 (ref 0–0.05)
IMM GRANULOCYTES NFR BLD AUTO: 0.5 % (ref 0–0.5)
LYMPHOCYTES # BLD AUTO: 0.51 10*3/MM3 (ref 0.7–3.1)
LYMPHOCYTES NFR BLD AUTO: 6.4 % (ref 19.6–45.3)
MAGNESIUM SERPL-MCNC: 1.6 MG/DL (ref 1.6–2.4)
MCH RBC QN AUTO: 28.5 PG (ref 26.6–33)
MCHC RBC AUTO-ENTMCNC: 31.3 G/DL (ref 31.5–35.7)
MCV RBC AUTO: 91 FL (ref 79–97)
MONOCYTES # BLD AUTO: 0.97 10*3/MM3 (ref 0.1–0.9)
MONOCYTES NFR BLD AUTO: 12.2 % (ref 5–12)
NEUTROPHILS # BLD AUTO: 6.4 10*3/MM3 (ref 1.7–7)
NEUTROPHILS NFR BLD AUTO: 80.9 % (ref 42.7–76)
PLATELET # BLD AUTO: 168 10*3/MM3 (ref 140–450)
PMV BLD AUTO: 12.1 FL (ref 6–12)
POTASSIUM BLD-SCNC: 3.5 MMOL/L (ref 3.5–5.2)
POTASSIUM BLD-SCNC: 3.6 MMOL/L (ref 3.5–5.2)
RBC # BLD AUTO: 3.65 10*6/MM3 (ref 3.77–5.28)
SODIUM BLD-SCNC: 140 MMOL/L (ref 136–145)
WBC NRBC COR # BLD: 7.92 10*3/MM3 (ref 3.4–10.8)

## 2019-06-19 PROCEDURE — 25010000002 VANCOMYCIN 5 G RECONSTITUTED SOLUTION 5,000 MG VIAL: Performed by: INTERNAL MEDICINE

## 2019-06-19 PROCEDURE — 94799 UNLISTED PULMONARY SVC/PX: CPT

## 2019-06-19 PROCEDURE — 84132 ASSAY OF SERUM POTASSIUM: CPT | Performed by: INTERNAL MEDICINE

## 2019-06-19 PROCEDURE — 25010000002 THIAMINE PER 100 MG: Performed by: INTERNAL MEDICINE

## 2019-06-19 PROCEDURE — 83735 ASSAY OF MAGNESIUM: CPT | Performed by: INTERNAL MEDICINE

## 2019-06-19 PROCEDURE — 92610 EVALUATE SWALLOWING FUNCTION: CPT

## 2019-06-19 PROCEDURE — 85025 COMPLETE CBC W/AUTO DIFF WBC: CPT | Performed by: INTERNAL MEDICINE

## 2019-06-19 PROCEDURE — 25010000002 HEPARIN (PORCINE) PER 1000 UNITS: Performed by: INTERNAL MEDICINE

## 2019-06-19 PROCEDURE — 99232 SBSQ HOSP IP/OBS MODERATE 35: CPT | Performed by: INTERNAL MEDICINE

## 2019-06-19 PROCEDURE — 93010 ELECTROCARDIOGRAM REPORT: CPT | Performed by: INTERNAL MEDICINE

## 2019-06-19 PROCEDURE — 93005 ELECTROCARDIOGRAM TRACING: CPT | Performed by: INTERNAL MEDICINE

## 2019-06-19 PROCEDURE — 25010000002 HYDROCORTISONE SODIUM SUCCINATE 100 MG RECONSTITUTED SOLUTION: Performed by: INTERNAL MEDICINE

## 2019-06-19 PROCEDURE — 25010000002 CEFEPIME 2 G/NS 100 ML SOLUTION: Performed by: INTERNAL MEDICINE

## 2019-06-19 PROCEDURE — 80048 BASIC METABOLIC PNL TOTAL CA: CPT | Performed by: INTERNAL MEDICINE

## 2019-06-19 RX ORDER — METOPROLOL TARTRATE 5 MG/5ML
5 INJECTION INTRAVENOUS EVERY 6 HOURS PRN
Status: DISCONTINUED | OUTPATIENT
Start: 2019-06-19 | End: 2019-06-28 | Stop reason: HOSPADM

## 2019-06-19 RX ORDER — POTASSIUM CHLORIDE 1.5 G/1.77G
40 POWDER, FOR SOLUTION ORAL EVERY 4 HOURS
Status: COMPLETED | OUTPATIENT
Start: 2019-06-19 | End: 2019-06-19

## 2019-06-19 RX ADMIN — SODIUM BICARBONATE TAB 650 MG 650 MG: 650 TAB at 21:22

## 2019-06-19 RX ADMIN — Medication 1 CAPSULE: at 09:03

## 2019-06-19 RX ADMIN — HEPARIN SODIUM 5000 UNITS: 5000 INJECTION INTRAVENOUS; SUBCUTANEOUS at 21:22

## 2019-06-19 RX ADMIN — BUDESONIDE 0.5 MG: 0.5 INHALANT RESPIRATORY (INHALATION) at 18:12

## 2019-06-19 RX ADMIN — HYDROCORTISONE SODIUM SUCCINATE 50 MG: 100 INJECTION, POWDER, FOR SOLUTION INTRAMUSCULAR; INTRAVENOUS at 05:28

## 2019-06-19 RX ADMIN — MUPIROCIN 100 APPLICATION: 20 OINTMENT TOPICAL at 09:03

## 2019-06-19 RX ADMIN — HEPARIN SODIUM 5000 UNITS: 5000 INJECTION INTRAVENOUS; SUBCUTANEOUS at 05:29

## 2019-06-19 RX ADMIN — ACETYLCYSTEINE 3 ML: 200 SOLUTION ORAL; RESPIRATORY (INHALATION) at 06:45

## 2019-06-19 RX ADMIN — CHLORHEXIDINE GLUCONATE 15 ML: 1.2 RINSE ORAL at 09:04

## 2019-06-19 RX ADMIN — SODIUM CHLORIDE, PRESERVATIVE FREE 10 ML: 5 INJECTION INTRAVENOUS at 21:23

## 2019-06-19 RX ADMIN — METRONIDAZOLE 500 MG: 500 INJECTION, SOLUTION INTRAVENOUS at 13:22

## 2019-06-19 RX ADMIN — VANCOMYCIN HYDROCHLORIDE 750 MG: 5 INJECTION, POWDER, LYOPHILIZED, FOR SOLUTION INTRAVENOUS at 16:29

## 2019-06-19 RX ADMIN — HYDROCORTISONE SODIUM SUCCINATE 50 MG: 100 INJECTION, POWDER, FOR SOLUTION INTRAMUSCULAR; INTRAVENOUS at 18:11

## 2019-06-19 RX ADMIN — SODIUM CHLORIDE, PRESERVATIVE FREE 10 ML: 5 INJECTION INTRAVENOUS at 09:04

## 2019-06-19 RX ADMIN — SODIUM BICARBONATE TAB 650 MG 650 MG: 650 TAB at 09:03

## 2019-06-19 RX ADMIN — CEFEPIME 2 G: 2 INJECTION, POWDER, FOR SOLUTION INTRAVENOUS at 22:10

## 2019-06-19 RX ADMIN — BUDESONIDE 0.5 MG: 0.5 INHALANT RESPIRATORY (INHALATION) at 06:46

## 2019-06-19 RX ADMIN — PANTOPRAZOLE SODIUM 40 MG: 40 INJECTION, POWDER, FOR SOLUTION INTRAVENOUS at 05:28

## 2019-06-19 RX ADMIN — HEPARIN SODIUM 5000 UNITS: 5000 INJECTION INTRAVENOUS; SUBCUTANEOUS at 13:22

## 2019-06-19 RX ADMIN — IPRATROPIUM BROMIDE AND ALBUTEROL SULFATE 3 ML: .5; 3 SOLUTION RESPIRATORY (INHALATION) at 13:19

## 2019-06-19 RX ADMIN — ASCORBIC ACID, VITAMIN A PALMITATE, CHOLECALCIFEROL, THIAMINE HYDROCHLORIDE, RIBOFLAVIN-5 PHOSPHATE SODIUM, PYRIDOXINE HYDROCHLORIDE, NIACINAMIDE, DEXPANTHENOL, ALPHA-TOCOPHEROL ACETATE, VITAMIN K1, FOLIC ACID, BIOTIN, CYANOCOBALAMIN: 200; 3300; 200; 6; 3.6; 6; 40; 15; 10; 150; 600; 60; 5 INJECTION, SOLUTION INTRAVENOUS at 09:04

## 2019-06-19 RX ADMIN — METOPROLOL TARTRATE 5 MG: 1 INJECTION, SOLUTION INTRAVENOUS at 16:29

## 2019-06-19 RX ADMIN — HYDROCORTISONE SODIUM SUCCINATE 50 MG: 100 INJECTION, POWDER, FOR SOLUTION INTRAMUSCULAR; INTRAVENOUS at 11:03

## 2019-06-19 RX ADMIN — METRONIDAZOLE 500 MG: 500 INJECTION, SOLUTION INTRAVENOUS at 05:28

## 2019-06-19 RX ADMIN — IPRATROPIUM BROMIDE AND ALBUTEROL SULFATE 3 ML: .5; 3 SOLUTION RESPIRATORY (INHALATION) at 00:07

## 2019-06-19 RX ADMIN — CHLORHEXIDINE GLUCONATE 15 ML: 1.2 RINSE ORAL at 21:23

## 2019-06-19 RX ADMIN — IPRATROPIUM BROMIDE AND ALBUTEROL SULFATE 3 ML: .5; 3 SOLUTION RESPIRATORY (INHALATION) at 18:12

## 2019-06-19 RX ADMIN — HYDROCORTISONE SODIUM SUCCINATE 50 MG: 100 INJECTION, POWDER, FOR SOLUTION INTRAMUSCULAR; INTRAVENOUS at 01:33

## 2019-06-19 RX ADMIN — THIAMINE HYDROCHLORIDE 250 MG: 100 INJECTION, SOLUTION INTRAMUSCULAR; INTRAVENOUS at 09:03

## 2019-06-19 RX ADMIN — SODIUM BICARBONATE TAB 650 MG 650 MG: 650 TAB at 16:29

## 2019-06-19 RX ADMIN — ACETYLCYSTEINE 3 ML: 200 SOLUTION ORAL; RESPIRATORY (INHALATION) at 18:12

## 2019-06-19 RX ADMIN — CEFEPIME 2 G: 2 INJECTION, POWDER, FOR SOLUTION INTRAVENOUS at 10:58

## 2019-06-19 RX ADMIN — POTASSIUM CHLORIDE 40 MEQ: 1.5 POWDER, FOR SOLUTION ORAL at 10:57

## 2019-06-19 RX ADMIN — IPRATROPIUM BROMIDE AND ALBUTEROL SULFATE 3 ML: .5; 3 SOLUTION RESPIRATORY (INHALATION) at 06:45

## 2019-06-19 RX ADMIN — POTASSIUM CHLORIDE 40 MEQ: 1.5 POWDER, FOR SOLUTION ORAL at 13:22

## 2019-06-20 LAB
ANION GAP SERPL CALCULATED.3IONS-SCNC: 10.3 MMOL/L
BASOPHILS # BLD AUTO: 0.01 10*3/MM3 (ref 0–0.2)
BASOPHILS NFR BLD AUTO: 0.1 % (ref 0–1.5)
BUN BLD-MCNC: 13 MG/DL (ref 8–23)
BUN/CREAT SERPL: 29.5 (ref 7–25)
CALCIUM SPEC-SCNC: 8.1 MG/DL (ref 8.6–10.5)
CHLORIDE SERPL-SCNC: 103 MMOL/L (ref 98–107)
CO2 SERPL-SCNC: 28.7 MMOL/L (ref 22–29)
CREAT BLD-MCNC: 0.44 MG/DL (ref 0.57–1)
DEPRECATED RDW RBC AUTO: 47.7 FL (ref 37–54)
EOSINOPHIL # BLD AUTO: 0 10*3/MM3 (ref 0–0.4)
EOSINOPHIL NFR BLD AUTO: 0 % (ref 0.3–6.2)
ERYTHROCYTE [DISTWIDTH] IN BLOOD BY AUTOMATED COUNT: 14.7 % (ref 12.3–15.4)
GFR SERPL CREATININE-BSD FRML MDRD: 138 ML/MIN/1.73
GLUCOSE BLD-MCNC: 125 MG/DL (ref 65–99)
HCT VFR BLD AUTO: 33.5 % (ref 34–46.6)
HGB BLD-MCNC: 10.1 G/DL (ref 12–15.9)
IMM GRANULOCYTES # BLD AUTO: 0.13 10*3/MM3 (ref 0–0.05)
IMM GRANULOCYTES NFR BLD AUTO: 1.6 % (ref 0–0.5)
LYMPHOCYTES # BLD AUTO: 0.81 10*3/MM3 (ref 0.7–3.1)
LYMPHOCYTES NFR BLD AUTO: 9.9 % (ref 19.6–45.3)
MCH RBC QN AUTO: 28.4 PG (ref 26.6–33)
MCHC RBC AUTO-ENTMCNC: 30.1 G/DL (ref 31.5–35.7)
MCV RBC AUTO: 94.1 FL (ref 79–97)
MONOCYTES # BLD AUTO: 1.17 10*3/MM3 (ref 0.1–0.9)
MONOCYTES NFR BLD AUTO: 14.3 % (ref 5–12)
NEUTROPHILS # BLD AUTO: 6.05 10*3/MM3 (ref 1.7–7)
NEUTROPHILS NFR BLD AUTO: 74.1 % (ref 42.7–76)
PLATELET # BLD AUTO: 182 10*3/MM3 (ref 140–450)
PMV BLD AUTO: 12.3 FL (ref 6–12)
POTASSIUM BLD-SCNC: 3.1 MMOL/L (ref 3.5–5.2)
POTASSIUM BLD-SCNC: 3.5 MMOL/L (ref 3.5–5.2)
RBC # BLD AUTO: 3.56 10*6/MM3 (ref 3.77–5.28)
SODIUM BLD-SCNC: 142 MMOL/L (ref 136–145)
SPECIMEN STATUS: NORMAL
WBC NRBC COR # BLD: 8.17 10*3/MM3 (ref 3.4–10.8)

## 2019-06-20 PROCEDURE — 25010000002 HYDROCORTISONE SODIUM SUCCINATE 100 MG RECONSTITUTED SOLUTION: Performed by: INTERNAL MEDICINE

## 2019-06-20 PROCEDURE — 85025 COMPLETE CBC W/AUTO DIFF WBC: CPT | Performed by: INTERNAL MEDICINE

## 2019-06-20 PROCEDURE — 99232 SBSQ HOSP IP/OBS MODERATE 35: CPT | Performed by: INTERNAL MEDICINE

## 2019-06-20 PROCEDURE — 94799 UNLISTED PULMONARY SVC/PX: CPT

## 2019-06-20 PROCEDURE — 25010000002 CEFEPIME 2 G/NS 100 ML SOLUTION: Performed by: INTERNAL MEDICINE

## 2019-06-20 PROCEDURE — 84132 ASSAY OF SERUM POTASSIUM: CPT | Performed by: INTERNAL MEDICINE

## 2019-06-20 PROCEDURE — 25010000002 VANCOMYCIN 5 G RECONSTITUTED SOLUTION 5,000 MG VIAL: Performed by: INTERNAL MEDICINE

## 2019-06-20 PROCEDURE — 80048 BASIC METABOLIC PNL TOTAL CA: CPT | Performed by: INTERNAL MEDICINE

## 2019-06-20 PROCEDURE — 25010000002 HEPARIN (PORCINE) PER 1000 UNITS: Performed by: INTERNAL MEDICINE

## 2019-06-20 RX ORDER — ZIPRASIDONE HYDROCHLORIDE 20 MG/1
20 CAPSULE ORAL ONCE
Status: COMPLETED | OUTPATIENT
Start: 2019-06-20 | End: 2019-06-20

## 2019-06-20 RX ORDER — MAGNESIUM SULFATE HEPTAHYDRATE 40 MG/ML
4 INJECTION, SOLUTION INTRAVENOUS ONCE
Status: COMPLETED | OUTPATIENT
Start: 2019-06-20 | End: 2019-06-20

## 2019-06-20 RX ORDER — DONEPEZIL HYDROCHLORIDE 5 MG/1
5 TABLET, FILM COATED ORAL NIGHTLY
Status: DISCONTINUED | OUTPATIENT
Start: 2019-06-20 | End: 2019-06-28 | Stop reason: HOSPADM

## 2019-06-20 RX ORDER — POTASSIUM CHLORIDE 1.5 G/1.77G
40 POWDER, FOR SOLUTION ORAL EVERY 4 HOURS
Status: DISPENSED | OUTPATIENT
Start: 2019-06-20 | End: 2019-06-21

## 2019-06-20 RX ORDER — POTASSIUM CHLORIDE 1.5 G/1.77G
40 POWDER, FOR SOLUTION ORAL EVERY 4 HOURS
Status: COMPLETED | OUTPATIENT
Start: 2019-06-20 | End: 2019-06-20

## 2019-06-20 RX ADMIN — IPRATROPIUM BROMIDE AND ALBUTEROL SULFATE 3 ML: .5; 3 SOLUTION RESPIRATORY (INHALATION) at 07:27

## 2019-06-20 RX ADMIN — METOPROLOL TARTRATE 5 MG: 1 INJECTION, SOLUTION INTRAVENOUS at 05:00

## 2019-06-20 RX ADMIN — MUPIROCIN: 20 OINTMENT TOPICAL at 01:37

## 2019-06-20 RX ADMIN — PANTOPRAZOLE SODIUM 40 MG: 40 INJECTION, POWDER, FOR SOLUTION INTRAVENOUS at 05:05

## 2019-06-20 RX ADMIN — SODIUM CHLORIDE, PRESERVATIVE FREE 10 ML: 5 INJECTION INTRAVENOUS at 09:30

## 2019-06-20 RX ADMIN — MAGNESIUM SULFATE HEPTAHYDRATE 4 G: 40 INJECTION, SOLUTION INTRAVENOUS at 09:27

## 2019-06-20 RX ADMIN — CEFEPIME 2 G: 2 INJECTION, POWDER, FOR SOLUTION INTRAVENOUS at 13:00

## 2019-06-20 RX ADMIN — ZIPRASIDONE HYDROCHLORIDE 20 MG: 20 CAPSULE ORAL at 05:06

## 2019-06-20 RX ADMIN — POTASSIUM CHLORIDE 40 MEQ: 1.5 POWDER, FOR SOLUTION ORAL at 13:01

## 2019-06-20 RX ADMIN — IPRATROPIUM BROMIDE AND ALBUTEROL SULFATE 3 ML: .5; 3 SOLUTION RESPIRATORY (INHALATION) at 13:31

## 2019-06-20 RX ADMIN — IPRATROPIUM BROMIDE AND ALBUTEROL SULFATE 3 ML: .5; 3 SOLUTION RESPIRATORY (INHALATION) at 00:26

## 2019-06-20 RX ADMIN — ASCORBIC ACID, VITAMIN A PALMITATE, CHOLECALCIFEROL, THIAMINE HYDROCHLORIDE, RIBOFLAVIN-5 PHOSPHATE SODIUM, PYRIDOXINE HYDROCHLORIDE, NIACINAMIDE, DEXPANTHENOL, ALPHA-TOCOPHEROL ACETATE, VITAMIN K1, FOLIC ACID, BIOTIN, CYANOCOBALAMIN: 200; 3300; 200; 6; 3.6; 6; 40; 15; 10; 150; 600; 60; 5 INJECTION, SOLUTION INTRAVENOUS at 12:46

## 2019-06-20 RX ADMIN — BUDESONIDE 0.5 MG: 0.5 INHALANT RESPIRATORY (INHALATION) at 19:29

## 2019-06-20 RX ADMIN — HEPARIN SODIUM 5000 UNITS: 5000 INJECTION INTRAVENOUS; SUBCUTANEOUS at 05:05

## 2019-06-20 RX ADMIN — HYDROCORTISONE SODIUM SUCCINATE 50 MG: 100 INJECTION, POWDER, FOR SOLUTION INTRAMUSCULAR; INTRAVENOUS at 17:32

## 2019-06-20 RX ADMIN — HYDROCORTISONE SODIUM SUCCINATE 50 MG: 100 INJECTION, POWDER, FOR SOLUTION INTRAMUSCULAR; INTRAVENOUS at 13:01

## 2019-06-20 RX ADMIN — HYDROCORTISONE SODIUM SUCCINATE 50 MG: 100 INJECTION, POWDER, FOR SOLUTION INTRAMUSCULAR; INTRAVENOUS at 01:32

## 2019-06-20 RX ADMIN — HYDROCORTISONE SODIUM SUCCINATE 50 MG: 100 INJECTION, POWDER, FOR SOLUTION INTRAMUSCULAR; INTRAVENOUS at 05:06

## 2019-06-20 RX ADMIN — VANCOMYCIN HYDROCHLORIDE 750 MG: 5 INJECTION, POWDER, LYOPHILIZED, FOR SOLUTION INTRAVENOUS at 14:37

## 2019-06-20 RX ADMIN — MUPIROCIN: 20 OINTMENT TOPICAL at 09:29

## 2019-06-20 RX ADMIN — SODIUM CHLORIDE, PRESERVATIVE FREE 10 ML: 5 INJECTION INTRAVENOUS at 09:29

## 2019-06-20 RX ADMIN — HEPARIN SODIUM 5000 UNITS: 5000 INJECTION INTRAVENOUS; SUBCUTANEOUS at 13:01

## 2019-06-20 RX ADMIN — ACETYLCYSTEINE 3 ML: 200 SOLUTION ORAL; RESPIRATORY (INHALATION) at 19:29

## 2019-06-20 RX ADMIN — POTASSIUM CHLORIDE 40 MEQ: 1.5 POWDER, FOR SOLUTION ORAL at 07:29

## 2019-06-20 RX ADMIN — SERTRALINE 50 MG: 50 TABLET, FILM COATED ORAL at 17:32

## 2019-06-20 RX ADMIN — Medication 1 CAPSULE: at 09:28

## 2019-06-20 RX ADMIN — IPRATROPIUM BROMIDE AND ALBUTEROL SULFATE 3 ML: .5; 3 SOLUTION RESPIRATORY (INHALATION) at 19:29

## 2019-06-20 RX ADMIN — SODIUM BICARBONATE TAB 650 MG 650 MG: 650 TAB at 17:32

## 2019-06-20 RX ADMIN — ACETYLCYSTEINE 3 ML: 200 SOLUTION ORAL; RESPIRATORY (INHALATION) at 07:27

## 2019-06-20 RX ADMIN — SODIUM BICARBONATE TAB 650 MG 650 MG: 650 TAB at 09:28

## 2019-06-20 RX ADMIN — BUDESONIDE 0.5 MG: 0.5 INHALANT RESPIRATORY (INHALATION) at 07:28

## 2019-06-21 LAB
ANION GAP SERPL CALCULATED.3IONS-SCNC: 11.2 MMOL/L
BASOPHILS # BLD AUTO: 0.01 10*3/MM3 (ref 0–0.2)
BASOPHILS NFR BLD AUTO: 0.1 % (ref 0–1.5)
BUN BLD-MCNC: 16 MG/DL (ref 8–23)
BUN/CREAT SERPL: 29.1 (ref 7–25)
CALCIUM SPEC-SCNC: 8.6 MG/DL (ref 8.6–10.5)
CHLORIDE SERPL-SCNC: 99 MMOL/L (ref 98–107)
CO2 SERPL-SCNC: 34.8 MMOL/L (ref 22–29)
CREAT BLD-MCNC: 0.55 MG/DL (ref 0.57–1)
DEPRECATED RDW RBC AUTO: 47.8 FL (ref 37–54)
EOSINOPHIL # BLD AUTO: 0 10*3/MM3 (ref 0–0.4)
EOSINOPHIL NFR BLD AUTO: 0 % (ref 0.3–6.2)
ERYTHROCYTE [DISTWIDTH] IN BLOOD BY AUTOMATED COUNT: 14.6 % (ref 12.3–15.4)
GFR SERPL CREATININE-BSD FRML MDRD: 107 ML/MIN/1.73
GLUCOSE BLD-MCNC: 119 MG/DL (ref 65–99)
HCT VFR BLD AUTO: 38 % (ref 34–46.6)
HGB BLD-MCNC: 11.7 G/DL (ref 12–15.9)
IMM GRANULOCYTES # BLD AUTO: 0.22 10*3/MM3 (ref 0–0.05)
IMM GRANULOCYTES NFR BLD AUTO: 2.1 % (ref 0–0.5)
LYMPHOCYTES # BLD AUTO: 1.29 10*3/MM3 (ref 0.7–3.1)
LYMPHOCYTES NFR BLD AUTO: 12.1 % (ref 19.6–45.3)
Lab: NORMAL
MAGNESIUM SERPL-MCNC: 2.5 MG/DL (ref 1.6–2.4)
MCH RBC QN AUTO: 28.5 PG (ref 26.6–33)
MCHC RBC AUTO-ENTMCNC: 30.8 G/DL (ref 31.5–35.7)
MCV RBC AUTO: 92.7 FL (ref 79–97)
MONOCYTES # BLD AUTO: 1.6 10*3/MM3 (ref 0.1–0.9)
MONOCYTES NFR BLD AUTO: 15 % (ref 5–12)
NEUTROPHILS # BLD AUTO: 7.53 10*3/MM3 (ref 1.7–7)
NEUTROPHILS NFR BLD AUTO: 70.7 % (ref 42.7–76)
O+P SPEC MICRO: NORMAL
PLATELET # BLD AUTO: 224 10*3/MM3 (ref 140–450)
PMV BLD AUTO: 12.2 FL (ref 6–12)
POTASSIUM BLD-SCNC: 3.1 MMOL/L (ref 3.5–5.2)
RBC # BLD AUTO: 4.1 10*6/MM3 (ref 3.77–5.28)
SODIUM BLD-SCNC: 145 MMOL/L (ref 136–145)
WBC NRBC COR # BLD: 10.65 10*3/MM3 (ref 3.4–10.8)

## 2019-06-21 PROCEDURE — 25010000002 HEPARIN (PORCINE) PER 1000 UNITS: Performed by: INTERNAL MEDICINE

## 2019-06-21 PROCEDURE — 25010000002 HYDROCORTISONE SODIUM SUCCINATE 100 MG RECONSTITUTED SOLUTION: Performed by: INTERNAL MEDICINE

## 2019-06-21 PROCEDURE — 94799 UNLISTED PULMONARY SVC/PX: CPT

## 2019-06-21 PROCEDURE — 80048 BASIC METABOLIC PNL TOTAL CA: CPT | Performed by: INTERNAL MEDICINE

## 2019-06-21 PROCEDURE — 99232 SBSQ HOSP IP/OBS MODERATE 35: CPT | Performed by: INTERNAL MEDICINE

## 2019-06-21 PROCEDURE — 83735 ASSAY OF MAGNESIUM: CPT | Performed by: INTERNAL MEDICINE

## 2019-06-21 PROCEDURE — 85025 COMPLETE CBC W/AUTO DIFF WBC: CPT | Performed by: INTERNAL MEDICINE

## 2019-06-21 RX ORDER — PANTOPRAZOLE SODIUM 40 MG/1
40 TABLET, DELAYED RELEASE ORAL EVERY MORNING
Status: DISCONTINUED | OUTPATIENT
Start: 2019-06-22 | End: 2019-06-28 | Stop reason: HOSPADM

## 2019-06-21 RX ORDER — POTASSIUM CHLORIDE 20 MEQ/1
40 TABLET, EXTENDED RELEASE ORAL EVERY 4 HOURS
Status: DISPENSED | OUTPATIENT
Start: 2019-06-21 | End: 2019-06-21

## 2019-06-21 RX ADMIN — ACETYLCYSTEINE 3 ML: 200 SOLUTION ORAL; RESPIRATORY (INHALATION) at 07:45

## 2019-06-21 RX ADMIN — BUDESONIDE 0.5 MG: 0.5 INHALANT RESPIRATORY (INHALATION) at 07:51

## 2019-06-21 RX ADMIN — IPRATROPIUM BROMIDE AND ALBUTEROL SULFATE 3 ML: .5; 3 SOLUTION RESPIRATORY (INHALATION) at 19:54

## 2019-06-21 RX ADMIN — IPRATROPIUM BROMIDE AND ALBUTEROL SULFATE 3 ML: .5; 3 SOLUTION RESPIRATORY (INHALATION) at 13:54

## 2019-06-21 RX ADMIN — IPRATROPIUM BROMIDE AND ALBUTEROL SULFATE 3 ML: .5; 3 SOLUTION RESPIRATORY (INHALATION) at 07:45

## 2019-06-21 RX ADMIN — ACETYLCYSTEINE 3 ML: 200 SOLUTION ORAL; RESPIRATORY (INHALATION) at 19:54

## 2019-06-21 RX ADMIN — HYDROCORTISONE SODIUM SUCCINATE 50 MG: 100 INJECTION, POWDER, FOR SOLUTION INTRAMUSCULAR; INTRAVENOUS at 14:17

## 2019-06-21 RX ADMIN — BUDESONIDE 0.5 MG: 0.5 INHALANT RESPIRATORY (INHALATION) at 19:54

## 2019-06-22 LAB
ANION GAP SERPL CALCULATED.3IONS-SCNC: 9.9 MMOL/L
BASOPHILS # BLD AUTO: 0.01 10*3/MM3 (ref 0–0.2)
BASOPHILS NFR BLD AUTO: 0.2 % (ref 0–1.5)
BUN BLD-MCNC: 18 MG/DL (ref 8–23)
BUN/CREAT SERPL: 40.9 (ref 7–25)
CALCIUM SPEC-SCNC: 8 MG/DL (ref 8.6–10.5)
CAMPYLOBACTER STL CULT: NORMAL
CHLORIDE SERPL-SCNC: 99 MMOL/L (ref 98–107)
CO2 SERPL-SCNC: 34.1 MMOL/L (ref 22–29)
CREAT BLD-MCNC: 0.44 MG/DL (ref 0.57–1)
DEPRECATED RDW RBC AUTO: 48.3 FL (ref 37–54)
E COLI SXT STL QL IA: NEGATIVE
EOSINOPHIL # BLD AUTO: 0.01 10*3/MM3 (ref 0–0.4)
EOSINOPHIL NFR BLD AUTO: 0.2 % (ref 0.3–6.2)
ERYTHROCYTE [DISTWIDTH] IN BLOOD BY AUTOMATED COUNT: 14.8 % (ref 12.3–15.4)
GFR SERPL CREATININE-BSD FRML MDRD: 138 ML/MIN/1.73
GLUCOSE BLD-MCNC: 93 MG/DL (ref 65–99)
HCT VFR BLD AUTO: 35.5 % (ref 34–46.6)
HGB BLD-MCNC: 11.1 G/DL (ref 12–15.9)
IMM GRANULOCYTES # BLD AUTO: 0.07 10*3/MM3 (ref 0–0.05)
IMM GRANULOCYTES NFR BLD AUTO: 1.1 % (ref 0–0.5)
LYMPHOCYTES # BLD AUTO: 1.3 10*3/MM3 (ref 0.7–3.1)
LYMPHOCYTES NFR BLD AUTO: 20.2 % (ref 19.6–45.3)
Lab: NORMAL
Lab: NORMAL
MCH RBC QN AUTO: 29.4 PG (ref 26.6–33)
MCHC RBC AUTO-ENTMCNC: 31.3 G/DL (ref 31.5–35.7)
MCV RBC AUTO: 93.9 FL (ref 79–97)
MONOCYTES # BLD AUTO: 1.03 10*3/MM3 (ref 0.1–0.9)
MONOCYTES NFR BLD AUTO: 16 % (ref 5–12)
NEUTROPHILS # BLD AUTO: 4.03 10*3/MM3 (ref 1.7–7)
NEUTROPHILS NFR BLD AUTO: 62.3 % (ref 42.7–76)
PLATELET # BLD AUTO: 168 10*3/MM3 (ref 140–450)
PMV BLD AUTO: 11.5 FL (ref 6–12)
POTASSIUM BLD-SCNC: 2.7 MMOL/L (ref 3.5–5.2)
RBC # BLD AUTO: 3.78 10*6/MM3 (ref 3.77–5.28)
SALM + SHIG STL CULT: NORMAL
SODIUM BLD-SCNC: 143 MMOL/L (ref 136–145)
WBC NRBC COR # BLD: 6.45 10*3/MM3 (ref 3.4–10.8)

## 2019-06-22 PROCEDURE — 25010000002 HEPARIN (PORCINE) PER 1000 UNITS: Performed by: INTERNAL MEDICINE

## 2019-06-22 PROCEDURE — 94799 UNLISTED PULMONARY SVC/PX: CPT

## 2019-06-22 PROCEDURE — 25010000002 HYDROCORTISONE SODIUM SUCCINATE 100 MG RECONSTITUTED SOLUTION: Performed by: INTERNAL MEDICINE

## 2019-06-22 PROCEDURE — 80048 BASIC METABOLIC PNL TOTAL CA: CPT | Performed by: INTERNAL MEDICINE

## 2019-06-22 PROCEDURE — 99232 SBSQ HOSP IP/OBS MODERATE 35: CPT | Performed by: INTERNAL MEDICINE

## 2019-06-22 PROCEDURE — 85025 COMPLETE CBC W/AUTO DIFF WBC: CPT | Performed by: INTERNAL MEDICINE

## 2019-06-22 PROCEDURE — 25010000003 POTASSIUM CHLORIDE 10 MEQ/100ML SOLUTION: Performed by: INTERNAL MEDICINE

## 2019-06-22 RX ORDER — POTASSIUM CHLORIDE 20 MEQ/1
40 TABLET, EXTENDED RELEASE ORAL AS NEEDED
Status: DISCONTINUED | OUTPATIENT
Start: 2019-06-22 | End: 2019-06-26

## 2019-06-22 RX ORDER — POTASSIUM CHLORIDE 7.45 MG/ML
10 INJECTION INTRAVENOUS
Status: DISPENSED | OUTPATIENT
Start: 2019-06-22 | End: 2019-06-22

## 2019-06-22 RX ORDER — POTASSIUM CHLORIDE 1.5 G/1.77G
40 POWDER, FOR SOLUTION ORAL AS NEEDED
Status: DISCONTINUED | OUTPATIENT
Start: 2019-06-22 | End: 2019-06-26

## 2019-06-22 RX ORDER — POTASSIUM CHLORIDE 7.45 MG/ML
10 INJECTION INTRAVENOUS
Status: DISCONTINUED | OUTPATIENT
Start: 2019-06-22 | End: 2019-06-26

## 2019-06-22 RX ADMIN — POTASSIUM CHLORIDE 10 MEQ: 10 INJECTION, SOLUTION INTRAVENOUS at 17:31

## 2019-06-22 RX ADMIN — HEPARIN SODIUM 5000 UNITS: 5000 INJECTION INTRAVENOUS; SUBCUTANEOUS at 20:58

## 2019-06-22 RX ADMIN — SODIUM CHLORIDE, PRESERVATIVE FREE 10 ML: 5 INJECTION INTRAVENOUS at 09:48

## 2019-06-22 RX ADMIN — Medication 1 CAPSULE: at 09:37

## 2019-06-22 RX ADMIN — BUDESONIDE 0.5 MG: 0.5 INHALANT RESPIRATORY (INHALATION) at 07:54

## 2019-06-22 RX ADMIN — DONEPEZIL HYDROCHLORIDE 5 MG: 5 TABLET, FILM COATED ORAL at 20:58

## 2019-06-22 RX ADMIN — HYDROCORTISONE SODIUM SUCCINATE 50 MG: 100 INJECTION, POWDER, FOR SOLUTION INTRAMUSCULAR; INTRAVENOUS at 12:09

## 2019-06-22 RX ADMIN — ACETYLCYSTEINE 3 ML: 200 SOLUTION ORAL; RESPIRATORY (INHALATION) at 07:56

## 2019-06-22 RX ADMIN — MUPIROCIN: 20 OINTMENT TOPICAL at 09:37

## 2019-06-22 RX ADMIN — MUPIROCIN: 20 OINTMENT TOPICAL at 22:33

## 2019-06-22 RX ADMIN — POTASSIUM CHLORIDE 10 MEQ: 10 INJECTION, SOLUTION INTRAVENOUS at 13:51

## 2019-06-22 RX ADMIN — SODIUM BICARBONATE TAB 650 MG 650 MG: 650 TAB at 09:52

## 2019-06-22 RX ADMIN — POTASSIUM CHLORIDE 10 MEQ: 10 INJECTION, SOLUTION INTRAVENOUS at 15:18

## 2019-06-22 RX ADMIN — ACETYLCYSTEINE 3 ML: 200 SOLUTION ORAL; RESPIRATORY (INHALATION) at 18:45

## 2019-06-22 RX ADMIN — POTASSIUM CHLORIDE 10 MEQ: 10 INJECTION, SOLUTION INTRAVENOUS at 18:36

## 2019-06-22 RX ADMIN — SODIUM BICARBONATE TAB 650 MG 650 MG: 650 TAB at 20:58

## 2019-06-22 RX ADMIN — HEPARIN SODIUM 5000 UNITS: 5000 INJECTION INTRAVENOUS; SUBCUTANEOUS at 13:51

## 2019-06-22 RX ADMIN — SODIUM CHLORIDE, PRESERVATIVE FREE 10 ML: 5 INJECTION INTRAVENOUS at 20:59

## 2019-06-22 RX ADMIN — SERTRALINE 50 MG: 50 TABLET, FILM COATED ORAL at 09:37

## 2019-06-22 RX ADMIN — IPRATROPIUM BROMIDE AND ALBUTEROL SULFATE 3 ML: .5; 3 SOLUTION RESPIRATORY (INHALATION) at 18:39

## 2019-06-22 RX ADMIN — BUDESONIDE 0.5 MG: 0.5 INHALANT RESPIRATORY (INHALATION) at 18:39

## 2019-06-22 RX ADMIN — POTASSIUM CHLORIDE 10 MEQ: 10 INJECTION, SOLUTION INTRAVENOUS at 16:23

## 2019-06-22 RX ADMIN — IPRATROPIUM BROMIDE AND ALBUTEROL SULFATE 3 ML: .5; 3 SOLUTION RESPIRATORY (INHALATION) at 15:14

## 2019-06-22 RX ADMIN — HYDROCORTISONE SODIUM SUCCINATE 50 MG: 100 INJECTION, POWDER, FOR SOLUTION INTRAMUSCULAR; INTRAVENOUS at 17:32

## 2019-06-22 RX ADMIN — SODIUM CHLORIDE, PRESERVATIVE FREE 10 ML: 5 INJECTION INTRAVENOUS at 09:47

## 2019-06-22 RX ADMIN — ASCORBIC ACID, VITAMIN A PALMITATE, CHOLECALCIFEROL, THIAMINE HYDROCHLORIDE, RIBOFLAVIN-5 PHOSPHATE SODIUM, PYRIDOXINE HYDROCHLORIDE, NIACINAMIDE, DEXPANTHENOL, ALPHA-TOCOPHEROL ACETATE, VITAMIN K1, FOLIC ACID, BIOTIN, CYANOCOBALAMIN: 200; 3300; 200; 6; 3.6; 6; 40; 15; 10; 150; 600; 60; 5 INJECTION, SOLUTION INTRAVENOUS at 09:49

## 2019-06-22 RX ADMIN — IPRATROPIUM BROMIDE AND ALBUTEROL SULFATE 3 ML: .5; 3 SOLUTION RESPIRATORY (INHALATION) at 07:57

## 2019-06-22 RX ADMIN — SODIUM BICARBONATE TAB 650 MG 650 MG: 650 TAB at 17:32

## 2019-06-23 LAB
ANION GAP SERPL CALCULATED.3IONS-SCNC: 10.8 MMOL/L
BASOPHILS # BLD AUTO: 0 10*3/MM3 (ref 0–0.2)
BASOPHILS NFR BLD AUTO: 0 % (ref 0–1.5)
BUN BLD-MCNC: 18 MG/DL (ref 8–23)
BUN/CREAT SERPL: 41.9 (ref 7–25)
CALCIUM SPEC-SCNC: 8.4 MG/DL (ref 8.6–10.5)
CHLORIDE SERPL-SCNC: 96 MMOL/L (ref 98–107)
CO2 SERPL-SCNC: 31.2 MMOL/L (ref 22–29)
CREAT BLD-MCNC: 0.43 MG/DL (ref 0.57–1)
DEPRECATED RDW RBC AUTO: 48.4 FL (ref 37–54)
EOSINOPHIL # BLD AUTO: 0 10*3/MM3 (ref 0–0.4)
EOSINOPHIL NFR BLD AUTO: 0 % (ref 0.3–6.2)
ERYTHROCYTE [DISTWIDTH] IN BLOOD BY AUTOMATED COUNT: 14.8 % (ref 12.3–15.4)
GFR SERPL CREATININE-BSD FRML MDRD: 142 ML/MIN/1.73
GLUCOSE BLD-MCNC: 161 MG/DL (ref 65–99)
HCT VFR BLD AUTO: 32.8 % (ref 34–46.6)
HGB BLD-MCNC: 10.6 G/DL (ref 12–15.9)
IMM GRANULOCYTES # BLD AUTO: 0.03 10*3/MM3 (ref 0–0.05)
IMM GRANULOCYTES NFR BLD AUTO: 0.5 % (ref 0–0.5)
LYMPHOCYTES # BLD AUTO: 0.48 10*3/MM3 (ref 0.7–3.1)
LYMPHOCYTES NFR BLD AUTO: 7.7 % (ref 19.6–45.3)
MCH RBC QN AUTO: 29.2 PG (ref 26.6–33)
MCHC RBC AUTO-ENTMCNC: 32.3 G/DL (ref 31.5–35.7)
MCV RBC AUTO: 90.4 FL (ref 79–97)
MONOCYTES # BLD AUTO: 0.37 10*3/MM3 (ref 0.1–0.9)
MONOCYTES NFR BLD AUTO: 5.9 % (ref 5–12)
NEUTROPHILS # BLD AUTO: 5.39 10*3/MM3 (ref 1.7–7)
NEUTROPHILS NFR BLD AUTO: 85.9 % (ref 42.7–76)
PLATELET # BLD AUTO: 174 10*3/MM3 (ref 140–450)
PMV BLD AUTO: 12.4 FL (ref 6–12)
POTASSIUM BLD-SCNC: 3.4 MMOL/L (ref 3.5–5.2)
POTASSIUM BLD-SCNC: 3.4 MMOL/L (ref 3.5–5.2)
POTASSIUM BLD-SCNC: 4.4 MMOL/L (ref 3.5–5.2)
RBC # BLD AUTO: 3.63 10*6/MM3 (ref 3.77–5.28)
SODIUM BLD-SCNC: 138 MMOL/L (ref 136–145)
WBC NRBC COR # BLD: 6.27 10*3/MM3 (ref 3.4–10.8)

## 2019-06-23 PROCEDURE — 99232 SBSQ HOSP IP/OBS MODERATE 35: CPT | Performed by: PSYCHIATRY & NEUROLOGY

## 2019-06-23 PROCEDURE — 94799 UNLISTED PULMONARY SVC/PX: CPT

## 2019-06-23 PROCEDURE — 85025 COMPLETE CBC W/AUTO DIFF WBC: CPT | Performed by: INTERNAL MEDICINE

## 2019-06-23 PROCEDURE — 25010000002 HYDROCORTISONE SODIUM SUCCINATE 100 MG RECONSTITUTED SOLUTION: Performed by: INTERNAL MEDICINE

## 2019-06-23 PROCEDURE — 99232 SBSQ HOSP IP/OBS MODERATE 35: CPT | Performed by: INTERNAL MEDICINE

## 2019-06-23 PROCEDURE — 25010000002 HEPARIN (PORCINE) PER 1000 UNITS: Performed by: INTERNAL MEDICINE

## 2019-06-23 PROCEDURE — 80048 BASIC METABOLIC PNL TOTAL CA: CPT | Performed by: INTERNAL MEDICINE

## 2019-06-23 PROCEDURE — 25010000003 POTASSIUM CHLORIDE 10 MEQ/100ML SOLUTION: Performed by: INTERNAL MEDICINE

## 2019-06-23 PROCEDURE — 84132 ASSAY OF SERUM POTASSIUM: CPT | Performed by: INTERNAL MEDICINE

## 2019-06-23 RX ORDER — POTASSIUM CHLORIDE 7.45 MG/ML
10 INJECTION INTRAVENOUS
Status: DISPENSED | OUTPATIENT
Start: 2019-06-23 | End: 2019-06-23

## 2019-06-23 RX ORDER — CEFDINIR 300 MG/1
300 CAPSULE ORAL EVERY 12 HOURS SCHEDULED
Status: DISPENSED | OUTPATIENT
Start: 2019-06-23 | End: 2019-06-26

## 2019-06-23 RX ADMIN — DONEPEZIL HYDROCHLORIDE 5 MG: 5 TABLET, FILM COATED ORAL at 20:04

## 2019-06-23 RX ADMIN — POTASSIUM CHLORIDE 10 MEQ: 10 INJECTION, SOLUTION INTRAVENOUS at 09:45

## 2019-06-23 RX ADMIN — HEPARIN SODIUM 5000 UNITS: 5000 INJECTION INTRAVENOUS; SUBCUTANEOUS at 20:05

## 2019-06-23 RX ADMIN — SERTRALINE 50 MG: 50 TABLET, FILM COATED ORAL at 09:18

## 2019-06-23 RX ADMIN — HYDROCORTISONE SODIUM SUCCINATE 50 MG: 100 INJECTION, POWDER, FOR SOLUTION INTRAMUSCULAR; INTRAVENOUS at 00:39

## 2019-06-23 RX ADMIN — HYDROCORTISONE SODIUM SUCCINATE 50 MG: 100 INJECTION, POWDER, FOR SOLUTION INTRAMUSCULAR; INTRAVENOUS at 23:33

## 2019-06-23 RX ADMIN — IPRATROPIUM BROMIDE AND ALBUTEROL SULFATE 3 ML: .5; 3 SOLUTION RESPIRATORY (INHALATION) at 07:16

## 2019-06-23 RX ADMIN — HEPARIN SODIUM 5000 UNITS: 5000 INJECTION INTRAVENOUS; SUBCUTANEOUS at 13:35

## 2019-06-23 RX ADMIN — SODIUM BICARBONATE TAB 650 MG 650 MG: 650 TAB at 16:16

## 2019-06-23 RX ADMIN — ACETYLCYSTEINE 3 ML: 200 SOLUTION ORAL; RESPIRATORY (INHALATION) at 18:32

## 2019-06-23 RX ADMIN — SODIUM CHLORIDE, PRESERVATIVE FREE 10 ML: 5 INJECTION INTRAVENOUS at 21:47

## 2019-06-23 RX ADMIN — SODIUM BICARBONATE TAB 650 MG 650 MG: 650 TAB at 20:05

## 2019-06-23 RX ADMIN — HYDROCORTISONE SODIUM SUCCINATE 50 MG: 100 INJECTION, POWDER, FOR SOLUTION INTRAMUSCULAR; INTRAVENOUS at 18:36

## 2019-06-23 RX ADMIN — BUDESONIDE 0.5 MG: 0.5 INHALANT RESPIRATORY (INHALATION) at 18:32

## 2019-06-23 RX ADMIN — HEPARIN SODIUM 5000 UNITS: 5000 INJECTION INTRAVENOUS; SUBCUTANEOUS at 05:20

## 2019-06-23 RX ADMIN — HYDROCORTISONE SODIUM SUCCINATE 50 MG: 100 INJECTION, POWDER, FOR SOLUTION INTRAMUSCULAR; INTRAVENOUS at 05:20

## 2019-06-23 RX ADMIN — IPRATROPIUM BROMIDE AND ALBUTEROL SULFATE 3 ML: .5; 3 SOLUTION RESPIRATORY (INHALATION) at 14:09

## 2019-06-23 RX ADMIN — SODIUM BICARBONATE TAB 650 MG 650 MG: 650 TAB at 09:18

## 2019-06-23 RX ADMIN — MUPIROCIN: 20 OINTMENT TOPICAL at 20:04

## 2019-06-23 RX ADMIN — POTASSIUM CHLORIDE 10 MEQ: 10 INJECTION, SOLUTION INTRAVENOUS at 12:44

## 2019-06-23 RX ADMIN — POTASSIUM CHLORIDE 10 MEQ: 10 INJECTION, SOLUTION INTRAVENOUS at 13:35

## 2019-06-23 RX ADMIN — MUPIROCIN: 20 OINTMENT TOPICAL at 09:19

## 2019-06-23 RX ADMIN — Medication 1 CAPSULE: at 09:18

## 2019-06-23 RX ADMIN — IPRATROPIUM BROMIDE AND ALBUTEROL SULFATE 3 ML: .5; 3 SOLUTION RESPIRATORY (INHALATION) at 18:32

## 2019-06-23 RX ADMIN — PANTOPRAZOLE SODIUM 40 MG: 40 TABLET, DELAYED RELEASE ORAL at 05:20

## 2019-06-23 RX ADMIN — ACETYLCYSTEINE 3 ML: 200 SOLUTION ORAL; RESPIRATORY (INHALATION) at 07:17

## 2019-06-23 RX ADMIN — POTASSIUM CHLORIDE 10 MEQ: 10 INJECTION, SOLUTION INTRAVENOUS at 11:30

## 2019-06-23 RX ADMIN — HYDROCORTISONE SODIUM SUCCINATE 50 MG: 100 INJECTION, POWDER, FOR SOLUTION INTRAMUSCULAR; INTRAVENOUS at 13:34

## 2019-06-23 RX ADMIN — SODIUM CHLORIDE, PRESERVATIVE FREE 10 ML: 5 INJECTION INTRAVENOUS at 09:00

## 2019-06-23 RX ADMIN — ASCORBIC ACID, VITAMIN A PALMITATE, CHOLECALCIFEROL, THIAMINE HYDROCHLORIDE, RIBOFLAVIN-5 PHOSPHATE SODIUM, PYRIDOXINE HYDROCHLORIDE, NIACINAMIDE, DEXPANTHENOL, ALPHA-TOCOPHEROL ACETATE, VITAMIN K1, FOLIC ACID, BIOTIN, CYANOCOBALAMIN: 200; 3300; 200; 6; 3.6; 6; 40; 15; 10; 150; 600; 60; 5 INJECTION, SOLUTION INTRAVENOUS at 09:45

## 2019-06-23 RX ADMIN — IPRATROPIUM BROMIDE AND ALBUTEROL SULFATE 3 ML: .5; 3 SOLUTION RESPIRATORY (INHALATION) at 00:15

## 2019-06-23 RX ADMIN — BUDESONIDE 0.5 MG: 0.5 INHALANT RESPIRATORY (INHALATION) at 07:17

## 2019-06-24 LAB
ANION GAP SERPL CALCULATED.3IONS-SCNC: 7.9 MMOL/L
BASOPHILS # BLD AUTO: 0 10*3/MM3 (ref 0–0.2)
BASOPHILS NFR BLD AUTO: 0 % (ref 0–1.5)
BUN BLD-MCNC: 25 MG/DL (ref 8–23)
BUN/CREAT SERPL: 55.6 (ref 7–25)
CALCIUM SPEC-SCNC: 8.2 MG/DL (ref 8.6–10.5)
CHLORIDE SERPL-SCNC: 99 MMOL/L (ref 98–107)
CO2 SERPL-SCNC: 32.1 MMOL/L (ref 22–29)
CREAT BLD-MCNC: 0.45 MG/DL (ref 0.57–1)
DEPRECATED RDW RBC AUTO: 47.6 FL (ref 37–54)
EOSINOPHIL # BLD AUTO: 0 10*3/MM3 (ref 0–0.4)
EOSINOPHIL NFR BLD AUTO: 0 % (ref 0.3–6.2)
ERYTHROCYTE [DISTWIDTH] IN BLOOD BY AUTOMATED COUNT: 14.8 % (ref 12.3–15.4)
FERRITIN SERPL-MCNC: 257.5 NG/ML (ref 13–150)
GFR SERPL CREATININE-BSD FRML MDRD: 134 ML/MIN/1.73
GLUCOSE BLD-MCNC: 233 MG/DL (ref 65–99)
HBA1C MFR BLD: 5.7 % (ref 4.8–5.6)
HCT VFR BLD AUTO: 32 % (ref 34–46.6)
HGB BLD-MCNC: 9.8 G/DL (ref 12–15.9)
IMM GRANULOCYTES # BLD AUTO: 0.03 10*3/MM3 (ref 0–0.05)
IMM GRANULOCYTES NFR BLD AUTO: 0.3 % (ref 0–0.5)
IRON 24H UR-MRATE: 38 MCG/DL (ref 37–145)
IRON SATN MFR SERPL: 22 % (ref 20–50)
LYMPHOCYTES # BLD AUTO: 0.44 10*3/MM3 (ref 0.7–3.1)
LYMPHOCYTES NFR BLD AUTO: 5.1 % (ref 19.6–45.3)
MCH RBC QN AUTO: 28.9 PG (ref 26.6–33)
MCHC RBC AUTO-ENTMCNC: 30.6 G/DL (ref 31.5–35.7)
MCV RBC AUTO: 94.4 FL (ref 79–97)
MONOCYTES # BLD AUTO: 0.6 10*3/MM3 (ref 0.1–0.9)
MONOCYTES NFR BLD AUTO: 6.9 % (ref 5–12)
NEUTROPHILS # BLD AUTO: 7.63 10*3/MM3 (ref 1.7–7)
NEUTROPHILS NFR BLD AUTO: 87.7 % (ref 42.7–76)
PLATELET # BLD AUTO: 142 10*3/MM3 (ref 140–450)
PMV BLD AUTO: 11.7 FL (ref 6–12)
POTASSIUM BLD-SCNC: 3.7 MMOL/L (ref 3.5–5.2)
RBC # BLD AUTO: 3.39 10*6/MM3 (ref 3.77–5.28)
SODIUM BLD-SCNC: 139 MMOL/L (ref 136–145)
TIBC SERPL-MCNC: 170 MCG/DL (ref 298–536)
TRANSFERRIN SERPL-MCNC: 114 MG/DL (ref 200–360)
WBC NRBC COR # BLD: 8.7 10*3/MM3 (ref 3.4–10.8)

## 2019-06-24 PROCEDURE — 83036 HEMOGLOBIN GLYCOSYLATED A1C: CPT | Performed by: HOSPITALIST

## 2019-06-24 PROCEDURE — 82746 ASSAY OF FOLIC ACID SERUM: CPT | Performed by: HOSPITALIST

## 2019-06-24 PROCEDURE — 25010000002 HYDROCORTISONE SODIUM SUCCINATE 100 MG RECONSTITUTED SOLUTION: Performed by: HOSPITALIST

## 2019-06-24 PROCEDURE — 80048 BASIC METABOLIC PNL TOTAL CA: CPT | Performed by: INTERNAL MEDICINE

## 2019-06-24 PROCEDURE — 85025 COMPLETE CBC W/AUTO DIFF WBC: CPT | Performed by: INTERNAL MEDICINE

## 2019-06-24 PROCEDURE — 94799 UNLISTED PULMONARY SVC/PX: CPT

## 2019-06-24 PROCEDURE — 25010000002 HEPARIN (PORCINE) PER 1000 UNITS: Performed by: INTERNAL MEDICINE

## 2019-06-24 PROCEDURE — 84466 ASSAY OF TRANSFERRIN: CPT | Performed by: HOSPITALIST

## 2019-06-24 PROCEDURE — 25010000002 HYDROCORTISONE SODIUM SUCCINATE 100 MG RECONSTITUTED SOLUTION: Performed by: INTERNAL MEDICINE

## 2019-06-24 PROCEDURE — 82607 VITAMIN B-12: CPT | Performed by: HOSPITALIST

## 2019-06-24 PROCEDURE — 83540 ASSAY OF IRON: CPT | Performed by: HOSPITALIST

## 2019-06-24 PROCEDURE — 82728 ASSAY OF FERRITIN: CPT | Performed by: HOSPITALIST

## 2019-06-24 PROCEDURE — 99232 SBSQ HOSP IP/OBS MODERATE 35: CPT | Performed by: HOSPITALIST

## 2019-06-24 RX ORDER — IPRATROPIUM BROMIDE AND ALBUTEROL SULFATE 2.5; .5 MG/3ML; MG/3ML
3 SOLUTION RESPIRATORY (INHALATION) EVERY 6 HOURS PRN
Status: DISCONTINUED | OUTPATIENT
Start: 2019-06-24 | End: 2019-06-28 | Stop reason: HOSPADM

## 2019-06-24 RX ADMIN — PANTOPRAZOLE SODIUM 40 MG: 40 TABLET, DELAYED RELEASE ORAL at 05:20

## 2019-06-24 RX ADMIN — SERTRALINE 50 MG: 50 TABLET, FILM COATED ORAL at 08:57

## 2019-06-24 RX ADMIN — BUDESONIDE 0.5 MG: 0.5 INHALANT RESPIRATORY (INHALATION) at 07:35

## 2019-06-24 RX ADMIN — CEFDINIR 300 MG: 300 CAPSULE ORAL at 08:57

## 2019-06-24 RX ADMIN — SODIUM CHLORIDE, PRESERVATIVE FREE 10 ML: 5 INJECTION INTRAVENOUS at 21:51

## 2019-06-24 RX ADMIN — SODIUM CHLORIDE, PRESERVATIVE FREE 10 ML: 5 INJECTION INTRAVENOUS at 08:59

## 2019-06-24 RX ADMIN — SODIUM BICARBONATE TAB 650 MG 650 MG: 650 TAB at 08:59

## 2019-06-24 RX ADMIN — Medication 1 CAPSULE: at 08:57

## 2019-06-24 RX ADMIN — IPRATROPIUM BROMIDE AND ALBUTEROL SULFATE 3 ML: .5; 3 SOLUTION RESPIRATORY (INHALATION) at 07:35

## 2019-06-24 RX ADMIN — HEPARIN SODIUM 5000 UNITS: 5000 INJECTION INTRAVENOUS; SUBCUTANEOUS at 21:51

## 2019-06-24 RX ADMIN — IPRATROPIUM BROMIDE AND ALBUTEROL SULFATE 3 ML: .5; 3 SOLUTION RESPIRATORY (INHALATION) at 00:03

## 2019-06-24 RX ADMIN — HYDROCORTISONE SODIUM SUCCINATE 50 MG: 100 INJECTION, POWDER, FOR SOLUTION INTRAMUSCULAR; INTRAVENOUS at 05:20

## 2019-06-24 RX ADMIN — SODIUM BICARBONATE TAB 650 MG 650 MG: 650 TAB at 15:09

## 2019-06-24 RX ADMIN — HYDROCORTISONE SODIUM SUCCINATE 50 MG: 100 INJECTION, POWDER, FOR SOLUTION INTRAMUSCULAR; INTRAVENOUS at 17:25

## 2019-06-24 RX ADMIN — ASCORBIC ACID, VITAMIN A PALMITATE, CHOLECALCIFEROL, THIAMINE HYDROCHLORIDE, RIBOFLAVIN-5 PHOSPHATE SODIUM, PYRIDOXINE HYDROCHLORIDE, NIACINAMIDE, DEXPANTHENOL, ALPHA-TOCOPHEROL ACETATE, VITAMIN K1, FOLIC ACID, BIOTIN, CYANOCOBALAMIN: 200; 3300; 200; 6; 3.6; 6; 40; 15; 10; 150; 600; 60; 5 INJECTION, SOLUTION INTRAVENOUS at 08:58

## 2019-06-24 RX ADMIN — CEFDINIR 300 MG: 300 CAPSULE ORAL at 21:50

## 2019-06-24 RX ADMIN — BUDESONIDE 0.5 MG: 0.5 INHALANT RESPIRATORY (INHALATION) at 19:41

## 2019-06-24 RX ADMIN — HEPARIN SODIUM 5000 UNITS: 5000 INJECTION INTRAVENOUS; SUBCUTANEOUS at 15:09

## 2019-06-24 RX ADMIN — ACETYLCYSTEINE 3 ML: 200 SOLUTION ORAL; RESPIRATORY (INHALATION) at 07:35

## 2019-06-24 RX ADMIN — IPRATROPIUM BROMIDE AND ALBUTEROL SULFATE 3 ML: .5; 3 SOLUTION RESPIRATORY (INHALATION) at 13:35

## 2019-06-24 RX ADMIN — DONEPEZIL HYDROCHLORIDE 5 MG: 5 TABLET, FILM COATED ORAL at 21:50

## 2019-06-24 RX ADMIN — MUPIROCIN: 20 OINTMENT TOPICAL at 21:51

## 2019-06-24 RX ADMIN — MUPIROCIN: 20 OINTMENT TOPICAL at 08:58

## 2019-06-24 RX ADMIN — HEPARIN SODIUM 5000 UNITS: 5000 INJECTION INTRAVENOUS; SUBCUTANEOUS at 05:19

## 2019-06-24 RX ADMIN — IPRATROPIUM BROMIDE AND ALBUTEROL SULFATE 3 ML: .5; 3 SOLUTION RESPIRATORY (INHALATION) at 19:41

## 2019-06-25 LAB
ANION GAP SERPL CALCULATED.3IONS-SCNC: 6.7 MMOL/L
BASOPHILS # BLD AUTO: 0 10*3/MM3 (ref 0–0.2)
BASOPHILS NFR BLD AUTO: 0 % (ref 0–1.5)
BUN BLD-MCNC: 26 MG/DL (ref 8–23)
BUN/CREAT SERPL: 65 (ref 7–25)
CALCIUM SPEC-SCNC: 7.9 MG/DL (ref 8.6–10.5)
CHLORIDE SERPL-SCNC: 102 MMOL/L (ref 98–107)
CO2 SERPL-SCNC: 32.3 MMOL/L (ref 22–29)
CREAT BLD-MCNC: 0.4 MG/DL (ref 0.57–1)
DEPRECATED RDW RBC AUTO: 48.9 FL (ref 37–54)
EOSINOPHIL # BLD AUTO: 0.03 10*3/MM3 (ref 0–0.4)
EOSINOPHIL NFR BLD AUTO: 0.3 % (ref 0.3–6.2)
ERYTHROCYTE [DISTWIDTH] IN BLOOD BY AUTOMATED COUNT: 15.1 % (ref 12.3–15.4)
FOLATE SERPL-MCNC: >20 NG/ML (ref 4.78–24.2)
GFR SERPL CREATININE-BSD FRML MDRD: >150 ML/MIN/1.73
GLUCOSE BLD-MCNC: 114 MG/DL (ref 65–99)
HCT VFR BLD AUTO: 32 % (ref 34–46.6)
HGB BLD-MCNC: 9.8 G/DL (ref 12–15.9)
IMM GRANULOCYTES # BLD AUTO: 0.08 10*3/MM3 (ref 0–0.05)
IMM GRANULOCYTES NFR BLD AUTO: 0.8 % (ref 0–0.5)
LYMPHOCYTES # BLD AUTO: 1.01 10*3/MM3 (ref 0.7–3.1)
LYMPHOCYTES NFR BLD AUTO: 9.5 % (ref 19.6–45.3)
MCH RBC QN AUTO: 28.9 PG (ref 26.6–33)
MCHC RBC AUTO-ENTMCNC: 30.6 G/DL (ref 31.5–35.7)
MCV RBC AUTO: 94.4 FL (ref 79–97)
MONOCYTES # BLD AUTO: 1.18 10*3/MM3 (ref 0.1–0.9)
MONOCYTES NFR BLD AUTO: 11.2 % (ref 5–12)
NEUTROPHILS # BLD AUTO: 8.28 10*3/MM3 (ref 1.7–7)
NEUTROPHILS NFR BLD AUTO: 78.2 % (ref 42.7–76)
PLATELET # BLD AUTO: 138 10*3/MM3 (ref 140–450)
PMV BLD AUTO: 11.7 FL (ref 6–12)
POTASSIUM BLD-SCNC: 3.5 MMOL/L (ref 3.5–5.2)
POTASSIUM BLD-SCNC: 4.7 MMOL/L (ref 3.5–5.2)
RBC # BLD AUTO: 3.39 10*6/MM3 (ref 3.77–5.28)
SODIUM BLD-SCNC: 141 MMOL/L (ref 136–145)
VIT B12 BLD-MCNC: 1053 PG/ML (ref 211–946)
WBC NRBC COR # BLD: 10.58 10*3/MM3 (ref 3.4–10.8)

## 2019-06-25 PROCEDURE — 85025 COMPLETE CBC W/AUTO DIFF WBC: CPT | Performed by: INTERNAL MEDICINE

## 2019-06-25 PROCEDURE — 97163 PT EVAL HIGH COMPLEX 45 MIN: CPT | Performed by: PHYSICAL THERAPIST

## 2019-06-25 PROCEDURE — 94799 UNLISTED PULMONARY SVC/PX: CPT

## 2019-06-25 PROCEDURE — 97167 OT EVAL HIGH COMPLEX 60 MIN: CPT | Performed by: OCCUPATIONAL THERAPIST

## 2019-06-25 PROCEDURE — 80048 BASIC METABOLIC PNL TOTAL CA: CPT | Performed by: INTERNAL MEDICINE

## 2019-06-25 PROCEDURE — 84132 ASSAY OF SERUM POTASSIUM: CPT | Performed by: HOSPITALIST

## 2019-06-25 PROCEDURE — 99232 SBSQ HOSP IP/OBS MODERATE 35: CPT | Performed by: INTERNAL MEDICINE

## 2019-06-25 PROCEDURE — 99232 SBSQ HOSP IP/OBS MODERATE 35: CPT | Performed by: HOSPITALIST

## 2019-06-25 PROCEDURE — 25010000002 HYDROCORTISONE SODIUM SUCCINATE 100 MG RECONSTITUTED SOLUTION: Performed by: HOSPITALIST

## 2019-06-25 PROCEDURE — 25010000002 HEPARIN (PORCINE) PER 1000 UNITS: Performed by: INTERNAL MEDICINE

## 2019-06-25 RX ORDER — POTASSIUM CHLORIDE 1.5 G/1.77G
40 POWDER, FOR SOLUTION ORAL EVERY 4 HOURS
Status: COMPLETED | OUTPATIENT
Start: 2019-06-25 | End: 2019-06-25

## 2019-06-25 RX ADMIN — PANTOPRAZOLE SODIUM 40 MG: 40 TABLET, DELAYED RELEASE ORAL at 05:24

## 2019-06-25 RX ADMIN — SERTRALINE 50 MG: 50 TABLET, FILM COATED ORAL at 09:11

## 2019-06-25 RX ADMIN — SODIUM CHLORIDE, PRESERVATIVE FREE 10 ML: 5 INJECTION INTRAVENOUS at 09:12

## 2019-06-25 RX ADMIN — CEFDINIR 300 MG: 300 CAPSULE ORAL at 09:11

## 2019-06-25 RX ADMIN — ASCORBIC ACID, VITAMIN A PALMITATE, CHOLECALCIFEROL, THIAMINE HYDROCHLORIDE, RIBOFLAVIN-5 PHOSPHATE SODIUM, PYRIDOXINE HYDROCHLORIDE, NIACINAMIDE, DEXPANTHENOL, ALPHA-TOCOPHEROL ACETATE, VITAMIN K1, FOLIC ACID, BIOTIN, CYANOCOBALAMIN: 200; 3300; 200; 6; 3.6; 6; 40; 15; 10; 150; 600; 60; 5 INJECTION, SOLUTION INTRAVENOUS at 09:12

## 2019-06-25 RX ADMIN — Medication 1 CAPSULE: at 09:11

## 2019-06-25 RX ADMIN — HYDROCORTISONE SODIUM SUCCINATE 50 MG: 100 INJECTION, POWDER, FOR SOLUTION INTRAMUSCULAR; INTRAVENOUS at 17:39

## 2019-06-25 RX ADMIN — DONEPEZIL HYDROCHLORIDE 5 MG: 5 TABLET, FILM COATED ORAL at 21:37

## 2019-06-25 RX ADMIN — SODIUM CHLORIDE, PRESERVATIVE FREE 10 ML: 5 INJECTION INTRAVENOUS at 09:13

## 2019-06-25 RX ADMIN — MUPIROCIN: 20 OINTMENT TOPICAL at 09:13

## 2019-06-25 RX ADMIN — POTASSIUM CHLORIDE 40 MEQ: 1.5 POWDER, FOR SOLUTION ORAL at 12:34

## 2019-06-25 RX ADMIN — HEPARIN SODIUM 5000 UNITS: 5000 INJECTION INTRAVENOUS; SUBCUTANEOUS at 14:54

## 2019-06-25 RX ADMIN — SODIUM CHLORIDE, PRESERVATIVE FREE 10 ML: 5 INJECTION INTRAVENOUS at 21:37

## 2019-06-25 RX ADMIN — CEFDINIR 300 MG: 300 CAPSULE ORAL at 21:37

## 2019-06-25 RX ADMIN — HYDROCORTISONE SODIUM SUCCINATE 50 MG: 100 INJECTION, POWDER, FOR SOLUTION INTRAMUSCULAR; INTRAVENOUS at 05:23

## 2019-06-25 RX ADMIN — HEPARIN SODIUM 5000 UNITS: 5000 INJECTION INTRAVENOUS; SUBCUTANEOUS at 05:24

## 2019-06-25 RX ADMIN — HEPARIN SODIUM 5000 UNITS: 5000 INJECTION INTRAVENOUS; SUBCUTANEOUS at 21:37

## 2019-06-25 RX ADMIN — MUPIROCIN: 20 OINTMENT TOPICAL at 21:37

## 2019-06-25 RX ADMIN — POTASSIUM CHLORIDE 40 MEQ: 1.5 POWDER, FOR SOLUTION ORAL at 09:11

## 2019-06-26 LAB
ANION GAP SERPL CALCULATED.3IONS-SCNC: 8.2 MMOL/L
BACTERIA UR QL AUTO: ABNORMAL /HPF
BASOPHILS # BLD AUTO: 0 10*3/MM3 (ref 0–0.2)
BASOPHILS NFR BLD AUTO: 0 % (ref 0–1.5)
BILIRUB UR QL STRIP: NEGATIVE
BUN BLD-MCNC: 25 MG/DL (ref 8–23)
BUN/CREAT SERPL: 56.8 (ref 7–25)
CALCIUM SPEC-SCNC: 8.4 MG/DL (ref 8.6–10.5)
CHLORIDE SERPL-SCNC: 100 MMOL/L (ref 98–107)
CLARITY UR: CLEAR
CO2 SERPL-SCNC: 28.8 MMOL/L (ref 22–29)
COLOR UR: YELLOW
CREAT BLD-MCNC: 0.44 MG/DL (ref 0.57–1)
DEPRECATED RDW RBC AUTO: 52 FL (ref 37–54)
EOSINOPHIL # BLD AUTO: 0 10*3/MM3 (ref 0–0.4)
EOSINOPHIL NFR BLD AUTO: 0 % (ref 0.3–6.2)
ERYTHROCYTE [DISTWIDTH] IN BLOOD BY AUTOMATED COUNT: 15.7 % (ref 12.3–15.4)
GFR SERPL CREATININE-BSD FRML MDRD: 138 ML/MIN/1.73
GLUCOSE BLD-MCNC: 167 MG/DL (ref 65–99)
GLUCOSE UR STRIP-MCNC: NEGATIVE MG/DL
HCT VFR BLD AUTO: 33.5 % (ref 34–46.6)
HGB BLD-MCNC: 10.5 G/DL (ref 12–15.9)
HGB UR QL STRIP.AUTO: ABNORMAL
HYALINE CASTS UR QL AUTO: ABNORMAL /LPF
IMM GRANULOCYTES # BLD AUTO: 0.07 10*3/MM3 (ref 0–0.05)
IMM GRANULOCYTES NFR BLD AUTO: 0.6 % (ref 0–0.5)
KETONES UR QL STRIP: NEGATIVE
LEUKOCYTE ESTERASE UR QL STRIP.AUTO: ABNORMAL
LYMPHOCYTES # BLD AUTO: 0.59 10*3/MM3 (ref 0.7–3.1)
LYMPHOCYTES NFR BLD AUTO: 5 % (ref 19.6–45.3)
MCH RBC QN AUTO: 29 PG (ref 26.6–33)
MCHC RBC AUTO-ENTMCNC: 31.3 G/DL (ref 31.5–35.7)
MCV RBC AUTO: 92.5 FL (ref 79–97)
MONOCYTES # BLD AUTO: 1.01 10*3/MM3 (ref 0.1–0.9)
MONOCYTES NFR BLD AUTO: 8.6 % (ref 5–12)
NEUTROPHILS # BLD AUTO: 10.04 10*3/MM3 (ref 1.7–7)
NEUTROPHILS NFR BLD AUTO: 85.8 % (ref 42.7–76)
NITRITE UR QL STRIP: NEGATIVE
NRBC BLD AUTO-RTO: 0 /100 WBC (ref 0–0.2)
PH UR STRIP.AUTO: 8 [PH] (ref 5–8)
PLATELET # BLD AUTO: 160 10*3/MM3 (ref 140–450)
PMV BLD AUTO: 12.4 FL (ref 6–12)
POTASSIUM BLD-SCNC: 5.2 MMOL/L (ref 3.5–5.2)
PROT UR QL STRIP: NEGATIVE
RBC # BLD AUTO: 3.62 10*6/MM3 (ref 3.77–5.28)
RBC # UR: ABNORMAL /HPF
REF LAB TEST METHOD: ABNORMAL
SODIUM BLD-SCNC: 137 MMOL/L (ref 136–145)
SP GR UR STRIP: 1.01 (ref 1–1.03)
SQUAMOUS #/AREA URNS HPF: ABNORMAL /HPF
UROBILINOGEN UR QL STRIP: ABNORMAL
WBC NRBC COR # BLD: 11.71 10*3/MM3 (ref 3.4–10.8)
WBC UR QL AUTO: ABNORMAL /HPF

## 2019-06-26 PROCEDURE — 94799 UNLISTED PULMONARY SVC/PX: CPT

## 2019-06-26 PROCEDURE — 80048 BASIC METABOLIC PNL TOTAL CA: CPT | Performed by: INTERNAL MEDICINE

## 2019-06-26 PROCEDURE — 99231 SBSQ HOSP IP/OBS SF/LOW 25: CPT | Performed by: HOSPITALIST

## 2019-06-26 PROCEDURE — 25010000002 HYDROCORTISONE SODIUM SUCCINATE 100 MG RECONSTITUTED SOLUTION: Performed by: HOSPITALIST

## 2019-06-26 PROCEDURE — 99232 SBSQ HOSP IP/OBS MODERATE 35: CPT | Performed by: PSYCHIATRY & NEUROLOGY

## 2019-06-26 PROCEDURE — 25010000002 HEPARIN (PORCINE) PER 1000 UNITS: Performed by: INTERNAL MEDICINE

## 2019-06-26 PROCEDURE — 81001 URINALYSIS AUTO W/SCOPE: CPT | Performed by: HOSPITALIST

## 2019-06-26 PROCEDURE — 85025 COMPLETE CBC W/AUTO DIFF WBC: CPT | Performed by: INTERNAL MEDICINE

## 2019-06-26 RX ADMIN — MUPIROCIN: 20 OINTMENT TOPICAL at 21:19

## 2019-06-26 RX ADMIN — SODIUM CHLORIDE, PRESERVATIVE FREE 10 ML: 5 INJECTION INTRAVENOUS at 21:20

## 2019-06-26 RX ADMIN — ASCORBIC ACID, VITAMIN A PALMITATE, CHOLECALCIFEROL, THIAMINE HYDROCHLORIDE, RIBOFLAVIN-5 PHOSPHATE SODIUM, PYRIDOXINE HYDROCHLORIDE, NIACINAMIDE, DEXPANTHENOL, ALPHA-TOCOPHEROL ACETATE, VITAMIN K1, FOLIC ACID, BIOTIN, CYANOCOBALAMIN: 200; 3300; 200; 6; 3.6; 6; 40; 15; 10; 150; 600; 60; 5 INJECTION, SOLUTION INTRAVENOUS at 09:14

## 2019-06-26 RX ADMIN — HEPARIN SODIUM 5000 UNITS: 5000 INJECTION INTRAVENOUS; SUBCUTANEOUS at 14:06

## 2019-06-26 RX ADMIN — HEPARIN SODIUM 5000 UNITS: 5000 INJECTION INTRAVENOUS; SUBCUTANEOUS at 05:27

## 2019-06-26 RX ADMIN — DONEPEZIL HYDROCHLORIDE 5 MG: 5 TABLET, FILM COATED ORAL at 21:19

## 2019-06-26 RX ADMIN — HEPARIN SODIUM 5000 UNITS: 5000 INJECTION INTRAVENOUS; SUBCUTANEOUS at 21:19

## 2019-06-26 RX ADMIN — PANTOPRAZOLE SODIUM 40 MG: 40 TABLET, DELAYED RELEASE ORAL at 09:13

## 2019-06-26 RX ADMIN — SODIUM CHLORIDE, PRESERVATIVE FREE 10 ML: 5 INJECTION INTRAVENOUS at 09:14

## 2019-06-26 RX ADMIN — HYDROCORTISONE SODIUM SUCCINATE 50 MG: 100 INJECTION, POWDER, FOR SOLUTION INTRAMUSCULAR; INTRAVENOUS at 05:27

## 2019-06-26 RX ADMIN — CEFDINIR 300 MG: 300 CAPSULE ORAL at 09:13

## 2019-06-26 RX ADMIN — MUPIROCIN: 20 OINTMENT TOPICAL at 09:14

## 2019-06-26 RX ADMIN — SERTRALINE 50 MG: 50 TABLET, FILM COATED ORAL at 09:13

## 2019-06-26 RX ADMIN — Medication 1 CAPSULE: at 09:13

## 2019-06-27 PROBLEM — F03.90 MAJOR NEUROCOGNITIVE DISORDER (HCC): Status: ACTIVE | Noted: 2019-06-27

## 2019-06-27 PROBLEM — G30.9 ALZHEIMER'S DEMENTIA WITH BEHAVIORAL DISTURBANCE (HCC): Status: ACTIVE | Noted: 2019-06-27

## 2019-06-27 PROBLEM — F02.818 ALZHEIMER'S DEMENTIA WITH BEHAVIORAL DISTURBANCE (HCC): Status: ACTIVE | Noted: 2019-06-27

## 2019-06-27 LAB
ANION GAP SERPL CALCULATED.3IONS-SCNC: 8.1 MMOL/L (ref 5–15)
BASOPHILS # BLD AUTO: 0 10*3/MM3 (ref 0–0.2)
BASOPHILS NFR BLD AUTO: 0 % (ref 0–1.5)
BUN BLD-MCNC: 24 MG/DL (ref 8–23)
BUN/CREAT SERPL: 55.8 (ref 7–25)
CALCIUM SPEC-SCNC: 8.6 MG/DL (ref 8.6–10.5)
CHLORIDE SERPL-SCNC: 101 MMOL/L (ref 98–107)
CO2 SERPL-SCNC: 28.9 MMOL/L (ref 22–29)
CREAT BLD-MCNC: 0.43 MG/DL (ref 0.57–1)
DEPRECATED RDW RBC AUTO: 51.1 FL (ref 37–54)
EOSINOPHIL # BLD AUTO: 0.2 10*3/MM3 (ref 0–0.4)
EOSINOPHIL NFR BLD AUTO: 2.1 % (ref 0.3–6.2)
ERYTHROCYTE [DISTWIDTH] IN BLOOD BY AUTOMATED COUNT: 15.9 % (ref 12.3–15.4)
GFR SERPL CREATININE-BSD FRML MDRD: 142 ML/MIN/1.73
GLUCOSE BLD-MCNC: 101 MG/DL (ref 65–99)
HCT VFR BLD AUTO: 35.6 % (ref 34–46.6)
HGB BLD-MCNC: 10.7 G/DL (ref 12–15.9)
IMM GRANULOCYTES # BLD AUTO: 0.08 10*3/MM3 (ref 0–0.05)
IMM GRANULOCYTES NFR BLD AUTO: 0.9 % (ref 0–0.5)
LYMPHOCYTES # BLD AUTO: 1.27 10*3/MM3 (ref 0.7–3.1)
LYMPHOCYTES NFR BLD AUTO: 13.6 % (ref 19.6–45.3)
MCH RBC QN AUTO: 28.8 PG (ref 26.6–33)
MCHC RBC AUTO-ENTMCNC: 30.1 G/DL (ref 31.5–35.7)
MCV RBC AUTO: 95.7 FL (ref 79–97)
MONOCYTES # BLD AUTO: 1.21 10*3/MM3 (ref 0.1–0.9)
MONOCYTES NFR BLD AUTO: 13 % (ref 5–12)
NEUTROPHILS # BLD AUTO: 6.56 10*3/MM3 (ref 1.7–7)
NEUTROPHILS NFR BLD AUTO: 70.4 % (ref 42.7–76)
PLATELET # BLD AUTO: 138 10*3/MM3 (ref 140–450)
PMV BLD AUTO: 12.3 FL (ref 6–12)
POTASSIUM BLD-SCNC: 4.1 MMOL/L (ref 3.5–5.2)
RBC # BLD AUTO: 3.72 10*6/MM3 (ref 3.77–5.28)
SODIUM BLD-SCNC: 138 MMOL/L (ref 136–145)
WBC NRBC COR # BLD: 9.32 10*3/MM3 (ref 3.4–10.8)

## 2019-06-27 PROCEDURE — 80048 BASIC METABOLIC PNL TOTAL CA: CPT | Performed by: INTERNAL MEDICINE

## 2019-06-27 PROCEDURE — 25010000002 HYDROCORTISONE SODIUM SUCCINATE 100 MG RECONSTITUTED SOLUTION: Performed by: HOSPITALIST

## 2019-06-27 PROCEDURE — 94799 UNLISTED PULMONARY SVC/PX: CPT

## 2019-06-27 PROCEDURE — 97530 THERAPEUTIC ACTIVITIES: CPT

## 2019-06-27 PROCEDURE — 85025 COMPLETE CBC W/AUTO DIFF WBC: CPT | Performed by: INTERNAL MEDICINE

## 2019-06-27 PROCEDURE — 99232 SBSQ HOSP IP/OBS MODERATE 35: CPT | Performed by: HOSPITALIST

## 2019-06-27 PROCEDURE — 97110 THERAPEUTIC EXERCISES: CPT

## 2019-06-27 PROCEDURE — 25010000002 HEPARIN (PORCINE) PER 1000 UNITS: Performed by: INTERNAL MEDICINE

## 2019-06-27 PROCEDURE — 97116 GAIT TRAINING THERAPY: CPT

## 2019-06-27 RX ORDER — METOPROLOL SUCCINATE 25 MG/1
12.5 TABLET, EXTENDED RELEASE ORAL
Status: DISCONTINUED | OUTPATIENT
Start: 2019-06-27 | End: 2019-06-28 | Stop reason: HOSPADM

## 2019-06-27 RX ORDER — BUDESONIDE 0.5 MG/2ML
0.5 INHALANT ORAL
Status: DISCONTINUED | OUTPATIENT
Start: 2019-06-27 | End: 2019-06-28 | Stop reason: HOSPADM

## 2019-06-27 RX ORDER — CARBOXYMETHYLCELLULOSE SODIUM 5 MG/ML
1 SOLUTION/ DROPS OPHTHALMIC 3 TIMES DAILY PRN
Status: DISCONTINUED | OUTPATIENT
Start: 2019-06-27 | End: 2019-06-28 | Stop reason: HOSPADM

## 2019-06-27 RX ADMIN — ASCORBIC ACID, VITAMIN A PALMITATE, CHOLECALCIFEROL, THIAMINE HYDROCHLORIDE, RIBOFLAVIN-5 PHOSPHATE SODIUM, PYRIDOXINE HYDROCHLORIDE, NIACINAMIDE, DEXPANTHENOL, ALPHA-TOCOPHEROL ACETATE, VITAMIN K1, FOLIC ACID, BIOTIN, CYANOCOBALAMIN: 200; 3300; 200; 6; 3.6; 6; 40; 15; 10; 150; 600; 60; 5 INJECTION, SOLUTION INTRAVENOUS at 08:32

## 2019-06-27 RX ADMIN — DONEPEZIL HYDROCHLORIDE 5 MG: 5 TABLET, FILM COATED ORAL at 20:12

## 2019-06-27 RX ADMIN — METOPROLOL SUCCINATE 12.5 MG: 25 TABLET, FILM COATED, EXTENDED RELEASE ORAL at 11:36

## 2019-06-27 RX ADMIN — SERTRALINE 50 MG: 50 TABLET, FILM COATED ORAL at 08:32

## 2019-06-27 RX ADMIN — HEPARIN SODIUM 5000 UNITS: 5000 INJECTION INTRAVENOUS; SUBCUTANEOUS at 05:53

## 2019-06-27 RX ADMIN — Medication 1 CAPSULE: at 08:32

## 2019-06-27 RX ADMIN — SODIUM CHLORIDE, PRESERVATIVE FREE 10 ML: 5 INJECTION INTRAVENOUS at 08:33

## 2019-06-27 RX ADMIN — HYDROCORTISONE SODIUM SUCCINATE 50 MG: 100 INJECTION, POWDER, FOR SOLUTION INTRAMUSCULAR; INTRAVENOUS at 05:53

## 2019-06-27 RX ADMIN — HEPARIN SODIUM 5000 UNITS: 5000 INJECTION INTRAVENOUS; SUBCUTANEOUS at 20:12

## 2019-06-27 RX ADMIN — PANTOPRAZOLE SODIUM 40 MG: 40 TABLET, DELAYED RELEASE ORAL at 05:53

## 2019-06-27 RX ADMIN — MUPIROCIN: 20 OINTMENT TOPICAL at 20:12

## 2019-06-27 RX ADMIN — SODIUM CHLORIDE, PRESERVATIVE FREE 10 ML: 5 INJECTION INTRAVENOUS at 08:32

## 2019-06-27 RX ADMIN — MUPIROCIN: 20 OINTMENT TOPICAL at 08:32

## 2019-06-28 VITALS
BODY MASS INDEX: 18.55 KG/M2 | TEMPERATURE: 98.4 F | DIASTOLIC BLOOD PRESSURE: 65 MMHG | RESPIRATION RATE: 20 BRPM | OXYGEN SATURATION: 96 % | HEART RATE: 94 BPM | SYSTOLIC BLOOD PRESSURE: 141 MMHG | HEIGHT: 63 IN | WEIGHT: 104.72 LBS

## 2019-06-28 PROBLEM — T50.901A POLYSUBSTANCE OVERDOSE: Status: RESOLVED | Noted: 2019-06-12 | Resolved: 2019-06-28

## 2019-06-28 PROCEDURE — 25010000002 HYDROCORTISONE SODIUM SUCCINATE 100 MG RECONSTITUTED SOLUTION: Performed by: HOSPITALIST

## 2019-06-28 PROCEDURE — 94799 UNLISTED PULMONARY SVC/PX: CPT

## 2019-06-28 PROCEDURE — 99239 HOSP IP/OBS DSCHRG MGMT >30: CPT | Performed by: PHYSICIAN ASSISTANT

## 2019-06-28 PROCEDURE — 25010000002 HEPARIN (PORCINE) PER 1000 UNITS: Performed by: INTERNAL MEDICINE

## 2019-06-28 RX ORDER — PANTOPRAZOLE SODIUM 40 MG/1
40 TABLET, DELAYED RELEASE ORAL EVERY MORNING
Qty: 30 TABLET | Refills: 0
Start: 2019-06-28 | End: 2019-07-28

## 2019-06-28 RX ORDER — IPRATROPIUM BROMIDE AND ALBUTEROL SULFATE 2.5; .5 MG/3ML; MG/3ML
3 SOLUTION RESPIRATORY (INHALATION) EVERY 6 HOURS PRN
Qty: 360 ML
Start: 2019-06-28 | End: 2019-07-28

## 2019-06-28 RX ORDER — DONEPEZIL HYDROCHLORIDE 5 MG/1
5 TABLET, FILM COATED ORAL NIGHTLY
Qty: 30 TABLET | Refills: 0
Start: 2019-06-28 | End: 2019-07-28

## 2019-06-28 RX ORDER — BUDESONIDE 0.5 MG/2ML
0.5 INHALANT ORAL
Qty: 120 ML | Refills: 0
Start: 2019-06-28 | End: 2019-07-28

## 2019-06-28 RX ORDER — METOPROLOL SUCCINATE 25 MG/1
12.5 TABLET, EXTENDED RELEASE ORAL
Qty: 15 TABLET | Refills: 0
Start: 2019-06-28 | End: 2019-07-28

## 2019-06-28 RX ORDER — CARBOXYMETHYLCELLULOSE SODIUM 5 MG/ML
1 SOLUTION/ DROPS OPHTHALMIC 3 TIMES DAILY PRN
Qty: 70 EACH | Refills: 0
Start: 2019-06-28 | End: 2019-07-28

## 2019-06-28 RX ADMIN — ASCORBIC ACID, VITAMIN A PALMITATE, CHOLECALCIFEROL, THIAMINE HYDROCHLORIDE, RIBOFLAVIN-5 PHOSPHATE SODIUM, PYRIDOXINE HYDROCHLORIDE, NIACINAMIDE, DEXPANTHENOL, ALPHA-TOCOPHEROL ACETATE, VITAMIN K1, FOLIC ACID, BIOTIN, CYANOCOBALAMIN: 200; 3300; 200; 6; 3.6; 6; 40; 15; 10; 150; 600; 60; 5 INJECTION, SOLUTION INTRAVENOUS at 09:20

## 2019-06-28 RX ADMIN — SERTRALINE 50 MG: 50 TABLET, FILM COATED ORAL at 09:17

## 2019-06-28 RX ADMIN — HYDROCORTISONE SODIUM SUCCINATE 50 MG: 100 INJECTION, POWDER, FOR SOLUTION INTRAMUSCULAR; INTRAVENOUS at 05:07

## 2019-06-28 RX ADMIN — PANTOPRAZOLE SODIUM 40 MG: 40 TABLET, DELAYED RELEASE ORAL at 05:08

## 2019-06-28 RX ADMIN — HEPARIN SODIUM 5000 UNITS: 5000 INJECTION INTRAVENOUS; SUBCUTANEOUS at 05:08

## 2019-06-28 RX ADMIN — SODIUM CHLORIDE, PRESERVATIVE FREE 10 ML: 5 INJECTION INTRAVENOUS at 09:19

## 2019-06-28 RX ADMIN — METOPROLOL SUCCINATE 12.5 MG: 25 TABLET, FILM COATED, EXTENDED RELEASE ORAL at 09:17

## 2019-06-28 RX ADMIN — CARBOXYMETHYLCELLULOSE SODIUM 1 DROP: 5 SOLUTION/ DROPS OPHTHALMIC at 05:08

## 2019-06-28 RX ADMIN — MUPIROCIN: 20 OINTMENT TOPICAL at 09:17

## 2019-06-28 RX ADMIN — Medication 1 CAPSULE: at 09:17

## 2019-07-01 ENCOUNTER — EPISODE CHANGES (OUTPATIENT)
Dept: CASE MANAGEMENT | Facility: OTHER | Age: 79
End: 2019-07-01

## 2019-07-01 ENCOUNTER — PATIENT OUTREACH (OUTPATIENT)
Dept: CASE MANAGEMENT | Facility: OTHER | Age: 79
End: 2019-07-01

## 2019-07-01 NOTE — OUTREACH NOTE
SNF Follow-up Note    Skilled Nursing Facility Discharge Assessment 7/1/2019   Acute Facility Discharged From [place]   Acute Discharge Date 6/28/2019   Name of the Skilled Nursing Facility? [facility] Level of the Skilled Nursing Facility 2   Purpose of SNF Admission PT;OT   Estimated length of stay for the patient? TBD   Who is the insurance provider or payor of patient stay? Medicare   Progression of Patient? New admission under Medicare to SNF       Angie Adams RN    7/1/2019, 11:05 AM

## 2019-07-03 ENCOUNTER — PATIENT OUTREACH (OUTPATIENT)
Dept: CASE MANAGEMENT | Facility: OTHER | Age: 79
End: 2019-07-03

## 2019-07-11 ENCOUNTER — PATIENT OUTREACH (OUTPATIENT)
Dept: CASE MANAGEMENT | Facility: OTHER | Age: 79
End: 2019-07-11

## 2019-07-11 NOTE — OUTREACH NOTE
SNF Follow-up Note    Skilled Nursing Facility Discharge Assessment 7/11/2019   Acute Facility Discharged From [place]   Acute Discharge Date 6/28/2019   Name of the Skilled Nursing Facility? [facility]   Tier Level of the Skilled Nursing Facility 2   Purpose of SNF Admission PT;OT   Estimated length of stay for the patient? TBD   Who is the insurance provider or payor of patient stay? Medicare   Progression of Patient? Spoke with Bella, pt is still under Medicare; no dc date yet       Angie Adams RN    7/11/2019, 2:07 PM

## 2019-07-18 ENCOUNTER — PATIENT OUTREACH (OUTPATIENT)
Dept: CASE MANAGEMENT | Facility: OTHER | Age: 79
End: 2019-07-18

## 2019-07-18 NOTE — OUTREACH NOTE
SNF Follow-up Note    Skilled Nursing Facility Discharge Assessment 7/18/2019   Acute Facility Discharged From [place]   Acute Discharge Date 6/28/2019   Name of the Skilled Nursing Facility? [facility]   Tier Level of the Skilled Nursing Facility 2   Purpose of SNF Admission PT;OT   Estimated length of stay for the patient? TBD   Who is the insurance provider or payor of patient stay? Medicare   Progression of Patient? Spoke with Bella, pt is still under Medicare; no dc date yet       Angie Adams RN    7/18/2019, 2:44 PM

## 2019-07-19 ENCOUNTER — HOSPITAL ENCOUNTER (EMERGENCY)
Facility: HOSPITAL | Age: 79
Discharge: HOME | End: 2019-07-19
Attending: EMERGENCY MEDICINE | Admitting: EMERGENCY MEDICINE

## 2019-07-19 ENCOUNTER — APPOINTMENT (OUTPATIENT)
Dept: GENERAL RADIOLOGY | Facility: HOSPITAL | Age: 79
End: 2019-07-19

## 2019-07-19 VITALS
TEMPERATURE: 98.3 F | OXYGEN SATURATION: 98 % | RESPIRATION RATE: 18 BRPM | SYSTOLIC BLOOD PRESSURE: 113 MMHG | DIASTOLIC BLOOD PRESSURE: 93 MMHG | HEART RATE: 99 BPM | BODY MASS INDEX: 16.2 KG/M2 | WEIGHT: 88 LBS | HEIGHT: 62 IN

## 2019-07-19 DIAGNOSIS — J90 PLEURAL EFFUSION ON LEFT: Primary | ICD-10-CM

## 2019-07-19 DIAGNOSIS — R07.1 CHEST PAIN ON BREATHING: ICD-10-CM

## 2019-07-19 LAB
A-A DO2: 31.2 MMHG (ref 0–300)
ALBUMIN SERPL-MCNC: 3.4 G/DL (ref 3.5–5.2)
ALBUMIN/GLOB SERPL: 0.9 G/DL
ALP SERPL-CCNC: 86 U/L (ref 39–117)
ALT SERPL W P-5'-P-CCNC: 14 U/L (ref 1–33)
ANION GAP SERPL CALCULATED.3IONS-SCNC: 11 MMOL/L (ref 5–15)
ARTERIAL PATENCY WRIST A: ABNORMAL
AST SERPL-CCNC: 32 U/L (ref 1–32)
ATMOSPHERIC PRESS: 729 MMHG
BASE EXCESS BLDA CALC-SCNC: 1.2 MMOL/L
BASOPHILS # BLD AUTO: 0.01 10*3/MM3 (ref 0–0.2)
BASOPHILS NFR BLD AUTO: 0.1 % (ref 0–1.5)
BDY SITE: ABNORMAL
BILIRUB SERPL-MCNC: 0.3 MG/DL (ref 0.2–1.2)
BILIRUB UR QL STRIP: NEGATIVE
BODY TEMPERATURE: 98.6 C
BUN BLD-MCNC: 21 MG/DL (ref 8–23)
BUN/CREAT SERPL: 32.3 (ref 7–25)
CALCIUM SPEC-SCNC: 9.5 MG/DL (ref 8.6–10.5)
CHLORIDE SERPL-SCNC: 101 MMOL/L (ref 98–107)
CLARITY UR: CLEAR
CO2 SERPL-SCNC: 26 MMOL/L (ref 22–29)
COHGB MFR BLD: 1.1 % (ref 0–5)
COLOR UR: YELLOW
CREAT BLD-MCNC: 0.65 MG/DL (ref 0.57–1)
D-LACTATE SERPL-SCNC: 1.7 MMOL/L (ref 0.5–2)
DEPRECATED RDW RBC AUTO: 52.3 FL (ref 37–54)
EOSINOPHIL # BLD AUTO: 0.1 10*3/MM3 (ref 0–0.4)
EOSINOPHIL NFR BLD AUTO: 0.9 % (ref 0.3–6.2)
ERYTHROCYTE [DISTWIDTH] IN BLOOD BY AUTOMATED COUNT: 16 % (ref 12.3–15.4)
GFR SERPL CREATININE-BSD FRML MDRD: 88 ML/MIN/1.73
GLOBULIN UR ELPH-MCNC: 4 GM/DL
GLUCOSE BLD-MCNC: 93 MG/DL (ref 65–99)
GLUCOSE UR STRIP-MCNC: NEGATIVE MG/DL
HCO3 BLDA-SCNC: 23.4 MMOL/L (ref 22–26)
HCT VFR BLD AUTO: 37.4 % (ref 34–46.6)
HCT VFR BLD CALC: 33 % (ref 37–47)
HGB BLD-MCNC: 11.6 G/DL (ref 12–15.9)
HGB BLDA-MCNC: 11.2 G/DL (ref 12–16)
HGB UR QL STRIP.AUTO: NEGATIVE
HOLD SPECIMEN: NORMAL
HOLD SPECIMEN: NORMAL
HOROWITZ INDEX BLD+IHG-RTO: 21 %
IMM GRANULOCYTES # BLD AUTO: 0.03 10*3/MM3 (ref 0–0.05)
IMM GRANULOCYTES NFR BLD AUTO: 0.3 % (ref 0–0.5)
KETONES UR QL STRIP: NEGATIVE
LEUKOCYTE ESTERASE UR QL STRIP.AUTO: NEGATIVE
LYMPHOCYTES # BLD AUTO: 1.37 10*3/MM3 (ref 0.7–3.1)
LYMPHOCYTES NFR BLD AUTO: 12.7 % (ref 19.6–45.3)
MCH RBC QN AUTO: 29.3 PG (ref 26.6–33)
MCHC RBC AUTO-ENTMCNC: 31 G/DL (ref 31.5–35.7)
MCV RBC AUTO: 94.4 FL (ref 79–97)
METHGB BLD QL: 0.3 % (ref 0–3)
MODALITY: ABNORMAL
MONOCYTES # BLD AUTO: 1.51 10*3/MM3 (ref 0.1–0.9)
MONOCYTES NFR BLD AUTO: 14 % (ref 5–12)
NEUTROPHILS # BLD AUTO: 7.74 10*3/MM3 (ref 1.7–7)
NEUTROPHILS NFR BLD AUTO: 72 % (ref 42.7–76)
NITRITE UR QL STRIP: NEGATIVE
OXYHGB MFR BLDV: 94.7 % (ref 85–100)
PCO2 BLDA: 29.7 MM HG (ref 35–45)
PH BLDA: 7.51 PH UNITS (ref 7.35–7.45)
PH UR STRIP.AUTO: >=9 [PH] (ref 5–8)
PLATELET # BLD AUTO: 284 10*3/MM3 (ref 140–450)
PMV BLD AUTO: 11.6 FL (ref 6–12)
PO2 BLDA: 76.5 MM HG (ref 80–100)
POTASSIUM BLD-SCNC: 4.5 MMOL/L (ref 3.5–5.2)
PROT SERPL-MCNC: 7.4 G/DL (ref 6–8.5)
PROT UR QL STRIP: NEGATIVE
RBC # BLD AUTO: 3.96 10*6/MM3 (ref 3.77–5.28)
SAO2 % BLDCOA: 96 % (ref 90–100)
SODIUM BLD-SCNC: 138 MMOL/L (ref 136–145)
SP GR UR STRIP: 1.01 (ref 1–1.03)
TROPONIN T SERPL-MCNC: <0.01 NG/ML (ref 0–0.03)
TROPONIN T SERPL-MCNC: <0.01 NG/ML (ref 0–0.03)
UROBILINOGEN UR QL STRIP: ABNORMAL
WBC NRBC COR # BLD: 10.76 10*3/MM3 (ref 3.4–10.8)
WHOLE BLOOD HOLD SPECIMEN: NORMAL
WHOLE BLOOD HOLD SPECIMEN: NORMAL

## 2019-07-19 PROCEDURE — 93010 ELECTROCARDIOGRAM REPORT: CPT | Performed by: INTERNAL MEDICINE

## 2019-07-19 PROCEDURE — 82375 ASSAY CARBOXYHB QUANT: CPT | Performed by: EMERGENCY MEDICINE

## 2019-07-19 PROCEDURE — 96360 HYDRATION IV INFUSION INIT: CPT

## 2019-07-19 PROCEDURE — 87040 BLOOD CULTURE FOR BACTERIA: CPT | Performed by: EMERGENCY MEDICINE

## 2019-07-19 PROCEDURE — 84484 ASSAY OF TROPONIN QUANT: CPT | Performed by: EMERGENCY MEDICINE

## 2019-07-19 PROCEDURE — 99285 EMERGENCY DEPT VISIT HI MDM: CPT

## 2019-07-19 PROCEDURE — 71045 X-RAY EXAM CHEST 1 VIEW: CPT | Performed by: RADIOLOGY

## 2019-07-19 PROCEDURE — 82805 BLOOD GASES W/O2 SATURATION: CPT | Performed by: EMERGENCY MEDICINE

## 2019-07-19 PROCEDURE — 80053 COMPREHEN METABOLIC PANEL: CPT | Performed by: EMERGENCY MEDICINE

## 2019-07-19 PROCEDURE — 81003 URINALYSIS AUTO W/O SCOPE: CPT | Performed by: EMERGENCY MEDICINE

## 2019-07-19 PROCEDURE — 36600 WITHDRAWAL OF ARTERIAL BLOOD: CPT | Performed by: EMERGENCY MEDICINE

## 2019-07-19 PROCEDURE — 94640 AIRWAY INHALATION TREATMENT: CPT

## 2019-07-19 PROCEDURE — 83050 HGB METHEMOGLOBIN QUAN: CPT | Performed by: EMERGENCY MEDICINE

## 2019-07-19 PROCEDURE — 71045 X-RAY EXAM CHEST 1 VIEW: CPT

## 2019-07-19 PROCEDURE — 94799 UNLISTED PULMONARY SVC/PX: CPT

## 2019-07-19 PROCEDURE — 83605 ASSAY OF LACTIC ACID: CPT | Performed by: EMERGENCY MEDICINE

## 2019-07-19 PROCEDURE — P9612 CATHETERIZE FOR URINE SPEC: HCPCS

## 2019-07-19 PROCEDURE — 85025 COMPLETE CBC W/AUTO DIFF WBC: CPT | Performed by: EMERGENCY MEDICINE

## 2019-07-19 PROCEDURE — 93005 ELECTROCARDIOGRAM TRACING: CPT | Performed by: EMERGENCY MEDICINE

## 2019-07-19 RX ORDER — SODIUM CHLORIDE 9 MG/ML
125 INJECTION, SOLUTION INTRAVENOUS CONTINUOUS
Status: DISCONTINUED | OUTPATIENT
Start: 2019-07-19 | End: 2019-07-19 | Stop reason: HOSPADM

## 2019-07-19 RX ORDER — SODIUM CHLORIDE 0.9 % (FLUSH) 0.9 %
10 SYRINGE (ML) INJECTION AS NEEDED
Status: DISCONTINUED | OUTPATIENT
Start: 2019-07-19 | End: 2019-07-19 | Stop reason: HOSPADM

## 2019-07-19 RX ORDER — IPRATROPIUM BROMIDE AND ALBUTEROL SULFATE 2.5; .5 MG/3ML; MG/3ML
3 SOLUTION RESPIRATORY (INHALATION) ONCE
Status: COMPLETED | OUTPATIENT
Start: 2019-07-19 | End: 2019-07-19

## 2019-07-19 RX ORDER — ACETAMINOPHEN 160 MG/5ML
650 SOLUTION ORAL ONCE
Status: COMPLETED | OUTPATIENT
Start: 2019-07-19 | End: 2019-07-19

## 2019-07-19 RX ADMIN — SODIUM CHLORIDE 125 ML/HR: 9 INJECTION, SOLUTION INTRAVENOUS at 03:10

## 2019-07-19 RX ADMIN — ACETAMINOPHEN ORAL SOLUTION 650 MG: 650 SOLUTION ORAL at 03:12

## 2019-07-19 RX ADMIN — IPRATROPIUM BROMIDE AND ALBUTEROL SULFATE 3 ML: .5; 3 SOLUTION RESPIRATORY (INHALATION) at 03:13

## 2019-07-24 LAB
BACTERIA SPEC AEROBE CULT: NORMAL
BACTERIA SPEC AEROBE CULT: NORMAL

## 2019-07-25 ENCOUNTER — PATIENT OUTREACH (OUTPATIENT)
Dept: CASE MANAGEMENT | Facility: OTHER | Age: 79
End: 2019-07-25

## 2019-07-25 NOTE — OUTREACH NOTE
SNF Follow-up Note    Skilled Nursing Facility Discharge Assessment 7/25/2019   Acute Facility Discharged From [place]   Acute Discharge Date 6/28/2019   Name of the Skilled Nursing Facility? [facility]   Tier Level of the Skilled Nursing Facility 2   Purpose of SNF Admission PT;OT   Estimated length of stay for the patient? TBD   Who is the insurance provider or payor of patient stay? Medicare   Progression of Patient? Spoke with Bella, pt is still under Medicare; no dc date yet       Angie Adams RN    7/25/2019, 3:09 PM

## 2019-08-04 ENCOUNTER — APPOINTMENT (OUTPATIENT)
Dept: GENERAL RADIOLOGY | Facility: HOSPITAL | Age: 79
End: 2019-08-04

## 2019-08-04 ENCOUNTER — APPOINTMENT (OUTPATIENT)
Dept: CT IMAGING | Facility: HOSPITAL | Age: 79
End: 2019-08-04

## 2019-08-04 ENCOUNTER — HOSPITAL ENCOUNTER (EMERGENCY)
Facility: HOSPITAL | Age: 79
Discharge: PSYCHIATRIC OR SUBSTANCE USE FACILITY | End: 2019-08-04
Attending: EMERGENCY MEDICINE | Admitting: EMERGENCY MEDICINE

## 2019-08-04 ENCOUNTER — HOSPITAL ENCOUNTER (INPATIENT)
Facility: HOSPITAL | Age: 79
LOS: 2 days | Discharge: HOSPICE | End: 2019-08-06
Attending: PSYCHIATRY & NEUROLOGY | Admitting: PSYCHIATRY & NEUROLOGY

## 2019-08-04 VITALS
OXYGEN SATURATION: 100 % | SYSTOLIC BLOOD PRESSURE: 113 MMHG | BODY MASS INDEX: 18.78 KG/M2 | HEIGHT: 64 IN | TEMPERATURE: 99 F | RESPIRATION RATE: 18 BRPM | DIASTOLIC BLOOD PRESSURE: 74 MMHG | WEIGHT: 110 LBS | HEART RATE: 74 BPM

## 2019-08-04 DIAGNOSIS — R45.851 SUICIDAL IDEATIONS: Primary | ICD-10-CM

## 2019-08-04 PROBLEM — F32.9 MDD (MAJOR DEPRESSIVE DISORDER): Status: ACTIVE | Noted: 2019-08-04

## 2019-08-04 PROBLEM — N61.1 BREAST ABSCESS: Status: RESOLVED | Noted: 2018-12-04 | Resolved: 2019-08-04

## 2019-08-04 PROBLEM — K94.23 PEG TUBE MALFUNCTION (HCC): Status: RESOLVED | Noted: 2018-01-24 | Resolved: 2019-08-04

## 2019-08-04 PROBLEM — E86.0 DEHYDRATION: Status: RESOLVED | Noted: 2017-07-14 | Resolved: 2019-08-04

## 2019-08-04 PROBLEM — T85.528A DISLODGED GASTROSTOMY TUBE: Status: RESOLVED | Noted: 2018-01-24 | Resolved: 2019-08-04

## 2019-08-04 LAB
6-ACETYL MORPHINE: NEGATIVE
A-A DO2: 74.5 MMHG (ref 0–300)
ALBUMIN SERPL-MCNC: 3.39 G/DL (ref 3.5–5.2)
ALBUMIN/GLOB SERPL: 0.7 G/DL
ALP SERPL-CCNC: 74 U/L (ref 39–117)
ALT SERPL W P-5'-P-CCNC: 6 U/L (ref 1–33)
AMPHET+METHAMPHET UR QL: NEGATIVE
ANION GAP SERPL CALCULATED.3IONS-SCNC: 13.7 MMOL/L (ref 5–15)
ARTERIAL PATENCY WRIST A: ABNORMAL
AST SERPL-CCNC: 19 U/L (ref 1–32)
ATMOSPHERIC PRESS: 729 MMHG
B-HCG UR QL: NEGATIVE
BARBITURATES UR QL SCN: NEGATIVE
BASE EXCESS BLDA CALC-SCNC: 2.8 MMOL/L
BASOPHILS # BLD AUTO: 0.02 10*3/MM3 (ref 0–0.2)
BASOPHILS # BLD AUTO: 0.04 10*3/MM3 (ref 0–0.2)
BASOPHILS NFR BLD AUTO: 0.2 % (ref 0–1.5)
BASOPHILS NFR BLD AUTO: 0.2 % (ref 0–1.5)
BDY SITE: ABNORMAL
BENZODIAZ UR QL SCN: NEGATIVE
BILIRUB SERPL-MCNC: 0.4 MG/DL (ref 0.2–1.2)
BILIRUB UR QL STRIP: NEGATIVE
BODY TEMPERATURE: 98.6 C
BUN BLD-MCNC: 32 MG/DL (ref 8–23)
BUN/CREAT SERPL: 37.6 (ref 7–25)
BUPRENORPHINE SERPL-MCNC: NEGATIVE NG/ML
CALCIUM SPEC-SCNC: 10.3 MG/DL (ref 8.6–10.5)
CANNABINOIDS SERPL QL: NEGATIVE
CHLORIDE SERPL-SCNC: 97 MMOL/L (ref 98–107)
CLARITY UR: CLEAR
CO2 SERPL-SCNC: 28.3 MMOL/L (ref 22–29)
COCAINE UR QL: NEGATIVE
COHGB MFR BLD: 1 % (ref 0–5)
COLOR UR: YELLOW
CREAT BLD-MCNC: 0.85 MG/DL (ref 0.57–1)
DEPRECATED RDW RBC AUTO: 52.9 FL (ref 37–54)
DEPRECATED RDW RBC AUTO: 53.5 FL (ref 37–54)
EOSINOPHIL # BLD AUTO: 0.18 10*3/MM3 (ref 0–0.4)
EOSINOPHIL # BLD AUTO: 0.4 10*3/MM3 (ref 0–0.4)
EOSINOPHIL NFR BLD AUTO: 1 % (ref 0.3–6.2)
EOSINOPHIL NFR BLD AUTO: 3.3 % (ref 0.3–6.2)
ERYTHROCYTE [DISTWIDTH] IN BLOOD BY AUTOMATED COUNT: 16.3 % (ref 12.3–15.4)
ERYTHROCYTE [DISTWIDTH] IN BLOOD BY AUTOMATED COUNT: 16.4 % (ref 12.3–15.4)
ETHANOL BLD-MCNC: <10 MG/DL (ref 0–10)
ETHANOL UR QL: <0.01 %
GFR SERPL CREATININE-BSD FRML MDRD: 65 ML/MIN/1.73
GLOBULIN UR ELPH-MCNC: 4.7 GM/DL
GLUCOSE BLD-MCNC: 87 MG/DL (ref 65–99)
GLUCOSE BLDC GLUCOMTR-MCNC: 68 MG/DL (ref 70–130)
GLUCOSE UR STRIP-MCNC: NEGATIVE MG/DL
HCO3 BLDA-SCNC: 26.4 MMOL/L (ref 22–26)
HCT VFR BLD AUTO: 33.2 % (ref 34–46.6)
HCT VFR BLD AUTO: 35.6 % (ref 34–46.6)
HCT VFR BLD CALC: 32 % (ref 37–47)
HGB BLD-MCNC: 10.1 G/DL (ref 12–15.9)
HGB BLD-MCNC: 10.9 G/DL (ref 12–15.9)
HGB BLDA-MCNC: 10.8 G/DL (ref 12–16)
HGB UR QL STRIP.AUTO: NEGATIVE
HOROWITZ INDEX BLD+IHG-RTO: 28 %
IMM GRANULOCYTES # BLD AUTO: 0.05 10*3/MM3 (ref 0–0.05)
IMM GRANULOCYTES # BLD AUTO: 0.07 10*3/MM3 (ref 0–0.05)
IMM GRANULOCYTES NFR BLD AUTO: 0.4 % (ref 0–0.5)
IMM GRANULOCYTES NFR BLD AUTO: 0.4 % (ref 0–0.5)
KETONES UR QL STRIP: NEGATIVE
LEUKOCYTE ESTERASE UR QL STRIP.AUTO: NEGATIVE
LYMPHOCYTES # BLD AUTO: 1.05 10*3/MM3 (ref 0.7–3.1)
LYMPHOCYTES # BLD AUTO: 1.42 10*3/MM3 (ref 0.7–3.1)
LYMPHOCYTES NFR BLD AUTO: 11.7 % (ref 19.6–45.3)
LYMPHOCYTES NFR BLD AUTO: 6.1 % (ref 19.6–45.3)
MAGNESIUM SERPL-MCNC: 2.1 MG/DL (ref 1.6–2.4)
MCH RBC QN AUTO: 28.6 PG (ref 26.6–33)
MCH RBC QN AUTO: 28.7 PG (ref 26.6–33)
MCHC RBC AUTO-ENTMCNC: 30.4 G/DL (ref 31.5–35.7)
MCHC RBC AUTO-ENTMCNC: 30.6 G/DL (ref 31.5–35.7)
MCV RBC AUTO: 93.7 FL (ref 79–97)
MCV RBC AUTO: 94.1 FL (ref 79–97)
METHADONE UR QL SCN: NEGATIVE
METHGB BLD QL: 0.4 % (ref 0–3)
MODALITY: ABNORMAL
MONOCYTES # BLD AUTO: 1.09 10*3/MM3 (ref 0.1–0.9)
MONOCYTES # BLD AUTO: 1.2 10*3/MM3 (ref 0.1–0.9)
MONOCYTES NFR BLD AUTO: 6.3 % (ref 5–12)
MONOCYTES NFR BLD AUTO: 9.9 % (ref 5–12)
NEUTROPHILS # BLD AUTO: 14.74 10*3/MM3 (ref 1.7–7)
NEUTROPHILS # BLD AUTO: 9.08 10*3/MM3 (ref 1.7–7)
NEUTROPHILS NFR BLD AUTO: 74.5 % (ref 42.7–76)
NEUTROPHILS NFR BLD AUTO: 86 % (ref 42.7–76)
NITRITE UR QL STRIP: NEGATIVE
NT-PROBNP SERPL-MCNC: 434.7 PG/ML (ref 5–1800)
OPIATES UR QL: NEGATIVE
OXYCODONE UR QL SCN: NEGATIVE
OXYHGB MFR BLDV: 93.3 % (ref 85–100)
PCO2 BLDA: 36.7 MM HG (ref 35–45)
PCP UR QL SCN: NEGATIVE
PH BLDA: 7.47 PH UNITS (ref 7.35–7.45)
PH UR STRIP.AUTO: 8 [PH] (ref 5–8)
PLATELET # BLD AUTO: 243 10*3/MM3 (ref 140–450)
PLATELET # BLD AUTO: 281 10*3/MM3 (ref 140–450)
PMV BLD AUTO: 10.5 FL (ref 6–12)
PMV BLD AUTO: 10.7 FL (ref 6–12)
PO2 BLDA: 73.2 MM HG (ref 80–100)
POTASSIUM BLD-SCNC: 4.5 MMOL/L (ref 3.5–5.2)
PROT SERPL-MCNC: 8.1 G/DL (ref 6–8.5)
PROT UR QL STRIP: NEGATIVE
RBC # BLD AUTO: 3.53 10*6/MM3 (ref 3.77–5.28)
RBC # BLD AUTO: 3.8 10*6/MM3 (ref 3.77–5.28)
SAO2 % BLDCOA: 94.6 % (ref 90–100)
SODIUM BLD-SCNC: 139 MMOL/L (ref 136–145)
SP GR UR STRIP: 1.01 (ref 1–1.03)
TROPONIN T SERPL-MCNC: <0.01 NG/ML (ref 0–0.03)
TROPONIN T SERPL-MCNC: <0.01 NG/ML (ref 0–0.03)
UROBILINOGEN UR QL STRIP: NORMAL
WBC NRBC COR # BLD: 12.17 10*3/MM3 (ref 3.4–10.8)
WBC NRBC COR # BLD: 17.17 10*3/MM3 (ref 3.4–10.8)

## 2019-08-04 PROCEDURE — 85025 COMPLETE CBC W/AUTO DIFF WBC: CPT | Performed by: PSYCHIATRY & NEUROLOGY

## 2019-08-04 PROCEDURE — 36600 WITHDRAWAL OF ARTERIAL BLOOD: CPT | Performed by: INTERNAL MEDICINE

## 2019-08-04 PROCEDURE — 80307 DRUG TEST PRSMV CHEM ANLYZR: CPT | Performed by: PHYSICIAN ASSISTANT

## 2019-08-04 PROCEDURE — 85025 COMPLETE CBC W/AUTO DIFF WBC: CPT | Performed by: PHYSICIAN ASSISTANT

## 2019-08-04 PROCEDURE — 99285 EMERGENCY DEPT VISIT HI MDM: CPT

## 2019-08-04 PROCEDURE — 36415 COLL VENOUS BLD VENIPUNCTURE: CPT

## 2019-08-04 PROCEDURE — 82805 BLOOD GASES W/O2 SATURATION: CPT | Performed by: INTERNAL MEDICINE

## 2019-08-04 PROCEDURE — 0 IOVERSOL 74 % SOLUTION: Performed by: PSYCHIATRY & NEUROLOGY

## 2019-08-04 PROCEDURE — 82962 GLUCOSE BLOOD TEST: CPT

## 2019-08-04 PROCEDURE — 83735 ASSAY OF MAGNESIUM: CPT | Performed by: PHYSICIAN ASSISTANT

## 2019-08-04 PROCEDURE — 94640 AIRWAY INHALATION TREATMENT: CPT

## 2019-08-04 PROCEDURE — 94799 UNLISTED PULMONARY SVC/PX: CPT

## 2019-08-04 PROCEDURE — 93005 ELECTROCARDIOGRAM TRACING: CPT | Performed by: PSYCHIATRY & NEUROLOGY

## 2019-08-04 PROCEDURE — 81025 URINE PREGNANCY TEST: CPT | Performed by: PHYSICIAN ASSISTANT

## 2019-08-04 PROCEDURE — 84484 ASSAY OF TROPONIN QUANT: CPT | Performed by: PSYCHIATRY & NEUROLOGY

## 2019-08-04 PROCEDURE — 96372 THER/PROPH/DIAG INJ SC/IM: CPT

## 2019-08-04 PROCEDURE — 83880 ASSAY OF NATRIURETIC PEPTIDE: CPT | Performed by: PSYCHIATRY & NEUROLOGY

## 2019-08-04 PROCEDURE — 93010 ELECTROCARDIOGRAM REPORT: CPT | Performed by: INTERNAL MEDICINE

## 2019-08-04 PROCEDURE — 71275 CT ANGIOGRAPHY CHEST: CPT

## 2019-08-04 PROCEDURE — 80053 COMPREHEN METABOLIC PANEL: CPT | Performed by: PHYSICIAN ASSISTANT

## 2019-08-04 PROCEDURE — 99223 1ST HOSP IP/OBS HIGH 75: CPT | Performed by: INTERNAL MEDICINE

## 2019-08-04 PROCEDURE — 83050 HGB METHEMOGLOBIN QUAN: CPT | Performed by: INTERNAL MEDICINE

## 2019-08-04 PROCEDURE — 25010000002 LORAZEPAM PER 2 MG: Performed by: EMERGENCY MEDICINE

## 2019-08-04 PROCEDURE — 71046 X-RAY EXAM CHEST 2 VIEWS: CPT

## 2019-08-04 PROCEDURE — 81003 URINALYSIS AUTO W/O SCOPE: CPT | Performed by: PHYSICIAN ASSISTANT

## 2019-08-04 PROCEDURE — 71046 X-RAY EXAM CHEST 2 VIEWS: CPT | Performed by: RADIOLOGY

## 2019-08-04 PROCEDURE — 82375 ASSAY CARBOXYHB QUANT: CPT | Performed by: INTERNAL MEDICINE

## 2019-08-04 RX ORDER — ACETAMINOPHEN 325 MG/1
650 TABLET ORAL EVERY 6 HOURS PRN
Status: DISCONTINUED | OUTPATIENT
Start: 2019-08-04 | End: 2019-08-06 | Stop reason: HOSPADM

## 2019-08-04 RX ORDER — BUDESONIDE 0.5 MG/2ML
0.5 INHALANT ORAL 2 TIMES DAILY
Status: CANCELLED | OUTPATIENT
Start: 2019-08-04

## 2019-08-04 RX ORDER — MIRTAZAPINE 15 MG/1
15 TABLET, FILM COATED ORAL NIGHTLY
Status: ON HOLD | COMMUNITY
End: 2019-08-15 | Stop reason: SDUPTHER

## 2019-08-04 RX ORDER — NITROGLYCERIN 0.4 MG/1
0.4 TABLET SUBLINGUAL ONCE
Status: COMPLETED | OUTPATIENT
Start: 2019-08-04 | End: 2019-08-04

## 2019-08-04 RX ORDER — MEGESTROL ACETATE 40 MG/ML
200 SUSPENSION ORAL 2 TIMES DAILY
Status: DISCONTINUED | OUTPATIENT
Start: 2019-08-04 | End: 2019-08-06 | Stop reason: HOSPADM

## 2019-08-04 RX ORDER — PANTOPRAZOLE SODIUM 40 MG/1
40 TABLET, DELAYED RELEASE ORAL DAILY
COMMUNITY

## 2019-08-04 RX ORDER — CARBOXYMETHYLCELLULOSE SODIUM 5 MG/ML
1 SOLUTION/ DROPS OPHTHALMIC 3 TIMES DAILY PRN
COMMUNITY

## 2019-08-04 RX ORDER — LORAZEPAM 0.5 MG/1
0.5 TABLET ORAL EVERY 8 HOURS PRN
Status: DISCONTINUED | OUTPATIENT
Start: 2019-08-04 | End: 2019-08-06 | Stop reason: HOSPADM

## 2019-08-04 RX ORDER — BENZONATATE 100 MG/1
100 CAPSULE ORAL 3 TIMES DAILY PRN
Status: DISCONTINUED | OUTPATIENT
Start: 2019-08-04 | End: 2019-08-06 | Stop reason: HOSPADM

## 2019-08-04 RX ORDER — LORAZEPAM 2 MG/ML
1 INJECTION INTRAMUSCULAR ONCE
Status: COMPLETED | OUTPATIENT
Start: 2019-08-04 | End: 2019-08-04

## 2019-08-04 RX ORDER — NICOTINE POLACRILEX 4 MG
15 LOZENGE BUCCAL
Status: DISCONTINUED | OUTPATIENT
Start: 2019-08-04 | End: 2019-08-06 | Stop reason: HOSPADM

## 2019-08-04 RX ORDER — METOPROLOL SUCCINATE 25 MG/1
12.5 TABLET, EXTENDED RELEASE ORAL DAILY
COMMUNITY
End: 2019-08-09 | Stop reason: HOSPADM

## 2019-08-04 RX ORDER — PANTOPRAZOLE SODIUM 40 MG/1
40 TABLET, DELAYED RELEASE ORAL DAILY
Status: CANCELLED | OUTPATIENT
Start: 2019-08-04

## 2019-08-04 RX ORDER — FAMOTIDINE 20 MG/1
20 TABLET, FILM COATED ORAL 2 TIMES DAILY PRN
Status: DISCONTINUED | OUTPATIENT
Start: 2019-08-04 | End: 2019-08-06 | Stop reason: HOSPADM

## 2019-08-04 RX ORDER — MEGESTROL ACETATE 40 MG/ML
200 SUSPENSION ORAL 2 TIMES DAILY
Status: ON HOLD | COMMUNITY
End: 2019-08-15 | Stop reason: SDUPTHER

## 2019-08-04 RX ORDER — ONDANSETRON 4 MG/1
4 TABLET, FILM COATED ORAL EVERY 6 HOURS PRN
Status: DISCONTINUED | OUTPATIENT
Start: 2019-08-04 | End: 2019-08-06 | Stop reason: HOSPADM

## 2019-08-04 RX ORDER — ACETYLCYSTEINE 200 MG/ML
3 SOLUTION ORAL; RESPIRATORY (INHALATION)
Status: DISCONTINUED | OUTPATIENT
Start: 2019-08-05 | End: 2019-08-06 | Stop reason: HOSPADM

## 2019-08-04 RX ORDER — BUDESONIDE 0.5 MG/2ML
0.5 INHALANT ORAL 2 TIMES DAILY
COMMUNITY

## 2019-08-04 RX ORDER — LORAZEPAM 0.5 MG/1
0.5 TABLET ORAL EVERY 8 HOURS PRN
Status: CANCELLED | OUTPATIENT
Start: 2019-08-04

## 2019-08-04 RX ORDER — CARBOXYMETHYLCELLULOSE SODIUM 5 MG/ML
1 SOLUTION/ DROPS OPHTHALMIC 3 TIMES DAILY PRN
Status: CANCELLED | OUTPATIENT
Start: 2019-08-04

## 2019-08-04 RX ORDER — IPRATROPIUM BROMIDE AND ALBUTEROL SULFATE 2.5; .5 MG/3ML; MG/3ML
3 SOLUTION RESPIRATORY (INHALATION) EVERY 6 HOURS PRN
Status: DISCONTINUED | OUTPATIENT
Start: 2019-08-04 | End: 2019-08-04

## 2019-08-04 RX ORDER — MIRTAZAPINE 15 MG/1
15 TABLET, FILM COATED ORAL NIGHTLY
Status: CANCELLED | OUTPATIENT
Start: 2019-08-04

## 2019-08-04 RX ORDER — DEXTROSE MONOHYDRATE 25 G/50ML
25 INJECTION, SOLUTION INTRAVENOUS
Status: DISCONTINUED | OUTPATIENT
Start: 2019-08-04 | End: 2019-08-06 | Stop reason: HOSPADM

## 2019-08-04 RX ORDER — PANTOPRAZOLE SODIUM 40 MG/1
40 TABLET, DELAYED RELEASE ORAL DAILY
Status: DISCONTINUED | OUTPATIENT
Start: 2019-08-05 | End: 2019-08-06 | Stop reason: HOSPADM

## 2019-08-04 RX ORDER — METOPROLOL SUCCINATE 25 MG/1
12.5 TABLET, EXTENDED RELEASE ORAL DAILY
Status: DISCONTINUED | OUTPATIENT
Start: 2019-08-05 | End: 2019-08-06

## 2019-08-04 RX ORDER — DONEPEZIL HYDROCHLORIDE 5 MG/1
5 TABLET, FILM COATED ORAL NIGHTLY
Status: DISCONTINUED | OUTPATIENT
Start: 2019-08-04 | End: 2019-08-06 | Stop reason: HOSPADM

## 2019-08-04 RX ORDER — MIRTAZAPINE 15 MG/1
15 TABLET, FILM COATED ORAL NIGHTLY
Status: DISCONTINUED | OUTPATIENT
Start: 2019-08-04 | End: 2019-08-06 | Stop reason: HOSPADM

## 2019-08-04 RX ORDER — DOXYCYCLINE 100 MG/1
100 CAPSULE ORAL EVERY 12 HOURS SCHEDULED
Status: CANCELLED | OUTPATIENT
Start: 2019-08-04 | End: 2019-08-13

## 2019-08-04 RX ORDER — DOXYCYCLINE 100 MG/1
100 CAPSULE ORAL EVERY 12 HOURS SCHEDULED
Status: DISCONTINUED | OUTPATIENT
Start: 2019-08-04 | End: 2019-08-06 | Stop reason: HOSPADM

## 2019-08-04 RX ORDER — LORAZEPAM 0.5 MG/1
0.5 TABLET ORAL EVERY 8 HOURS PRN
COMMUNITY

## 2019-08-04 RX ORDER — CARBOXYMETHYLCELLULOSE SODIUM 5 MG/ML
1 SOLUTION/ DROPS OPHTHALMIC 3 TIMES DAILY PRN
Status: DISCONTINUED | OUTPATIENT
Start: 2019-08-04 | End: 2019-08-06 | Stop reason: HOSPADM

## 2019-08-04 RX ORDER — IBUPROFEN 400 MG/1
400 TABLET ORAL EVERY 6 HOURS PRN
Status: DISCONTINUED | OUTPATIENT
Start: 2019-08-04 | End: 2019-08-06 | Stop reason: HOSPADM

## 2019-08-04 RX ORDER — ECHINACEA PURPUREA EXTRACT 125 MG
2 TABLET ORAL AS NEEDED
Status: DISCONTINUED | OUTPATIENT
Start: 2019-08-04 | End: 2019-08-06 | Stop reason: HOSPADM

## 2019-08-04 RX ORDER — MEGESTROL ACETATE 40 MG/ML
200 SUSPENSION ORAL 2 TIMES DAILY
Status: CANCELLED | OUTPATIENT
Start: 2019-08-04

## 2019-08-04 RX ORDER — IPRATROPIUM BROMIDE AND ALBUTEROL SULFATE 2.5; .5 MG/3ML; MG/3ML
3 SOLUTION RESPIRATORY (INHALATION) EVERY 6 HOURS PRN
COMMUNITY

## 2019-08-04 RX ORDER — HYDROXYZINE 50 MG/1
50 TABLET, FILM COATED ORAL EVERY 6 HOURS PRN
Status: DISCONTINUED | OUTPATIENT
Start: 2019-08-04 | End: 2019-08-06 | Stop reason: HOSPADM

## 2019-08-04 RX ORDER — BUDESONIDE 0.5 MG/2ML
0.5 INHALANT ORAL
Status: DISCONTINUED | OUTPATIENT
Start: 2019-08-04 | End: 2019-08-06 | Stop reason: HOSPADM

## 2019-08-04 RX ORDER — DOXYCYCLINE HYCLATE 100 MG/1
100 CAPSULE ORAL 2 TIMES DAILY
COMMUNITY
End: 2019-08-09 | Stop reason: HOSPADM

## 2019-08-04 RX ORDER — DONEPEZIL HYDROCHLORIDE 5 MG/1
5 TABLET, FILM COATED ORAL NIGHTLY
COMMUNITY

## 2019-08-04 RX ORDER — AMOXICILLIN AND CLAVULANATE POTASSIUM 875; 125 MG/1; MG/1
1 TABLET, FILM COATED ORAL EVERY 12 HOURS SCHEDULED
Status: DISCONTINUED | OUTPATIENT
Start: 2019-08-04 | End: 2019-08-06 | Stop reason: HOSPADM

## 2019-08-04 RX ORDER — LOPERAMIDE HYDROCHLORIDE 2 MG/1
2 CAPSULE ORAL
Status: DISCONTINUED | OUTPATIENT
Start: 2019-08-04 | End: 2019-08-06 | Stop reason: HOSPADM

## 2019-08-04 RX ORDER — IPRATROPIUM BROMIDE AND ALBUTEROL SULFATE 2.5; .5 MG/3ML; MG/3ML
3 SOLUTION RESPIRATORY (INHALATION) EVERY 6 HOURS PRN
Status: CANCELLED | OUTPATIENT
Start: 2019-08-04

## 2019-08-04 RX ORDER — PANTOPRAZOLE SODIUM 40 MG/1
40 TABLET, DELAYED RELEASE ORAL ONCE
Status: COMPLETED | OUTPATIENT
Start: 2019-08-04 | End: 2019-08-04

## 2019-08-04 RX ORDER — ALUMINA, MAGNESIA, AND SIMETHICONE 2400; 2400; 240 MG/30ML; MG/30ML; MG/30ML
15 SUSPENSION ORAL EVERY 6 HOURS PRN
Status: DISCONTINUED | OUTPATIENT
Start: 2019-08-04 | End: 2019-08-06 | Stop reason: HOSPADM

## 2019-08-04 RX ORDER — TRAZODONE HYDROCHLORIDE 50 MG/1
50 TABLET ORAL NIGHTLY PRN
Status: DISCONTINUED | OUTPATIENT
Start: 2019-08-04 | End: 2019-08-06 | Stop reason: HOSPADM

## 2019-08-04 RX ORDER — METRONIDAZOLE 250 MG/1
500 TABLET ORAL EVERY 8 HOURS SCHEDULED
Status: DISCONTINUED | OUTPATIENT
Start: 2019-08-05 | End: 2019-08-04

## 2019-08-04 RX ORDER — DONEPEZIL HYDROCHLORIDE 5 MG/1
5 TABLET, FILM COATED ORAL NIGHTLY
Status: CANCELLED | OUTPATIENT
Start: 2019-08-04

## 2019-08-04 RX ORDER — ASPIRIN 325 MG
325 TABLET, DELAYED RELEASE (ENTERIC COATED) ORAL ONCE
Status: COMPLETED | OUTPATIENT
Start: 2019-08-04 | End: 2019-08-04

## 2019-08-04 RX ORDER — METOPROLOL SUCCINATE 25 MG/1
12.5 TABLET, EXTENDED RELEASE ORAL DAILY
Status: CANCELLED | OUTPATIENT
Start: 2019-08-04

## 2019-08-04 RX ADMIN — ALUMINUM HYDROXIDE, MAGNESIUM HYDROXIDE, AND DIMETHICONE 15 ML: 400; 400; 40 SUSPENSION ORAL at 17:30

## 2019-08-04 RX ADMIN — DOXYCYCLINE 100 MG: 100 CAPSULE ORAL at 22:03

## 2019-08-04 RX ADMIN — IOVERSOL 70 ML: 741 INJECTION INTRA-ARTERIAL; INTRAVENOUS at 22:00

## 2019-08-04 RX ADMIN — PANTOPRAZOLE SODIUM 40 MG: 40 TABLET, DELAYED RELEASE ORAL at 19:05

## 2019-08-04 RX ADMIN — BUDESONIDE 0.5 MG: 0.5 INHALANT RESPIRATORY (INHALATION) at 19:46

## 2019-08-04 RX ADMIN — NITROGLYCERIN 0.4 MG: 0.4 TABLET, ORALLY DISINTEGRATING SUBLINGUAL at 19:05

## 2019-08-04 RX ADMIN — Medication 325 MG: at 18:04

## 2019-08-04 RX ADMIN — DONEPEZIL HYDROCHLORIDE 5 MG: 5 TABLET, FILM COATED ORAL at 22:03

## 2019-08-04 RX ADMIN — AMOXICILLIN AND CLAVULANATE POTASSIUM 1 TABLET: 875; 125 TABLET, FILM COATED ORAL at 23:41

## 2019-08-04 RX ADMIN — MEGESTROL ACETATE 200 MG: 40 SUSPENSION ORAL at 22:04

## 2019-08-04 RX ADMIN — IPRATROPIUM BROMIDE AND ALBUTEROL SULFATE 3 ML: .5; 3 SOLUTION RESPIRATORY (INHALATION) at 19:46

## 2019-08-04 RX ADMIN — LORAZEPAM 1 MG: 2 INJECTION INTRAMUSCULAR; INTRAVENOUS at 13:59

## 2019-08-05 ENCOUNTER — APPOINTMENT (OUTPATIENT)
Dept: GENERAL RADIOLOGY | Facility: HOSPITAL | Age: 79
End: 2019-08-05

## 2019-08-05 LAB
ALBUMIN SERPL-MCNC: 3.4 G/DL (ref 3.5–5.2)
ALBUMIN/GLOB SERPL: 0.8 G/DL
ALP SERPL-CCNC: 76 U/L (ref 39–117)
ALT SERPL W P-5'-P-CCNC: 11 U/L (ref 1–33)
ANION GAP SERPL CALCULATED.3IONS-SCNC: 14.5 MMOL/L (ref 5–15)
AST SERPL-CCNC: 18 U/L (ref 1–32)
BASOPHILS # BLD AUTO: 0.02 10*3/MM3 (ref 0–0.2)
BASOPHILS NFR BLD AUTO: 0.2 % (ref 0–1.5)
BILIRUB SERPL-MCNC: 0.7 MG/DL (ref 0.2–1.2)
BUN BLD-MCNC: 33 MG/DL (ref 8–23)
BUN/CREAT SERPL: 39.8 (ref 7–25)
CALCIUM SPEC-SCNC: 9.5 MG/DL (ref 8.6–10.5)
CHLORIDE SERPL-SCNC: 101 MMOL/L (ref 98–107)
CO2 SERPL-SCNC: 27.5 MMOL/L (ref 22–29)
CREAT BLD-MCNC: 0.83 MG/DL (ref 0.57–1)
CRP SERPL-MCNC: 10.57 MG/DL (ref 0–0.5)
D-LACTATE SERPL-SCNC: 0.8 MMOL/L (ref 0.5–2)
DEPRECATED RDW RBC AUTO: 53.5 FL (ref 37–54)
EOSINOPHIL # BLD AUTO: 0.01 10*3/MM3 (ref 0–0.4)
EOSINOPHIL NFR BLD AUTO: 0.1 % (ref 0.3–6.2)
ERYTHROCYTE [DISTWIDTH] IN BLOOD BY AUTOMATED COUNT: 16.3 % (ref 12.3–15.4)
GFR SERPL CREATININE-BSD FRML MDRD: 66 ML/MIN/1.73
GLOBULIN UR ELPH-MCNC: 4.1 GM/DL
GLUCOSE BLD-MCNC: 115 MG/DL (ref 65–99)
GLUCOSE BLDC GLUCOMTR-MCNC: 111 MG/DL (ref 70–130)
GLUCOSE BLDC GLUCOMTR-MCNC: 114 MG/DL (ref 70–130)
HCT VFR BLD AUTO: 32 % (ref 34–46.6)
HGB BLD-MCNC: 9.7 G/DL (ref 12–15.9)
IMM GRANULOCYTES # BLD AUTO: 0.02 10*3/MM3 (ref 0–0.05)
IMM GRANULOCYTES NFR BLD AUTO: 0.2 % (ref 0–0.5)
L PNEUMO1 AG UR QL IA: NEGATIVE
LYMPHOCYTES # BLD AUTO: 0.77 10*3/MM3 (ref 0.7–3.1)
LYMPHOCYTES NFR BLD AUTO: 6.4 % (ref 19.6–45.3)
M PNEUMO IGM SER QL: POSITIVE
MCH RBC QN AUTO: 28.4 PG (ref 26.6–33)
MCHC RBC AUTO-ENTMCNC: 30.3 G/DL (ref 31.5–35.7)
MCV RBC AUTO: 93.6 FL (ref 79–97)
MONOCYTES # BLD AUTO: 1.48 10*3/MM3 (ref 0.1–0.9)
MONOCYTES NFR BLD AUTO: 12.3 % (ref 5–12)
NEUTROPHILS # BLD AUTO: 9.78 10*3/MM3 (ref 1.7–7)
NEUTROPHILS NFR BLD AUTO: 80.8 % (ref 42.7–76)
PLATELET # BLD AUTO: 266 10*3/MM3 (ref 140–450)
PMV BLD AUTO: 11.2 FL (ref 6–12)
POTASSIUM BLD-SCNC: 4.5 MMOL/L (ref 3.5–5.2)
PROT SERPL-MCNC: 7.5 G/DL (ref 6–8.5)
RBC # BLD AUTO: 3.42 10*6/MM3 (ref 3.77–5.28)
SODIUM BLD-SCNC: 143 MMOL/L (ref 136–145)
TROPONIN T SERPL-MCNC: <0.01 NG/ML (ref 0–0.03)
WBC NRBC COR # BLD: 12.08 10*3/MM3 (ref 3.4–10.8)

## 2019-08-05 PROCEDURE — 93005 ELECTROCARDIOGRAM TRACING: CPT | Performed by: INTERNAL MEDICINE

## 2019-08-05 PROCEDURE — 74230 X-RAY XM SWLNG FUNCJ C+: CPT

## 2019-08-05 PROCEDURE — 83605 ASSAY OF LACTIC ACID: CPT | Performed by: INTERNAL MEDICINE

## 2019-08-05 PROCEDURE — 92611 MOTION FLUOROSCOPY/SWALLOW: CPT

## 2019-08-05 PROCEDURE — 80053 COMPREHEN METABOLIC PANEL: CPT | Performed by: INTERNAL MEDICINE

## 2019-08-05 PROCEDURE — 94640 AIRWAY INHALATION TREATMENT: CPT

## 2019-08-05 PROCEDURE — 99222 1ST HOSP IP/OBS MODERATE 55: CPT | Performed by: INTERNAL MEDICINE

## 2019-08-05 PROCEDURE — 99233 SBSQ HOSP IP/OBS HIGH 50: CPT | Performed by: PHYSICIAN ASSISTANT

## 2019-08-05 PROCEDURE — 84484 ASSAY OF TROPONIN QUANT: CPT | Performed by: INTERNAL MEDICINE

## 2019-08-05 PROCEDURE — 87040 BLOOD CULTURE FOR BACTERIA: CPT | Performed by: PHYSICIAN ASSISTANT

## 2019-08-05 PROCEDURE — 86140 C-REACTIVE PROTEIN: CPT | Performed by: INTERNAL MEDICINE

## 2019-08-05 PROCEDURE — 25010000002 METHYLPREDNISOLONE PER 125 MG: Performed by: PHYSICIAN ASSISTANT

## 2019-08-05 PROCEDURE — 87899 AGENT NOS ASSAY W/OPTIC: CPT | Performed by: INTERNAL MEDICINE

## 2019-08-05 PROCEDURE — 86738 MYCOPLASMA ANTIBODY: CPT | Performed by: INTERNAL MEDICINE

## 2019-08-05 PROCEDURE — 74230 X-RAY XM SWLNG FUNCJ C+: CPT | Performed by: RADIOLOGY

## 2019-08-05 PROCEDURE — 99223 1ST HOSP IP/OBS HIGH 75: CPT | Performed by: PSYCHIATRY & NEUROLOGY

## 2019-08-05 PROCEDURE — 94799 UNLISTED PULMONARY SVC/PX: CPT

## 2019-08-05 PROCEDURE — 85025 COMPLETE CBC W/AUTO DIFF WBC: CPT | Performed by: INTERNAL MEDICINE

## 2019-08-05 PROCEDURE — 82962 GLUCOSE BLOOD TEST: CPT

## 2019-08-05 RX ORDER — METHYLPREDNISOLONE SODIUM SUCCINATE 125 MG/2ML
80 INJECTION, POWDER, LYOPHILIZED, FOR SOLUTION INTRAMUSCULAR; INTRAVENOUS ONCE
Status: COMPLETED | OUTPATIENT
Start: 2019-08-05 | End: 2019-08-05

## 2019-08-05 RX ORDER — PREDNISONE 20 MG/1
40 TABLET ORAL DAILY
Status: DISCONTINUED | OUTPATIENT
Start: 2019-08-05 | End: 2019-08-05

## 2019-08-05 RX ORDER — PREDNISONE 20 MG/1
40 TABLET ORAL DAILY
Status: DISCONTINUED | OUTPATIENT
Start: 2019-08-06 | End: 2019-08-06 | Stop reason: HOSPADM

## 2019-08-05 RX ORDER — PREDNISONE 20 MG/1
20 TABLET ORAL DAILY
Status: DISCONTINUED | OUTPATIENT
Start: 2019-08-12 | End: 2019-08-06 | Stop reason: HOSPADM

## 2019-08-05 RX ORDER — PREDNISONE 20 MG/1
20 TABLET ORAL DAILY
Status: DISCONTINUED | OUTPATIENT
Start: 2019-08-11 | End: 2019-08-05

## 2019-08-05 RX ORDER — PREDNISONE 10 MG/1
10 TABLET ORAL DAILY
Status: DISCONTINUED | OUTPATIENT
Start: 2019-08-14 | End: 2019-08-05

## 2019-08-05 RX ORDER — PREDNISONE 10 MG/1
10 TABLET ORAL DAILY
Status: DISCONTINUED | OUTPATIENT
Start: 2019-08-15 | End: 2019-08-06 | Stop reason: HOSPADM

## 2019-08-05 RX ORDER — GUAIFENESIN 600 MG/1
1200 TABLET, EXTENDED RELEASE ORAL EVERY 12 HOURS SCHEDULED
Status: DISCONTINUED | OUTPATIENT
Start: 2019-08-05 | End: 2019-08-06 | Stop reason: HOSPADM

## 2019-08-05 RX ADMIN — AMOXICILLIN AND CLAVULANATE POTASSIUM 1 TABLET: 875; 125 TABLET, FILM COATED ORAL at 20:21

## 2019-08-05 RX ADMIN — BUDESONIDE 0.5 MG: 0.5 INHALANT RESPIRATORY (INHALATION) at 06:53

## 2019-08-05 RX ADMIN — MEGESTROL ACETATE 200 MG: 40 SUSPENSION ORAL at 20:21

## 2019-08-05 RX ADMIN — ACETYLCYSTEINE 3 ML: 200 SOLUTION ORAL; RESPIRATORY (INHALATION) at 20:58

## 2019-08-05 RX ADMIN — SODIUM CHLORIDE 500 ML: 9 INJECTION, SOLUTION INTRAVENOUS at 01:01

## 2019-08-05 RX ADMIN — BUDESONIDE 0.5 MG: 0.5 INHALANT RESPIRATORY (INHALATION) at 20:58

## 2019-08-05 RX ADMIN — MEGESTROL ACETATE 200 MG: 40 SUSPENSION ORAL at 09:37

## 2019-08-05 RX ADMIN — MIRTAZAPINE 15 MG: 15 TABLET, FILM COATED ORAL at 20:21

## 2019-08-05 RX ADMIN — PANTOPRAZOLE SODIUM 40 MG: 40 TABLET, DELAYED RELEASE ORAL at 09:36

## 2019-08-05 RX ADMIN — GUAIFENESIN 1200 MG: 600 TABLET, EXTENDED RELEASE ORAL at 21:23

## 2019-08-05 RX ADMIN — DOXYCYCLINE 100 MG: 100 CAPSULE ORAL at 20:21

## 2019-08-05 RX ADMIN — IPRATROPIUM BROMIDE 0.5 MG: 0.5 SOLUTION RESPIRATORY (INHALATION) at 01:02

## 2019-08-05 RX ADMIN — METHYLPREDNISOLONE SODIUM SUCCINATE 80 MG: 125 INJECTION, POWDER, FOR SOLUTION INTRAMUSCULAR; INTRAVENOUS at 16:07

## 2019-08-05 RX ADMIN — AMOXICILLIN AND CLAVULANATE POTASSIUM 1 TABLET: 875; 125 TABLET, FILM COATED ORAL at 09:37

## 2019-08-05 RX ADMIN — IPRATROPIUM BROMIDE 0.5 MG: 0.5 SOLUTION RESPIRATORY (INHALATION) at 20:58

## 2019-08-05 RX ADMIN — METOPROLOL SUCCINATE 12.5 MG: 25 TABLET, FILM COATED, EXTENDED RELEASE ORAL at 09:36

## 2019-08-05 RX ADMIN — DONEPEZIL HYDROCHLORIDE 5 MG: 5 TABLET, FILM COATED ORAL at 20:21

## 2019-08-05 RX ADMIN — GUAIFENESIN 1200 MG: 600 TABLET, EXTENDED RELEASE ORAL at 16:13

## 2019-08-05 RX ADMIN — IPRATROPIUM BROMIDE 0.5 MG: 0.5 SOLUTION RESPIRATORY (INHALATION) at 06:53

## 2019-08-05 RX ADMIN — DOXYCYCLINE 100 MG: 100 CAPSULE ORAL at 09:36

## 2019-08-06 ENCOUNTER — HOSPITAL ENCOUNTER (INPATIENT)
Facility: HOSPITAL | Age: 79
LOS: 3 days | End: 2019-08-09
Attending: INTERNAL MEDICINE | Admitting: INTERNAL MEDICINE

## 2019-08-06 ENCOUNTER — PREP FOR SURGERY (OUTPATIENT)
Dept: OTHER | Facility: HOSPITAL | Age: 79
End: 2019-08-06

## 2019-08-06 VITALS
HEIGHT: 64 IN | OXYGEN SATURATION: 98 % | BODY MASS INDEX: 15.95 KG/M2 | HEART RATE: 126 BPM | TEMPERATURE: 98 F | SYSTOLIC BLOOD PRESSURE: 130 MMHG | RESPIRATION RATE: 20 BRPM | DIASTOLIC BLOOD PRESSURE: 74 MMHG | WEIGHT: 93.4 LBS

## 2019-08-06 PROBLEM — A41.9 SEPSIS (HCC): Status: ACTIVE | Noted: 2019-08-06

## 2019-08-06 LAB
ALBUMIN SERPL-MCNC: 3.14 G/DL (ref 3.5–5.2)
ALBUMIN/GLOB SERPL: 0.6 G/DL
ALP SERPL-CCNC: 72 U/L (ref 39–117)
ALT SERPL W P-5'-P-CCNC: 11 U/L (ref 1–33)
ANION GAP SERPL CALCULATED.3IONS-SCNC: 14.4 MMOL/L (ref 5–15)
AST SERPL-CCNC: 18 U/L (ref 1–32)
BASOPHILS # BLD AUTO: 0 10*3/MM3 (ref 0–0.2)
BASOPHILS NFR BLD AUTO: 0 % (ref 0–1.5)
BILIRUB SERPL-MCNC: 0.4 MG/DL (ref 0.2–1.2)
BUN BLD-MCNC: 35 MG/DL (ref 8–23)
BUN/CREAT SERPL: 42.7 (ref 7–25)
CALCIUM SPEC-SCNC: 10.1 MG/DL (ref 8.6–10.5)
CHLORIDE SERPL-SCNC: 105 MMOL/L (ref 98–107)
CO2 SERPL-SCNC: 24.6 MMOL/L (ref 22–29)
CREAT BLD-MCNC: 0.82 MG/DL (ref 0.57–1)
CRP SERPL-MCNC: 20.53 MG/DL (ref 0–0.5)
D-LACTATE SERPL-SCNC: 1.5 MMOL/L (ref 0.5–2)
DEPRECATED RDW RBC AUTO: 54 FL (ref 37–54)
EOSINOPHIL # BLD AUTO: 0 10*3/MM3 (ref 0–0.4)
EOSINOPHIL NFR BLD AUTO: 0 % (ref 0.3–6.2)
ERYTHROCYTE [DISTWIDTH] IN BLOOD BY AUTOMATED COUNT: 16.2 % (ref 12.3–15.4)
GFR SERPL CREATININE-BSD FRML MDRD: 67 ML/MIN/1.73
GLOBULIN UR ELPH-MCNC: 4.9 GM/DL
GLUCOSE BLD-MCNC: 140 MG/DL (ref 65–99)
GLUCOSE BLDC GLUCOMTR-MCNC: 203 MG/DL (ref 70–130)
HCT VFR BLD AUTO: 34.5 % (ref 34–46.6)
HGB BLD-MCNC: 10.3 G/DL (ref 12–15.9)
IMM GRANULOCYTES # BLD AUTO: 0.02 10*3/MM3 (ref 0–0.05)
IMM GRANULOCYTES NFR BLD AUTO: 0.3 % (ref 0–0.5)
LYMPHOCYTES # BLD AUTO: 0.54 10*3/MM3 (ref 0.7–3.1)
LYMPHOCYTES NFR BLD AUTO: 7.1 % (ref 19.6–45.3)
MCH RBC QN AUTO: 28.3 PG (ref 26.6–33)
MCHC RBC AUTO-ENTMCNC: 29.9 G/DL (ref 31.5–35.7)
MCV RBC AUTO: 94.8 FL (ref 79–97)
MONOCYTES # BLD AUTO: 0.21 10*3/MM3 (ref 0.1–0.9)
MONOCYTES NFR BLD AUTO: 2.7 % (ref 5–12)
NEUTROPHILS # BLD AUTO: 6.88 10*3/MM3 (ref 1.7–7)
NEUTROPHILS NFR BLD AUTO: 89.9 % (ref 42.7–76)
PLATELET # BLD AUTO: 246 10*3/MM3 (ref 140–450)
PMV BLD AUTO: 10.9 FL (ref 6–12)
POTASSIUM BLD-SCNC: 4.5 MMOL/L (ref 3.5–5.2)
PROT SERPL-MCNC: 8 G/DL (ref 6–8.5)
RBC # BLD AUTO: 3.64 10*6/MM3 (ref 3.77–5.28)
SODIUM BLD-SCNC: 144 MMOL/L (ref 136–145)
WBC NRBC COR # BLD: 7.65 10*3/MM3 (ref 3.4–10.8)

## 2019-08-06 PROCEDURE — 94799 UNLISTED PULMONARY SVC/PX: CPT

## 2019-08-06 PROCEDURE — 85025 COMPLETE CBC W/AUTO DIFF WBC: CPT | Performed by: PHYSICIAN ASSISTANT

## 2019-08-06 PROCEDURE — 80053 COMPREHEN METABOLIC PANEL: CPT | Performed by: PHYSICIAN ASSISTANT

## 2019-08-06 PROCEDURE — 25010000002 METHYLPREDNISOLONE PER 40 MG: Performed by: PHYSICIAN ASSISTANT

## 2019-08-06 PROCEDURE — 86140 C-REACTIVE PROTEIN: CPT | Performed by: PHYSICIAN ASSISTANT

## 2019-08-06 PROCEDURE — 25010000002 CEFEPIME 2 G/NS 100 ML SOLUTION: Performed by: PHYSICIAN ASSISTANT

## 2019-08-06 PROCEDURE — 82962 GLUCOSE BLOOD TEST: CPT

## 2019-08-06 PROCEDURE — 99238 HOSP IP/OBS DSCHRG MGMT 30/<: CPT | Performed by: PSYCHIATRY & NEUROLOGY

## 2019-08-06 PROCEDURE — 94640 AIRWAY INHALATION TREATMENT: CPT

## 2019-08-06 PROCEDURE — 63710000001 PREDNISONE PER 1 MG: Performed by: PHYSICIAN ASSISTANT

## 2019-08-06 PROCEDURE — 83605 ASSAY OF LACTIC ACID: CPT | Performed by: PSYCHIATRY & NEUROLOGY

## 2019-08-06 PROCEDURE — 99223 1ST HOSP IP/OBS HIGH 75: CPT | Performed by: PHYSICIAN ASSISTANT

## 2019-08-06 RX ORDER — HEPARIN SODIUM 5000 [USP'U]/ML
5000 INJECTION, SOLUTION INTRAVENOUS; SUBCUTANEOUS EVERY 12 HOURS SCHEDULED
Status: CANCELLED | OUTPATIENT
Start: 2019-08-06

## 2019-08-06 RX ORDER — SODIUM CHLORIDE 0.9 % (FLUSH) 0.9 %
3 SYRINGE (ML) INJECTION EVERY 12 HOURS SCHEDULED
Status: CANCELLED | OUTPATIENT
Start: 2019-08-06

## 2019-08-06 RX ORDER — HEPARIN SODIUM 5000 [USP'U]/ML
5000 INJECTION, SOLUTION INTRAVENOUS; SUBCUTANEOUS EVERY 12 HOURS SCHEDULED
Status: DISCONTINUED | OUTPATIENT
Start: 2019-08-06 | End: 2019-08-09 | Stop reason: HOSPADM

## 2019-08-06 RX ORDER — BUDESONIDE 0.5 MG/2ML
0.5 INHALANT ORAL
Status: DISCONTINUED | OUTPATIENT
Start: 2019-08-06 | End: 2019-08-09 | Stop reason: HOSPADM

## 2019-08-06 RX ORDER — METHYLPREDNISOLONE SODIUM SUCCINATE 40 MG/ML
40 INJECTION, POWDER, LYOPHILIZED, FOR SOLUTION INTRAMUSCULAR; INTRAVENOUS EVERY 12 HOURS
Status: DISCONTINUED | OUTPATIENT
Start: 2019-08-06 | End: 2019-08-07

## 2019-08-06 RX ORDER — SODIUM CHLORIDE 0.9 % (FLUSH) 0.9 %
1-10 SYRINGE (ML) INJECTION AS NEEDED
Status: DISCONTINUED | OUTPATIENT
Start: 2019-08-06 | End: 2019-08-09 | Stop reason: HOSPADM

## 2019-08-06 RX ORDER — NITROGLYCERIN 0.4 MG/1
0.4 TABLET SUBLINGUAL
Status: CANCELLED | OUTPATIENT
Start: 2019-08-06

## 2019-08-06 RX ORDER — NITROGLYCERIN 0.4 MG/1
0.4 TABLET SUBLINGUAL
Status: DISCONTINUED | OUTPATIENT
Start: 2019-08-06 | End: 2019-08-09 | Stop reason: HOSPADM

## 2019-08-06 RX ORDER — LORAZEPAM 0.5 MG/1
0.5 TABLET ORAL EVERY 8 HOURS PRN
Status: DISCONTINUED | OUTPATIENT
Start: 2019-08-06 | End: 2019-08-09 | Stop reason: HOSPADM

## 2019-08-06 RX ORDER — SODIUM CHLORIDE 0.9 % (FLUSH) 0.9 %
1-10 SYRINGE (ML) INJECTION AS NEEDED
Status: CANCELLED | OUTPATIENT
Start: 2019-08-06

## 2019-08-06 RX ORDER — METOPROLOL SUCCINATE 25 MG/1
25 TABLET, EXTENDED RELEASE ORAL DAILY
Status: DISCONTINUED | OUTPATIENT
Start: 2019-08-07 | End: 2019-08-06 | Stop reason: HOSPADM

## 2019-08-06 RX ORDER — SODIUM CHLORIDE 0.9 % (FLUSH) 0.9 %
3 SYRINGE (ML) INJECTION EVERY 12 HOURS SCHEDULED
Status: DISCONTINUED | OUTPATIENT
Start: 2019-08-06 | End: 2019-08-09 | Stop reason: HOSPADM

## 2019-08-06 RX ORDER — BUDESONIDE 0.5 MG/2ML
0.5 INHALANT ORAL
Status: CANCELLED | OUTPATIENT
Start: 2019-08-06

## 2019-08-06 RX ORDER — METHYLPREDNISOLONE SODIUM SUCCINATE 40 MG/ML
40 INJECTION, POWDER, LYOPHILIZED, FOR SOLUTION INTRAMUSCULAR; INTRAVENOUS EVERY 12 HOURS
Status: CANCELLED | OUTPATIENT
Start: 2019-08-06

## 2019-08-06 RX ADMIN — IPRATROPIUM BROMIDE 0.5 MG: 0.5 SOLUTION RESPIRATORY (INHALATION) at 12:53

## 2019-08-06 RX ADMIN — AMOXICILLIN AND CLAVULANATE POTASSIUM 1 TABLET: 875; 125 TABLET, FILM COATED ORAL at 08:44

## 2019-08-06 RX ADMIN — DOXYCYCLINE 100 MG: 100 CAPSULE ORAL at 08:44

## 2019-08-06 RX ADMIN — ACETYLCYSTEINE 4 ML: 200 SOLUTION ORAL; RESPIRATORY (INHALATION) at 07:49

## 2019-08-06 RX ADMIN — HYDROXYZINE HYDROCHLORIDE 50 MG: 50 TABLET ORAL at 14:44

## 2019-08-06 RX ADMIN — IPRATROPIUM BROMIDE 0.5 MG: 0.5 SOLUTION RESPIRATORY (INHALATION) at 20:47

## 2019-08-06 RX ADMIN — GUAIFENESIN 1200 MG: 600 TABLET, EXTENDED RELEASE ORAL at 08:44

## 2019-08-06 RX ADMIN — BUDESONIDE 0.5 MG: 0.5 INHALANT RESPIRATORY (INHALATION) at 07:49

## 2019-08-06 RX ADMIN — IPRATROPIUM BROMIDE 0.5 MG: 0.5 SOLUTION RESPIRATORY (INHALATION) at 07:49

## 2019-08-06 RX ADMIN — PREDNISONE 40 MG: 20 TABLET ORAL at 08:44

## 2019-08-06 RX ADMIN — CEFEPIME 2 G: 2 INJECTION, POWDER, FOR SOLUTION INTRAVENOUS at 20:54

## 2019-08-06 RX ADMIN — SODIUM CHLORIDE, PRESERVATIVE FREE 3 ML: 5 INJECTION INTRAVENOUS at 20:01

## 2019-08-06 RX ADMIN — MEGESTROL ACETATE 200 MG: 40 SUSPENSION ORAL at 08:46

## 2019-08-06 RX ADMIN — PANTOPRAZOLE SODIUM 40 MG: 40 TABLET, DELAYED RELEASE ORAL at 08:44

## 2019-08-06 RX ADMIN — SODIUM CHLORIDE, PRESERVATIVE FREE 10 ML: 5 INJECTION INTRAVENOUS at 21:30

## 2019-08-06 RX ADMIN — METRONIDAZOLE 500 MG: 500 INJECTION, SOLUTION INTRAVENOUS at 21:30

## 2019-08-06 RX ADMIN — BUDESONIDE 0.5 MG: 0.5 INHALANT RESPIRATORY (INHALATION) at 20:47

## 2019-08-06 RX ADMIN — DOXYCYCLINE 100 MG: 100 INJECTION, POWDER, LYOPHILIZED, FOR SOLUTION INTRAVENOUS at 20:01

## 2019-08-06 RX ADMIN — METHYLPREDNISOLONE SODIUM SUCCINATE 40 MG: 40 INJECTION, POWDER, FOR SOLUTION INTRAMUSCULAR; INTRAVENOUS at 20:01

## 2019-08-06 RX ADMIN — METOPROLOL SUCCINATE 12.5 MG: 25 TABLET, FILM COATED, EXTENDED RELEASE ORAL at 08:45

## 2019-08-07 LAB
ANION GAP SERPL CALCULATED.3IONS-SCNC: 13.1 MMOL/L (ref 5–15)
B PERT DNA SPEC QL NAA+PROBE: NOT DETECTED
BASOPHILS # BLD AUTO: 0 10*3/MM3 (ref 0–0.2)
BASOPHILS NFR BLD AUTO: 0 % (ref 0–1.5)
BUN BLD-MCNC: 38 MG/DL (ref 8–23)
BUN/CREAT SERPL: 50 (ref 7–25)
C PNEUM DNA NPH QL NAA+NON-PROBE: NOT DETECTED
CALCIUM SPEC-SCNC: 9.5 MG/DL (ref 8.6–10.5)
CHLORIDE SERPL-SCNC: 108 MMOL/L (ref 98–107)
CO2 SERPL-SCNC: 21.9 MMOL/L (ref 22–29)
CREAT BLD-MCNC: 0.76 MG/DL (ref 0.57–1)
CRP SERPL-MCNC: 8.84 MG/DL (ref 0–0.5)
DEPRECATED RDW RBC AUTO: 58 FL (ref 37–54)
EOSINOPHIL # BLD AUTO: 0 10*3/MM3 (ref 0–0.4)
EOSINOPHIL NFR BLD AUTO: 0 % (ref 0.3–6.2)
ERYTHROCYTE [DISTWIDTH] IN BLOOD BY AUTOMATED COUNT: 16.6 % (ref 12.3–15.4)
FLUAV H1 2009 PAND RNA NPH QL NAA+PROBE: NOT DETECTED
FLUAV H1 HA GENE NPH QL NAA+PROBE: NOT DETECTED
FLUAV H3 RNA NPH QL NAA+PROBE: NOT DETECTED
FLUAV SUBTYP SPEC NAA+PROBE: NOT DETECTED
FLUBV RNA ISLT QL NAA+PROBE: NOT DETECTED
GFR SERPL CREATININE-BSD FRML MDRD: 73 ML/MIN/1.73
GLUCOSE BLD-MCNC: 156 MG/DL (ref 65–99)
GLUCOSE BLDC GLUCOMTR-MCNC: 108 MG/DL (ref 70–130)
GLUCOSE BLDC GLUCOMTR-MCNC: 129 MG/DL (ref 70–130)
HADV DNA SPEC NAA+PROBE: NOT DETECTED
HBA1C MFR BLD: 5.1 % (ref 4.8–5.6)
HCOV 229E RNA SPEC QL NAA+PROBE: NOT DETECTED
HCOV HKU1 RNA SPEC QL NAA+PROBE: NOT DETECTED
HCOV NL63 RNA SPEC QL NAA+PROBE: NOT DETECTED
HCOV OC43 RNA SPEC QL NAA+PROBE: NOT DETECTED
HCT VFR BLD AUTO: 30.5 % (ref 34–46.6)
HGB BLD-MCNC: 9 G/DL (ref 12–15.9)
HMPV RNA NPH QL NAA+NON-PROBE: NOT DETECTED
HPIV1 RNA SPEC QL NAA+PROBE: NOT DETECTED
HPIV2 RNA SPEC QL NAA+PROBE: NOT DETECTED
HPIV3 RNA NPH QL NAA+PROBE: NOT DETECTED
HPIV4 P GENE NPH QL NAA+PROBE: NOT DETECTED
IMM GRANULOCYTES # BLD AUTO: 0.01 10*3/MM3 (ref 0–0.05)
IMM GRANULOCYTES NFR BLD AUTO: 0.1 % (ref 0–0.5)
LYMPHOCYTES # BLD AUTO: 0.42 10*3/MM3 (ref 0.7–3.1)
LYMPHOCYTES NFR BLD AUTO: 4.9 % (ref 19.6–45.3)
M PNEUMO IGG SER IA-ACNC: NOT DETECTED
MAGNESIUM SERPL-MCNC: 2.2 MG/DL (ref 1.6–2.4)
MCH RBC QN AUTO: 28.5 PG (ref 26.6–33)
MCHC RBC AUTO-ENTMCNC: 29.5 G/DL (ref 31.5–35.7)
MCV RBC AUTO: 96.5 FL (ref 79–97)
MONOCYTES # BLD AUTO: 0.68 10*3/MM3 (ref 0.1–0.9)
MONOCYTES NFR BLD AUTO: 7.9 % (ref 5–12)
NEUTROPHILS # BLD AUTO: 7.51 10*3/MM3 (ref 1.7–7)
NEUTROPHILS NFR BLD AUTO: 87.1 % (ref 42.7–76)
PHOSPHATE SERPL-MCNC: 3.4 MG/DL (ref 2.5–4.5)
PLATELET # BLD AUTO: 255 10*3/MM3 (ref 140–450)
PMV BLD AUTO: 11.6 FL (ref 6–12)
POTASSIUM BLD-SCNC: 4.7 MMOL/L (ref 3.5–5.2)
RBC # BLD AUTO: 3.16 10*6/MM3 (ref 3.77–5.28)
RHINOVIRUS RNA SPEC NAA+PROBE: NOT DETECTED
RSV RNA NPH QL NAA+NON-PROBE: NOT DETECTED
SODIUM BLD-SCNC: 143 MMOL/L (ref 136–145)
TSH SERPL DL<=0.05 MIU/L-ACNC: 0.13 MIU/ML (ref 0.27–4.2)
WBC NRBC COR # BLD: 8.62 10*3/MM3 (ref 3.4–10.8)

## 2019-08-07 PROCEDURE — 25010000002 HEPARIN (PORCINE) PER 1000 UNITS: Performed by: PHYSICIAN ASSISTANT

## 2019-08-07 PROCEDURE — 84100 ASSAY OF PHOSPHORUS: CPT | Performed by: PHYSICIAN ASSISTANT

## 2019-08-07 PROCEDURE — 94799 UNLISTED PULMONARY SVC/PX: CPT

## 2019-08-07 PROCEDURE — 86140 C-REACTIVE PROTEIN: CPT | Performed by: PHYSICIAN ASSISTANT

## 2019-08-07 PROCEDURE — 80048 BASIC METABOLIC PNL TOTAL CA: CPT | Performed by: PHYSICIAN ASSISTANT

## 2019-08-07 PROCEDURE — 83735 ASSAY OF MAGNESIUM: CPT | Performed by: PHYSICIAN ASSISTANT

## 2019-08-07 PROCEDURE — 85025 COMPLETE CBC W/AUTO DIFF WBC: CPT | Performed by: PHYSICIAN ASSISTANT

## 2019-08-07 PROCEDURE — 0099U HC BIOFIRE FILMARRAY RESP PANEL 1: CPT | Performed by: PHYSICIAN ASSISTANT

## 2019-08-07 PROCEDURE — 99233 SBSQ HOSP IP/OBS HIGH 50: CPT | Performed by: PHYSICIAN ASSISTANT

## 2019-08-07 PROCEDURE — 82962 GLUCOSE BLOOD TEST: CPT

## 2019-08-07 PROCEDURE — 25010000002 METHYLPREDNISOLONE PER 40 MG: Performed by: PHYSICIAN ASSISTANT

## 2019-08-07 PROCEDURE — 83036 HEMOGLOBIN GLYCOSYLATED A1C: CPT | Performed by: PHYSICIAN ASSISTANT

## 2019-08-07 PROCEDURE — 92610 EVALUATE SWALLOWING FUNCTION: CPT

## 2019-08-07 PROCEDURE — 84443 ASSAY THYROID STIM HORMONE: CPT | Performed by: PHYSICIAN ASSISTANT

## 2019-08-07 RX ORDER — CARBOXYMETHYLCELLULOSE SODIUM 5 MG/ML
1 SOLUTION/ DROPS OPHTHALMIC 3 TIMES DAILY PRN
Status: DISCONTINUED | OUTPATIENT
Start: 2019-08-07 | End: 2019-08-09 | Stop reason: HOSPADM

## 2019-08-07 RX ORDER — MEGESTROL ACETATE 40 MG/ML
200 SUSPENSION ORAL 2 TIMES DAILY
Status: DISCONTINUED | OUTPATIENT
Start: 2019-08-07 | End: 2019-08-09 | Stop reason: HOSPADM

## 2019-08-07 RX ORDER — DONEPEZIL HYDROCHLORIDE 5 MG/1
5 TABLET, FILM COATED ORAL NIGHTLY
Status: DISCONTINUED | OUTPATIENT
Start: 2019-08-07 | End: 2019-08-09 | Stop reason: HOSPADM

## 2019-08-07 RX ORDER — METHYLPREDNISOLONE SODIUM SUCCINATE 40 MG/ML
20 INJECTION, POWDER, LYOPHILIZED, FOR SOLUTION INTRAMUSCULAR; INTRAVENOUS EVERY 12 HOURS
Status: DISCONTINUED | OUTPATIENT
Start: 2019-08-07 | End: 2019-08-08

## 2019-08-07 RX ORDER — ARFORMOTEROL TARTRATE 15 UG/2ML
15 SOLUTION RESPIRATORY (INHALATION)
Status: DISCONTINUED | OUTPATIENT
Start: 2019-08-07 | End: 2019-08-09 | Stop reason: HOSPADM

## 2019-08-07 RX ORDER — L.ACID,PARA/B.BIFIDUM/S.THERM 8B CELL
1 CAPSULE ORAL DAILY
Status: DISCONTINUED | OUTPATIENT
Start: 2019-08-07 | End: 2019-08-09 | Stop reason: HOSPADM

## 2019-08-07 RX ORDER — DEXTROSE MONOHYDRATE 25 G/50ML
25 INJECTION, SOLUTION INTRAVENOUS
Status: DISCONTINUED | OUTPATIENT
Start: 2019-08-07 | End: 2019-08-09 | Stop reason: HOSPADM

## 2019-08-07 RX ORDER — METOPROLOL SUCCINATE 25 MG/1
12.5 TABLET, EXTENDED RELEASE ORAL DAILY
Status: DISCONTINUED | OUTPATIENT
Start: 2019-08-07 | End: 2019-08-08

## 2019-08-07 RX ORDER — MIRTAZAPINE 15 MG/1
15 TABLET, FILM COATED ORAL NIGHTLY
Status: DISCONTINUED | OUTPATIENT
Start: 2019-08-07 | End: 2019-08-09 | Stop reason: HOSPADM

## 2019-08-07 RX ORDER — PANTOPRAZOLE SODIUM 40 MG/1
40 TABLET, DELAYED RELEASE ORAL DAILY
Status: DISCONTINUED | OUTPATIENT
Start: 2019-08-07 | End: 2019-08-09 | Stop reason: HOSPADM

## 2019-08-07 RX ORDER — NICOTINE POLACRILEX 4 MG
15 LOZENGE BUCCAL
Status: DISCONTINUED | OUTPATIENT
Start: 2019-08-07 | End: 2019-08-09 | Stop reason: HOSPADM

## 2019-08-07 RX ADMIN — DOXYCYCLINE 100 MG: 100 INJECTION, POWDER, LYOPHILIZED, FOR SOLUTION INTRAVENOUS at 20:29

## 2019-08-07 RX ADMIN — IPRATROPIUM BROMIDE 0.5 MG: 0.5 SOLUTION RESPIRATORY (INHALATION) at 18:29

## 2019-08-07 RX ADMIN — HEPARIN SODIUM 5000 UNITS: 5000 INJECTION INTRAVENOUS; SUBCUTANEOUS at 20:29

## 2019-08-07 RX ADMIN — METHYLPREDNISOLONE SODIUM SUCCINATE 20 MG: 40 INJECTION, POWDER, FOR SOLUTION INTRAMUSCULAR; INTRAVENOUS at 20:30

## 2019-08-07 RX ADMIN — METRONIDAZOLE 500 MG: 500 INJECTION, SOLUTION INTRAVENOUS at 14:50

## 2019-08-07 RX ADMIN — LORAZEPAM 0.5 MG: 0.5 TABLET ORAL at 06:46

## 2019-08-07 RX ADMIN — METRONIDAZOLE 500 MG: 500 INJECTION, SOLUTION INTRAVENOUS at 06:06

## 2019-08-07 RX ADMIN — DONEPEZIL HYDROCHLORIDE 5 MG: 5 TABLET, FILM COATED ORAL at 20:29

## 2019-08-07 RX ADMIN — MEGESTROL ACETATE 200 MG: 40 SUSPENSION ORAL at 20:28

## 2019-08-07 RX ADMIN — METRONIDAZOLE 500 MG: 500 INJECTION, SOLUTION INTRAVENOUS at 20:30

## 2019-08-07 RX ADMIN — CEFEPIME HYDROCHLORIDE 1 G: 1 INJECTION, POWDER, FOR SOLUTION INTRAMUSCULAR; INTRAVENOUS at 09:15

## 2019-08-07 RX ADMIN — DOXYCYCLINE 100 MG: 100 INJECTION, POWDER, LYOPHILIZED, FOR SOLUTION INTRAVENOUS at 09:15

## 2019-08-07 RX ADMIN — MEGESTROL ACETATE 200 MG: 40 SUSPENSION ORAL at 09:27

## 2019-08-07 RX ADMIN — HEPARIN SODIUM 5000 UNITS: 5000 INJECTION INTRAVENOUS; SUBCUTANEOUS at 09:15

## 2019-08-07 RX ADMIN — CEFEPIME HYDROCHLORIDE 1 G: 1 INJECTION, POWDER, FOR SOLUTION INTRAMUSCULAR; INTRAVENOUS at 20:29

## 2019-08-07 RX ADMIN — METOPROLOL SUCCINATE 12.5 MG: 50 TABLET, FILM COATED, EXTENDED RELEASE ORAL at 09:15

## 2019-08-07 RX ADMIN — MIRTAZAPINE 15 MG: 15 TABLET, FILM COATED ORAL at 20:29

## 2019-08-07 RX ADMIN — SODIUM CHLORIDE, PRESERVATIVE FREE 3 ML: 5 INJECTION INTRAVENOUS at 20:29

## 2019-08-07 RX ADMIN — PANTOPRAZOLE SODIUM 40 MG: 40 TABLET, DELAYED RELEASE ORAL at 09:27

## 2019-08-07 RX ADMIN — BUDESONIDE 0.5 MG: 0.5 INHALANT RESPIRATORY (INHALATION) at 18:29

## 2019-08-07 RX ADMIN — LORAZEPAM 0.5 MG: 0.5 TABLET ORAL at 06:20

## 2019-08-07 RX ADMIN — Medication 1 CAPSULE: at 09:27

## 2019-08-08 ENCOUNTER — PATIENT OUTREACH (OUTPATIENT)
Dept: CASE MANAGEMENT | Facility: OTHER | Age: 79
End: 2019-08-08

## 2019-08-08 LAB
ALBUMIN SERPL-MCNC: 2.79 G/DL (ref 3.5–5.2)
ALBUMIN/GLOB SERPL: 0.7 G/DL
ALP SERPL-CCNC: 59 U/L (ref 39–117)
ALT SERPL W P-5'-P-CCNC: 10 U/L (ref 1–33)
ANION GAP SERPL CALCULATED.3IONS-SCNC: 12.5 MMOL/L (ref 5–15)
AST SERPL-CCNC: 11 U/L (ref 1–32)
BASOPHILS # BLD AUTO: 0 10*3/MM3 (ref 0–0.2)
BASOPHILS NFR BLD AUTO: 0 % (ref 0–1.5)
BILIRUB SERPL-MCNC: 0.3 MG/DL (ref 0.2–1.2)
BUN BLD-MCNC: 32 MG/DL (ref 8–23)
BUN/CREAT SERPL: 33 (ref 7–25)
CALCIUM SPEC-SCNC: 8.9 MG/DL (ref 8.6–10.5)
CHLORIDE SERPL-SCNC: 109 MMOL/L (ref 98–107)
CO2 SERPL-SCNC: 24.5 MMOL/L (ref 22–29)
CREAT BLD-MCNC: 0.97 MG/DL (ref 0.57–1)
DEPRECATED RDW RBC AUTO: 52.5 FL (ref 37–54)
EOSINOPHIL # BLD AUTO: 0 10*3/MM3 (ref 0–0.4)
EOSINOPHIL NFR BLD AUTO: 0 % (ref 0.3–6.2)
ERYTHROCYTE [DISTWIDTH] IN BLOOD BY AUTOMATED COUNT: 16 % (ref 12.3–15.4)
GFR SERPL CREATININE-BSD FRML MDRD: 55 ML/MIN/1.73
GLOBULIN UR ELPH-MCNC: 3.8 GM/DL
GLUCOSE BLD-MCNC: 154 MG/DL (ref 65–99)
GLUCOSE BLDC GLUCOMTR-MCNC: 103 MG/DL (ref 70–130)
GLUCOSE BLDC GLUCOMTR-MCNC: 104 MG/DL (ref 70–130)
HCT VFR BLD AUTO: 31.1 % (ref 34–46.6)
HGB BLD-MCNC: 9.1 G/DL (ref 12–15.9)
IMM GRANULOCYTES # BLD AUTO: 0.01 10*3/MM3 (ref 0–0.05)
IMM GRANULOCYTES NFR BLD AUTO: 0.2 % (ref 0–0.5)
LYMPHOCYTES # BLD AUTO: 0.98 10*3/MM3 (ref 0.7–3.1)
LYMPHOCYTES NFR BLD AUTO: 18.5 % (ref 19.6–45.3)
MCH RBC QN AUTO: 27.5 PG (ref 26.6–33)
MCHC RBC AUTO-ENTMCNC: 29.3 G/DL (ref 31.5–35.7)
MCV RBC AUTO: 94 FL (ref 79–97)
MONOCYTES # BLD AUTO: 0.64 10*3/MM3 (ref 0.1–0.9)
MONOCYTES NFR BLD AUTO: 12.1 % (ref 5–12)
NEUTROPHILS # BLD AUTO: 3.67 10*3/MM3 (ref 1.7–7)
NEUTROPHILS NFR BLD AUTO: 69.2 % (ref 42.7–76)
PLATELET # BLD AUTO: 283 10*3/MM3 (ref 140–450)
PMV BLD AUTO: 10.9 FL (ref 6–12)
POTASSIUM BLD-SCNC: 3.5 MMOL/L (ref 3.5–5.2)
PROT SERPL-MCNC: 6.6 G/DL (ref 6–8.5)
RBC # BLD AUTO: 3.31 10*6/MM3 (ref 3.77–5.28)
SODIUM BLD-SCNC: 146 MMOL/L (ref 136–145)
T3FREE SERPL-MCNC: 1.79 PG/ML (ref 2–4.4)
T4 FREE SERPL-MCNC: 1.72 NG/DL (ref 0.93–1.7)
WBC NRBC COR # BLD: 5.3 10*3/MM3 (ref 3.4–10.8)

## 2019-08-08 PROCEDURE — 80053 COMPREHEN METABOLIC PANEL: CPT | Performed by: PHYSICIAN ASSISTANT

## 2019-08-08 PROCEDURE — 94799 UNLISTED PULMONARY SVC/PX: CPT

## 2019-08-08 PROCEDURE — 85025 COMPLETE CBC W/AUTO DIFF WBC: CPT | Performed by: PHYSICIAN ASSISTANT

## 2019-08-08 PROCEDURE — 82962 GLUCOSE BLOOD TEST: CPT

## 2019-08-08 PROCEDURE — 25010000002 HEPARIN (PORCINE) PER 1000 UNITS: Performed by: PHYSICIAN ASSISTANT

## 2019-08-08 PROCEDURE — 84439 ASSAY OF FREE THYROXINE: CPT | Performed by: PHYSICIAN ASSISTANT

## 2019-08-08 PROCEDURE — 63710000001 PREDNISONE PER 1 MG: Performed by: PHYSICIAN ASSISTANT

## 2019-08-08 PROCEDURE — C1751 CATH, INF, PER/CENT/MIDLINE: HCPCS

## 2019-08-08 PROCEDURE — 84481 FREE ASSAY (FT-3): CPT | Performed by: PHYSICIAN ASSISTANT

## 2019-08-08 PROCEDURE — 99221 1ST HOSP IP/OBS SF/LOW 40: CPT | Performed by: PSYCHIATRY & NEUROLOGY

## 2019-08-08 PROCEDURE — 36410 VNPNXR 3YR/> PHY/QHP DX/THER: CPT

## 2019-08-08 PROCEDURE — 99233 SBSQ HOSP IP/OBS HIGH 50: CPT | Performed by: PHYSICIAN ASSISTANT

## 2019-08-08 RX ORDER — PREDNISONE 20 MG/1
40 TABLET ORAL
Status: DISCONTINUED | OUTPATIENT
Start: 2019-08-08 | End: 2019-08-09 | Stop reason: HOSPADM

## 2019-08-08 RX ORDER — SODIUM CHLORIDE 0.9 % (FLUSH) 0.9 %
10 SYRINGE (ML) INJECTION EVERY 12 HOURS SCHEDULED
Status: DISCONTINUED | OUTPATIENT
Start: 2019-08-08 | End: 2019-08-09 | Stop reason: HOSPADM

## 2019-08-08 RX ORDER — METOPROLOL SUCCINATE 25 MG/1
25 TABLET, EXTENDED RELEASE ORAL DAILY
Status: DISCONTINUED | OUTPATIENT
Start: 2019-08-08 | End: 2019-08-09 | Stop reason: HOSPADM

## 2019-08-08 RX ORDER — LIDOCAINE HYDROCHLORIDE 10 MG/ML
INJECTION, SOLUTION INFILTRATION; PERINEURAL
Status: DISPENSED
Start: 2019-08-08 | End: 2019-08-08

## 2019-08-08 RX ORDER — SODIUM CHLORIDE 0.9 % (FLUSH) 0.9 %
10 SYRINGE (ML) INJECTION AS NEEDED
Status: DISCONTINUED | OUTPATIENT
Start: 2019-08-08 | End: 2019-08-09 | Stop reason: HOSPADM

## 2019-08-08 RX ORDER — LIDOCAINE HYDROCHLORIDE 10 MG/ML
25 INJECTION, SOLUTION INFILTRATION; PERINEURAL ONCE
Status: DISCONTINUED | OUTPATIENT
Start: 2019-08-08 | End: 2019-08-09 | Stop reason: HOSPADM

## 2019-08-08 RX ADMIN — LORAZEPAM 0.5 MG: 0.5 TABLET ORAL at 00:02

## 2019-08-08 RX ADMIN — MEGESTROL ACETATE 200 MG: 40 SUSPENSION ORAL at 21:00

## 2019-08-08 RX ADMIN — Medication 1 CAPSULE: at 09:06

## 2019-08-08 RX ADMIN — CEFEPIME HYDROCHLORIDE 1 G: 1 INJECTION, POWDER, FOR SOLUTION INTRAMUSCULAR; INTRAVENOUS at 09:05

## 2019-08-08 RX ADMIN — HEPARIN SODIUM 5000 UNITS: 5000 INJECTION INTRAVENOUS; SUBCUTANEOUS at 21:00

## 2019-08-08 RX ADMIN — CEFEPIME HYDROCHLORIDE 1 G: 1 INJECTION, POWDER, FOR SOLUTION INTRAMUSCULAR; INTRAVENOUS at 21:00

## 2019-08-08 RX ADMIN — METRONIDAZOLE 500 MG: 500 INJECTION, SOLUTION INTRAVENOUS at 13:15

## 2019-08-08 RX ADMIN — ARFORMOTEROL TARTRATE 15 MCG: 15 SOLUTION RESPIRATORY (INHALATION) at 21:11

## 2019-08-08 RX ADMIN — IPRATROPIUM BROMIDE 0.5 MG: 0.5 SOLUTION RESPIRATORY (INHALATION) at 18:37

## 2019-08-08 RX ADMIN — BUDESONIDE 0.5 MG: 0.5 INHALANT RESPIRATORY (INHALATION) at 06:23

## 2019-08-08 RX ADMIN — METOPROLOL SUCCINATE 25 MG: 25 TABLET, FILM COATED, EXTENDED RELEASE ORAL at 09:06

## 2019-08-08 RX ADMIN — IPRATROPIUM BROMIDE 0.5 MG: 0.5 SOLUTION RESPIRATORY (INHALATION) at 12:32

## 2019-08-08 RX ADMIN — BUDESONIDE 0.5 MG: 0.5 INHALANT RESPIRATORY (INHALATION) at 18:37

## 2019-08-08 RX ADMIN — MIRTAZAPINE 15 MG: 15 TABLET, FILM COATED ORAL at 21:00

## 2019-08-08 RX ADMIN — PREDNISONE 40 MG: 20 TABLET ORAL at 11:30

## 2019-08-08 RX ADMIN — DOXYCYCLINE 100 MG: 100 INJECTION, POWDER, LYOPHILIZED, FOR SOLUTION INTRAVENOUS at 09:06

## 2019-08-08 RX ADMIN — MEGESTROL ACETATE 200 MG: 40 SUSPENSION ORAL at 09:06

## 2019-08-08 RX ADMIN — PANTOPRAZOLE SODIUM 40 MG: 40 TABLET, DELAYED RELEASE ORAL at 09:06

## 2019-08-08 RX ADMIN — IPRATROPIUM BROMIDE 0.5 MG: 0.5 SOLUTION RESPIRATORY (INHALATION) at 06:22

## 2019-08-08 RX ADMIN — METRONIDAZOLE 500 MG: 500 INJECTION, SOLUTION INTRAVENOUS at 21:00

## 2019-08-08 RX ADMIN — METRONIDAZOLE 500 MG: 500 INJECTION, SOLUTION INTRAVENOUS at 04:58

## 2019-08-08 RX ADMIN — DONEPEZIL HYDROCHLORIDE 5 MG: 5 TABLET, FILM COATED ORAL at 21:00

## 2019-08-08 RX ADMIN — HEPARIN SODIUM 5000 UNITS: 5000 INJECTION INTRAVENOUS; SUBCUTANEOUS at 09:06

## 2019-08-08 RX ADMIN — DOXYCYCLINE 100 MG: 100 INJECTION, POWDER, LYOPHILIZED, FOR SOLUTION INTRAVENOUS at 21:01

## 2019-08-08 NOTE — OUTREACH NOTE
SNF Follow-up Note      Responses   Acute Facility Discharged From  [place]   Acute Discharge Date  06/28/19   Name of the Skilled Nursing Facility?  [facility]   Tier Level of the Skilled Nursing Facility  2   Purpose of SNF Admission  PT, OT   Estimated length of stay for the patient?  Patient currently readmitted to acute care   Who is the insurance provider or payor of patient stay?  Medicare   Progression of Patient?  CC spoke with Bella, patient is currently in the hospital   Skilled Nursing Discharge Date?  08/06/19          Angie Adams RN    8/8/2019, 1:27 PM

## 2019-08-09 ENCOUNTER — HOSPITAL ENCOUNTER (INPATIENT)
Facility: HOSPITAL | Age: 79
LOS: 6 days | Discharge: HOME | End: 2019-08-15
Attending: PSYCHIATRY & NEUROLOGY | Admitting: PSYCHIATRY & NEUROLOGY

## 2019-08-09 ENCOUNTER — APPOINTMENT (OUTPATIENT)
Dept: CT IMAGING | Facility: HOSPITAL | Age: 79
End: 2019-08-09

## 2019-08-09 VITALS
TEMPERATURE: 98 F | HEIGHT: 67 IN | BODY MASS INDEX: 16.4 KG/M2 | HEART RATE: 94 BPM | WEIGHT: 104.5 LBS | RESPIRATION RATE: 18 BRPM | SYSTOLIC BLOOD PRESSURE: 122 MMHG | DIASTOLIC BLOOD PRESSURE: 80 MMHG | OXYGEN SATURATION: 96 %

## 2019-08-09 PROCEDURE — 94799 UNLISTED PULMONARY SVC/PX: CPT

## 2019-08-09 PROCEDURE — 70450 CT HEAD/BRAIN W/O DYE: CPT

## 2019-08-09 PROCEDURE — 99239 HOSP IP/OBS DSCHRG MGMT >30: CPT | Performed by: PHYSICIAN ASSISTANT

## 2019-08-09 PROCEDURE — 25010000002 HEPARIN (PORCINE) PER 1000 UNITS: Performed by: PHYSICIAN ASSISTANT

## 2019-08-09 PROCEDURE — 25010000002 ZIPRASIDONE MESYLATE PER 10 MG

## 2019-08-09 PROCEDURE — 70450 CT HEAD/BRAIN W/O DYE: CPT | Performed by: RADIOLOGY

## 2019-08-09 PROCEDURE — 25010000002 ZIPRASIDONE MESYLATE PER 10 MG: Performed by: INTERNAL MEDICINE

## 2019-08-09 PROCEDURE — 63710000001 PREDNISONE PER 1 MG: Performed by: PHYSICIAN ASSISTANT

## 2019-08-09 PROCEDURE — 93005 ELECTROCARDIOGRAM TRACING: CPT | Performed by: PSYCHIATRY & NEUROLOGY

## 2019-08-09 RX ORDER — CEFDINIR 300 MG/1
300 CAPSULE ORAL EVERY 12 HOURS SCHEDULED
Status: DISCONTINUED | OUTPATIENT
Start: 2019-08-09 | End: 2019-08-15 | Stop reason: HOSPADM

## 2019-08-09 RX ORDER — PANTOPRAZOLE SODIUM 40 MG/1
40 TABLET, DELAYED RELEASE ORAL
Status: CANCELLED | OUTPATIENT
Start: 2019-08-10

## 2019-08-09 RX ORDER — CEFDINIR 300 MG/1
300 CAPSULE ORAL 2 TIMES DAILY
Qty: 14 CAPSULE | Refills: 0 | Status: ON HOLD
Start: 2019-08-09 | End: 2019-08-09

## 2019-08-09 RX ORDER — ONDANSETRON 4 MG/1
4 TABLET, FILM COATED ORAL EVERY 6 HOURS PRN
Status: CANCELLED | OUTPATIENT
Start: 2019-08-09

## 2019-08-09 RX ORDER — ALUMINA, MAGNESIA, AND SIMETHICONE 2400; 2400; 240 MG/30ML; MG/30ML; MG/30ML
15 SUSPENSION ORAL EVERY 6 HOURS PRN
Status: CANCELLED | OUTPATIENT
Start: 2019-08-09

## 2019-08-09 RX ORDER — DOXYCYCLINE 100 MG/1
100 CAPSULE ORAL EVERY 12 HOURS SCHEDULED
Status: DISCONTINUED | OUTPATIENT
Start: 2019-08-09 | End: 2019-08-15 | Stop reason: HOSPADM

## 2019-08-09 RX ORDER — WATER 1000 ML/1000ML
INJECTION, SOLUTION INTRAVENOUS
Status: COMPLETED
Start: 2019-08-09 | End: 2019-08-09

## 2019-08-09 RX ORDER — METRONIDAZOLE 250 MG/1
500 TABLET ORAL EVERY 8 HOURS SCHEDULED
Status: CANCELLED | OUTPATIENT
Start: 2019-08-09 | End: 2019-08-19

## 2019-08-09 RX ORDER — L.ACID,PARA/B.BIFIDUM/S.THERM 8B CELL
1 CAPSULE ORAL DAILY
Status: CANCELLED | OUTPATIENT
Start: 2019-08-09

## 2019-08-09 RX ORDER — ONDANSETRON 4 MG/1
4 TABLET, FILM COATED ORAL EVERY 6 HOURS PRN
Status: DISCONTINUED | OUTPATIENT
Start: 2019-08-09 | End: 2019-08-15 | Stop reason: HOSPADM

## 2019-08-09 RX ORDER — TRAZODONE HYDROCHLORIDE 50 MG/1
50 TABLET ORAL NIGHTLY PRN
Status: CANCELLED | OUTPATIENT
Start: 2019-08-09

## 2019-08-09 RX ORDER — DONEPEZIL HYDROCHLORIDE 5 MG/1
5 TABLET, FILM COATED ORAL NIGHTLY
Status: CANCELLED | OUTPATIENT
Start: 2019-08-09

## 2019-08-09 RX ORDER — TRAZODONE HYDROCHLORIDE 50 MG/1
50 TABLET ORAL NIGHTLY PRN
Status: DISCONTINUED | OUTPATIENT
Start: 2019-08-09 | End: 2019-08-15 | Stop reason: HOSPADM

## 2019-08-09 RX ORDER — ACETAMINOPHEN 325 MG/1
650 TABLET ORAL EVERY 6 HOURS PRN
Status: DISCONTINUED | OUTPATIENT
Start: 2019-08-09 | End: 2019-08-15 | Stop reason: HOSPADM

## 2019-08-09 RX ORDER — MEGESTROL ACETATE 40 MG/1
40 TABLET ORAL 2 TIMES DAILY
Status: CANCELLED | OUTPATIENT
Start: 2019-08-09

## 2019-08-09 RX ORDER — CARBOXYMETHYLCELLULOSE SODIUM 5 MG/ML
1 SOLUTION/ DROPS OPHTHALMIC 3 TIMES DAILY PRN
Status: CANCELLED | OUTPATIENT
Start: 2019-08-09

## 2019-08-09 RX ORDER — ARFORMOTEROL TARTRATE 15 UG/2ML
15 SOLUTION RESPIRATORY (INHALATION)
Qty: 120 ML | Status: ON HOLD
Start: 2019-08-09 | End: 2019-08-09

## 2019-08-09 RX ORDER — CARBOXYMETHYLCELLULOSE SODIUM 5 MG/ML
1 SOLUTION/ DROPS OPHTHALMIC 3 TIMES DAILY PRN
Status: DISCONTINUED | OUTPATIENT
Start: 2019-08-09 | End: 2019-08-15 | Stop reason: HOSPADM

## 2019-08-09 RX ORDER — METRONIDAZOLE 500 MG/1
500 TABLET ORAL 3 TIMES DAILY
Qty: 21 TABLET | Refills: 0 | Status: ON HOLD
Start: 2019-08-09 | End: 2019-08-09

## 2019-08-09 RX ORDER — DOXYCYCLINE 100 MG/1
100 CAPSULE ORAL EVERY 12 HOURS SCHEDULED
Status: CANCELLED | OUTPATIENT
Start: 2019-08-09 | End: 2019-08-19

## 2019-08-09 RX ORDER — LOPERAMIDE HYDROCHLORIDE 2 MG/1
2 CAPSULE ORAL
Status: CANCELLED | OUTPATIENT
Start: 2019-08-09

## 2019-08-09 RX ORDER — HYDROXYZINE 50 MG/1
50 TABLET, FILM COATED ORAL EVERY 6 HOURS PRN
Status: DISCONTINUED | OUTPATIENT
Start: 2019-08-09 | End: 2019-08-15 | Stop reason: HOSPADM

## 2019-08-09 RX ORDER — METOPROLOL SUCCINATE 25 MG/1
12.5 TABLET, EXTENDED RELEASE ORAL DAILY
COMMUNITY

## 2019-08-09 RX ORDER — ALUMINA, MAGNESIA, AND SIMETHICONE 2400; 2400; 240 MG/30ML; MG/30ML; MG/30ML
15 SUSPENSION ORAL EVERY 6 HOURS PRN
Status: DISCONTINUED | OUTPATIENT
Start: 2019-08-09 | End: 2019-08-15 | Stop reason: HOSPADM

## 2019-08-09 RX ORDER — BUDESONIDE 0.5 MG/2ML
0.5 INHALANT ORAL
Status: DISCONTINUED | OUTPATIENT
Start: 2019-08-09 | End: 2019-08-15 | Stop reason: HOSPADM

## 2019-08-09 RX ORDER — PANTOPRAZOLE SODIUM 40 MG/1
40 TABLET, DELAYED RELEASE ORAL
Status: DISCONTINUED | OUTPATIENT
Start: 2019-08-10 | End: 2019-08-15 | Stop reason: HOSPADM

## 2019-08-09 RX ORDER — ZIPRASIDONE MESYLATE 20 MG/ML
INJECTION, POWDER, LYOPHILIZED, FOR SOLUTION INTRAMUSCULAR
Status: COMPLETED
Start: 2019-08-09 | End: 2019-08-09

## 2019-08-09 RX ORDER — ARFORMOTEROL TARTRATE 15 UG/2ML
15 SOLUTION RESPIRATORY (INHALATION)
Status: CANCELLED | OUTPATIENT
Start: 2019-08-09

## 2019-08-09 RX ORDER — PREDNISONE 20 MG/1
20 TABLET ORAL
Status: DISCONTINUED | OUTPATIENT
Start: 2019-08-10 | End: 2019-08-15 | Stop reason: HOSPADM

## 2019-08-09 RX ORDER — IPRATROPIUM BROMIDE AND ALBUTEROL SULFATE 2.5; .5 MG/3ML; MG/3ML
3 SOLUTION RESPIRATORY (INHALATION) EVERY 6 HOURS PRN
Status: CANCELLED | OUTPATIENT
Start: 2019-08-09

## 2019-08-09 RX ORDER — METOPROLOL SUCCINATE 25 MG/1
25 TABLET, EXTENDED RELEASE ORAL DAILY
Qty: 30 TABLET | Refills: 0 | Status: ON HOLD
Start: 2019-08-10 | End: 2019-08-09

## 2019-08-09 RX ORDER — ACETAMINOPHEN 325 MG/1
650 TABLET ORAL EVERY 6 HOURS PRN
Status: CANCELLED | OUTPATIENT
Start: 2019-08-09

## 2019-08-09 RX ORDER — FAMOTIDINE 20 MG/1
20 TABLET, FILM COATED ORAL 2 TIMES DAILY PRN
Status: DISCONTINUED | OUTPATIENT
Start: 2019-08-09 | End: 2019-08-15 | Stop reason: HOSPADM

## 2019-08-09 RX ORDER — FAMOTIDINE 20 MG/1
20 TABLET, FILM COATED ORAL 2 TIMES DAILY PRN
Status: CANCELLED | OUTPATIENT
Start: 2019-08-09

## 2019-08-09 RX ORDER — METOPROLOL SUCCINATE 25 MG/1
25 TABLET, EXTENDED RELEASE ORAL
Status: DISCONTINUED | OUTPATIENT
Start: 2019-08-10 | End: 2019-08-15 | Stop reason: HOSPADM

## 2019-08-09 RX ORDER — PREDNISONE 20 MG/1
20 TABLET ORAL
Status: CANCELLED | OUTPATIENT
Start: 2019-08-10

## 2019-08-09 RX ORDER — MEGESTROL ACETATE 40 MG/1
40 TABLET ORAL 2 TIMES DAILY
Status: DISCONTINUED | OUTPATIENT
Start: 2019-08-09 | End: 2019-08-15 | Stop reason: HOSPADM

## 2019-08-09 RX ORDER — LOPERAMIDE HYDROCHLORIDE 2 MG/1
2 CAPSULE ORAL
Status: DISCONTINUED | OUTPATIENT
Start: 2019-08-09 | End: 2019-08-15 | Stop reason: HOSPADM

## 2019-08-09 RX ORDER — CEFDINIR 300 MG/1
300 CAPSULE ORAL EVERY 12 HOURS SCHEDULED
Status: CANCELLED | OUTPATIENT
Start: 2019-08-09 | End: 2019-08-19

## 2019-08-09 RX ORDER — METRONIDAZOLE 250 MG/1
500 TABLET ORAL EVERY 8 HOURS SCHEDULED
Status: DISCONTINUED | OUTPATIENT
Start: 2019-08-09 | End: 2019-08-15 | Stop reason: HOSPADM

## 2019-08-09 RX ORDER — L.ACID,PARA/B.BIFIDUM/S.THERM 8B CELL
1 CAPSULE ORAL DAILY
Qty: 7 CAPSULE | Refills: 0 | Status: ON HOLD
Start: 2019-08-10 | End: 2019-08-09

## 2019-08-09 RX ORDER — ECHINACEA PURPUREA EXTRACT 125 MG
2 TABLET ORAL AS NEEDED
Status: CANCELLED | OUTPATIENT
Start: 2019-08-09

## 2019-08-09 RX ORDER — BENZONATATE 100 MG/1
100 CAPSULE ORAL 3 TIMES DAILY PRN
Status: CANCELLED | OUTPATIENT
Start: 2019-08-09

## 2019-08-09 RX ORDER — IBUPROFEN 400 MG/1
400 TABLET ORAL EVERY 6 HOURS PRN
Status: DISCONTINUED | OUTPATIENT
Start: 2019-08-09 | End: 2019-08-15 | Stop reason: HOSPADM

## 2019-08-09 RX ORDER — BUDESONIDE 0.5 MG/2ML
0.5 INHALANT ORAL
Status: CANCELLED | OUTPATIENT
Start: 2019-08-09

## 2019-08-09 RX ORDER — MIRTAZAPINE 15 MG/1
15 TABLET, FILM COATED ORAL NIGHTLY
Status: CANCELLED | OUTPATIENT
Start: 2019-08-09

## 2019-08-09 RX ORDER — PREDNISONE 20 MG/1
40 TABLET ORAL
Qty: 3 TABLET | Refills: 0 | Status: ON HOLD
Start: 2019-08-10 | End: 2019-08-09

## 2019-08-09 RX ORDER — L.ACID,PARA/B.BIFIDUM/S.THERM 8B CELL
1 CAPSULE ORAL DAILY
Status: DISCONTINUED | OUTPATIENT
Start: 2019-08-09 | End: 2019-08-15 | Stop reason: HOSPADM

## 2019-08-09 RX ORDER — MIRTAZAPINE 15 MG/1
15 TABLET, FILM COATED ORAL NIGHTLY
Status: DISCONTINUED | OUTPATIENT
Start: 2019-08-09 | End: 2019-08-15 | Stop reason: HOSPADM

## 2019-08-09 RX ORDER — DONEPEZIL HYDROCHLORIDE 5 MG/1
5 TABLET, FILM COATED ORAL NIGHTLY
Status: DISCONTINUED | OUTPATIENT
Start: 2019-08-09 | End: 2019-08-15 | Stop reason: HOSPADM

## 2019-08-09 RX ORDER — DOXYCYCLINE HYCLATE 100 MG/1
100 TABLET, DELAYED RELEASE ORAL 2 TIMES DAILY
Qty: 14 TABLET | Refills: 0
Start: 2019-08-09 | End: 2019-08-15 | Stop reason: HOSPADM

## 2019-08-09 RX ORDER — IBUPROFEN 400 MG/1
400 TABLET ORAL EVERY 6 HOURS PRN
Status: CANCELLED | OUTPATIENT
Start: 2019-08-09

## 2019-08-09 RX ORDER — METOPROLOL SUCCINATE 25 MG/1
25 TABLET, EXTENDED RELEASE ORAL
Status: CANCELLED | OUTPATIENT
Start: 2019-08-09

## 2019-08-09 RX ORDER — BENZONATATE 100 MG/1
100 CAPSULE ORAL 3 TIMES DAILY PRN
Status: DISCONTINUED | OUTPATIENT
Start: 2019-08-09 | End: 2019-08-15 | Stop reason: HOSPADM

## 2019-08-09 RX ORDER — ARFORMOTEROL TARTRATE 15 UG/2ML
15 SOLUTION RESPIRATORY (INHALATION)
Status: DISCONTINUED | OUTPATIENT
Start: 2019-08-09 | End: 2019-08-15 | Stop reason: HOSPADM

## 2019-08-09 RX ORDER — IPRATROPIUM BROMIDE AND ALBUTEROL SULFATE 2.5; .5 MG/3ML; MG/3ML
3 SOLUTION RESPIRATORY (INHALATION) EVERY 6 HOURS PRN
Status: DISCONTINUED | OUTPATIENT
Start: 2019-08-09 | End: 2019-08-15 | Stop reason: HOSPADM

## 2019-08-09 RX ORDER — HYDROXYZINE 50 MG/1
50 TABLET, FILM COATED ORAL EVERY 6 HOURS PRN
Status: CANCELLED | OUTPATIENT
Start: 2019-08-09

## 2019-08-09 RX ORDER — ECHINACEA PURPUREA EXTRACT 125 MG
2 TABLET ORAL AS NEEDED
Status: DISCONTINUED | OUTPATIENT
Start: 2019-08-09 | End: 2019-08-15 | Stop reason: HOSPADM

## 2019-08-09 RX ADMIN — CEFEPIME HYDROCHLORIDE 1 G: 1 INJECTION, POWDER, FOR SOLUTION INTRAMUSCULAR; INTRAVENOUS at 09:16

## 2019-08-09 RX ADMIN — DONEPEZIL HYDROCHLORIDE 5 MG: 5 TABLET, FILM COATED ORAL at 21:20

## 2019-08-09 RX ADMIN — HEPARIN SODIUM 5000 UNITS: 5000 INJECTION INTRAVENOUS; SUBCUTANEOUS at 09:16

## 2019-08-09 RX ADMIN — PANTOPRAZOLE SODIUM 40 MG: 40 TABLET, DELAYED RELEASE ORAL at 09:15

## 2019-08-09 RX ADMIN — MEGESTROL ACETATE 40 MG: 40 TABLET ORAL at 21:20

## 2019-08-09 RX ADMIN — LORAZEPAM 0.5 MG: 0.5 TABLET ORAL at 03:46

## 2019-08-09 RX ADMIN — DOXYCYCLINE 100 MG: 100 CAPSULE ORAL at 21:20

## 2019-08-09 RX ADMIN — PREDNISONE 40 MG: 20 TABLET ORAL at 09:15

## 2019-08-09 RX ADMIN — WATER 10 MG: 1 INJECTION INTRAMUSCULAR; INTRAVENOUS; SUBCUTANEOUS at 14:28

## 2019-08-09 RX ADMIN — DOXYCYCLINE 100 MG: 100 INJECTION, POWDER, LYOPHILIZED, FOR SOLUTION INTRAVENOUS at 11:37

## 2019-08-09 RX ADMIN — MIRTAZAPINE 15 MG: 15 TABLET, FILM COATED ORAL at 21:22

## 2019-08-09 RX ADMIN — IPRATROPIUM BROMIDE 0.5 MG: 0.5 SOLUTION RESPIRATORY (INHALATION) at 06:40

## 2019-08-09 RX ADMIN — Medication 1 CAPSULE: at 09:15

## 2019-08-09 RX ADMIN — METRONIDAZOLE 500 MG: 500 INJECTION, SOLUTION INTRAVENOUS at 14:52

## 2019-08-09 RX ADMIN — BUDESONIDE 0.5 MG: 0.5 INHALANT RESPIRATORY (INHALATION) at 06:54

## 2019-08-09 RX ADMIN — MEGESTROL ACETATE 200 MG: 40 SUSPENSION ORAL at 09:15

## 2019-08-09 RX ADMIN — SODIUM CHLORIDE, PRESERVATIVE FREE 10 ML: 5 INJECTION INTRAVENOUS at 09:17

## 2019-08-09 RX ADMIN — ZIPRASIDONE MESYLATE 20 MG: 20 INJECTION, POWDER, LYOPHILIZED, FOR SOLUTION INTRAMUSCULAR at 14:53

## 2019-08-09 RX ADMIN — WATER: 1 INJECTION INTRAMUSCULAR; INTRAVENOUS; SUBCUTANEOUS at 14:53

## 2019-08-09 RX ADMIN — METRONIDAZOLE 500 MG: 250 TABLET ORAL at 21:20

## 2019-08-09 RX ADMIN — METOPROLOL SUCCINATE 25 MG: 25 TABLET, FILM COATED, EXTENDED RELEASE ORAL at 09:16

## 2019-08-09 RX ADMIN — CEFDINIR 300 MG: 300 CAPSULE ORAL at 21:20

## 2019-08-09 RX ADMIN — SODIUM CHLORIDE, PRESERVATIVE FREE 3 ML: 5 INJECTION INTRAVENOUS at 09:16

## 2019-08-09 RX ADMIN — ARFORMOTEROL TARTRATE 15 MCG: 15 SOLUTION RESPIRATORY (INHALATION) at 06:40

## 2019-08-09 RX ADMIN — Medication 1 CAPSULE: at 21:20

## 2019-08-10 PROBLEM — R45.851 SUICIDAL IDEATION: Status: ACTIVE | Noted: 2019-08-10

## 2019-08-10 LAB
BACTERIA SPEC AEROBE CULT: NORMAL
BACTERIA SPEC AEROBE CULT: NORMAL

## 2019-08-10 PROCEDURE — 63710000001 PREDNISONE PER 1 MG: Performed by: PSYCHIATRY & NEUROLOGY

## 2019-08-10 PROCEDURE — 99223 1ST HOSP IP/OBS HIGH 75: CPT | Performed by: PSYCHIATRY & NEUROLOGY

## 2019-08-10 PROCEDURE — 94799 UNLISTED PULMONARY SVC/PX: CPT

## 2019-08-10 RX ORDER — RISPERIDONE 0.25 MG/1
0.25 TABLET ORAL EVERY 12 HOURS SCHEDULED
Status: DISCONTINUED | OUTPATIENT
Start: 2019-08-10 | End: 2019-08-11

## 2019-08-10 RX ADMIN — ARFORMOTEROL TARTRATE 15 MCG: 15 SOLUTION RESPIRATORY (INHALATION) at 10:10

## 2019-08-10 RX ADMIN — DONEPEZIL HYDROCHLORIDE 5 MG: 5 TABLET, FILM COATED ORAL at 21:01

## 2019-08-10 RX ADMIN — METRONIDAZOLE 500 MG: 250 TABLET ORAL at 06:18

## 2019-08-10 RX ADMIN — MEGESTROL ACETATE 40 MG: 40 TABLET ORAL at 21:01

## 2019-08-10 RX ADMIN — PREDNISONE 20 MG: 20 TABLET ORAL at 08:56

## 2019-08-10 RX ADMIN — METOPROLOL SUCCINATE 25 MG: 25 TABLET, FILM COATED, EXTENDED RELEASE ORAL at 08:56

## 2019-08-10 RX ADMIN — PANTOPRAZOLE SODIUM 40 MG: 40 TABLET, DELAYED RELEASE ORAL at 06:18

## 2019-08-10 RX ADMIN — DOXYCYCLINE 100 MG: 100 CAPSULE ORAL at 21:01

## 2019-08-10 RX ADMIN — Medication 1 CAPSULE: at 08:55

## 2019-08-10 RX ADMIN — RISPERIDONE 0.25 MG: 0.25 TABLET, FILM COATED ORAL at 21:01

## 2019-08-10 RX ADMIN — ARFORMOTEROL TARTRATE 15 MCG: 15 SOLUTION RESPIRATORY (INHALATION) at 22:10

## 2019-08-10 RX ADMIN — METRONIDAZOLE 500 MG: 250 TABLET ORAL at 21:01

## 2019-08-10 RX ADMIN — BUDESONIDE 0.5 MG: 0.5 INHALANT RESPIRATORY (INHALATION) at 22:10

## 2019-08-10 RX ADMIN — DOXYCYCLINE 100 MG: 100 CAPSULE ORAL at 08:55

## 2019-08-10 RX ADMIN — MEGESTROL ACETATE 40 MG: 40 TABLET ORAL at 08:55

## 2019-08-10 RX ADMIN — BUDESONIDE 0.5 MG: 0.5 INHALANT RESPIRATORY (INHALATION) at 10:10

## 2019-08-10 RX ADMIN — METRONIDAZOLE 500 MG: 250 TABLET ORAL at 15:13

## 2019-08-10 RX ADMIN — CEFDINIR 300 MG: 300 CAPSULE ORAL at 08:55

## 2019-08-10 RX ADMIN — RISPERIDONE 0.25 MG: 0.25 TABLET, FILM COATED ORAL at 09:01

## 2019-08-10 RX ADMIN — MIRTAZAPINE 15 MG: 15 TABLET, FILM COATED ORAL at 21:01

## 2019-08-10 RX ADMIN — CEFDINIR 300 MG: 300 CAPSULE ORAL at 21:01

## 2019-08-11 PROCEDURE — 99232 SBSQ HOSP IP/OBS MODERATE 35: CPT | Performed by: PSYCHIATRY & NEUROLOGY

## 2019-08-11 PROCEDURE — 94799 UNLISTED PULMONARY SVC/PX: CPT

## 2019-08-11 PROCEDURE — 25010000002 LORAZEPAM PER 2 MG: Performed by: PSYCHIATRY & NEUROLOGY

## 2019-08-11 PROCEDURE — 63710000001 PREDNISONE PER 1 MG: Performed by: PSYCHIATRY & NEUROLOGY

## 2019-08-11 RX ORDER — RISPERIDONE 0.25 MG/1
0.5 TABLET ORAL EVERY 12 HOURS SCHEDULED
Status: DISCONTINUED | OUTPATIENT
Start: 2019-08-11 | End: 2019-08-15 | Stop reason: HOSPADM

## 2019-08-11 RX ORDER — LORAZEPAM 2 MG/ML
2 INJECTION INTRAMUSCULAR ONCE
Status: COMPLETED | OUTPATIENT
Start: 2019-08-11 | End: 2019-08-11

## 2019-08-11 RX ADMIN — METRONIDAZOLE 500 MG: 250 TABLET ORAL at 08:36

## 2019-08-11 RX ADMIN — LORAZEPAM 2 MG: 2 INJECTION INTRAMUSCULAR; INTRAVENOUS at 08:23

## 2019-08-11 RX ADMIN — CEFDINIR 300 MG: 300 CAPSULE ORAL at 08:36

## 2019-08-11 RX ADMIN — METRONIDAZOLE 500 MG: 250 TABLET ORAL at 15:37

## 2019-08-11 RX ADMIN — METOPROLOL SUCCINATE 25 MG: 25 TABLET, FILM COATED, EXTENDED RELEASE ORAL at 08:36

## 2019-08-11 RX ADMIN — BUDESONIDE 0.5 MG: 0.5 INHALANT RESPIRATORY (INHALATION) at 22:28

## 2019-08-11 RX ADMIN — MIRTAZAPINE 15 MG: 15 TABLET, FILM COATED ORAL at 20:37

## 2019-08-11 RX ADMIN — DOXYCYCLINE 100 MG: 100 CAPSULE ORAL at 20:37

## 2019-08-11 RX ADMIN — MEGESTROL ACETATE 40 MG: 40 TABLET ORAL at 08:36

## 2019-08-11 RX ADMIN — METRONIDAZOLE 500 MG: 250 TABLET ORAL at 21:02

## 2019-08-11 RX ADMIN — DOXYCYCLINE 100 MG: 100 CAPSULE ORAL at 08:38

## 2019-08-11 RX ADMIN — ARFORMOTEROL TARTRATE 15 MCG: 15 SOLUTION RESPIRATORY (INHALATION) at 09:15

## 2019-08-11 RX ADMIN — BUDESONIDE 0.5 MG: 0.5 INHALANT RESPIRATORY (INHALATION) at 09:15

## 2019-08-11 RX ADMIN — ARFORMOTEROL TARTRATE 15 MCG: 15 SOLUTION RESPIRATORY (INHALATION) at 22:28

## 2019-08-11 RX ADMIN — RISPERIDONE 0.5 MG: 0.25 TABLET, FILM COATED ORAL at 20:37

## 2019-08-11 RX ADMIN — MEGESTROL ACETATE 40 MG: 40 TABLET ORAL at 20:37

## 2019-08-11 RX ADMIN — RISPERIDONE 0.25 MG: 0.25 TABLET, FILM COATED ORAL at 08:36

## 2019-08-11 RX ADMIN — PREDNISONE 20 MG: 20 TABLET ORAL at 08:38

## 2019-08-11 RX ADMIN — CEFDINIR 300 MG: 300 CAPSULE ORAL at 20:37

## 2019-08-11 RX ADMIN — DONEPEZIL HYDROCHLORIDE 5 MG: 5 TABLET, FILM COATED ORAL at 20:37

## 2019-08-11 RX ADMIN — PANTOPRAZOLE SODIUM 40 MG: 40 TABLET, DELAYED RELEASE ORAL at 09:41

## 2019-08-11 RX ADMIN — Medication 1 CAPSULE: at 08:36

## 2019-08-12 PROCEDURE — 63710000001 PREDNISONE PER 1 MG: Performed by: PSYCHIATRY & NEUROLOGY

## 2019-08-12 PROCEDURE — 94799 UNLISTED PULMONARY SVC/PX: CPT

## 2019-08-12 PROCEDURE — 99232 SBSQ HOSP IP/OBS MODERATE 35: CPT | Performed by: PSYCHIATRY & NEUROLOGY

## 2019-08-12 RX ADMIN — MEGESTROL ACETATE 40 MG: 40 TABLET ORAL at 09:05

## 2019-08-12 RX ADMIN — METRONIDAZOLE 500 MG: 250 TABLET ORAL at 05:58

## 2019-08-12 RX ADMIN — MIRTAZAPINE 15 MG: 15 TABLET, FILM COATED ORAL at 20:20

## 2019-08-12 RX ADMIN — METRONIDAZOLE 500 MG: 250 TABLET ORAL at 21:00

## 2019-08-12 RX ADMIN — ARFORMOTEROL TARTRATE 15 MCG: 15 SOLUTION RESPIRATORY (INHALATION) at 20:38

## 2019-08-12 RX ADMIN — MEGESTROL ACETATE 40 MG: 40 TABLET ORAL at 20:20

## 2019-08-12 RX ADMIN — DOXYCYCLINE 100 MG: 100 CAPSULE ORAL at 09:05

## 2019-08-12 RX ADMIN — Medication 1 CAPSULE: at 09:05

## 2019-08-12 RX ADMIN — CEFDINIR 300 MG: 300 CAPSULE ORAL at 20:20

## 2019-08-12 RX ADMIN — DOXYCYCLINE 100 MG: 100 CAPSULE ORAL at 20:20

## 2019-08-12 RX ADMIN — PANTOPRAZOLE SODIUM 40 MG: 40 TABLET, DELAYED RELEASE ORAL at 05:57

## 2019-08-12 RX ADMIN — RISPERIDONE 0.5 MG: 0.25 TABLET, FILM COATED ORAL at 09:05

## 2019-08-12 RX ADMIN — METRONIDAZOLE 500 MG: 250 TABLET ORAL at 13:11

## 2019-08-12 RX ADMIN — CEFDINIR 300 MG: 300 CAPSULE ORAL at 09:05

## 2019-08-12 RX ADMIN — PREDNISONE 20 MG: 20 TABLET ORAL at 09:05

## 2019-08-12 RX ADMIN — DONEPEZIL HYDROCHLORIDE 5 MG: 5 TABLET, FILM COATED ORAL at 20:20

## 2019-08-12 RX ADMIN — HYDROXYZINE HYDROCHLORIDE 50 MG: 50 TABLET, FILM COATED ORAL at 16:27

## 2019-08-12 RX ADMIN — RISPERIDONE 0.5 MG: 0.25 TABLET, FILM COATED ORAL at 20:20

## 2019-08-12 RX ADMIN — BUDESONIDE 0.5 MG: 0.5 INHALANT RESPIRATORY (INHALATION) at 20:38

## 2019-08-13 PROCEDURE — 94799 UNLISTED PULMONARY SVC/PX: CPT

## 2019-08-13 PROCEDURE — 97110 THERAPEUTIC EXERCISES: CPT

## 2019-08-13 PROCEDURE — 99232 SBSQ HOSP IP/OBS MODERATE 35: CPT | Performed by: PSYCHIATRY & NEUROLOGY

## 2019-08-13 PROCEDURE — 63710000001 PREDNISONE PER 1 MG: Performed by: PSYCHIATRY & NEUROLOGY

## 2019-08-13 PROCEDURE — 97116 GAIT TRAINING THERAPY: CPT

## 2019-08-13 PROCEDURE — 94640 AIRWAY INHALATION TREATMENT: CPT

## 2019-08-13 RX ADMIN — BUDESONIDE 0.5 MG: 0.5 INHALANT RESPIRATORY (INHALATION) at 19:50

## 2019-08-13 RX ADMIN — MIRTAZAPINE 15 MG: 15 TABLET, FILM COATED ORAL at 21:05

## 2019-08-13 RX ADMIN — METRONIDAZOLE 500 MG: 250 TABLET ORAL at 05:13

## 2019-08-13 RX ADMIN — METOPROLOL SUCCINATE 25 MG: 25 TABLET, FILM COATED, EXTENDED RELEASE ORAL at 08:55

## 2019-08-13 RX ADMIN — CEFDINIR 300 MG: 300 CAPSULE ORAL at 08:54

## 2019-08-13 RX ADMIN — ARFORMOTEROL TARTRATE 15 MCG: 15 SOLUTION RESPIRATORY (INHALATION) at 19:50

## 2019-08-13 RX ADMIN — Medication 1 CAPSULE: at 08:55

## 2019-08-13 RX ADMIN — METRONIDAZOLE 500 MG: 250 TABLET ORAL at 21:05

## 2019-08-13 RX ADMIN — ARFORMOTEROL TARTRATE 15 MCG: 15 SOLUTION RESPIRATORY (INHALATION) at 07:49

## 2019-08-13 RX ADMIN — MEGESTROL ACETATE 40 MG: 40 TABLET ORAL at 21:05

## 2019-08-13 RX ADMIN — DOXYCYCLINE 100 MG: 100 CAPSULE ORAL at 21:05

## 2019-08-13 RX ADMIN — MEGESTROL ACETATE 40 MG: 40 TABLET ORAL at 08:55

## 2019-08-13 RX ADMIN — RISPERIDONE 0.5 MG: 0.25 TABLET, FILM COATED ORAL at 08:55

## 2019-08-13 RX ADMIN — PANTOPRAZOLE SODIUM 40 MG: 40 TABLET, DELAYED RELEASE ORAL at 05:13

## 2019-08-13 RX ADMIN — DONEPEZIL HYDROCHLORIDE 5 MG: 5 TABLET, FILM COATED ORAL at 21:05

## 2019-08-13 RX ADMIN — DOXYCYCLINE 100 MG: 100 CAPSULE ORAL at 08:54

## 2019-08-13 RX ADMIN — BUDESONIDE 0.5 MG: 0.5 INHALANT RESPIRATORY (INHALATION) at 07:49

## 2019-08-13 RX ADMIN — RISPERIDONE 0.5 MG: 0.25 TABLET, FILM COATED ORAL at 21:05

## 2019-08-13 RX ADMIN — METRONIDAZOLE 500 MG: 250 TABLET ORAL at 13:49

## 2019-08-13 RX ADMIN — CEFDINIR 300 MG: 300 CAPSULE ORAL at 21:05

## 2019-08-13 RX ADMIN — PREDNISONE 20 MG: 20 TABLET ORAL at 08:49

## 2019-08-14 PROCEDURE — 99232 SBSQ HOSP IP/OBS MODERATE 35: CPT | Performed by: PSYCHIATRY & NEUROLOGY

## 2019-08-14 PROCEDURE — 63710000001 PREDNISONE PER 1 MG: Performed by: PSYCHIATRY & NEUROLOGY

## 2019-08-14 PROCEDURE — 94799 UNLISTED PULMONARY SVC/PX: CPT

## 2019-08-14 RX ADMIN — ARFORMOTEROL TARTRATE 15 MCG: 15 SOLUTION RESPIRATORY (INHALATION) at 19:28

## 2019-08-14 RX ADMIN — DONEPEZIL HYDROCHLORIDE 5 MG: 5 TABLET, FILM COATED ORAL at 20:27

## 2019-08-14 RX ADMIN — RISPERIDONE 0.5 MG: 0.25 TABLET, FILM COATED ORAL at 09:15

## 2019-08-14 RX ADMIN — BUDESONIDE 0.5 MG: 0.5 INHALANT RESPIRATORY (INHALATION) at 07:47

## 2019-08-14 RX ADMIN — METOPROLOL SUCCINATE 25 MG: 25 TABLET, FILM COATED, EXTENDED RELEASE ORAL at 09:16

## 2019-08-14 RX ADMIN — DOXYCYCLINE 100 MG: 100 CAPSULE ORAL at 09:16

## 2019-08-14 RX ADMIN — PANTOPRAZOLE SODIUM 40 MG: 40 TABLET, DELAYED RELEASE ORAL at 06:13

## 2019-08-14 RX ADMIN — METRONIDAZOLE 500 MG: 250 TABLET ORAL at 15:20

## 2019-08-14 RX ADMIN — CEFDINIR 300 MG: 300 CAPSULE ORAL at 09:15

## 2019-08-14 RX ADMIN — MEGESTROL ACETATE 40 MG: 40 TABLET ORAL at 20:27

## 2019-08-14 RX ADMIN — MIRTAZAPINE 15 MG: 15 TABLET, FILM COATED ORAL at 20:27

## 2019-08-14 RX ADMIN — PREDNISONE 20 MG: 20 TABLET ORAL at 09:16

## 2019-08-14 RX ADMIN — MEGESTROL ACETATE 40 MG: 40 TABLET ORAL at 09:16

## 2019-08-14 RX ADMIN — Medication 1 CAPSULE: at 09:16

## 2019-08-14 RX ADMIN — METRONIDAZOLE 500 MG: 250 TABLET ORAL at 06:13

## 2019-08-14 RX ADMIN — DOXYCYCLINE 100 MG: 100 CAPSULE ORAL at 20:27

## 2019-08-14 RX ADMIN — ARFORMOTEROL TARTRATE 15 MCG: 15 SOLUTION RESPIRATORY (INHALATION) at 07:47

## 2019-08-14 RX ADMIN — BUDESONIDE 0.5 MG: 0.5 INHALANT RESPIRATORY (INHALATION) at 19:28

## 2019-08-14 RX ADMIN — METRONIDAZOLE 500 MG: 250 TABLET ORAL at 21:33

## 2019-08-14 RX ADMIN — RISPERIDONE 0.5 MG: 0.25 TABLET, FILM COATED ORAL at 20:27

## 2019-08-14 RX ADMIN — CEFDINIR 300 MG: 300 CAPSULE ORAL at 20:27

## 2019-08-15 VITALS
TEMPERATURE: 97.8 F | RESPIRATION RATE: 16 BRPM | SYSTOLIC BLOOD PRESSURE: 156 MMHG | OXYGEN SATURATION: 92 % | BODY MASS INDEX: 16.76 KG/M2 | HEIGHT: 64 IN | WEIGHT: 98.2 LBS | DIASTOLIC BLOOD PRESSURE: 91 MMHG | HEART RATE: 118 BPM

## 2019-08-15 PROCEDURE — 94799 UNLISTED PULMONARY SVC/PX: CPT

## 2019-08-15 PROCEDURE — 63710000001 PREDNISONE PER 1 MG: Performed by: PSYCHIATRY & NEUROLOGY

## 2019-08-15 PROCEDURE — 99238 HOSP IP/OBS DSCHRG MGMT 30/<: CPT | Performed by: PSYCHIATRY & NEUROLOGY

## 2019-08-15 RX ORDER — CEFDINIR 300 MG/1
300 CAPSULE ORAL EVERY 12 HOURS SCHEDULED
Qty: 8 CAPSULE | Refills: 0 | Status: SHIPPED | OUTPATIENT
Start: 2019-08-15 | End: 2019-08-19

## 2019-08-15 RX ORDER — MEGESTROL ACETATE 40 MG/ML
200 SUSPENSION ORAL 2 TIMES DAILY
Qty: 240 ML | Refills: 2 | Status: SHIPPED | OUTPATIENT
Start: 2019-08-15

## 2019-08-15 RX ORDER — PREDNISONE 20 MG/1
20 TABLET ORAL
Qty: 30 TABLET | Refills: 1 | Status: SHIPPED | OUTPATIENT
Start: 2019-08-16 | End: 2019-08-15

## 2019-08-15 RX ORDER — METRONIDAZOLE 500 MG/1
500 TABLET ORAL EVERY 8 HOURS SCHEDULED
Qty: 13 TABLET | Refills: 0 | Status: SHIPPED | OUTPATIENT
Start: 2019-08-15 | End: 2019-08-20

## 2019-08-15 RX ORDER — MIRTAZAPINE 15 MG/1
15 TABLET, FILM COATED ORAL NIGHTLY
Qty: 30 TABLET | Refills: 2 | Status: SHIPPED | OUTPATIENT
Start: 2019-08-15

## 2019-08-15 RX ORDER — RISPERIDONE 0.5 MG/1
0.5 TABLET ORAL EVERY 12 HOURS SCHEDULED
Qty: 60 TABLET | Refills: 2 | Status: SHIPPED | OUTPATIENT
Start: 2019-08-15

## 2019-08-15 RX ORDER — DOXYCYCLINE 100 MG/1
100 CAPSULE ORAL EVERY 12 HOURS SCHEDULED
Qty: 8 CAPSULE | Refills: 0 | Status: SHIPPED | OUTPATIENT
Start: 2019-08-15 | End: 2019-08-19

## 2019-08-15 RX ORDER — L.ACID,PARA/B.BIFIDUM/S.THERM 8B CELL
1 CAPSULE ORAL DAILY
Qty: 30 CAPSULE | Refills: 2 | Status: SHIPPED | OUTPATIENT
Start: 2019-08-16

## 2019-08-15 RX ADMIN — DOXYCYCLINE 100 MG: 100 CAPSULE ORAL at 09:12

## 2019-08-15 RX ADMIN — MEGESTROL ACETATE 40 MG: 40 TABLET ORAL at 09:13

## 2019-08-15 RX ADMIN — RISPERIDONE 0.5 MG: 0.25 TABLET, FILM COATED ORAL at 09:13

## 2019-08-15 RX ADMIN — CEFDINIR 300 MG: 300 CAPSULE ORAL at 09:13

## 2019-08-15 RX ADMIN — PANTOPRAZOLE SODIUM 40 MG: 40 TABLET, DELAYED RELEASE ORAL at 06:00

## 2019-08-15 RX ADMIN — BUDESONIDE 0.5 MG: 0.5 INHALANT RESPIRATORY (INHALATION) at 07:13

## 2019-08-15 RX ADMIN — METOPROLOL SUCCINATE 25 MG: 25 TABLET, FILM COATED, EXTENDED RELEASE ORAL at 09:13

## 2019-08-15 RX ADMIN — Medication 1 CAPSULE: at 09:13

## 2019-08-15 RX ADMIN — METRONIDAZOLE 500 MG: 250 TABLET ORAL at 06:00

## 2019-08-15 RX ADMIN — ARFORMOTEROL TARTRATE 15 MCG: 15 SOLUTION RESPIRATORY (INHALATION) at 07:13

## 2019-08-15 RX ADMIN — PREDNISONE 20 MG: 20 TABLET ORAL at 09:12

## 2019-08-23 ENCOUNTER — EPISODE CHANGES (OUTPATIENT)
Dept: CASE MANAGEMENT | Facility: OTHER | Age: 79
End: 2019-08-23

## 2020-02-14 NOTE — PLAN OF CARE
Patient was mailed instructions for colon mac  on 3/26/20  at 9 am  at [facility] , will  prep on 3/19/20.         Problem: Inpatient Physical Therapy  Goal: Bed Mobility Goal LTG- PT  Outcome: Ongoing (interventions implemented as appropriate)    07/20/17 1730   Bed Mobility PT LTG   Bed Mobility PT LTG, Date Established 07/20/17   Bed Mobility PT LTG, Time to Achieve 3 days   Bed Mobility PT LTG, Activity Type all bed mobility   Bed Mobility PT LTG, [place] Level minimum assist (75% patient effort)   Bed Mobility PT Goal LTG, Assist Device bed rails       Goal: Transfer Training Goal 1 LTG- PT  Outcome: Ongoing (interventions implemented as appropriate)    07/20/17 1730   Transfer Training PT LTG   Transfer Training PT LTG, Date Established 07/20/17   Transfer Training PT LTG, Time to Achieve 3 days   Transfer Training PT LTG, Activity Type all transfers   Transfer Training PT LTG, [place] Level minimum assist (75% patient effort)   Transfer Training PT LTG, Assist Device walker, rolling       Goal: Gait Training Goal LTG- PT  Outcome: Ongoing (interventions implemented as appropriate)    07/20/17 1730   Gait Training PT LTG   Gait Training Goal PT LTG, Date Established 07/20/17   Gait Training Goal PT LTG, Time to Achieve 3 days   Gait Training Goal PT LTG, [place] Level minimum assist (75% patient effort)   Gait Training Goal PT LTG, Assist Device walker, rolling   Gait Training Goal PT LTG, Distance to Achieve 20

## 2021-02-12 DIAGNOSIS — Z23 IMMUNIZATION DUE: ICD-10-CM

## 2022-01-01 NOTE — OUTREACH NOTE
SNF Follow-up Note    Skilled Nursing Facility Discharge Assessment 7/3/2019   Acute Facility Discharged From [place]   Acute Discharge Date 6/28/2019   Name of the Skilled Nursing Facility? [facility]   Tier Level of the Skilled Nursing Facility 2   Purpose of SNF Admission PT;OT   Estimated length of stay for the patient? TBD   Who is the insurance provider or payor of patient stay? Medicare   Progression of Patient? Spoke with Bella, pt is still under Medicare; no dc date yet       Angie Adams RN    7/3/2019, 1:47 PM  
Statement Selected

## (undated) DEVICE — FRCP BX RADJAW4 NDL 2.8 240CM LG OG BX40

## (undated) DEVICE — Device: Brand: DEFENDO AIR/WATER/SUCTION AND BIOPSY VALVE

## (undated) DEVICE — Device

## (undated) DEVICE — SPNG GZ WOVN 4X4IN 12PLY 10/BX STRL

## (undated) DEVICE — TUBING, SUCTION, 1/4" X 20', STRAIGHT: Brand: MEDLINE INDUSTRIES, INC.

## (undated) DEVICE — PERCUTANEOUS ENDOSCOPIC GASTROSTOMY KIT: Brand: ENDOVIVE SAFETY PEG KIT

## (undated) DEVICE — THE BITE BLOCK MAXI, LATEX FREE STRAP IS USED TO PROTECT THE ENDOSCOPE INSERTION TUBE FROM BEING BITTEN BY THE PATIENT.

## (undated) DEVICE — SINGLE PORT MANIFOLD: Brand: NEPTUNE 2